# Patient Record
Sex: MALE | Race: WHITE | NOT HISPANIC OR LATINO | Employment: OTHER | URBAN - NONMETROPOLITAN AREA
[De-identification: names, ages, dates, MRNs, and addresses within clinical notes are randomized per-mention and may not be internally consistent; named-entity substitution may affect disease eponyms.]

---

## 2017-04-04 ENCOUNTER — APPOINTMENT (OUTPATIENT)
Dept: GENERAL RADIOLOGY | Facility: HOSPITAL | Age: 57
End: 2017-04-04

## 2017-04-04 ENCOUNTER — HOSPITAL ENCOUNTER (EMERGENCY)
Facility: HOSPITAL | Age: 57
Discharge: HOME | End: 2017-04-04
Admitting: FAMILY MEDICINE

## 2017-04-04 VITALS
HEART RATE: 95 BPM | OXYGEN SATURATION: 96 % | RESPIRATION RATE: 14 BRPM | WEIGHT: 181.4 LBS | SYSTOLIC BLOOD PRESSURE: 123 MMHG | TEMPERATURE: 98.2 F | HEIGHT: 70 IN | DIASTOLIC BLOOD PRESSURE: 70 MMHG | BODY MASS INDEX: 25.97 KG/M2

## 2017-04-04 DIAGNOSIS — M25.431 WRIST SWELLING, RIGHT: Primary | ICD-10-CM

## 2017-04-04 DIAGNOSIS — M25.532 LEFT WRIST PAIN: ICD-10-CM

## 2017-04-04 PROCEDURE — 96372 THER/PROPH/DIAG INJ SC/IM: CPT

## 2017-04-04 PROCEDURE — 25010000002 KETOROLAC TROMETHAMINE PER 15 MG: Performed by: PHYSICIAN ASSISTANT

## 2017-04-04 PROCEDURE — 99283 EMERGENCY DEPT VISIT LOW MDM: CPT

## 2017-04-04 PROCEDURE — 73110 X-RAY EXAM OF WRIST: CPT

## 2017-04-04 RX ORDER — MELOXICAM 7.5 MG/1
7.5 TABLET ORAL DAILY
Qty: 30 TABLET | Refills: 0 | Status: ON HOLD | OUTPATIENT
Start: 2017-04-04 | End: 2021-05-06

## 2017-04-04 RX ORDER — POTASSIUM CHLORIDE 750 MG/1
10 TABLET, EXTENDED RELEASE ORAL 2 TIMES DAILY
COMMUNITY
End: 2019-09-06

## 2017-04-04 RX ORDER — KETOROLAC TROMETHAMINE 30 MG/ML
60 INJECTION, SOLUTION INTRAMUSCULAR; INTRAVENOUS ONCE
Status: COMPLETED | OUTPATIENT
Start: 2017-04-04 | End: 2017-04-04

## 2017-04-04 RX ADMIN — KETOROLAC TROMETHAMINE 60 MG: 30 INJECTION, SOLUTION INTRAMUSCULAR at 14:44

## 2017-04-04 NOTE — DISCHARGE INSTRUCTIONS
Rest, ice, elevate, compress. followup with PCP and/or orthopedic surgeon. Avoid painful movement.

## 2017-04-04 NOTE — ED PROVIDER NOTES
"Subjective   Patient is a 56 y.o. male presenting with upper extremity pain.   Upper Extremity Issue     Patient is a pleasant 56-year-old male with chief complaint of left wrist swelling for the past 5 days.  He denies any trauma.  He reports a fall 1 month ago without any immediate pain.  For the past 5 days, he noticed tenderness to the distal aspect of his left wrist and some swelling.  He reports pain with any type of movement of his wrist.  He denied taking medication to alleviate symptoms.  He has not iced it.  He denies any fever or injections.  He has not sought medical attention until today.  He is right-hand dominant. He denies any other areas of swelling. He has chronic numbness to his extremities that is unchanged.     Review of Systems   Constitutional: Negative.    HENT: Negative.    Respiratory: Negative.    Cardiovascular: Negative.    Genitourinary: Negative.    Musculoskeletal:        Patient has chronic left knee pain.    Neurological: Negative.        History reviewed. No pertinent past medical history.    Allergies   Allergen Reactions   • Penicillins      Pt reports he does not know what kind of reaction he has, states \"I'm highly allergic.\"       Past Surgical History:   Procedure Laterality Date   • CYST REMOVAL     • KNEE SURGERY         History reviewed. No pertinent family history.    Social History     Social History   • Marital status:      Spouse name: N/A   • Number of children: N/A   • Years of education: N/A     Social History Main Topics   • Smoking status: Current Every Day Smoker     Packs/day: 1.00     Types: Cigarettes   • Smokeless tobacco: None   • Alcohol use Yes      Comment: Occasional   • Drug use: Yes     Special: Marijuana   • Sexual activity: Defer     Other Topics Concern   • None     Social History Narrative   • None       Prior to Admission medications    Medication Sig Start Date End Date Taking? Authorizing Provider   potassium chloride (K-DUR,KLOR-CON) 10 " "MEQ CR tablet Take 10 mEq by mouth 2 (Two) Times a Day.   Yes Historical Provider, MD       Medications   ketorolac (TORADOL) injection 60 mg (60 mg Intramuscular Given 4/4/17 1444)       /70 (Patient Position: Sitting)  Pulse 95  Temp 98.2 °F (36.8 °C) (Temporal Artery )   Resp 14  Ht 70\" (177.8 cm)  Wt 181 lb 6.4 oz (82.3 kg)  SpO2 96%  BMI 26.03 kg/m2      Objective   Physical Exam   Constitutional: He is oriented to person, place, and time. He appears well-developed and well-nourished.  Non-toxic appearance. No distress.   HENT:   Head: Normocephalic and atraumatic.   Nose: Nose normal.   Mouth/Throat: Uvula is midline, oropharynx is clear and moist and mucous membranes are normal.   Eyes: Conjunctivae, EOM and lids are normal. Pupils are equal, round, and reactive to light. Lids are everted and swept, no foreign bodies found.   Neck: Trachea normal, normal range of motion, full passive range of motion without pain and phonation normal. Neck supple. Normal carotid pulses and no JVD present. Carotid bruit is not present. No rigidity.   Cardiovascular: Normal rate, regular rhythm, normal heart sounds, intact distal pulses and normal pulses.    Pulmonary/Chest: Effort normal and breath sounds normal. No stridor. No apnea and no tachypnea. No respiratory distress.   Abdominal: Normal appearance and normal aorta. There is no hepatosplenomegaly.   Musculoskeletal: He exhibits edema and tenderness.   Patient has mild-moderate localized not well demarcated swelling (about the size of a lime) to his left distal wrist moreso along the radial region. It's fleshy, nonerythematous, mildly tender to touch.  Pain with ulnar and radial deviation. No pain to touch to his hand, left elbow, or left shoulder. He has full range of motion.        Vascular Status -  His exam exhibits right foot vasculature normal. His exam exhibits no right foot edema. His exam exhibits left foot vasculature normal. His exam exhibits no " left foot edema.  Neurological: He is alert and oriented to person, place, and time. He has normal strength. He displays normal reflexes. No cranial nerve deficit or sensory deficit. Gait normal. GCS eye subscore is 4. GCS verbal subscore is 5. GCS motor subscore is 6.   Skin: Skin is warm, dry and intact. He is not diaphoretic. No pallor.   Psychiatric: He has a normal mood and affect. His speech is normal and behavior is normal.   Nursing note and vitals reviewed.      Procedures         Lab Results (last 24 hours)     ** No results found for the last 24 hours. **          Xr Wrist 3+ View Left    Result Date: 4/4/2017  Narrative: EXAMINATION:  XR WRIST 3+ VW LEFT-  4/4/2017 3:22 PM EDT  HISTORY: Left wrist pain.  COMPARISON: No comparison study.  TECHNIQUE: 3 views were obtained.  FINDINGS:  There is no displaced fracture. There may be slight anterior subluxation of the distal ulna on the lateral view. Correlate with location of pain.      Impression: 1. No fracture is identified. 2. There may be slight anterior subluxation of the distal ulna on the lateral view. Correlate clinically.  This report was finalized on 04/04/2017 14:32 by Dr. Barrington Fajardo MD.      ED Course  ED Course        I reviewed this case with Dr. Luna who has reviewed the xray. The patient does NOT have any point tenderness or pain to his ulnar region.    MDM    Final diagnoses:   Wrist swelling, right   Left wrist pain          Thu-MAYA Gotti  04/04/17 0071

## 2017-07-17 ENCOUNTER — HOSPITAL ENCOUNTER (OUTPATIENT)
Dept: MRI IMAGING | Age: 57
Discharge: HOME | End: 2017-07-17
Payer: MEDICARE

## 2017-07-17 DIAGNOSIS — M25.532 ACUTE PAIN OF LEFT WRIST: ICD-10-CM

## 2017-07-17 PROCEDURE — 73221 MRI JOINT UPR EXTREM W/O DYE: CPT

## 2017-08-09 ENCOUNTER — OFFICE VISIT (OUTPATIENT)
Dept: GASTROENTEROLOGY | Age: 57
End: 2017-08-09
Payer: MEDICARE

## 2017-08-09 VITALS
RESPIRATION RATE: 18 BRPM | HEIGHT: 70 IN | HEART RATE: 63 BPM | WEIGHT: 171 LBS | DIASTOLIC BLOOD PRESSURE: 80 MMHG | SYSTOLIC BLOOD PRESSURE: 132 MMHG | OXYGEN SATURATION: 97 % | BODY MASS INDEX: 24.48 KG/M2

## 2017-08-09 DIAGNOSIS — K64.9 HEMORRHOIDS, UNSPECIFIED HEMORRHOID TYPE: ICD-10-CM

## 2017-08-09 DIAGNOSIS — Z86.010 HISTORY OF ADENOMATOUS POLYP OF COLON: Primary | ICD-10-CM

## 2017-08-09 PROCEDURE — 3017F COLORECTAL CA SCREEN DOC REV: CPT | Performed by: NURSE PRACTITIONER

## 2017-08-09 PROCEDURE — 99203 OFFICE O/P NEW LOW 30 MIN: CPT | Performed by: NURSE PRACTITIONER

## 2017-08-09 PROCEDURE — 4004F PT TOBACCO SCREEN RCVD TLK: CPT | Performed by: NURSE PRACTITIONER

## 2017-08-09 PROCEDURE — G8427 DOCREV CUR MEDS BY ELIG CLIN: HCPCS | Performed by: NURSE PRACTITIONER

## 2017-08-09 PROCEDURE — G8420 CALC BMI NORM PARAMETERS: HCPCS | Performed by: NURSE PRACTITIONER

## 2017-08-09 ASSESSMENT — ENCOUNTER SYMPTOMS
CHEST TIGHTNESS: 0
RECTAL PAIN: 0
NAUSEA: 0
BACK PAIN: 1
VOICE CHANGE: 0
SHORTNESS OF BREATH: 1
COUGH: 1
VOMITING: 0
ABDOMINAL DISTENTION: 0
SORE THROAT: 0
CONSTIPATION: 1
ABDOMINAL PAIN: 0
DIARRHEA: 0
BLOOD IN STOOL: 0

## 2017-08-22 ENCOUNTER — ANESTHESIA EVENT (OUTPATIENT)
Dept: OPERATING ROOM | Age: 57
End: 2017-08-22

## 2017-08-30 ENCOUNTER — HOSPITAL ENCOUNTER (OUTPATIENT)
Age: 57
Setting detail: OUTPATIENT SURGERY
Discharge: HOME | End: 2017-08-30
Attending: INTERNAL MEDICINE | Admitting: INTERNAL MEDICINE
Payer: MEDICARE

## 2017-08-30 ENCOUNTER — ANESTHESIA (OUTPATIENT)
Dept: OPERATING ROOM | Age: 57
End: 2017-08-30
Payer: MEDICARE

## 2017-08-30 ENCOUNTER — HOSPITAL ENCOUNTER (OUTPATIENT)
Age: 57
Setting detail: SPECIMEN
Discharge: HOME | End: 2017-08-30
Payer: MEDICARE

## 2017-08-30 VITALS — SYSTOLIC BLOOD PRESSURE: 97 MMHG | OXYGEN SATURATION: 98 % | DIASTOLIC BLOOD PRESSURE: 54 MMHG

## 2017-08-30 VITALS
OXYGEN SATURATION: 95 % | HEIGHT: 69 IN | SYSTOLIC BLOOD PRESSURE: 93 MMHG | BODY MASS INDEX: 25.18 KG/M2 | WEIGHT: 170 LBS | DIASTOLIC BLOOD PRESSURE: 45 MMHG | TEMPERATURE: 97 F | RESPIRATION RATE: 18 BRPM | HEART RATE: 65 BPM

## 2017-08-30 PROCEDURE — 45385 COLONOSCOPY W/LESION REMOVAL: CPT

## 2017-08-30 PROCEDURE — 88305 TISSUE EXAM BY PATHOLOGIST: CPT

## 2017-08-30 PROCEDURE — G8907 PT DOC NO EVENTS ON DISCHARG: HCPCS

## 2017-08-30 PROCEDURE — 00810 PR ANESTH,INTESTINE,SCOPE,LOW: CPT | Performed by: NURSE ANESTHETIST, CERTIFIED REGISTERED

## 2017-08-30 PROCEDURE — 45385 COLONOSCOPY W/LESION REMOVAL: CPT | Performed by: INTERNAL MEDICINE

## 2017-08-30 PROCEDURE — G8918 PT W/O PREOP ORDER IV AB PRO: HCPCS

## 2017-08-30 RX ORDER — SODIUM CHLORIDE, SODIUM LACTATE, POTASSIUM CHLORIDE, CALCIUM CHLORIDE 600; 310; 30; 20 MG/100ML; MG/100ML; MG/100ML; MG/100ML
INJECTION, SOLUTION INTRAVENOUS CONTINUOUS
Status: DISCONTINUED | OUTPATIENT
Start: 2017-08-30 | End: 2017-08-30 | Stop reason: HOSPADM

## 2017-08-30 RX ORDER — PROPOFOL 10 MG/ML
INJECTION, EMULSION INTRAVENOUS PRN
Status: DISCONTINUED | OUTPATIENT
Start: 2017-08-30 | End: 2017-08-30 | Stop reason: SDUPTHER

## 2017-08-30 RX ORDER — SODIUM CHLORIDE 9 MG/ML
INJECTION, SOLUTION INTRAVENOUS CONTINUOUS
Status: DISCONTINUED | OUTPATIENT
Start: 2017-08-30 | End: 2017-08-30 | Stop reason: HOSPADM

## 2017-08-30 RX ORDER — LIDOCAINE HYDROCHLORIDE 10 MG/ML
1 INJECTION, SOLUTION EPIDURAL; INFILTRATION; INTRACAUDAL; PERINEURAL
Status: DISCONTINUED | OUTPATIENT
Start: 2017-08-30 | End: 2017-08-30 | Stop reason: HOSPADM

## 2017-08-30 RX ADMIN — PROPOFOL 200 MG: 10 INJECTION, EMULSION INTRAVENOUS at 12:50

## 2017-08-30 RX ADMIN — SODIUM CHLORIDE: 9 INJECTION, SOLUTION INTRAVENOUS at 11:54

## 2017-10-26 ENCOUNTER — HOSPITAL ENCOUNTER (OUTPATIENT)
Dept: MRI IMAGING | Age: 57
Discharge: HOME | End: 2017-10-26
Payer: MEDICARE

## 2017-10-26 DIAGNOSIS — M54.2 CERVICALGIA: ICD-10-CM

## 2017-10-26 PROCEDURE — 72141 MRI NECK SPINE W/O DYE: CPT

## 2017-12-07 ENCOUNTER — HOSPITAL ENCOUNTER (OUTPATIENT)
Dept: GENERAL RADIOLOGY | Age: 57
Discharge: HOME | End: 2017-12-07
Payer: MEDICARE

## 2017-12-07 ENCOUNTER — HOSPITAL ENCOUNTER (OUTPATIENT)
Dept: PREADMISSION TESTING | Age: 57
Discharge: HOME | End: 2017-12-07
Payer: MEDICARE

## 2017-12-07 VITALS — BODY MASS INDEX: 25.62 KG/M2 | HEIGHT: 70 IN | WEIGHT: 179 LBS

## 2017-12-07 LAB
ALBUMIN SERPL-MCNC: 4.8 G/DL (ref 3.5–5.2)
ALP BLD-CCNC: 84 U/L (ref 40–130)
ALT SERPL-CCNC: 19 U/L (ref 5–41)
ANION GAP SERPL CALCULATED.3IONS-SCNC: 15 MMOL/L (ref 7–19)
APTT: 33 SEC (ref 26–36.2)
AST SERPL-CCNC: 17 U/L (ref 5–40)
BASOPHILS ABSOLUTE: 0 K/UL (ref 0–0.2)
BASOPHILS RELATIVE PERCENT: 0.4 % (ref 0–1)
BILIRUB SERPL-MCNC: 0.6 MG/DL (ref 0.2–1.2)
BILIRUBIN URINE: NEGATIVE
BLOOD, URINE: NEGATIVE
BUN BLDV-MCNC: 10 MG/DL (ref 6–20)
CALCIUM SERPL-MCNC: 9.5 MG/DL (ref 8.6–10)
CHLORIDE BLD-SCNC: 104 MMOL/L (ref 98–111)
CLARITY: CLEAR
CO2: 26 MMOL/L (ref 22–29)
COLOR: YELLOW
CREAT SERPL-MCNC: 0.6 MG/DL (ref 0.5–1.2)
EOSINOPHILS ABSOLUTE: 0.2 K/UL (ref 0–0.6)
EOSINOPHILS RELATIVE PERCENT: 2.7 % (ref 0–5)
GFR NON-AFRICAN AMERICAN: >60
GLUCOSE BLD-MCNC: 75 MG/DL (ref 74–109)
GLUCOSE URINE: NEGATIVE MG/DL
HCT VFR BLD CALC: 49.9 % (ref 42–52)
HEMOGLOBIN: 16.1 G/DL (ref 14–18)
INR BLD: 0.99 (ref 0.88–1.18)
KETONES, URINE: NEGATIVE MG/DL
LEUKOCYTE ESTERASE, URINE: NEGATIVE
LYMPHOCYTES ABSOLUTE: 3.5 K/UL (ref 1.1–4.5)
LYMPHOCYTES RELATIVE PERCENT: 38.7 % (ref 20–40)
MCH RBC QN AUTO: 30 PG (ref 27–31)
MCHC RBC AUTO-ENTMCNC: 32.3 G/DL (ref 33–37)
MCV RBC AUTO: 93.1 FL (ref 80–94)
MONOCYTES ABSOLUTE: 0.8 K/UL (ref 0–0.9)
MONOCYTES RELATIVE PERCENT: 8.5 % (ref 0–10)
NEUTROPHILS ABSOLUTE: 4.5 K/UL (ref 1.5–7.5)
NEUTROPHILS RELATIVE PERCENT: 49.5 % (ref 50–65)
NITRITE, URINE: NEGATIVE
PDW BLD-RTO: 13.3 % (ref 11.5–14.5)
PH UA: 7.5
PLATELET # BLD: 274 K/UL (ref 130–400)
PMV BLD AUTO: 11.9 FL (ref 9.4–12.4)
POTASSIUM SERPL-SCNC: 3.8 MMOL/L (ref 3.5–5)
PROTEIN UA: NEGATIVE MG/DL
PROTHROMBIN TIME: 13 SEC (ref 12–14.6)
RBC # BLD: 5.36 M/UL (ref 4.7–6.1)
SODIUM BLD-SCNC: 145 MMOL/L (ref 136–145)
SPECIFIC GRAVITY UA: 1.01
TOTAL PROTEIN: 7.3 G/DL (ref 6.6–8.7)
UROBILINOGEN, URINE: 0.2 E.U./DL
WBC # BLD: 9 K/UL (ref 4.8–10.8)

## 2017-12-07 PROCEDURE — 93005 ELECTROCARDIOGRAM TRACING: CPT

## 2017-12-07 PROCEDURE — 85730 THROMBOPLASTIN TIME PARTIAL: CPT

## 2017-12-07 PROCEDURE — 71020 XR CHEST STANDARD TWO VW: CPT

## 2017-12-07 PROCEDURE — 81003 URINALYSIS AUTO W/O SCOPE: CPT

## 2017-12-07 PROCEDURE — 85610 PROTHROMBIN TIME: CPT

## 2017-12-07 PROCEDURE — 87070 CULTURE OTHR SPECIMN AEROBIC: CPT

## 2017-12-07 PROCEDURE — 85025 COMPLETE CBC W/AUTO DIFF WBC: CPT

## 2017-12-07 PROCEDURE — 80053 COMPREHEN METABOLIC PANEL: CPT

## 2017-12-08 LAB — MRSA CULTURE ONLY: NORMAL

## 2017-12-11 LAB
EKG P AXIS: 75 DEGREES
EKG P-R INTERVAL: 178 MS
EKG Q-T INTERVAL: 444 MS
EKG QRS DURATION: 110 MS
EKG QTC CALCULATION (BAZETT): 430 MS
EKG T AXIS: 45 DEGREES

## 2017-12-12 ENCOUNTER — APPOINTMENT (OUTPATIENT)
Dept: GENERAL RADIOLOGY | Age: 57
DRG: 473 | End: 2017-12-12
Payer: MEDICARE

## 2017-12-12 ENCOUNTER — ANESTHESIA (OUTPATIENT)
Dept: OPERATING ROOM | Age: 57
DRG: 473 | End: 2017-12-12
Payer: MEDICARE

## 2017-12-12 ENCOUNTER — ANESTHESIA EVENT (OUTPATIENT)
Dept: OPERATING ROOM | Age: 57
DRG: 473 | End: 2017-12-12
Payer: MEDICARE

## 2017-12-12 ENCOUNTER — HOSPITAL ENCOUNTER (INPATIENT)
Age: 57
LOS: 1 days | Discharge: HOME | DRG: 473 | End: 2017-12-13
Payer: MEDICARE

## 2017-12-12 VITALS
SYSTOLIC BLOOD PRESSURE: 78 MMHG | TEMPERATURE: 98.6 F | RESPIRATION RATE: 6 BRPM | OXYGEN SATURATION: 100 % | DIASTOLIC BLOOD PRESSURE: 37 MMHG

## 2017-12-12 PROBLEM — G95.9 MYELOPATHY OF CERVICAL SPINAL CORD WITH CERVICAL RADICULOPATHY (HCC): Status: ACTIVE | Noted: 2017-12-12

## 2017-12-12 PROBLEM — G95.9 CERVICAL MYELOPATHY (HCC): Status: ACTIVE | Noted: 2017-12-12

## 2017-12-12 PROBLEM — M54.2 CHRONIC NECK PAIN: Status: ACTIVE | Noted: 2017-12-12

## 2017-12-12 PROBLEM — M54.12 MYELOPATHY OF CERVICAL SPINAL CORD WITH CERVICAL RADICULOPATHY (HCC): Status: ACTIVE | Noted: 2017-12-12

## 2017-12-12 PROBLEM — G89.29 CHRONIC NECK PAIN: Status: ACTIVE | Noted: 2017-12-12

## 2017-12-12 PROBLEM — M50.321 DEGENERATION OF INTERVERTEBRAL DISC AT C4-C5 LEVEL: Status: ACTIVE | Noted: 2017-12-12

## 2017-12-12 LAB
ABO/RH: NORMAL
ANTIBODY SCREEN: NORMAL

## 2017-12-12 PROCEDURE — 6360000002 HC RX W HCPCS: Performed by: ANESTHESIOLOGY

## 2017-12-12 PROCEDURE — C1713 ANCHOR/SCREW BN/BN,TIS/BN: HCPCS

## 2017-12-12 PROCEDURE — 2720000001 HC MISC SURG SUPPLY STERILE $51-500

## 2017-12-12 PROCEDURE — 7100000000 HC PACU RECOVERY - FIRST 15 MIN

## 2017-12-12 PROCEDURE — 6360000002 HC RX W HCPCS: Performed by: NURSE ANESTHETIST, CERTIFIED REGISTERED

## 2017-12-12 PROCEDURE — XRG20F3 FUSION OF 2 OR MORE CERVICAL VERTEBRAL JOINTS USING RADIOLUCENT POROUS INTERBODY FUSION DEVICE, OPEN APPROACH, NEW TECHNOLOGY GROUP 3: ICD-10-PCS

## 2017-12-12 PROCEDURE — 6360000002 HC RX W HCPCS: Performed by: PHYSICIAN ASSISTANT

## 2017-12-12 PROCEDURE — 3600000005 HC SURGERY LEVEL 5 BASE

## 2017-12-12 PROCEDURE — 2500000003 HC RX 250 WO HCPCS: Performed by: NURSE ANESTHETIST, CERTIFIED REGISTERED

## 2017-12-12 PROCEDURE — 86900 BLOOD TYPING SEROLOGIC ABO: CPT

## 2017-12-12 PROCEDURE — 86901 BLOOD TYPING SEROLOGIC RH(D): CPT

## 2017-12-12 PROCEDURE — 3209999900 FLUORO FOR SURGICAL PROCEDURES

## 2017-12-12 PROCEDURE — G8978 MOBILITY CURRENT STATUS: HCPCS

## 2017-12-12 PROCEDURE — 6370000000 HC RX 637 (ALT 250 FOR IP): Performed by: ANESTHESIOLOGY

## 2017-12-12 PROCEDURE — 97161 PT EVAL LOW COMPLEX 20 MIN: CPT

## 2017-12-12 PROCEDURE — 1210000000 HC MED SURG R&B

## 2017-12-12 PROCEDURE — 94640 AIRWAY INHALATION TREATMENT: CPT

## 2017-12-12 PROCEDURE — 86850 RBC ANTIBODY SCREEN: CPT

## 2017-12-12 PROCEDURE — 2720000010 HC SURG SUPPLY STERILE

## 2017-12-12 PROCEDURE — 36415 COLL VENOUS BLD VENIPUNCTURE: CPT

## 2017-12-12 PROCEDURE — 7100000001 HC PACU RECOVERY - ADDTL 15 MIN

## 2017-12-12 PROCEDURE — G8979 MOBILITY GOAL STATUS: HCPCS

## 2017-12-12 PROCEDURE — 3700000001 HC ADD 15 MINUTES (ANESTHESIA)

## 2017-12-12 PROCEDURE — 3700000000 HC ANESTHESIA ATTENDED CARE

## 2017-12-12 PROCEDURE — 3600000015 HC SURGERY LEVEL 5 ADDTL 15MIN

## 2017-12-12 PROCEDURE — 2580000003 HC RX 258: Performed by: PHYSICIAN ASSISTANT

## 2017-12-12 PROCEDURE — 2580000003 HC RX 258: Performed by: ANESTHESIOLOGY

## 2017-12-12 PROCEDURE — 94762 N-INVAS EAR/PLS OXIMTRY CONT: CPT

## 2017-12-12 PROCEDURE — 6360000002 HC RX W HCPCS

## 2017-12-12 PROCEDURE — 94664 DEMO&/EVAL PT USE INHALER: CPT

## 2017-12-12 PROCEDURE — L8699 PROSTHETIC IMPLANT NOS: HCPCS

## 2017-12-12 PROCEDURE — 2500000003 HC RX 250 WO HCPCS

## 2017-12-12 PROCEDURE — 0RT30ZZ RESECTION OF CERVICAL VERTEBRAL DISC, OPEN APPROACH: ICD-10-PCS

## 2017-12-12 PROCEDURE — 6370000000 HC RX 637 (ALT 250 FOR IP): Performed by: PHYSICIAN ASSISTANT

## 2017-12-12 DEVICE — CAGE SPNL W14XH8XL16MM FOOTPRINT 7DEG LORDOSIS CERV COHERE: Type: IMPLANTABLE DEVICE | Site: SPINE CERVICAL | Status: FUNCTIONAL

## 2017-12-12 DEVICE — PRIMAGEN 1CC: Type: IMPLANTABLE DEVICE | Site: SPINE CERVICAL | Status: FUNCTIONAL

## 2017-12-12 DEVICE — IMPLANTABLE DEVICE: Type: IMPLANTABLE DEVICE | Site: SPINE CERVICAL | Status: FUNCTIONAL

## 2017-12-12 DEVICE — AGENT HEMSTAT 8ML FLX TIP MTRX + DISP SURGIFLO: Type: IMPLANTABLE DEVICE | Site: SPINE CERVICAL | Status: FUNCTIONAL

## 2017-12-12 RX ORDER — IPRATROPIUM BROMIDE AND ALBUTEROL SULFATE 2.5; .5 MG/3ML; MG/3ML
1 SOLUTION RESPIRATORY (INHALATION) ONCE
Status: COMPLETED | OUTPATIENT
Start: 2017-12-12 | End: 2017-12-12

## 2017-12-12 RX ORDER — METOCLOPRAMIDE HYDROCHLORIDE 5 MG/ML
10 INJECTION INTRAMUSCULAR; INTRAVENOUS
Status: DISCONTINUED | OUTPATIENT
Start: 2017-12-12 | End: 2017-12-12 | Stop reason: HOSPADM

## 2017-12-12 RX ORDER — NICOTINE 21 MG/24HR
1 PATCH, TRANSDERMAL 24 HOURS TRANSDERMAL DAILY
Status: DISCONTINUED | OUTPATIENT
Start: 2017-12-12 | End: 2017-12-13 | Stop reason: HOSPADM

## 2017-12-12 RX ORDER — ONDANSETRON 2 MG/ML
INJECTION INTRAMUSCULAR; INTRAVENOUS PRN
Status: DISCONTINUED | OUTPATIENT
Start: 2017-12-12 | End: 2017-12-12 | Stop reason: SDUPTHER

## 2017-12-12 RX ORDER — SUFENTANIL CITRATE 50 UG/ML
INJECTION EPIDURAL; INTRAVENOUS PRN
Status: DISCONTINUED | OUTPATIENT
Start: 2017-12-12 | End: 2017-12-12 | Stop reason: SDUPTHER

## 2017-12-12 RX ORDER — MEPERIDINE HYDROCHLORIDE 50 MG/ML
12.5 INJECTION INTRAMUSCULAR; INTRAVENOUS; SUBCUTANEOUS EVERY 5 MIN PRN
Status: DISCONTINUED | OUTPATIENT
Start: 2017-12-12 | End: 2017-12-12 | Stop reason: HOSPADM

## 2017-12-12 RX ORDER — OXYCODONE HYDROCHLORIDE AND ACETAMINOPHEN 5; 325 MG/1; MG/1
1 TABLET ORAL EVERY 4 HOURS PRN
Status: DISCONTINUED | OUTPATIENT
Start: 2017-12-12 | End: 2017-12-13 | Stop reason: HOSPADM

## 2017-12-12 RX ORDER — FENTANYL CITRATE 50 UG/ML
50 INJECTION, SOLUTION INTRAMUSCULAR; INTRAVENOUS
Status: DISCONTINUED | OUTPATIENT
Start: 2017-12-12 | End: 2017-12-12 | Stop reason: HOSPADM

## 2017-12-12 RX ORDER — LABETALOL HYDROCHLORIDE 5 MG/ML
5 INJECTION, SOLUTION INTRAVENOUS EVERY 10 MIN PRN
Status: DISCONTINUED | OUTPATIENT
Start: 2017-12-12 | End: 2017-12-12 | Stop reason: HOSPADM

## 2017-12-12 RX ORDER — DEXAMETHASONE SODIUM PHOSPHATE 10 MG/ML
INJECTION INTRAMUSCULAR; INTRAVENOUS PRN
Status: DISCONTINUED | OUTPATIENT
Start: 2017-12-12 | End: 2017-12-12 | Stop reason: SDUPTHER

## 2017-12-12 RX ORDER — HYDRALAZINE HYDROCHLORIDE 20 MG/ML
5 INJECTION INTRAMUSCULAR; INTRAVENOUS EVERY 10 MIN PRN
Status: DISCONTINUED | OUTPATIENT
Start: 2017-12-12 | End: 2017-12-12 | Stop reason: HOSPADM

## 2017-12-12 RX ORDER — POLYETHYLENE GLYCOL 3350 17 G/17G
17 POWDER, FOR SOLUTION ORAL DAILY PRN
Status: DISCONTINUED | OUTPATIENT
Start: 2017-12-12 | End: 2017-12-13 | Stop reason: HOSPADM

## 2017-12-12 RX ORDER — FAMOTIDINE 20 MG/1
20 TABLET, FILM COATED ORAL 2 TIMES DAILY
Status: DISCONTINUED | OUTPATIENT
Start: 2017-12-12 | End: 2017-12-13 | Stop reason: HOSPADM

## 2017-12-12 RX ORDER — PROPOFOL 10 MG/ML
INJECTION, EMULSION INTRAVENOUS PRN
Status: DISCONTINUED | OUTPATIENT
Start: 2017-12-12 | End: 2017-12-12 | Stop reason: SDUPTHER

## 2017-12-12 RX ORDER — ACETAMINOPHEN 325 MG/1
650 TABLET ORAL EVERY 4 HOURS PRN
Status: DISCONTINUED | OUTPATIENT
Start: 2017-12-12 | End: 2017-12-13 | Stop reason: HOSPADM

## 2017-12-12 RX ORDER — SODIUM CHLORIDE 0.9 % (FLUSH) 0.9 %
10 SYRINGE (ML) INJECTION PRN
Status: DISCONTINUED | OUTPATIENT
Start: 2017-12-12 | End: 2017-12-12 | Stop reason: HOSPADM

## 2017-12-12 RX ORDER — SUCCINYLCHOLINE CHLORIDE 20 MG/ML
INJECTION INTRAMUSCULAR; INTRAVENOUS PRN
Status: DISCONTINUED | OUTPATIENT
Start: 2017-12-12 | End: 2017-12-12 | Stop reason: SDUPTHER

## 2017-12-12 RX ORDER — LIDOCAINE HYDROCHLORIDE 10 MG/ML
INJECTION, SOLUTION INFILTRATION; PERINEURAL PRN
Status: DISCONTINUED | OUTPATIENT
Start: 2017-12-12 | End: 2017-12-12 | Stop reason: SDUPTHER

## 2017-12-12 RX ORDER — MORPHINE SULFATE 1 MG/ML
4 INJECTION, SOLUTION EPIDURAL; INTRATHECAL; INTRAVENOUS EVERY 5 MIN PRN
Status: DISCONTINUED | OUTPATIENT
Start: 2017-12-12 | End: 2017-12-12 | Stop reason: HOSPADM

## 2017-12-12 RX ORDER — MIDAZOLAM HYDROCHLORIDE 1 MG/ML
2 INJECTION INTRAMUSCULAR; INTRAVENOUS
Status: COMPLETED | OUTPATIENT
Start: 2017-12-12 | End: 2017-12-12

## 2017-12-12 RX ORDER — OXYCODONE HYDROCHLORIDE AND ACETAMINOPHEN 5; 325 MG/1; MG/1
2 TABLET ORAL EVERY 4 HOURS PRN
Status: DISCONTINUED | OUTPATIENT
Start: 2017-12-12 | End: 2017-12-13 | Stop reason: HOSPADM

## 2017-12-12 RX ORDER — SODIUM CHLORIDE, SODIUM LACTATE, POTASSIUM CHLORIDE, CALCIUM CHLORIDE 600; 310; 30; 20 MG/100ML; MG/100ML; MG/100ML; MG/100ML
INJECTION, SOLUTION INTRAVENOUS CONTINUOUS
Status: DISCONTINUED | OUTPATIENT
Start: 2017-12-12 | End: 2017-12-12

## 2017-12-12 RX ORDER — SODIUM CHLORIDE 0.9 % (FLUSH) 0.9 %
10 SYRINGE (ML) INJECTION EVERY 12 HOURS SCHEDULED
Status: DISCONTINUED | OUTPATIENT
Start: 2017-12-12 | End: 2017-12-12 | Stop reason: HOSPADM

## 2017-12-12 RX ORDER — LIDOCAINE HYDROCHLORIDE 10 MG/ML
1 INJECTION, SOLUTION EPIDURAL; INFILTRATION; INTRACAUDAL; PERINEURAL
Status: DISCONTINUED | OUTPATIENT
Start: 2017-12-12 | End: 2017-12-12 | Stop reason: HOSPADM

## 2017-12-12 RX ORDER — PROPOFOL 10 MG/ML
INJECTION, EMULSION INTRAVENOUS CONTINUOUS PRN
Status: DISCONTINUED | OUTPATIENT
Start: 2017-12-12 | End: 2017-12-12 | Stop reason: SDUPTHER

## 2017-12-12 RX ORDER — MORPHINE SULFATE 1 MG/ML
2 INJECTION, SOLUTION EPIDURAL; INTRATHECAL; INTRAVENOUS EVERY 5 MIN PRN
Status: DISCONTINUED | OUTPATIENT
Start: 2017-12-12 | End: 2017-12-12 | Stop reason: HOSPADM

## 2017-12-12 RX ORDER — DIPHENHYDRAMINE HYDROCHLORIDE 50 MG/ML
12.5 INJECTION INTRAMUSCULAR; INTRAVENOUS
Status: DISCONTINUED | OUTPATIENT
Start: 2017-12-12 | End: 2017-12-12 | Stop reason: HOSPADM

## 2017-12-12 RX ORDER — ONDANSETRON 2 MG/ML
4 INJECTION INTRAMUSCULAR; INTRAVENOUS EVERY 6 HOURS PRN
Status: DISCONTINUED | OUTPATIENT
Start: 2017-12-12 | End: 2017-12-13 | Stop reason: HOSPADM

## 2017-12-12 RX ORDER — PROMETHAZINE HYDROCHLORIDE 25 MG/ML
6.25 INJECTION, SOLUTION INTRAMUSCULAR; INTRAVENOUS
Status: DISCONTINUED | OUTPATIENT
Start: 2017-12-12 | End: 2017-12-12 | Stop reason: HOSPADM

## 2017-12-12 RX ORDER — DOCUSATE SODIUM 100 MG/1
100 CAPSULE, LIQUID FILLED ORAL DAILY
Status: DISCONTINUED | OUTPATIENT
Start: 2017-12-12 | End: 2017-12-13 | Stop reason: HOSPADM

## 2017-12-12 RX ORDER — DIAZEPAM 5 MG/1
5 TABLET ORAL EVERY 6 HOURS PRN
Status: DISCONTINUED | OUTPATIENT
Start: 2017-12-12 | End: 2017-12-13 | Stop reason: HOSPADM

## 2017-12-12 RX ORDER — ENALAPRILAT 2.5 MG/2ML
1.25 INJECTION INTRAVENOUS
Status: DISCONTINUED | OUTPATIENT
Start: 2017-12-12 | End: 2017-12-12 | Stop reason: HOSPADM

## 2017-12-12 RX ORDER — MORPHINE SULFATE 4 MG/ML
2 INJECTION, SOLUTION INTRAMUSCULAR; INTRAVENOUS
Status: DISCONTINUED | OUTPATIENT
Start: 2017-12-12 | End: 2017-12-13 | Stop reason: HOSPADM

## 2017-12-12 RX ORDER — SODIUM CHLORIDE 9 MG/ML
INJECTION, SOLUTION INTRAVENOUS CONTINUOUS
Status: DISCONTINUED | OUTPATIENT
Start: 2017-12-12 | End: 2017-12-13 | Stop reason: HOSPADM

## 2017-12-12 RX ORDER — SCOLOPAMINE TRANSDERMAL SYSTEM 1 MG/1
1 PATCH, EXTENDED RELEASE TRANSDERMAL ONCE
Status: DISCONTINUED | OUTPATIENT
Start: 2017-12-12 | End: 2017-12-12 | Stop reason: HOSPADM

## 2017-12-12 RX ORDER — SODIUM CHLORIDE 0.9 % (FLUSH) 0.9 %
10 SYRINGE (ML) INJECTION PRN
Status: DISCONTINUED | OUTPATIENT
Start: 2017-12-12 | End: 2017-12-13 | Stop reason: HOSPADM

## 2017-12-12 RX ORDER — SODIUM CHLORIDE 0.9 % (FLUSH) 0.9 %
10 SYRINGE (ML) INJECTION EVERY 12 HOURS SCHEDULED
Status: DISCONTINUED | OUTPATIENT
Start: 2017-12-12 | End: 2017-12-13 | Stop reason: HOSPADM

## 2017-12-12 RX ORDER — MORPHINE SULFATE 4 MG/ML
4 INJECTION, SOLUTION INTRAMUSCULAR; INTRAVENOUS
Status: DISCONTINUED | OUTPATIENT
Start: 2017-12-12 | End: 2017-12-13 | Stop reason: HOSPADM

## 2017-12-12 RX ADMIN — MIDAZOLAM 2 MG: 1 INJECTION INTRAMUSCULAR; INTRAVENOUS at 07:34

## 2017-12-12 RX ADMIN — PHENYLEPHRINE HYDROCHLORIDE 100 MCG: 10 INJECTION INTRAVENOUS at 09:14

## 2017-12-12 RX ADMIN — OXYCODONE HYDROCHLORIDE AND ACETAMINOPHEN 2 TABLET: 5; 325 TABLET ORAL at 17:58

## 2017-12-12 RX ADMIN — IPRATROPIUM BROMIDE AND ALBUTEROL SULFATE 1 AMPULE: .5; 3 SOLUTION RESPIRATORY (INHALATION) at 07:19

## 2017-12-12 RX ADMIN — DOCUSATE SODIUM 100 MG: 100 CAPSULE, LIQUID FILLED ORAL at 12:45

## 2017-12-12 RX ADMIN — PHENYLEPHRINE HYDROCHLORIDE 200 MCG: 10 INJECTION INTRAVENOUS at 10:04

## 2017-12-12 RX ADMIN — SUCCINYLCHOLINE CHLORIDE 140 MG: 20 INJECTION, SOLUTION INTRAMUSCULAR; INTRAVENOUS; PARENTERAL at 08:39

## 2017-12-12 RX ADMIN — PHENYLEPHRINE HYDROCHLORIDE 200 MCG: 10 INJECTION INTRAVENOUS at 10:15

## 2017-12-12 RX ADMIN — LIDOCAINE HYDROCHLORIDE 50 MG: 10 INJECTION, SOLUTION INFILTRATION; PERINEURAL at 08:39

## 2017-12-12 RX ADMIN — PHENYLEPHRINE HYDROCHLORIDE 200 MCG: 10 INJECTION INTRAVENOUS at 09:28

## 2017-12-12 RX ADMIN — PHENYLEPHRINE HYDROCHLORIDE 200 MCG: 10 INJECTION INTRAVENOUS at 10:35

## 2017-12-12 RX ADMIN — PHENYLEPHRINE HYDROCHLORIDE 100 MCG: 10 INJECTION INTRAVENOUS at 08:58

## 2017-12-12 RX ADMIN — DIAZEPAM 5 MG: 5 TABLET ORAL at 18:42

## 2017-12-12 RX ADMIN — SODIUM CHLORIDE, SODIUM LACTATE, POTASSIUM CHLORIDE, AND CALCIUM CHLORIDE: 600; 310; 30; 20 INJECTION, SOLUTION INTRAVENOUS at 08:36

## 2017-12-12 RX ADMIN — ONDANSETRON HYDROCHLORIDE 4 MG: 2 SOLUTION INTRAMUSCULAR; INTRAVENOUS at 08:58

## 2017-12-12 RX ADMIN — PHENYLEPHRINE HYDROCHLORIDE 200 MCG: 10 INJECTION INTRAVENOUS at 09:54

## 2017-12-12 RX ADMIN — SODIUM CHLORIDE, SODIUM LACTATE, POTASSIUM CHLORIDE, AND CALCIUM CHLORIDE: 600; 310; 30; 20 INJECTION, SOLUTION INTRAVENOUS at 09:36

## 2017-12-12 RX ADMIN — CEFAZOLIN SODIUM 1 G: 1 INJECTION, SOLUTION INTRAVENOUS at 17:58

## 2017-12-12 RX ADMIN — PROPOFOL 160 MCG/KG/MIN: 10 INJECTION, EMULSION INTRAVENOUS at 08:39

## 2017-12-12 RX ADMIN — SODIUM CHLORIDE, SODIUM LACTATE, POTASSIUM CHLORIDE, AND CALCIUM CHLORIDE: 600; 310; 30; 20 INJECTION, SOLUTION INTRAVENOUS at 10:25

## 2017-12-12 RX ADMIN — ACETAMINOPHEN 650 MG: 325 TABLET ORAL at 12:45

## 2017-12-12 RX ADMIN — MORPHINE SULFATE 4 MG: 4 INJECTION INTRAVENOUS at 18:42

## 2017-12-12 RX ADMIN — PROPOFOL 120 MG: 10 INJECTION, EMULSION INTRAVENOUS at 08:39

## 2017-12-12 RX ADMIN — PHENYLEPHRINE HYDROCHLORIDE 100 MCG: 10 INJECTION INTRAVENOUS at 09:06

## 2017-12-12 RX ADMIN — DEXAMETHASONE SODIUM PHOSPHATE 10 MG: 10 INJECTION INTRAMUSCULAR; INTRAVENOUS at 08:58

## 2017-12-12 RX ADMIN — Medication 10 ML: at 20:13

## 2017-12-12 RX ADMIN — CEFAZOLIN SODIUM 2 G: 2 SOLUTION INTRAVENOUS at 08:53

## 2017-12-12 RX ADMIN — FAMOTIDINE 20 MG: 20 TABLET ORAL at 20:12

## 2017-12-12 RX ADMIN — FAMOTIDINE 20 MG: 20 TABLET ORAL at 12:45

## 2017-12-12 RX ADMIN — OXYCODONE HYDROCHLORIDE AND ACETAMINOPHEN 2 TABLET: 5; 325 TABLET ORAL at 22:49

## 2017-12-12 RX ADMIN — SUFENTANIL CITRATE 60 MCG: 50 INJECTION EPIDURAL; INTRAVENOUS at 08:39

## 2017-12-12 RX ADMIN — SODIUM CHLORIDE: 9 INJECTION, SOLUTION INTRAVENOUS at 12:45

## 2017-12-12 ASSESSMENT — PAIN SCALES - WONG BAKER: WONGBAKER_NUMERICALRESPONSE: 6

## 2017-12-12 ASSESSMENT — PAIN SCALES - GENERAL
PAINLEVEL_OUTOF10: 7
PAINLEVEL_OUTOF10: 3
PAINLEVEL_OUTOF10: 8
PAINLEVEL_OUTOF10: 4
PAINLEVEL_OUTOF10: 3

## 2017-12-12 NOTE — ANESTHESIA POSTPROCEDURE EVALUATION
Department of Anesthesiology  Postprocedure Note    Patient: Stephon Ferguson  MRN: [id number]  [place]: 1960  Date of evaluation: 12/12/2017  Time:  11:05 AM     Procedure Summary     Date:  12/12/17 Room / Location:  [facility] OR  / [facility] OR    Anesthesia Start:  0836 Anesthesia Stop:  3312    Procedure:  C3-4 POSSIBLE C4-5 ACDF (N/A ) Diagnosis:  (G95.9)    Surgeon:  Darylene Compton, MD Responsible Provider:  Juan Madden CRNA    Anesthesia Type:  general ASA Status:  3          Anesthesia Type: general    Kendall Phase I: Kendall Score: 10    Kendall Phase II:      Last vitals: Reviewed and per EMR flowsheets.        Anesthesia Post Evaluation    Patient location during evaluation: PACU  Patient participation: complete - patient participated  Level of consciousness: awake and alert  Pain score: 0  Airway patency: patent  Nausea & Vomiting: no nausea and no vomiting  Complications: no  Cardiovascular status: hemodynamically stable  Respiratory status: acceptable  Hydration status: euvolemic

## 2017-12-12 NOTE — BRIEF OP NOTE
Brief Postoperative Note  ______________________________________________________________    Patient: Jamilah Cuevas  YOB: 1960  MRN: [id number]  Date of Procedure: 12/12/2017    Pre-Op Diagnosis: G95.9    Post-Op Diagnosis: Same       Procedure(s):  C3-4 POSSIBLE C4-5 ACDF    Anesthesia: [unfilled]    Surgeon(s):  Jose Ramon Gil MD    Staff:  First Assistant: Dorian Braxton PA-C  Scrub Person First: Mai Mir; Bhavna Gutierrez     Estimated Blood Loss: 77FQ    Complications: None    Specimens:   * No specimens in log *    Implants:    Implant Name Type Inv.  Item Serial No.  Lot No. LRB No. Used   IMPL SEALANT SURGIFLO HEMOSTAT MATRIC Bone/Graft/Tissue IMPL SEALANT SURGIFLO HEMOSTAT MATRIC  [employer]: [employer] [id number] N/A 1   CELLENTRA 1CC Bone/Graft/Tissue CELLENTRA 1CC  [employer] [id number] N/A 1   CELLENTRA 1CC Bone/Graft/Tissue CELLENTRA 1CC  [employer] [id number] N/A 1   IMPL SPACER SPINE COHERE [id number] Spine IMPL SPACER SPINE COHERE [id number]  [employer] [id number] N/A 1   IMPL SPACER SPINE COHERE [id number] Spine IMPL SPACER SPINE COHERE [id number]  [employer] [id number] N/A 1   IMPL SPINE PLATE MAXAN 2 LVL 29EC Spine IMPL SPINE PLATE MAXAN 2 LVL 83DF  [employer]  N/A 1   SCREW SPINE 4.0X18MM Spine SCREW SPINE 4.0X18MM   [employer]   N/A 6         Drains:      Findings: SEE OPERATIVE REPORT    Dorian Braxton PA-C  Date: 12/12/2017  Time: 11:10 AM

## 2017-12-12 NOTE — ANESTHESIA PRE PROCEDURE
Department of Anesthesiology  Preprocedure Note       Name:  Shilpi Shane   Age:  65 y.o.  :  1960                                          MRN:  [id number]         Date:  2017      Surgeon: Fernande Boas):  Zina John MD    Procedure: Procedure(s):  C3-4 POSSIBLE C4-5 ACDF    Medications prior to admission:   Prior to Admission medications    Medication Sig Start Date End Date Taking? Authorizing Provider   Potassium 99 MG TABS Take by mouth Twice a Week    Yes Historical Provider, MD   Naproxen Sodium (ALEVE PO) Take by mouth as needed   Yes Historical Provider, MD       Current medications:    Current Facility-Administered Medications   Medication Dose Route Frequency Provider Last Rate Last Dose    ceFAZolin (ANCEF) 2 g in dextrose 3 % 50 mL IVPB (duplex)  2 g Intravenous Once Zina John MD           Allergies:     Allergies   Allergen Reactions    Codeine     Pcn [Penicillins]        Problem List:    Patient Active Problem List   Diagnosis Code    History of adenomatous polyp of colon Z86.010    Hemorrhoids K64.9       Past Medical History:        Diagnosis Date    Anxiety     Arthritis     Chronic back pain     [facility]     Depression     Hyperlipidemia     Hypertension     Insomnia     MDRO (multiple drug resistant organisms) resistance     MRSA (methicillin resistant staph aureus) culture positive     left knee    Neuropathy ([facility] [address] [facility]        Past Surgical History:        Procedure Laterality Date    COLONOSCOPY  2012    HP x2, TA (5 yr)    PATELLA SURGERY      CO COLSC FLX W/REMOVAL LESION BY HOT BX FORCEPS N/A 2017    Dr Hopkins Poster yr recall    TESTICLE SURGERY      cyst    TUMOR REMOVAL      x 5 on back, fatty tumor       Social History:    Social History   Substance Use Topics    Smoking status: Current Every Day Smoker     Packs/day: 1.50     Years: 30.00     Types: Cigarettes    Smokeless tobacco: Never Used   Vista Loffler Alcohol use No      Comment: quit x 8 years                                Ready to quit: Not Answered  Counseling given: Not Answered      Vital Signs (Current): There were no vitals filed for this visit. BP Readings from Last 3 Encounters:   08/30/17 (!) 97/54   08/30/17 (!) 93/45   08/09/17 132/80       NPO Status: Time of last liquid consumption: 2300                        Time of last solid consumption: 2300                        Date of last liquid consumption: 12/11/17                        Date of last solid food consumption: 12/11/17    BMI:   Wt Readings from Last 3 Encounters:   12/07/17 179 lb (81.2 kg)   08/30/17 170 lb (77.1 kg)   08/09/17 171 lb (77.6 kg)     There is no height or weight on file to calculate BMI.    CBC:   Lab Results   Component Value Date    WBC 9.0 12/07/2017    RBC 5.36 12/07/2017    HGB 16.1 12/07/2017    HCT 49.9 12/07/2017    MCV 93.1 12/07/2017    RDW 13.3 12/07/2017     12/07/2017       CMP:   Lab Results   Component Value Date     12/07/2017    K 3.8 12/07/2017     12/07/2017    CO2 26 12/07/2017    BUN 10 12/07/2017    CREATININE 0.6 12/07/2017    LABGLOM >60 12/07/2017    GLUCOSE 75 12/07/2017    PROT 7.3 12/07/2017    CALCIUM 9.5 12/07/2017    BILITOT 0.6 12/07/2017    ALKPHOS 84 12/07/2017    AST 17 12/07/2017    ALT 19 12/07/2017       POC Tests: No results for input(s): POCGLU, POCNA, POCK, POCCL, POCBUN, POCHEMO, POCHCT in the last 72 hours.     Coags:   Lab Results   Component Value Date    PROTIME 13.0 12/07/2017    INR 0.99 12/07/2017    APTT 33.0 12/07/2017       HCG (If Applicable): No results found for: PREGTESTUR, PREGSERUM, HCG, HCGQUANT     ABGs: No results found for: PHART, PO2ART, WPI0TLQ, PUF3WRC, BEART, J2AKOTMD     Type & Screen (If Applicable):  No results found for: LABABO, [address]    Anesthesia Evaluation  Patient summary reviewed and Nursing notes reviewed no history of anesthetic complications:   Airway: Mallampati: I  TM distance: >3 FB   Neck ROM: full  Mouth opening: > = 3 FB Dental:      Comment: No upper dentition    Pulmonary:normal exam    (+) COPD:                             Cardiovascular:    (+) hypertension:,       ECG reviewed               Beta Blocker:  Not on Beta Blocker         Neuro/Psych:   (+) depression/anxiety             GI/Hepatic/Renal:   (+) GERD:,           Endo/Other:    (+) : arthritis:., .                 Abdominal:           Vascular:                                        Anesthesia Plan      general     ASA 3       Induction: intravenous. MIPS: Postoperative opioids intended and Prophylactic antiemetics administered. Anesthetic plan and risks discussed with patient.                       Juan Neal MD   12/12/2017

## 2017-12-12 NOTE — OP NOTE
Pre Op Diagnosis: Cervical DDD, spondylosis, stenosis with radiculopathy, Cervical Myelopathy with spinal cord myelomalacia     Post Op Diagnosis: Same     Procedure:    1. Anterior Cervical Discectomy and Fusion C34 & C45  2. PEEK Spacer placement x 2 (8 mm, large footprint, Vertera, Cohere spacers)  3. Anterior plate fixation U24 & C45 ([employer] MaxAn plate & Screws)  4. Use of Allograft bone (Primagen, ZB)  5. Use of the surgical microscope  6. Use of intraoperative flouroscopy  7. Use of SSEP, MEP and continuous EMG monitoring     Surgeon: Humaira Brock     Assistant: Gordo Schmid PA-c assisted throughout all key components of this case by retracting soft  tissues and helping to enable adequate visualization to safely complete all  aspects of this procedure. He was scrubbed throughout the entirety of the  procedure.       Anaesthesia: GETA     EBL: 99WR     Complications:none     Implants: See brief op note              PROCEDURE IN DETAIL  The patient was seen in the preoperative holding area.  Prior to the  procedure, the operative site was confirmed and marked, and then the patient  was taken to the operating room.  Once in the operating room, the patient was  positioned in a supine position on a standard operating room table.  All bony  prominences are padded.  The arms are padded and tucked by the patient's  side.  Traction was placed across the shoulder using 3 inch wide silk tape.    The C-arm was used to localize a site for the incision.  The skin overlying  the operative level was marked.  The C-arm was taken out.  The anterior  cervical spine was prepped and draped in a sterile fashion.  A time-out was  called and all in the room agreed to proceed.          A right sided Zuñiga-Parker approach was used to expose the operative levels(C3-C5).    Through a transverse incision, the dissection was carried down through the  subcutaneous skin and through the platysma, and then developing the usual fascial intervals.  The esophagus was mobilized toward the left.  The operative levels were exposed and confirmed to be the appropriate level(s) with C-arm fluoroscopy. A self retaining retractor was then placed in the wound to maintain visualization. [place] pins were placed into the vertebral bodies cephalad and caudad to the first operative level (C34).  A 15 blade scalpel was used to incise the anterior longitudinal  ligament and disk material.  Distraction was placed across the disk space  using a [place] distractor.  Curettes and pituitaries were used to remove the  majority of the disk material.  A microscope was brought in. The anterior  osteophytes were taken off with a bone-bitting pituitary.  The endplates were  decorticated with a high speed bur.  The posterior osteophytes were then  thinned with a high speed bur, and then the small up-angled curette was used  to develop the interval between the anterior aspect of the dura and  the posterior longitudinal ligament.  The posterior longitudinal ligament was  then removed with a 2 mm Kerrison along with the remainder of the posterior  osteophytes.      Once all of neural elements had been adequately decompressed, the endplates  were decorticated using a high speed bur.  The disk space was irrigated and  suctioned.  A trial spacer was placed into the disk space.  It was found that  An 8 mm spacer was the appropriate size spacer.  An 8 mm PEEK spacer  was then obtained and packed with Cellentra allograft bone and then tapped  into the disk space without any complication.  Once the spacer was in  satisfactory position, the [place] pins were removed.  The holes left from the  [employer] were filled with Surgiflo to help maintain hemostasis.  The  anterior osteophytes were then contoured further with high speed bur to  accommodate plate placement. The caspar pins were placed above and below the next    Disc space (C45).  The discectomy was performed, the neural

## 2017-12-13 VITALS
DIASTOLIC BLOOD PRESSURE: 77 MMHG | TEMPERATURE: 97.6 F | HEART RATE: 64 BPM | RESPIRATION RATE: 17 BRPM | OXYGEN SATURATION: 97 % | BODY MASS INDEX: 25.62 KG/M2 | SYSTOLIC BLOOD PRESSURE: 142 MMHG | HEIGHT: 70 IN | WEIGHT: 179 LBS

## 2017-12-13 LAB
ANION GAP SERPL CALCULATED.3IONS-SCNC: 13 MMOL/L (ref 7–19)
BUN BLDV-MCNC: 11 MG/DL (ref 6–20)
CALCIUM SERPL-MCNC: 8.8 MG/DL (ref 8.6–10)
CHLORIDE BLD-SCNC: 104 MMOL/L (ref 98–111)
CO2: 25 MMOL/L (ref 22–29)
CREAT SERPL-MCNC: 0.7 MG/DL (ref 0.5–1.2)
GFR NON-AFRICAN AMERICAN: >60
GLUCOSE BLD-MCNC: 136 MG/DL (ref 74–109)
HCT VFR BLD CALC: 44 % (ref 42–52)
HEMOGLOBIN: 14.4 G/DL (ref 14–18)
MCH RBC QN AUTO: 30.3 PG (ref 27–31)
MCHC RBC AUTO-ENTMCNC: 32.7 G/DL (ref 33–37)
MCV RBC AUTO: 92.6 FL (ref 80–94)
PDW BLD-RTO: 13.2 % (ref 11.5–14.5)
PLATELET # BLD: 260 K/UL (ref 130–400)
PMV BLD AUTO: 12 FL (ref 9.4–12.4)
POTASSIUM SERPL-SCNC: 4.2 MMOL/L (ref 3.5–5)
RBC # BLD: 4.75 M/UL (ref 4.7–6.1)
SODIUM BLD-SCNC: 142 MMOL/L (ref 136–145)
WBC # BLD: 19.9 K/UL (ref 4.8–10.8)

## 2017-12-13 PROCEDURE — 80048 BASIC METABOLIC PNL TOTAL CA: CPT

## 2017-12-13 PROCEDURE — 2580000003 HC RX 258: Performed by: PHYSICIAN ASSISTANT

## 2017-12-13 PROCEDURE — 6370000000 HC RX 637 (ALT 250 FOR IP): Performed by: PHYSICIAN ASSISTANT

## 2017-12-13 PROCEDURE — 36415 COLL VENOUS BLD VENIPUNCTURE: CPT

## 2017-12-13 PROCEDURE — 97535 SELF CARE MNGMENT TRAINING: CPT

## 2017-12-13 PROCEDURE — 6360000002 HC RX W HCPCS: Performed by: PHYSICIAN ASSISTANT

## 2017-12-13 PROCEDURE — G8987 SELF CARE CURRENT STATUS: HCPCS

## 2017-12-13 PROCEDURE — 85027 COMPLETE CBC AUTOMATED: CPT

## 2017-12-13 PROCEDURE — 97165 OT EVAL LOW COMPLEX 30 MIN: CPT

## 2017-12-13 PROCEDURE — G8988 SELF CARE GOAL STATUS: HCPCS

## 2017-12-13 PROCEDURE — G8989 SELF CARE D/C STATUS: HCPCS

## 2017-12-13 RX ADMIN — DOCUSATE SODIUM 100 MG: 100 CAPSULE, LIQUID FILLED ORAL at 07:59

## 2017-12-13 RX ADMIN — OXYCODONE HYDROCHLORIDE AND ACETAMINOPHEN 2 TABLET: 5; 325 TABLET ORAL at 05:00

## 2017-12-13 RX ADMIN — Medication 10 ML: at 07:59

## 2017-12-13 RX ADMIN — CEFAZOLIN SODIUM 1 G: 1 INJECTION, SOLUTION INTRAVENOUS at 01:30

## 2017-12-13 RX ADMIN — FAMOTIDINE 20 MG: 20 TABLET ORAL at 07:59

## 2017-12-13 ASSESSMENT — PAIN SCALES - GENERAL: PAINLEVEL_OUTOF10: 7

## 2017-12-13 NOTE — DISCHARGE SUMMARY
[facility]  DISCHARGE SUMMARY  AUTHOR: Florence Christine MD    Patient ID:  Peggy Yap  [id number]  82 y.o.  1960    Admit date: 12/12/2017    Discharge date and time: 12/13/17    Hospital LOS: 1    Admitting Physician: Florence Christine MD     Indication for Admission: Cervical myelopathy     Admission Diagnoses: Cervical myelopathy ([address] [G95.9]    Discharge Diagnoses: Cervical myelopathy ([address] [G95.9]    Procedures: ACDF C3-C5    Problem List:   Patient Active Problem List    Diagnosis Date Noted    Myelopathy of cervical spinal cord with cervical radiculopathy 12/12/2017    Degeneration of intervertebral disc at C4-C5 level 12/12/2017    Chronic neck pain 12/12/2017    Cervical myelopathy ([address] 12/12/2017    History of adenomatous polyp of colon 08/09/2017    Hemorrhoids 08/09/2017       Consults: none    Admission Condition: good    Discharged Condition: good    Hospital Course: Stable     Disposition: home    Patient Instructions:    Clarise Soulier   Home Medication Instructions AIM:[id number]    Printed on:12/13/17 9845   Medication Information                      Potassium 99 MG TABS  Take by mouth Twice a Week                Activity: Avoid bending lifting or twisting, no driving while taking narcotic pain medication, walk 10-15 minutes 2-3 times daily, remove collar food/shower   Diet: regular diet  Wound Care: keep wound clean and dry and ok to shower, keep dressing CDI  Other: [address] [place] [address] office with questions or concerns    Follow-up with Dr Tiffany Mccoy or PA's in 2 weeks.     Electronically signed by Florence Christine MD on 12/13/17 at 7:39 AM

## 2017-12-13 NOTE — DISCHARGE INSTR - ACTIVITY
Avoid bending lifting or twisting, no driving while taking narcotic pain medication, walk 10-15 minutes 2-3 times daily, remove collar food/shower

## 2017-12-13 NOTE — PROGRESS NOTES
Occupational Therapy   Occupational Therapy Initial Assessment  Date: 2017   Patient Name: Tracy Marino  MRN: [id number]     : 1960    Patient Diagnosis(es): There were no encounter diagnoses. has a past medical history of Anxiety; Arthritis; Chronic back pain; Chronic neck pain; COPD (chronic obstructive pulmonary disease) ([address] Degeneration of intervertebral disc at C4-C5 level; Depression; Hyperlipidemia; Hypertension; Insomnia; MDRO (multiple drug resistant organisms) resistance; MRSA (methicillin resistant staph aureus) culture positive; Myelopathy of cervical spinal cord with cervical radiculopathy; and Neuropathy ([address] has a past surgical history that includes tumor removal; Patella surgery; Colonoscopy (2012); Testicle surgery; colsc flx w/removal lesion by hot bx forceps (N/A, 2017); and cervical fusion (N/A, 2017).     Treatment Diagnosis: ACF      Restrictions  Restrictions/Precautions  Restrictions/Precautions: Fall Risk  Required Braces or Orthoses?: Yes  Required Braces or Orthoses  Cervical: c-collar (HARD)  Position Activity Restriction  Spinal Precautions: No Lifting    Subjective   Pain Assessment  Patient Currently in Pain: Denies  Pain Assessment: Faces  Matos-Baker Pain Rating: Hurts even more  Pain Level: 7  Pain Location:  (HEADACHE)  Oxygen Therapy  SpO2: 97 %  O2 Device: None (Room air)  Social/Functional History  Social/Functional History  Lives With: Family (spouse, 2 daughters, and son)  Type of Home: House  Home Layout: One level  Home Access:  (1 STEP)  Bathroom Shower/Tub: Tub/Shower unit  Bathroom Equipment: Grab bars in shower, Commode  Home Equipment: Electric scooter, [address] Help From: Family  ADL Assistance: Independent  Ambulation Assistance: Independent  Transfer Assistance: Independent  Active : Yes  Mode of Transportation: Car  Occupation: On disability  Leisure & Hobbies: [employer]        Objective   Vision: Within
Physical Therapy    Facility/Department: [facility] SUR[facility]  Initial Assessment    NAME: Chely Trimble  : 1960  MRN: [id number]    Date of Service: 2017    Patient Diagnosis(es): There were no encounter diagnoses. has a past medical history of Anxiety; Arthritis; Chronic back pain; Chronic neck pain; COPD (chronic obstructive pulmonary disease) ([facility] [address] Degeneration of intervertebral disc at C4-C5 level; Depression; Hyperlipidemia; Hypertension; Insomnia; MDRO (multiple drug resistant organisms) resistance; MRSA (methicillin resistant staph aureus) culture positive; Myelopathy of cervical spinal cord with cervical radiculopathy; and Neuropathy ([facility] [address] [facility] has a past surgical history that includes tumor removal; Patella surgery; Colonoscopy (2012); Testicle surgery; colsc flx w/removal lesion by hot bx forceps (N/A, 2017); and cervical fusion (N/A, 2017).     Restrictions  Restrictions/Precautions  Restrictions/Precautions: Fall Risk  Required Braces or Orthoses?: Yes  Required Braces or Orthoses  Cervical: c-collar (HARD)  Position Activity Restriction  Spinal Precautions: No Lifting  Vision/Hearing  Vision: Within Functional Limits  Hearing: Within functional limits     Subjective  General  Chart Reviewed: Yes  Family / Caregiver Present: Yes  Diagnosis: ACF  Subjective  Subjective: Pt WILLING TO SIT EOB  Pain Screening  Patient Currently in Pain: Denies  Pain Assessment  Pain Assessment: Faces  Matos-Baker Pain Rating: Hurts even more  Pain Location:  (HEADACHE)       Orientation  Orientation  Overall Orientation Status: Within Normal Limits    Social/Functional History  Social/Functional History  Lives With: Spouse  Type of Home: House  Home Layout: One level  Home Access:  (1 STEP)  ADL Assistance: Independent  Ambulation Assistance: Independent  Transfer Assistance: Independent  Objective          AROM RLE (degrees)  RLE AROM: WFL  AROM LLE (degrees)  LLE AROM : WFL  Strength
Comments: pt hs paresthesia in LLE  Toilet Transfers  Equipment Used: Raised toilet seat with rails  Toilet Transfer: Modified independent  Bed mobility  Supine to Sit: Modified independent  Sit to Supine: Modified independent  Transfers  Sit to stand: Modified independent  Stand to sit: Modified independent  Transfer Comments: increased time due to decreased sensation in LLE         Cognition  Overall Cognitive Status: WFL      LUE AROM (degrees)  LUE AROM : WFL  Left Hand AROM (degrees)  Left Hand AROM: WFL  RUE AROM (degrees)  RUE AROM : WFL  Right Hand AROM (degrees)  Right Hand AROM: WFL        Assessment   Performance deficits / Impairments: Decreased ADL status  Assessment: Pt Modified I to don/doff C-collar, Modified I to dress entire body; pt Modified I for mobility to/from bathroom due to increased time and safety without device, Pt at this time demonstrating no need for skilled OT; please re-consult OT if needed   Treatment Diagnosis: ACF  Prognosis: Good  Decision Making: Low Complexity  Discharge Recommendations: Home independently (with family assist )  REQUIRES OT FOLLOW UP: Yes  Activity Tolerance  Activity Tolerance: Patient Tolerated treatment well  Safety Devices  Safety Devices in place: Yes  Type of devices: Call light within reach; Left in chair; All fall risk precautions in place       Discharge Recommendations:  Home independently (with family assist )     Plan   Plan  Times per week: 1-2x  Current Treatment Recommendations: Functional Mobility Training, Safety Education & Training, Equipment Evaluation, Education, & procurement, Patient/Caregiver Education & Training, Self-Care / ADL, Home Management Training, Endurance Training, Strengthening, Balance Training  G-Code  OT G-codes  Functional Assessment Tool Used: ADL and self-care tasks  Functional Limitation: Self care  Self Care Current Status (): 0 percent impaired, limited or restricted  Self Care Goal Status (): 0 percent

## 2019-04-15 ENCOUNTER — TRANSCRIBE ORDERS (OUTPATIENT)
Dept: ADMINISTRATIVE | Facility: HOSPITAL | Age: 59
End: 2019-04-15

## 2019-04-15 DIAGNOSIS — M54.16 LUMBAR RADICULOPATHY: Primary | ICD-10-CM

## 2019-05-07 ENCOUNTER — APPOINTMENT (OUTPATIENT)
Dept: MRI IMAGING | Facility: HOSPITAL | Age: 59
End: 2019-05-07

## 2019-05-21 ENCOUNTER — APPOINTMENT (OUTPATIENT)
Dept: MRI IMAGING | Facility: HOSPITAL | Age: 59
End: 2019-05-21

## 2019-05-30 ENCOUNTER — TRANSCRIBE ORDERS (OUTPATIENT)
Dept: ADMINISTRATIVE | Facility: HOSPITAL | Age: 59
End: 2019-05-30

## 2019-05-30 DIAGNOSIS — Z87.891 PERSONAL HISTORY OF TOBACCO USE, PRESENTING HAZARDS TO HEALTH: ICD-10-CM

## 2019-05-30 DIAGNOSIS — R52 PAIN: ICD-10-CM

## 2019-05-30 DIAGNOSIS — Z12.2 ENCOUNTER FOR SCREENING FOR LUNG CANCER: Primary | ICD-10-CM

## 2019-05-30 DIAGNOSIS — I70.213 ATHEROSCLEROSIS OF NATIVE ARTERY OF BOTH LOWER EXTREMITIES WITH INTERMITTENT CLAUDICATION (HCC): ICD-10-CM

## 2019-06-10 ENCOUNTER — HOSPITAL ENCOUNTER (OUTPATIENT)
Dept: CT IMAGING | Facility: HOSPITAL | Age: 59
Discharge: HOME | End: 2019-06-10

## 2019-06-10 ENCOUNTER — HOSPITAL ENCOUNTER (OUTPATIENT)
Dept: ULTRASOUND IMAGING | Facility: HOSPITAL | Age: 59
Discharge: HOME | End: 2019-06-10
Admitting: NURSE PRACTITIONER

## 2019-06-10 DIAGNOSIS — Z87.891 PERSONAL HISTORY OF TOBACCO USE, PRESENTING HAZARDS TO HEALTH: ICD-10-CM

## 2019-06-10 DIAGNOSIS — I70.213 ATHEROSCLEROSIS OF NATIVE ARTERY OF BOTH LOWER EXTREMITIES WITH INTERMITTENT CLAUDICATION (HCC): ICD-10-CM

## 2019-06-10 DIAGNOSIS — R52 PAIN: ICD-10-CM

## 2019-06-10 PROCEDURE — 93924 LWR XTR VASC STDY BILAT: CPT | Performed by: SURGERY

## 2019-06-10 PROCEDURE — G0297 LDCT FOR LUNG CA SCREEN: HCPCS

## 2019-06-10 PROCEDURE — 93923 UPR/LXTR ART STDY 3+ LVLS: CPT

## 2019-06-14 ENCOUNTER — TELEPHONE (OUTPATIENT)
Dept: VASCULAR SURGERY | Facility: CLINIC | Age: 59
End: 2019-06-14

## 2019-06-14 NOTE — TELEPHONE ENCOUNTER
I called the patient and reminded him of his appt on Monday with Dr. Biggs.  The patient confirmed.

## 2019-06-17 ENCOUNTER — OFFICE VISIT (OUTPATIENT)
Dept: VASCULAR SURGERY | Facility: CLINIC | Age: 59
End: 2019-06-17

## 2019-06-17 VITALS
DIASTOLIC BLOOD PRESSURE: 82 MMHG | SYSTOLIC BLOOD PRESSURE: 118 MMHG | HEART RATE: 72 BPM | WEIGHT: 181 LBS | HEIGHT: 70 IN | OXYGEN SATURATION: 98 % | BODY MASS INDEX: 25.91 KG/M2

## 2019-06-17 DIAGNOSIS — I70.211 ATHEROSCLEROSIS OF NATIVE ARTERY OF RIGHT LOWER EXTREMITY WITH INTERMITTENT CLAUDICATION (HCC): Primary | ICD-10-CM

## 2019-06-17 PROCEDURE — 99407 BEHAV CHNG SMOKING > 10 MIN: CPT | Performed by: SURGERY

## 2019-06-17 PROCEDURE — 99214 OFFICE O/P EST MOD 30 MIN: CPT | Performed by: SURGERY

## 2019-06-17 RX ORDER — BUDESONIDE AND FORMOTEROL FUMARATE DIHYDRATE 160; 4.5 UG/1; UG/1
2 AEROSOL RESPIRATORY (INHALATION)
Refills: 2 | COMMUNITY
Start: 2019-06-08

## 2019-06-17 RX ORDER — MELOXICAM 15 MG/1
TABLET ORAL
COMMUNITY
Start: 2019-06-12 | End: 2019-09-06

## 2019-06-17 RX ORDER — SIMVASTATIN 40 MG
40 TABLET ORAL NIGHTLY
Status: ON HOLD | COMMUNITY
Start: 2019-06-12 | End: 2021-05-06

## 2019-06-17 RX ORDER — OXYCODONE AND ACETAMINOPHEN 7.5; 325 MG/1; MG/1
TABLET ORAL
Refills: 0 | COMMUNITY
Start: 2019-05-25 | End: 2019-09-06

## 2019-06-17 NOTE — PATIENT INSTRUCTIONS
BMI for Adults  Body mass index (BMI) is a number that is calculated from a person's weight and height. In most adults, the number is used to find how much of an adult's weight is made up of fat. BMI is not as accurate as a direct measure of body fat.  How is BMI calculated?  BMI is calculated by dividing weight in kilograms by height in meters squared. It can also be calculated by dividing weight in pounds by height in inches squared, then multiplying the resulting number by 703. Charts are available to help you find your BMI quickly and easily without doing this calculation.  How is BMI interpreted?  Health care professionals use BMI charts to identify whether an adult is underweight, at a normal weight, or overweight based on the following guidelines:  · Underweight: BMI less than 18.5.  · Normal weight: BMI between 18.5 and 24.9.  · Overweight: BMI between 25 and 29.9.  · Obese: BMI of 30 and above.    BMI is usually interpreted the same for males and females.  Weight includes both fat and muscle, so someone with a muscular build, such as an athlete, may have a BMI that is higher than 24.9. In cases like these, BMI may not accurately depict body fat. To determine if excess body fat is the cause of a BMI of 25 or higher, further assessments may need to be done by a health care provider.  Why is BMI a useful tool?  BMI is used to identify a possible weight problem that may be related to a medical problem or may increase the risk for medical problems. BMI can also be used to promote changes to reach a healthy weight.  This information is not intended to replace advice given to you by your health care provider. Make sure you discuss any questions you have with your health care provider.  Document Released: 08/29/2005 Document Revised: 04/27/2017 Document Reviewed: 05/15/2015  [employer] Interactive Patient Education © 2018 [employer]        For more information:    Quit Now  [place]  1-800-QUIT-NOW  https://[place].quitlogix.org/en-[place]/  Steps to Quit Smoking  Smoking tobacco can be harmful to your health and can affect almost every organ in your body. Smoking puts you, and those around you, at risk for developing many serious chronic diseases. Quitting smoking is difficult, but it is one of the best things that you can do for your health. It is never too late to quit.  What are the benefits of quitting smoking?  When you quit smoking, you lower your risk of developing serious diseases and conditions, such as:  · Lung cancer or lung disease, such as COPD.  · Heart disease.  · Stroke.  · Heart attack.  · Infertility.  · Osteoporosis and bone fractures.  Additionally, symptoms such as coughing, wheezing, and shortness of breath may get better when you quit. You may also find that you get sick less often because your body is stronger at fighting off colds and infections. If you are pregnant, quitting smoking can help to reduce your chances of having a baby of low birth weight.  How do I get ready to quit?  When you decide to quit smoking, create a plan to make sure that you are successful. Before you quit:  · Pick a date to quit. Set a date within the next two weeks to give you time to prepare.  · Write down the reasons why you are quitting. Keep this list in places where you will see it often, such as on your bathroom mirror or in your car or wallet.  · Identify the people, places, things, and activities that make you want to smoke (triggers) and avoid them. Make sure to take these actions:  ¨ Throw away all cigarettes at home, at work, and in your car.  ¨ Throw away smoking accessories, such as ashtrays and lighters.  ¨ Clean your car and make sure to empty the ashtray.  ¨ Clean your home, including curtains and carpets.  · Tell your family, friends, and coworkers that you are quitting. Support from your loved ones can make quitting easier.  · Talk with your health care provider about  your options for quitting smoking.  · Find out what treatment options are covered by your health insurance.  What strategies can I use to quit smoking?  Talk with your healthcare provider about different strategies to quit smoking. Some strategies include:  · Quitting smoking altogether instead of gradually lessening how much you smoke over a period of time. Research shows that quitting “cold turkey” is more successful than gradually quitting.  · Attending in-person counseling to help you build problem-solving skills. You are more likely to have success in quitting if you attend several counseling sessions. Even short sessions of 10 minutes can be effective.  · Finding resources and support systems that can help you to quit smoking and remain smoke-free after you quit. These resources are most helpful when you use them often. They can include:  ¨ Online chats with a counselor.  ¨ Telephone quitlines.  ¨ Printed self-help materials.  ¨ Support groups or group counseling.  ¨ Text messaging programs.  ¨ Mobile phone applications.  · Taking medicines to help you quit smoking. (If you are pregnant or breastfeeding, talk with your health care provider first.) Some medicines contain nicotine and some do not. Both types of medicines help with cravings, but the medicines that include nicotine help to relieve withdrawal symptoms. Your health care provider may recommend:  ¨ Nicotine patches, gum, or lozenges.  ¨ Nicotine inhalers or sprays.  ¨ Non-nicotine medicine that is taken by mouth.  Talk with your health care provider about combining strategies, such as taking medicines while you are also receiving in-person counseling. Using these two strategies together makes you more likely to succeed in quitting than if you used either strategy on its own.  If you are pregnant or breastfeeding, talk with your health care provider about finding counseling or other support strategies to quit smoking. Do not take medicine to help you  quit smoking unless told to do so by your health care provider.  What things can I do to make it easier to quit?  Quitting smoking might feel overwhelming at first, but there is a lot that you can do to make it easier. Take these important actions:  · Reach out to your family and friends and ask that they support and encourage you during this time. Call telephone quitlines, reach out to support groups, or work with a counselor for support.  · Ask people who smoke to avoid smoking around you.  · Avoid places that trigger you to smoke, such as bars, parties, or smoke-break areas at work.  · Spend time around people who do not smoke.  · Lessen stress in your life, because stress can be a smoking trigger for some people. To lessen stress, try:  ¨ Exercising regularly.  ¨ Deep-breathing exercises.  ¨ Yoga.  ¨ Meditating.  ¨ Performing a body scan. This involves closing your eyes, scanning your body from head to toe, and noticing which parts of your body are particularly tense. Purposefully relax the muscles in those areas.  · Download or purchase mobile phone or tablet apps (applications) that can help you stick to your quit plan by providing reminders, tips, and encouragement. There are many free apps, such as QuitGuide from the CDC (Centers for Disease Control and Prevention). You can find other support for quitting smoking (smoking cessation) through smokefree.gov and other websites.  How will I feel when I quit smoking?  Within the first 24 hours of quitting smoking, you may start to feel some withdrawal symptoms. These symptoms are usually most noticeable 2-3 days after quitting, but they usually do not last beyond 2-3 weeks. Changes or symptoms that you might experience include:  · Mood swings.  · Restlessness, anxiety, or irritation.  · Difficulty concentrating.  · Dizziness.  · Strong cravings for sugary foods in addition to nicotine.  · Mild weight gain.  · Constipation.  · Nausea.  · Coughing or a sore  throat.  · Changes in how your medicines work in your body.  · A depressed mood.  · Difficulty sleeping (insomnia).  After the first 2-3 weeks of quitting, you may start to notice more positive results, such as:  · Improved sense of smell and taste.  · Decreased coughing and sore throat.  · Slower heart rate.  · Lower blood pressure.  · Clearer skin.  · The ability to breathe more easily.  · Fewer sick days.  Quitting smoking is very challenging for most people. Do not get discouraged if you are not successful the first time. Some people need to make many attempts to quit before they achieve long-term success. Do your best to stick to your quit plan, and talk with your health care provider if you have any questions or concerns.  This information is not intended to replace advice given to you by your health care provider. Make sure you discuss any questions you have with your health care provider.  Document Released: 12/12/2002 Document Revised: 08/15/2017 Document Reviewed: 05/03/2016  [employer]vier Interactive Patient Education © 2017 [employer].

## 2019-06-17 NOTE — PROGRESS NOTES
06/17/2019      Mai Tran, APRN  [address]  [place] [address]    Tree Shaffer  1960    Chief Complaint   Patient presents with   • Establish Care     Bilateral leg claudication.  Pt states that it is starting to effect his thighs.  Pt referred by Dr. Tran       Dear Mai Tran, ELIO:      HPI  I had the pleasure of seeing your patient Tree Shaffer in the office today.  Thank you kindly for this consultation.  As you recall, Tree Shaffer is a 58 y.o.  male who you are currently following for general medical care.  He presents to the office today with complaints of lower extremity claudication, worse on the right than the left.  He is a past history of COPD, chronic neck and back pain and is status post prior cervical spine procedure, chronic arthritis, as well as hypercholesterolemia.  He is also a heavy smoker.  He reports over the last few months he has been noticing discomfort/cramping to the right calf with walking.  He reports after about 500 feet he develops what sounds like claudication in the right lower extremity in the calf.  If he continues to walk through this he also reports some claudication in the left lower extremity but symptoms are worse on the right.  He reports at times the claudication can make it difficult for him to perform activities if they require more than minimal effort.  He overall also complains of generalized fatigue as well as a time shortness of breath which also limit his activity.  He was sent for noninvasive arterial imaging with GEOFF/PVR and I did review those images in the office today.  It does show an GEOFF of 0.8 on the right, and 0.98 on the left.  He otherwise is without acute complaint at the office today.    Past Medical History:   Diagnosis Date   • Arthritis    • Back pain    • COPD (chronic obstructive pulmonary disease) (CMS/[facility])    • Hypercholesteremia        Past Surgical History:   Procedure Laterality Date   • CYST REMOVAL     •  "KNEE SURGERY     • NECK SURGERY         Family History   Problem Relation Age of Onset   • COPD Mother    • Cancer Mother         femal type   • Neuromuscular disorder Father         Lizzy Gehrig's Disease       Social History     Socioeconomic History   • Marital status:      Spouse name: Not on file   • Number of children: Not on file   • Years of education: Not on file   • Highest education level: Not on file   Tobacco Use   • Smoking status: Current Every Day Smoker     Packs/day: 1.50     Types: Cigarettes   • Smokeless tobacco: Never Used   Substance and Sexual Activity   • Alcohol use: Yes     Comment: Occasional   • Drug use: Yes     Types: Marijuana   • Sexual activity: Defer       Allergies   Allergen Reactions   • Codeine Other (See Comments)   • Penicillins      Pt reports he does not know what kind of reaction he has, states \"I'm highly allergic.\"       Prior to Admission medications    Medication Sig Start Date End Date Taking? Authorizing Provider   LYRICA 100 MG capsule TK 1 C PO Q 8 HOURS PRN 5/25/19  Yes Clarence Kaur MD   meloxicam (MOBIC) 15 MG tablet  6/12/19  Yes Clarence Kaur MD   meloxicam (MOBIC) 7.5 MG tablet Take 1 tablet by mouth Daily. 4/4/17  Yes Serenity Fernandes PA   oxyCODONE-acetaminophen (PERCOCET) 7.5-325 MG per tablet TK 1 T PO Q 8 HOURS PRN P 5/25/19  Yes Clarence Kaur MD   potassium chloride (K-DUR,KLOR-CON) 10 MEQ CR tablet Take 10 mEq by mouth 2 (Two) Times a Day.   Yes Clarence Kaur MD   PROAIR  (90 Base) MCG/ACT inhaler INHALE 2 PUFFS (180 MCG) BY INHALATION ROUTE EVERY 4-6 HOURS AS NEEDED 6/8/19  Yes Clarence Kaur MD   simvastatin (ZOCOR) 40 MG tablet  6/12/19  Yes Clarence Kaur MD   SYMBICORT 160-4.5 MCG/ACT inhaler INHALE 2 PUFFS BY INHALATION ROUTE 2 TIMES PER DAY 6/8/19  Yes Clarence Kaur MD       Review of Systems   Constitutional: Positive for fatigue. Negative for activity change, appetite " "change, chills, diaphoresis and fever.   HENT: Negative.  Negative for congestion, sneezing, sore throat and trouble swallowing.    Eyes: Negative.  Negative for visual disturbance.   Respiratory: Negative.  Negative for chest tightness and shortness of breath.    Cardiovascular: Negative.  Negative for chest pain, palpitations and leg swelling.   Gastrointestinal: Negative.  Negative for abdominal distention, abdominal pain, nausea and vomiting.   Endocrine: Negative.    Genitourinary: Negative.    Musculoskeletal:        He reports bilateral lower extremity claudication, worse on the right than the left   Skin: Negative.    Allergic/Immunologic: Negative.    Neurological: Negative.    Hematological: Negative.    Psychiatric/Behavioral: Negative.        /82   Pulse 72   Ht 177.8 cm (70\")   Wt 82.1 kg (181 lb)   SpO2 98%   BMI 25.97 kg/m²   Physical Exam   Constitutional: He is oriented to person, place, and time. He appears well-developed and well-nourished.   HENT:   Head: Normocephalic and atraumatic.   Eyes: EOM are normal. Pupils are equal, round, and reactive to light.   Neck: Normal range of motion. Neck supple. No JVD present.   Cardiovascular: Normal rate and regular rhythm.   Pulses:       Carotid pulses are 2+ on the right side, and 2+ on the left side.       Radial pulses are 2+ on the right side, and 2+ on the left side.        Femoral pulses are 2+ on the right side, and 2+ on the left side.       Popliteal pulses are 0 on the right side, and 0 on the left side.        Dorsalis pedis pulses are 0 on the right side, and 0 on the left side.        Posterior tibial pulses are 0 on the right side, and 0 on the left side.   He does have Doppler signals of the bilateral DP and PT which are mainly monophasic.   Pulmonary/Chest: Effort normal. No respiratory distress.   Abdominal: Soft. He exhibits no distension and no mass. There is no tenderness.   Musculoskeletal: Normal range of motion. He " exhibits no edema, tenderness or deformity.   Neurological: He is alert and oriented to person, place, and time. No sensory deficit. He exhibits normal muscle tone.   Skin: Skin is warm and dry. Capillary refill takes 2 to 3 seconds.   Psychiatric: He has a normal mood and affect. His behavior is normal. Judgment and thought content normal.   Vitals reviewed.      Us Ankle / Brachial Indices Extremity Complete    Result Date: 6/11/2019  Narrative:  History: PAD  Comments: Bilateral lower extremity arterial with multi-level pulse volume recordings and segmental pressures were performed at rest and stress.  The right ankle/brachial index is 0.80. Doppler waveforms are monophasic and PVR waveform at the ankle is mildly dampened.These findings are consistent with moderate arterial insufficiency of the right lower extremity at rest.  Postexercise GEOFF 0.68.  The left ankle/brachial index is 0.98. Doppler waveforms are biphasic and PVR waveform at the ankle is normal appearing with no significant emptying. These findings are consistent with no significant arterial insufficiency of the left lower extremity at rest.    Postexercise ABIs 0.86.      Impression: Impression: 1. Moderate arterial insufficiency of the right lower extremity at rest. 2. No significant arterial insufficiency of the left lower extremity at rest.   This report was finalized on 06/11/2019 09:14 by Dr. Leo Biggs MD.    Ct Chest Low Dose Wo    Result Date: 6/10/2019  Narrative: CT CHEST LOW DOSE WO- 6/10/2019 1:43 PM CDT  HISTORY: Lung cancer screening  COMPARISON: 10/01/2012  DOSE LENGTH PRODUCT: 63 mGy cm. Automated exposure control was also utilized to decrease patient radiation dose.  TECHNIQUE: Noncontrast CT of the chest was performed using low-dose protocol.  FINDINGS:  Nodules: No suspicious pulmonary nodule. Scattered granulomata.  Other lung findings: Emphysema. Minimal dependent atelectasis at the lung bases.  Airway: Main airways patent.   Pleura: No pleural effusion.  Aorta and great vessels: Normal course and caliber of the thoracic aorta with atherosclerosis.  Heart and pericardium: Normal heart size. No pericardial effusion. Coronary artery calcifications present.  Lymph nodes: No adenopathy.  Bones and soft tissues: No acute bony findings. Soft tissues within normal limits.  Upper abdomen: Calcified gallstone in the gallbladder. Evaluation of the upper abdomen limited due to technique with grossly normal appearance of the liver, pancreas, spleen, adrenal glands.      Impression: 1. No evidence of pulmonary malignancy. Emphysema. Recommend continued annual screening. 3. Coronary artery calcifications. 4. Calcified gallstone.  Lung-RADS 1: Negative No nodules and definitely benign nodules. Continue annual screening with low-dose CT in 12 months.   ACT Lung-RADS version 1.1 Categories and Recommendations: 1: Negative -- Recommend continued annual screening with low-dose CT in 12 months. 2: Benign appearance or behavior. -- Recommend continued annual screening with low-dose CT in 12 months. 3: Probably benign -- Recommend low-dose CT in 6 months. 4A: Probably suspicious (5-15% of malignancy) --recommend low-dose CT in 3 months. PET/CT may be used when there is a a solid component of at least 8 mm. 4B: Suspicious (greater than 15% chance of malignancy) --options for additional evaluation include chest CT, PET/CT and/or tissue sampling depending on clinical morbidities. For new large nodules developing on an annual repeat screening CT, a one-month low-dose CT may be used to address potentially infectious or inflammatory conditions. 4X: Suspicious --category 3 or 4 nodules with additional features are imaging that increases the suspicion of malignancy. Recommendation same as 4B. S: Other clinically significant or potentially clinically significant findings (nonlung cancer) -- recommendation depends on finding.  This report was finalized on 06/10/2019  15:16 by Dr Sudha Jason MD.      There is no problem list on file for this patient.        ICD-10-CM ICD-9-CM   1. Atherosclerosis of native artery of right lower extremity with intermittent claudication (CMS/[facility])  I70.211 440.21       Lab Frequency Next Occurrence   US Ankle / Brachial Indices Extremity Complete Once 09/14/2019       Plan: After thoroughly evaluating Tree Shaffer, I believe the best course of action is to initially remain conservative from a vascular standpoint.  He has symptoms of claudication mainly in the right lower extremity although at this point it is not lifestyle limiting.  All it does at times affect his activity he also complains of generalized fatigue and overall malaise and as such this limits his activity and he spends most of his time at home on the couch.  I have recommended that he start a monitored exercise program where he tries to walk at least 30 minutes 3-4 times a week which has been shown to be beneficial in patients with mild to moderate claudication.  I have also recommended that he start an 81 mg aspirin daily in conjunction with the statin medication that he was just recently started on.  I have also strongly recommended that he attempt to cut back and ultimately quit smoking as it is the #1 risk factor in peripheral vascular disease.  I will plan to see him back in the office in 3 months with a repeat GEOFF PVR and we will see how his symptoms are doing.  If he continues to have similar symptoms or his current symptoms worsen despite the above then we can discuss the possibility of an angiogram at that time.  The patient can continue taking their current medication regimen as previously planned. I did discuss vascular risk factors as they pertain to the progression of vascular disease including controlling hyperlipidemia, and BMI. Patient's Body mass index is 25.97 kg/m². BMI is above normal parameters. Recommendations include: educational material. I did   extensively on smoking cessation, and the patient was advised of the continued risks of smoking.  I provided over 10 minutes counseling on this matter. This was all discussed in full with complete understanding.    Thank you for allowing me to participate in the care of your patient.  Please do not hesitate with any questions or concerns.  I will keep you aware of any further encounters with Tree Shaffer.        Sincerely yours,         Leo Biggs MD

## 2019-06-21 ENCOUNTER — TRANSCRIBE ORDERS (OUTPATIENT)
Dept: ADMINISTRATIVE | Facility: HOSPITAL | Age: 59
End: 2019-06-21

## 2019-06-21 DIAGNOSIS — R10.9 ABDOMINAL PAIN, UNSPECIFIED ABDOMINAL LOCATION: Primary | ICD-10-CM

## 2019-06-21 DIAGNOSIS — R10.12 ABDOMINAL PAIN, LEFT UPPER QUADRANT: ICD-10-CM

## 2019-06-27 ENCOUNTER — HOSPITAL ENCOUNTER (OUTPATIENT)
Dept: ULTRASOUND IMAGING | Facility: HOSPITAL | Age: 59
Discharge: HOME | End: 2019-06-27
Admitting: NURSE PRACTITIONER

## 2019-06-27 DIAGNOSIS — R10.9 ABDOMINAL PAIN, UNSPECIFIED ABDOMINAL LOCATION: ICD-10-CM

## 2019-06-27 DIAGNOSIS — R10.12 ABDOMINAL PAIN, LEFT UPPER QUADRANT: ICD-10-CM

## 2019-06-27 PROCEDURE — 76700 US EXAM ABDOM COMPLETE: CPT

## 2019-09-06 ENCOUNTER — HOSPITAL ENCOUNTER (OUTPATIENT)
Dept: GENERAL RADIOLOGY | Facility: HOSPITAL | Age: 59
Discharge: HOME | End: 2019-09-06
Admitting: SPECIALIST

## 2019-09-06 ENCOUNTER — APPOINTMENT (OUTPATIENT)
Dept: PREADMISSION TESTING | Facility: HOSPITAL | Age: 59
End: 2019-09-06

## 2019-09-06 VITALS
WEIGHT: 180.78 LBS | HEART RATE: 64 BPM | OXYGEN SATURATION: 97 % | HEIGHT: 68 IN | SYSTOLIC BLOOD PRESSURE: 131 MMHG | BODY MASS INDEX: 27.4 KG/M2 | RESPIRATION RATE: 16 BRPM | DIASTOLIC BLOOD PRESSURE: 86 MMHG

## 2019-09-06 LAB
ALBUMIN SERPL-MCNC: 4.5 G/DL (ref 3.5–5)
ALBUMIN/GLOB SERPL: 1.6 G/DL (ref 1.1–2.5)
ALP SERPL-CCNC: 60 U/L (ref 24–120)
ALT SERPL W P-5'-P-CCNC: 25 U/L (ref 0–54)
ANION GAP SERPL CALCULATED.3IONS-SCNC: 8 MMOL/L (ref 4–13)
AST SERPL-CCNC: 21 U/L (ref 7–45)
BASOPHILS # BLD AUTO: 0.05 10*3/MM3 (ref 0–0.2)
BASOPHILS NFR BLD AUTO: 0.5 % (ref 0–1.5)
BILIRUB SERPL-MCNC: 0.8 MG/DL (ref 0.1–1)
BUN BLD-MCNC: 17 MG/DL (ref 5–21)
BUN/CREAT SERPL: 22.1 (ref 7–25)
CALCIUM SPEC-SCNC: 9.3 MG/DL (ref 8.4–10.4)
CHLORIDE SERPL-SCNC: 107 MMOL/L (ref 98–110)
CO2 SERPL-SCNC: 27 MMOL/L (ref 24–31)
CREAT BLD-MCNC: 0.77 MG/DL (ref 0.5–1.4)
DEPRECATED RDW RBC AUTO: 44.5 FL (ref 37–54)
EOSINOPHIL # BLD AUTO: 0.28 10*3/MM3 (ref 0–0.4)
EOSINOPHIL NFR BLD AUTO: 2.7 % (ref 0.3–6.2)
ERYTHROCYTE [DISTWIDTH] IN BLOOD BY AUTOMATED COUNT: 13.3 % (ref 12.3–15.4)
GFR SERPL CREATININE-BSD FRML MDRD: 104 ML/MIN/1.73
GLOBULIN UR ELPH-MCNC: 2.8 GM/DL
GLUCOSE BLD-MCNC: 91 MG/DL (ref 70–100)
HCT VFR BLD AUTO: 50.4 % (ref 37.5–51)
HGB BLD-MCNC: 16.7 G/DL (ref 13–17.7)
IMM GRANULOCYTES # BLD AUTO: 0.03 10*3/MM3 (ref 0–0.05)
IMM GRANULOCYTES NFR BLD AUTO: 0.3 % (ref 0–0.5)
LYMPHOCYTES # BLD AUTO: 3.46 10*3/MM3 (ref 0.7–3.1)
LYMPHOCYTES NFR BLD AUTO: 33.5 % (ref 19.6–45.3)
MCH RBC QN AUTO: 30.1 PG (ref 26.6–33)
MCHC RBC AUTO-ENTMCNC: 33.1 G/DL (ref 31.5–35.7)
MCV RBC AUTO: 90.8 FL (ref 79–97)
MONOCYTES # BLD AUTO: 0.78 10*3/MM3 (ref 0.1–0.9)
MONOCYTES NFR BLD AUTO: 7.5 % (ref 5–12)
NEUTROPHILS # BLD AUTO: 5.74 10*3/MM3 (ref 1.7–7)
NEUTROPHILS NFR BLD AUTO: 55.5 % (ref 42.7–76)
NRBC BLD AUTO-RTO: 0 /100 WBC (ref 0–0.2)
PLATELET # BLD AUTO: 244 10*3/MM3 (ref 140–450)
PMV BLD AUTO: 12.2 FL (ref 6–12)
POTASSIUM BLD-SCNC: 4.4 MMOL/L (ref 3.5–5.3)
PROT SERPL-MCNC: 7.3 G/DL (ref 6.3–8.7)
RBC # BLD AUTO: 5.55 10*6/MM3 (ref 4.14–5.8)
SODIUM BLD-SCNC: 142 MMOL/L (ref 135–145)
WBC NRBC COR # BLD: 10.34 10*3/MM3 (ref 3.4–10.8)

## 2019-09-06 PROCEDURE — 93005 ELECTROCARDIOGRAM TRACING: CPT

## 2019-09-06 PROCEDURE — 80053 COMPREHEN METABOLIC PANEL: CPT | Performed by: SPECIALIST

## 2019-09-06 PROCEDURE — 93010 ELECTROCARDIOGRAM REPORT: CPT | Performed by: INTERNAL MEDICINE

## 2019-09-06 PROCEDURE — 71046 X-RAY EXAM CHEST 2 VIEWS: CPT

## 2019-09-06 PROCEDURE — 85025 COMPLETE CBC W/AUTO DIFF WBC: CPT | Performed by: SPECIALIST

## 2019-09-06 PROCEDURE — 36415 COLL VENOUS BLD VENIPUNCTURE: CPT

## 2019-09-06 RX ORDER — HYDROCODONE BITARTRATE AND ACETAMINOPHEN 10; 325 MG/1; MG/1
1 TABLET ORAL EVERY 6 HOURS PRN
COMMUNITY
End: 2019-09-27

## 2019-09-06 NOTE — DISCHARGE INSTRUCTIONS
DAY OF SURGERY INSTRUCTIONS        YOUR SURGEON: ***SHAR SCHMITT    PROCEDURE: ***LAPAROSCOPIC CHOLECYSTECTOMY     DATE OF SURGERY: ***September 10, 2019     ARRIVAL TIME: AS DIRECTED BY OFFICE    YOU MAY TAKE THE FOLLOWING MEDICATION(S) THE MORNING OF SURGERY WITH A SIP OF WATER: ***NORCO IF NEEDED       ALL OTHER HOME MEDICATION CHECK WITH YOUR PHYSICIAN                MANAGING PAIN AFTER SURGERY    We know you are probably wondering what your pain will be like after surgery.  Following surgery it is unrealistic to expect you will not have pain.   Pain is how our bodies let us know that something is wrong or cautions us to be careful.  That said, our goal is to make your pain tolerable.    Methods we may use to treat your pain include (oral or IV medications, PCAs, epidurals, nerve blocks, etc.)   While some procedures require IV pain medications for a short time after surgery, transitioning to pain medications by mouth allows for better management of pain.   Your nurse will encourage you to take oral pain medications whenever possible.  IV medications work almost immediately, but only last a short while.  Taking medications by mouth allows for a more constant level of medication in your blood stream for a longer period of time.      Once your pain is out of control it is harder to get back under control.  It is important you are aware when your next dose of pain medication is due.  If you are admitted, your nurse may write the time of your next dose on the white board in your room to help you remember.      We are interested in your pain and encourage you to inform us about aggravating factors during your visit.   Many times a simple repositioning every few hours can make a big difference.    If your physician says it is okay, do not let your pain prevent you from getting out of bed. Be sure to call your nurse for assistance prior to getting up so you do not fall.      Before surgery, please decide your  tolerable pain goal.  These faces help describe the pain ratings we use on a 0-10 scale.   Be prepared to tell us your goal and whether or not you take pain or anxiety medications at home.          BEFORE YOU COME TO THE HOSPITAL  (Pre-op instructions)  • Do not eat, drink, smoke or chew gum after midnight the night before surgery.  This also includes no mints.  • Morning of surgery take only the medicines you have been instructed with a sip of water unless otherwise instructed  by your physician.  • Do not shave, wear makeup or dark nail polish.  • Remove all jewelry including rings.  • Leave anything you consider valuable at home.  • Leave your suitcase in the car until after your surgery.  • Bring the following with you if applicable:  o Picture ID and insurance, Medicare or Medicaid cards  o Co-pay/deductible required by insurance (cash, check, credit card)  o Copy of advance directive, living will or power-of- documents if not brought to PAT  o CPAP or BIPAP mask and tubing  o Relaxation aids (MP3 player, book, magazine)  • On the day of surgery check in at registration located at the main entrance of the hospital.       Outpatient Surgery Guidelines, Adult  Outpatient procedures are those for which the person having the procedure is allowed to go home the same day as the procedure. Various procedures are done on an outpatient basis. You should follow some general guidelines if you will be having an outpatient procedure.  LET YOUR HEALTH CARE PROVIDER KNOW ABOUT:  · Any allergies you have.  · All medicines you are taking, including vitamins, herbs, eye drops, creams, and over-the-counter medicines.  · Previous problems you or members of your family have had with the use of anesthetics.  · Any blood disorders you have.  · Previous surgeries you have had.  · Medical conditions you have.  RISKS AND COMPLICATIONS  Your health care provider will discuss possible risks and complications with you before  surgery. Common risks and complications include:    · Problems due to the use of anesthetics.  · Blood loss and replacement (does not apply to minor surgical procedures).  · Temporary increase in pain due to surgery.  · Uncorrected pain or problems that the surgery was meant to correct.  · Infection.  · New damage.  BEFORE THE PROCEDURE  · Ask your health care provider about changing or stopping your regular medicines. You may need to stop taking certain medicines in the days or weeks before the procedure.  · Stop smoking at least 2 weeks before surgery. This lowers your risk for complications during and after surgery. Ask your health care provider for help with this if needed.  · Eat your usual meals and a light supper the day before surgery. Continue fluid intake. Do not drink alcohol.  · Do not eat or drink after midnight the night before your surgery.   · Arrange for someone to take you home and to stay with you for 24 hours after the procedure. Medicine given for your procedure may affect your ability to drive or to care for yourself.  · Call your health care provider's office if you develop an illness or problem that may prevent you from safely having your procedure.  AFTER THE PROCEDURE  After surgery, you will be taken to a recovery area, where your progress will be monitored. If there are no complications, you will be allowed to go home when you are awake, stable, and taking fluids well. You may have numbness around the surgical site. Healing will take some time. You will have tenderness at the surgical site and may have some swelling and bruising. You may also have some nausea.  HOME CARE INSTRUCTIONS  · Do not drive for 24 hours, or as directed by your health care provider. Do not drive while taking prescription pain medicines.  · Do not drink alcohol for 24 hours.  · Do not make important decisions or sign legal documents for 24 hours.  · You may resume a normal diet and activities as directed.  · Do not  lift anything heavier than 10 pounds (4.5 kg) or play contact sports until your health care provider says it is okay.  · Change your bandages (dressings) as directed.  · Only take over-the-counter or prescription medicines as directed by your health care provider.  · Follow up with your health care provider as directed.  SEEK MEDICAL CARE IF:  · You have increased bleeding (more than a small spot) from the surgical site.  · You have redness, swelling, or increasing pain in the wound.  · You see pus coming from the wound.  · You have a fever.  · You notice a bad smell coming from the wound or dressing.  · You feel lightheaded or faint.  · You develop a rash.  · You have trouble breathing.  · You develop allergies.  MAKE SURE YOU:  · Understand these instructions.  · Will watch your condition.  · Will get help right away if you are not doing well or get worse.     This information is not intended to replace advice given to you by your health care provider. Make sure you discuss any questions you have with your health care provider.     Document Released: 09/12/2002 Document Revised: 05/03/2016 Document Reviewed: 05/22/2014  [employer] Interactive Patient Education ©2016 [employer]       Fall Prevention in Hospitals, Adult  As a hospital patient, your condition and the treatments you receive can increase your risk for falls. Some additional risk factors for falls in a hospital include:  · Being in an unfamiliar environment.  · Being on bed rest.  · Your surgery.  · Taking certain medicines.  · Your tubing requirements, such as intravenous (IV) therapy or catheters.  It is important that you learn how to decrease fall risks while at the hospital. Below are important tips that can help prevent falls.  SAFETY TIPS FOR PREVENTING FALLS  Talk about your risk of falling.  · Ask your health care provider why you are at risk for falling. Is it your medicine, illness, tubing placement, or something else?  · Make a plan with  your health care provider to keep you safe from falls.  · Ask your health care provider or pharmacist about side effects of your medicines. Some medicines can make you dizzy or affect your coordination.  Ask for help.  · Ask for help before getting out of bed. You may need to press your call button.  · Ask for assistance in getting safely to the toilet.  · Ask for a walker or cane to be put at your bedside. Ask that most of the side rails on your bed be placed up before your health care provider leaves the room.  · Ask family or friends to sit with you.  · Ask for things that are out of your reach, such as your glasses, hearing aids, telephone, bedside table, or call button.  Follow these tips to avoid falling:  · Stay lying or seated, rather than standing, while waiting for help.  · Wear rubber-soled slippers or shoes whenever you walk in the hospital.  · Avoid quick, sudden movements.  ¨ Change positions slowly.  ¨ Sit on the side of your bed before standing.  ¨ Stand up slowly and wait before you start to walk.  · Let your health care provider know if there is a spill on the floor.  · Pay careful attention to the medical equipment, electrical cords, and tubes around you.  · When you need help, use your call button by your bed or in the bathroom. Wait for one of your health care providers to help you.  · If you feel dizzy or unsure of your footing, return to bed and wait for assistance.  · Avoid being distracted by the TV, telephone, or another person in your room.  · Do not lean or support yourself on rolling objects, such as IV poles or bedside tables.     This information is not intended to replace advice given to you by your health care provider. Make sure you discuss any questions you have with your health care provider.     Document Released: 12/15/2001 Document Revised: 01/08/2016 Document Reviewed: 08/25/2013  [employer] Interactive Patient Education ©2016 [employer]       Surgical Site Infections  FAQs  What is a Surgical Site Infection (SSI)?  A surgical site infection is an infection that occurs after surgery in the part of the body where the surgery took place. Most patients who have surgery do not develop an infection. However, infections develop in about 1 to 3 out of every 100 patients who have surgery.  Some of the common symptoms of a surgical site infection are:  · Redness and pain around the area where you had surgery  · Drainage of cloudy fluid from your surgical wound  · Fever  Can SSIs be treated?  Yes. Most surgical site infections can be treated with antibiotics. The antibiotic given to you depends on the bacteria (germs) causing the infection. Sometimes patients with SSIs also need another surgery to treat the infection.  What are some of the things that hospitals are doing to prevent SSIs?  To prevent SSIs, doctors, nurses, and other healthcare providers:  · Clean their hands and arms up to their elbows with an antiseptic agent just before the surgery.  · Clean their hands with soap and water or an alcohol-based hand rub before and after caring for each patient.  · May remove some of your hair immediately before your surgery using electric clippers if the hair is in the same area where the procedure will occur. They should not shave you with a razor.  · Wear special hair covers, masks, gowns, and gloves during surgery to keep the surgery area clean.  · Give you antibiotics before your surgery starts. In most cases, you should get antibiotics within 60 minutes before the surgery starts and the antibiotics should be stopped within 24 hours after surgery.  · Clean the skin at the site of your surgery with a special soap that kills germs.  What can I do to help prevent SSIs?  Before your surgery:  · Tell your doctor about other medical problems you may have. Health problems such as allergies, diabetes, and obesity could affect your surgery and your treatment.  · Quit smoking. Patients who smoke  get more infections. Talk to your doctor about how you can quit before your surgery.  · Do not shave near where you will have surgery. Shaving with a razor can irritate your skin and make it easier to develop an infection.  At the time of your surgery:  · Speak up if someone tries to shave you with a razor before surgery. Ask why you need to be shaved and talk with your surgeon if you have any concerns.  · Ask if you will get antibiotics before surgery.  After your surgery:  · Make sure that your healthcare providers clean their hands before examining you, either with soap and water or an alcohol-based hand rub.  · If you do not see your providers clean their hands, please ask them to do so.  · Family and friends who visit you should not touch the surgical wound or dressings.  · Family and friends should clean their hands with soap and water or an alcohol-based hand rub before and after visiting you. If you do not see them clean their hands, ask them to clean their hands.  What do I need to do when I go home from the hospital?  · Before you go home, your doctor or nurse should explain everything you need to know about taking care of your wound. Make sure you understand how to care for your wound before you leave the hospital.  · Always clean your hands before and after caring for your wound.  · Before you go home, make sure you know who to contact if you have questions or problems after you get home.  · If you have any symptoms of an infection, such as redness and pain at the surgery site, drainage, or fever, call your doctor immediately.  If you have additional questions, please ask your doctor or nurse.  Developed and co-sponsored by The Society for Healthcare Epidemiology of [place] (SHEA); Infectious Diseases Society of [place] (IDSA); American Hospital Association; Association for Professionals in Infection Control and Epidemiology (APIC); Centers for Disease Control and Prevention (CDC); and The Joint  Commission.     This information is not intended to replace advice given to you by your health care provider. Make sure you discuss any questions you have with your health care provider.     Document Released: 12/23/2014 Document Revised: 01/08/2016 Document Reviewed: 03/02/2016  [employer] Interactive Patient Education ©2016 [employer]       [facility]  CHG 4% Patient Instruction Sheet    Preparing the Skin Before Surgery  Preparing or “prepping” skin before surgery can reduce the risk of infection at the surgical site. To make the process easier,[facility] has chosen 4% Chlorhexidine Gluconate (CHG) antiseptic solution.   The steps below outline the prepping process and should be carefully followed.                                                                                                                                                      Prep the skin at the following time(s):                                                      We recommend you shower the night before surgery, and again the morning of surgery with the 4% CHG antiseptic solution using half of the bottle and a cloth each time.  Dress in clean clothes/sleepwear after showering.  See instructions below for application.          Do not apply any lotions or moisturizers.       Do not shave the area to be prepped for at least 2 days prior to surgery.    Clipping the hair may be done immediately prior to your surgery at the hospital    if needed.    Directions:  Thoroughly rinse your body with water.  Apply 4% CHG to a cloth and wash skin gently, paying special attention to the operative site.  Rinse again thoroughly.  Once you have begun using this product do not apply anything else to your skin. If itching or redness persists, rinse affected areas and discontinue use.    When using this product:  • Keep out of eyes, ears, and mouth.  • If solution should contact these areas, rinse out promptly and thoroughly with water.  • For external  use only.  • Do not use in genital area, on your face or head.      PATIENT/FAMILY/RESPONSIBLE PARTY VERBALIZES UNDERSTANDING OF ABOVE EDUCATION.  COPY OF PAIN SCALE GIVEN AND REVIEWED WITH VERBALIZED UNDERSTANDING.

## 2019-09-09 RX ORDER — LEVOFLOXACIN 5 MG/ML
500 INJECTION, SOLUTION INTRAVENOUS EVERY 24 HOURS
Status: COMPLETED | OUTPATIENT
Start: 2019-09-10 | End: 2019-09-10

## 2019-09-10 ENCOUNTER — APPOINTMENT (OUTPATIENT)
Dept: GENERAL RADIOLOGY | Facility: HOSPITAL | Age: 59
End: 2019-09-10

## 2019-09-10 ENCOUNTER — ANESTHESIA EVENT (OUTPATIENT)
Dept: PERIOP | Facility: HOSPITAL | Age: 59
End: 2019-09-10

## 2019-09-10 ENCOUNTER — HOSPITAL ENCOUNTER (OUTPATIENT)
Facility: HOSPITAL | Age: 59
Setting detail: HOSPITAL OUTPATIENT SURGERY
Discharge: HOME | End: 2019-09-10
Attending: SPECIALIST | Admitting: SPECIALIST

## 2019-09-10 ENCOUNTER — ANESTHESIA (OUTPATIENT)
Dept: PERIOP | Facility: HOSPITAL | Age: 59
End: 2019-09-10

## 2019-09-10 VITALS
DIASTOLIC BLOOD PRESSURE: 61 MMHG | TEMPERATURE: 98.2 F | RESPIRATION RATE: 18 BRPM | SYSTOLIC BLOOD PRESSURE: 103 MMHG | HEART RATE: 68 BPM | OXYGEN SATURATION: 93 %

## 2019-09-10 DIAGNOSIS — K80.20 CHOLELITHIASIS: ICD-10-CM

## 2019-09-10 PROCEDURE — 25010000002 FENTANYL CITRATE (PF) 100 MCG/2ML SOLUTION: Performed by: NURSE ANESTHETIST, CERTIFIED REGISTERED

## 2019-09-10 PROCEDURE — 25010000002 DEXAMETHASONE PER 1 MG: Performed by: NURSE ANESTHETIST, CERTIFIED REGISTERED

## 2019-09-10 PROCEDURE — 25010000002 DEXAMETHASONE PER 1 MG: Performed by: ANESTHESIOLOGY

## 2019-09-10 PROCEDURE — 76000 FLUOROSCOPY <1 HR PHYS/QHP: CPT

## 2019-09-10 PROCEDURE — 25010000002 LEVOFLOXACIN PER 250 MG: Performed by: NURSE ANESTHETIST, CERTIFIED REGISTERED

## 2019-09-10 PROCEDURE — 25010000002 LEVOFLOXACIN PER 250 MG: Performed by: SPECIALIST

## 2019-09-10 PROCEDURE — 25010000002 FENTANYL CITRATE (PF) 250 MCG/5ML SOLUTION: Performed by: NURSE ANESTHETIST, CERTIFIED REGISTERED

## 2019-09-10 PROCEDURE — C1726 CATH, BAL DIL, NON-VASCULAR: HCPCS | Performed by: SPECIALIST

## 2019-09-10 PROCEDURE — 25010000002 CEFOXITIN PER 1 G: Performed by: SPECIALIST

## 2019-09-10 PROCEDURE — 25010000002 ONDANSETRON PER 1 MG: Performed by: NURSE ANESTHETIST, CERTIFIED REGISTERED

## 2019-09-10 PROCEDURE — 88304 TISSUE EXAM BY PATHOLOGIST: CPT | Performed by: SPECIALIST

## 2019-09-10 PROCEDURE — 25010000002 IOPAMIDOL 61 % SOLUTION: Performed by: SPECIALIST

## 2019-09-10 PROCEDURE — 74300 X-RAY BILE DUCTS/PANCREAS: CPT

## 2019-09-10 PROCEDURE — 25010000002 MIDAZOLAM PER 1 MG: Performed by: ANESTHESIOLOGY

## 2019-09-10 PROCEDURE — 25010000002 PROPOFOL 10 MG/ML EMULSION: Performed by: NURSE ANESTHETIST, CERTIFIED REGISTERED

## 2019-09-10 PROCEDURE — 25010000002 NEOSTIGMINE PER 0.5 MG: Performed by: NURSE ANESTHETIST, CERTIFIED REGISTERED

## 2019-09-10 RX ORDER — ONDANSETRON HCL 8 MG
8 TABLET ORAL EVERY 8 HOURS PRN
Qty: 10 TABLET | Refills: 1 | Status: SHIPPED | OUTPATIENT
Start: 2019-09-10 | End: 2019-09-27

## 2019-09-10 RX ORDER — ONDANSETRON 2 MG/ML
4 INJECTION INTRAMUSCULAR; INTRAVENOUS ONCE AS NEEDED
Status: DISCONTINUED | OUTPATIENT
Start: 2019-09-10 | End: 2019-09-10 | Stop reason: HOSPADM

## 2019-09-10 RX ORDER — LIDOCAINE HYDROCHLORIDE 20 MG/ML
INJECTION, SOLUTION INFILTRATION; PERINEURAL AS NEEDED
Status: DISCONTINUED | OUTPATIENT
Start: 2019-09-10 | End: 2019-09-10 | Stop reason: SURG

## 2019-09-10 RX ORDER — SODIUM CHLORIDE, SODIUM LACTATE, POTASSIUM CHLORIDE, CALCIUM CHLORIDE 600; 310; 30; 20 MG/100ML; MG/100ML; MG/100ML; MG/100ML
1000 INJECTION, SOLUTION INTRAVENOUS CONTINUOUS
Status: DISCONTINUED | OUTPATIENT
Start: 2019-09-10 | End: 2019-09-10 | Stop reason: HOSPADM

## 2019-09-10 RX ORDER — ONDANSETRON 2 MG/ML
INJECTION INTRAMUSCULAR; INTRAVENOUS AS NEEDED
Status: DISCONTINUED | OUTPATIENT
Start: 2019-09-10 | End: 2019-09-10 | Stop reason: SURG

## 2019-09-10 RX ORDER — SODIUM CHLORIDE 0.9 % (FLUSH) 0.9 %
3 SYRINGE (ML) INJECTION EVERY 12 HOURS SCHEDULED
Status: DISCONTINUED | OUTPATIENT
Start: 2019-09-10 | End: 2019-09-10 | Stop reason: HOSPADM

## 2019-09-10 RX ORDER — OXYCODONE AND ACETAMINOPHEN 10; 325 MG/1; MG/1
1 TABLET ORAL ONCE AS NEEDED
Status: DISCONTINUED | OUTPATIENT
Start: 2019-09-10 | End: 2019-09-10 | Stop reason: HOSPADM

## 2019-09-10 RX ORDER — SODIUM CHLORIDE 9 MG/ML
INJECTION, SOLUTION INTRAVENOUS AS NEEDED
Status: DISCONTINUED | OUTPATIENT
Start: 2019-09-10 | End: 2019-09-10 | Stop reason: HOSPADM

## 2019-09-10 RX ORDER — METOCLOPRAMIDE HYDROCHLORIDE 5 MG/ML
5 INJECTION INTRAMUSCULAR; INTRAVENOUS
Status: DISCONTINUED | OUTPATIENT
Start: 2019-09-10 | End: 2019-09-10 | Stop reason: HOSPADM

## 2019-09-10 RX ORDER — IPRATROPIUM BROMIDE AND ALBUTEROL SULFATE 2.5; .5 MG/3ML; MG/3ML
3 SOLUTION RESPIRATORY (INHALATION) ONCE AS NEEDED
Status: DISCONTINUED | OUTPATIENT
Start: 2019-09-10 | End: 2019-09-10 | Stop reason: HOSPADM

## 2019-09-10 RX ORDER — PROPOFOL 10 MG/ML
VIAL (ML) INTRAVENOUS AS NEEDED
Status: DISCONTINUED | OUTPATIENT
Start: 2019-09-10 | End: 2019-09-10 | Stop reason: SURG

## 2019-09-10 RX ORDER — SODIUM CHLORIDE, SODIUM LACTATE, POTASSIUM CHLORIDE, CALCIUM CHLORIDE 600; 310; 30; 20 MG/100ML; MG/100ML; MG/100ML; MG/100ML
100 INJECTION, SOLUTION INTRAVENOUS CONTINUOUS
Status: DISCONTINUED | OUTPATIENT
Start: 2019-09-10 | End: 2019-09-10 | Stop reason: HOSPADM

## 2019-09-10 RX ORDER — FENTANYL CITRATE 50 UG/ML
25 INJECTION, SOLUTION INTRAMUSCULAR; INTRAVENOUS AS NEEDED
Status: DISCONTINUED | OUTPATIENT
Start: 2019-09-10 | End: 2019-09-10 | Stop reason: HOSPADM

## 2019-09-10 RX ORDER — DEXAMETHASONE SODIUM PHOSPHATE 4 MG/ML
4 INJECTION, SOLUTION INTRA-ARTICULAR; INTRALESIONAL; INTRAMUSCULAR; INTRAVENOUS; SOFT TISSUE ONCE AS NEEDED
Status: COMPLETED | OUTPATIENT
Start: 2019-09-10 | End: 2019-09-10

## 2019-09-10 RX ORDER — SODIUM CHLORIDE 0.9 % (FLUSH) 0.9 %
3 SYRINGE (ML) INJECTION AS NEEDED
Status: DISCONTINUED | OUTPATIENT
Start: 2019-09-10 | End: 2019-09-10 | Stop reason: HOSPADM

## 2019-09-10 RX ORDER — IBUPROFEN 600 MG/1
600 TABLET ORAL EVERY 6 HOURS PRN
COMMUNITY
End: 2019-09-27

## 2019-09-10 RX ORDER — MEPERIDINE HYDROCHLORIDE 50 MG/1
50 TABLET ORAL EVERY 4 HOURS PRN
Qty: 30 TABLET | Refills: 0 | Status: SHIPPED | OUTPATIENT
Start: 2019-09-10 | End: 2019-09-27

## 2019-09-10 RX ORDER — FENTANYL CITRATE 50 UG/ML
INJECTION, SOLUTION INTRAMUSCULAR; INTRAVENOUS AS NEEDED
Status: DISCONTINUED | OUTPATIENT
Start: 2019-09-10 | End: 2019-09-10 | Stop reason: SURG

## 2019-09-10 RX ORDER — IBUPROFEN 600 MG/1
600 TABLET ORAL ONCE AS NEEDED
Status: DISCONTINUED | OUTPATIENT
Start: 2019-09-10 | End: 2019-09-10 | Stop reason: HOSPADM

## 2019-09-10 RX ORDER — DEXAMETHASONE SODIUM PHOSPHATE 4 MG/ML
INJECTION, SOLUTION INTRA-ARTICULAR; INTRALESIONAL; INTRAMUSCULAR; INTRAVENOUS; SOFT TISSUE AS NEEDED
Status: DISCONTINUED | OUTPATIENT
Start: 2019-09-10 | End: 2019-09-10 | Stop reason: SURG

## 2019-09-10 RX ORDER — LEVOFLOXACIN 5 MG/ML
INJECTION, SOLUTION INTRAVENOUS AS NEEDED
Status: DISCONTINUED | OUTPATIENT
Start: 2019-09-10 | End: 2019-09-10 | Stop reason: SURG

## 2019-09-10 RX ORDER — NALOXONE HCL 0.4 MG/ML
0.4 VIAL (ML) INJECTION AS NEEDED
Status: DISCONTINUED | OUTPATIENT
Start: 2019-09-10 | End: 2019-09-10 | Stop reason: HOSPADM

## 2019-09-10 RX ORDER — MAGNESIUM HYDROXIDE 1200 MG/15ML
LIQUID ORAL AS NEEDED
Status: DISCONTINUED | OUTPATIENT
Start: 2019-09-10 | End: 2019-09-10 | Stop reason: HOSPADM

## 2019-09-10 RX ORDER — ROCURONIUM BROMIDE 10 MG/ML
INJECTION, SOLUTION INTRAVENOUS AS NEEDED
Status: DISCONTINUED | OUTPATIENT
Start: 2019-09-10 | End: 2019-09-10 | Stop reason: SURG

## 2019-09-10 RX ORDER — SODIUM CHLORIDE 0.9 % (FLUSH) 0.9 %
3-10 SYRINGE (ML) INJECTION AS NEEDED
Status: DISCONTINUED | OUTPATIENT
Start: 2019-09-10 | End: 2019-09-10 | Stop reason: HOSPADM

## 2019-09-10 RX ORDER — OXYCODONE AND ACETAMINOPHEN 7.5; 325 MG/1; MG/1
2 TABLET ORAL EVERY 4 HOURS PRN
Status: DISCONTINUED | OUTPATIENT
Start: 2019-09-10 | End: 2019-09-10 | Stop reason: HOSPADM

## 2019-09-10 RX ORDER — GLYCOPYRROLATE 0.2 MG/ML
INJECTION INTRAMUSCULAR; INTRAVENOUS AS NEEDED
Status: DISCONTINUED | OUTPATIENT
Start: 2019-09-10 | End: 2019-09-10 | Stop reason: SURG

## 2019-09-10 RX ORDER — MIDAZOLAM HYDROCHLORIDE 1 MG/ML
2 INJECTION INTRAMUSCULAR; INTRAVENOUS
Status: DISCONTINUED | OUTPATIENT
Start: 2019-09-10 | End: 2019-09-10 | Stop reason: HOSPADM

## 2019-09-10 RX ORDER — MIDAZOLAM HYDROCHLORIDE 1 MG/ML
1 INJECTION INTRAMUSCULAR; INTRAVENOUS
Status: DISCONTINUED | OUTPATIENT
Start: 2019-09-10 | End: 2019-09-10 | Stop reason: HOSPADM

## 2019-09-10 RX ORDER — LABETALOL HYDROCHLORIDE 5 MG/ML
5 INJECTION, SOLUTION INTRAVENOUS
Status: DISCONTINUED | OUTPATIENT
Start: 2019-09-10 | End: 2019-09-10 | Stop reason: HOSPADM

## 2019-09-10 RX ORDER — BUPIVACAINE HYDROCHLORIDE AND EPINEPHRINE 5; 5 MG/ML; UG/ML
INJECTION, SOLUTION PERINEURAL AS NEEDED
Status: DISCONTINUED | OUTPATIENT
Start: 2019-09-10 | End: 2019-09-10 | Stop reason: HOSPADM

## 2019-09-10 RX ORDER — IPRATROPIUM BROMIDE AND ALBUTEROL SULFATE 2.5; .5 MG/3ML; MG/3ML
3 SOLUTION RESPIRATORY (INHALATION) ONCE
Status: COMPLETED | OUTPATIENT
Start: 2019-09-10 | End: 2019-09-10

## 2019-09-10 RX ORDER — ACETAMINOPHEN 500 MG
1000 TABLET ORAL ONCE
Status: COMPLETED | OUTPATIENT
Start: 2019-09-10 | End: 2019-09-10

## 2019-09-10 RX ORDER — METHYLCELLULOSE 2 G/19G
2 POWDER, FOR SOLUTION ORAL DAILY
Qty: 60 G | Refills: 6 | Status: SHIPPED | OUTPATIENT
Start: 2019-09-10 | End: 2019-10-10

## 2019-09-10 RX ADMIN — SODIUM CHLORIDE, POTASSIUM CHLORIDE, SODIUM LACTATE AND CALCIUM CHLORIDE: 600; 310; 30; 20 INJECTION, SOLUTION INTRAVENOUS at 11:51

## 2019-09-10 RX ADMIN — LIDOCAINE HYDROCHLORIDE 100 MG: 20 INJECTION, SOLUTION INFILTRATION; PERINEURAL at 10:35

## 2019-09-10 RX ADMIN — DEXAMETHASONE SODIUM PHOSPHATE 4 MG: 4 INJECTION, SOLUTION INTRAMUSCULAR; INTRAVENOUS at 11:51

## 2019-09-10 RX ADMIN — PROPOFOL 150 MG: 10 INJECTION, EMULSION INTRAVENOUS at 10:35

## 2019-09-10 RX ADMIN — MIDAZOLAM HYDROCHLORIDE 2 MG: 1 INJECTION, SOLUTION INTRAMUSCULAR; INTRAVENOUS at 09:31

## 2019-09-10 RX ADMIN — IPRATROPIUM BROMIDE AND ALBUTEROL SULFATE 3 ML: 2.5; .5 SOLUTION RESPIRATORY (INHALATION) at 09:29

## 2019-09-10 RX ADMIN — FENTANYL CITRATE 100 MCG: 50 INJECTION, SOLUTION INTRAMUSCULAR; INTRAVENOUS at 10:35

## 2019-09-10 RX ADMIN — ACETAMINOPHEN 1000 MG: 500 TABLET, FILM COATED ORAL at 09:31

## 2019-09-10 RX ADMIN — LEVOFLOXACIN 500 MG: 5 INJECTION, SOLUTION INTRAVENOUS at 10:14

## 2019-09-10 RX ADMIN — Medication 4 MG: at 11:51

## 2019-09-10 RX ADMIN — ROCURONIUM BROMIDE 40 MG: 10 INJECTION INTRAVENOUS at 10:35

## 2019-09-10 RX ADMIN — GLYCOPYRROLATE 0.6 MG: 0.2 INJECTION, SOLUTION INTRAMUSCULAR; INTRAVENOUS at 11:51

## 2019-09-10 RX ADMIN — SODIUM CHLORIDE, POTASSIUM CHLORIDE, SODIUM LACTATE AND CALCIUM CHLORIDE 1000 ML: 600; 310; 30; 20 INJECTION, SOLUTION INTRAVENOUS at 07:56

## 2019-09-10 RX ADMIN — LEVOFLOXACIN 500 MG: 500 INJECTION, SOLUTION INTRAVENOUS at 10:14

## 2019-09-10 RX ADMIN — ONDANSETRON HYDROCHLORIDE 4 MG: 2 SOLUTION INTRAMUSCULAR; INTRAVENOUS at 11:51

## 2019-09-10 RX ADMIN — FENTANYL CITRATE 250 MCG: 50 INJECTION INTRAMUSCULAR; INTRAVENOUS at 10:35

## 2019-09-10 RX ADMIN — DEXAMETHASONE SODIUM PHOSPHATE 4 MG: 4 INJECTION, SOLUTION INTRAMUSCULAR; INTRAVENOUS at 09:31

## 2019-09-10 NOTE — ANESTHESIA PROCEDURE NOTES
Airway  Urgency: elective    Date/Time: 9/10/2019 10:35 AM  Airway not difficult    General Information and Staff    Patient location during procedure: OR  CRNA: Zeb Harp CRNA    Indications and Patient Condition  Indications for airway management: airway protection    Preoxygenated: yes  MILS maintained throughout  Mask difficulty assessment: 1 - vent by mask    Final Airway Details  Final airway type: endotracheal airway      Successful airway: ETT  Cuffed: yes   Successful intubation technique: direct laryngoscopy  Facilitating devices/methods: intubating stylet  Endotracheal tube insertion site: oral  Blade: Jayy  Blade size: 4  ETT size (mm): 8.0  Cormack-Lehane Classification: grade I - full view of glottis  Placement verified by: chest auscultation and capnometry   Cuff volume (mL): 8  Measured from: lips  ETT/EBT  to lips (cm): 21  Number of attempts at approach: 1

## 2019-09-10 NOTE — DISCHARGE INSTRUCTIONS

## 2019-09-10 NOTE — ANESTHESIA POSTPROCEDURE EVALUATION
Patient: Tree Shaffer    Procedure Summary     Date:  09/10/19 Room / Location:   [facility] OR  /  [facility]    Anesthesia Start:  1028 Anesthesia Stop:  1206    Procedure:  LAPAROSCOPIC CHOLECYSTECTOMY WITH INTRAOPERATIVE, CHOLANGIOGRAM (N/A Abdomen) Diagnosis:  (CHOLELITHIASIS)    Surgeon:  Leon Pulido MD Provider:  Zeb Harp CRNA    Anesthesia Type:  general ASA Status:  3          Anesthesia Type: general  Last vitals  BP   107/64 (09/10/19 1257)   Temp   98.2 °F (36.8 °C) (09/10/19 1257)   Pulse   74 (09/10/19 1257)   Resp   16 (09/10/19 1257)     SpO2   95 % (09/10/19 1257)     Post Anesthesia Care and Evaluation    Patient location during evaluation: PACU  Patient participation: complete - patient participated  Level of consciousness: awake and alert  Pain management: adequate  Airway patency: patent  Anesthetic complications: No anesthetic complications    Cardiovascular status: acceptable  Respiratory status: acceptable  Hydration status: acceptable    Comments: Blood pressure 107/64, pulse 74, temperature 98.2 °F (36.8 °C), temperature source Temporal, resp. rate 16, SpO2 95 %.    Pt discharged from PACU based on kimberlee score >8

## 2019-09-10 NOTE — ANESTHESIA PREPROCEDURE EVALUATION
Anesthesia Evaluation     Patient summary reviewed   no history of anesthetic complications:  NPO Solid Status: > 8 hours  NPO Liquid Status: > 2 hours           Airway   Mallampati: I  TM distance: >3 FB  Neck ROM: full  Dental          Pulmonary - normal exam    breath sounds clear to auscultation  (+) a smoker (1 ppd) Current Smoked day of surgery, COPD,   (-) asthma, recent URI, sleep apnea  Cardiovascular - normal exam  Exercise tolerance: good (4-7 METS)    ECG reviewed  Rhythm: regular  Rate: normal    (+) hypertension, PVD, hyperlipidemia,   (-) pacemaker, past MI, angina, cardiac stents, CABG      Neuro/Psych  (+) psychiatric history Anxiety,     (-) seizures, TIA, CVA  GI/Hepatic/Renal/Endo    (-) GERD, liver disease, no renal disease, diabetes, hypothyroidism, hyperthyroidism    Musculoskeletal     (+) back pain, neck pain,   Abdominal    Substance History      OB/GYN          Other   (+) arthritis                     Anesthesia Plan    ASA 3     general     intravenous induction   Anesthetic plan, all risks, benefits, and alternatives have been provided, discussed and informed consent has been obtained with: patient.

## 2019-09-10 NOTE — OP NOTE
CHOLECYSTECTOMY LAPAROSCOPIC INTRAOPERATIVE CHOLANGIOGRAM  Procedure Note    Tree Shaffer  9/10/2019    Pre-op Diagnosis:   CHOLELITHIASIS    Post-op Diagnosis:     Symptomatic cholelithiasis chronic cholecystitis    Procedure/CPT® Codes:      Procedure(s):  LAPAROSCOPIC CHOLECYSTECTOMY WITH INTRAOPERATIVE, CHOLANGIOGRAM    Surgeon(s):  Leon Pulido MD    Anesthesia: General    Staff:   Specimens     ID Source Type Tests Collected By Collected At Frozen?      A Gallbladder Tissue · TISSUE PATHOLOGY EXAM   Leon Pulido MD 9/10/19 1115 No     Description: GALLBLADDER AND CONTENTS          Estimated Blood Loss: 25 cc    Specimens:                ID Type Source Tests Collected by Time   A : GALLBLADDER AND CONTENTS Tissue Gallbladder TISSUE PATHOLOGY EXAM Leon Pulido MD 9/10/2019 1115         Drains: There were no drains    Indications: Mr. Tree Shaffer is a 59-year-old gentleman who for the past 2 months has had very vague symptoms in the upper abdomen with fullness, bloating, central abdominal pain occasional right sided discomfort as well as bloating and fullness with fried or greasy foods.  With this noted an ultrasound showing stones no inflammation he is tried to reduce his fats also anti-inflammatories and antiulcer medications all without improvement.  With the above problem he is for laparoscopic cholecystectomy and treatment of presumed symptomatic cholelithiasis and chronic cholecystitis.    Findings: Laparoscopic exploration, cholecystectomy and cholangiogram accomplished blood loss less than 25 cc cholangiogram was normal.      Complications: There are no complications blood loss less than 25 cc      Procedure: Patient was placed in a supine position in the surgical suite and after a timeout had been completed  the area was scrubbed prepped and draped with alcohol and Betadine a Ioban was applied.  Following this a transverse skin incision was completed in the upper abdomen incising  through the skin and subcutaneous taste tissue to the fascia.  Once of the fascia 2-0 Vicryl stay sutures were placed superior and inferior to the planned transverse incision.  An incision was completed with a 15 blade incising through the fascia and out muscle-splitting technique was completed using Darian clamps and dissecting in a muscle-splitting technique down to the peritoneum.  The peritoneum was opened and entered a blunt trocar 10 mm port was placed in this area and insufflation with CO2 was completed to maintain the abdominal pressure between 10 and 14 mm of pressure.  This accomplished under direct visualization 3  5 mm trochars were placed one in the right mid abdomen and 2 in the upper abdomen.  With the 3, 5 mm ports and one 10 the gallbladders and grasped with the right lateral port and elevated toward the right shoulder.  The adhesions at the mary carmen hepatis was taken down sharply and as well as with Bovie electrocautery and dissection was completed at the mary carmen hepatis.  With dissection completed the critical angle was visualized with the cystic artery and cystic duct with this visualized the cystic artery was then clipped ×3 proximally and incised distally further dissection around the infundibulum was completed and a clip was placed across the infundibulum incision into the infundibulum was completed through which a cholangiogram was brought into the field and cholangiography obtained.  There is free flow into the left and right hepatic radical, hepatic duct, bile duct and free flow into the duodenum.  With this completed no other abnormalities noted the Cholangiocath was removed the cystic duct was then transected the infundibulum was controlled using 1-0 PDS loop and the cystic duct was controlled using 2-0 PDS loops and one clip distal to this.  With this completed the gallbladder was slowly and tediously removed from the infrahepatic substance prominent venous and arterial tributaries were  noted and a single clip was applied across these.  Ultimately the gallbladder was able to be fully removed.  With this completed the bed was evaluated there is no bleeding from the bed and full control of the cystic duct and cystic artery the gallbladder was then grasped and placed in the Endo Catch  and removed from the right midepigastric port.  There is no spillage of bile or stones and this removal and having completed this the excess gas was relieved the right midepigastric port was closed using 4 figure-of-eight sutures of 0 Vicryl the deep dermis was reapproximated using simple inverted interrupted sutures of 3-0 Vicryl and skin was enclosed using running subcuticular suture of 4-0 Vicryl.  The skin was injected with half percent Marcaine with epinephrine a total of 30 mL used the 5 mm trochars sites were then closed using simple inverted interrupted sutures of 4-0 Vicryl and once completed the area was cleaned with saline Mastisol Steri-Strips 2 x 2 and Tegaderm applied.  Patient was then extubated and transferred to the recovery room in good condition and we supplied back pressure over the right midepigastric port until the patient was extubated to reduce any increased tension on the larger trocar site.  Leon Pulido MD     Date: 9/10/2019  Time: 12:16 PM    EMR Dragon/Transcription disclaimer: Much of this encounter note is an electronic transcription/translation of spoken language to printed text. The electronic translation of spoken language may permit erroneous, or at times, nonsensical words or phrases to be inadvertently transcribed; although I have reviewed the note for such errors, some may still exist.

## 2019-09-12 LAB
CYTO UR: NORMAL
LAB AP CASE REPORT: NORMAL
PATH REPORT.FINAL DX SPEC: NORMAL
PATH REPORT.GROSS SPEC: NORMAL

## 2019-09-16 ENCOUNTER — TELEPHONE (OUTPATIENT)
Dept: VASCULAR SURGERY | Facility: CLINIC | Age: 59
End: 2019-09-16

## 2019-09-16 NOTE — TELEPHONE ENCOUNTER
Patient returned a phone call from the office. Patient confirmed testing and appointment with Dr. Biggs on 9/18/2019

## 2019-09-16 NOTE — TELEPHONE ENCOUNTER
Left a message for the patient reminding him of his testing and appt with Dr. Biggs on Wednesday.

## 2019-09-18 ENCOUNTER — APPOINTMENT (OUTPATIENT)
Dept: ULTRASOUND IMAGING | Facility: HOSPITAL | Age: 59
End: 2019-09-18

## 2019-09-25 ENCOUNTER — TELEPHONE (OUTPATIENT)
Dept: VASCULAR SURGERY | Facility: CLINIC | Age: 59
End: 2019-09-25

## 2019-09-25 NOTE — TELEPHONE ENCOUNTER
I called the patient to remind him of his appt for testing and to see Dr. Biggs on Friday.  He was aware and confirmed.

## 2019-09-27 ENCOUNTER — HOSPITAL ENCOUNTER (OUTPATIENT)
Dept: ULTRASOUND IMAGING | Facility: HOSPITAL | Age: 59
Discharge: HOME | End: 2019-09-27
Admitting: SURGERY

## 2019-09-27 ENCOUNTER — OFFICE VISIT (OUTPATIENT)
Dept: VASCULAR SURGERY | Facility: CLINIC | Age: 59
End: 2019-09-27

## 2019-09-27 VITALS
SYSTOLIC BLOOD PRESSURE: 138 MMHG | HEART RATE: 58 BPM | WEIGHT: 192 LBS | OXYGEN SATURATION: 98 % | HEIGHT: 68 IN | BODY MASS INDEX: 29.1 KG/M2 | DIASTOLIC BLOOD PRESSURE: 82 MMHG

## 2019-09-27 DIAGNOSIS — I70.211 ATHEROSCLEROSIS OF NATIVE ARTERY OF RIGHT LOWER EXTREMITY WITH INTERMITTENT CLAUDICATION (HCC): ICD-10-CM

## 2019-09-27 DIAGNOSIS — I70.213 ATHEROSCLEROSIS OF NATIVE ARTERY OF BOTH LOWER EXTREMITIES WITH INTERMITTENT CLAUDICATION (HCC): Primary | ICD-10-CM

## 2019-09-27 PROCEDURE — 93923 UPR/LXTR ART STDY 3+ LVLS: CPT

## 2019-09-27 PROCEDURE — 93923 UPR/LXTR ART STDY 3+ LVLS: CPT | Performed by: SURGERY

## 2019-09-27 PROCEDURE — 99407 BEHAV CHNG SMOKING > 10 MIN: CPT | Performed by: SURGERY

## 2019-09-27 PROCEDURE — 99214 OFFICE O/P EST MOD 30 MIN: CPT | Performed by: SURGERY

## 2019-09-27 RX ORDER — OXYCODONE AND ACETAMINOPHEN 7.5; 325 MG/1; MG/1
TABLET ORAL
Refills: 0 | Status: ON HOLD | COMMUNITY
Start: 2019-09-19 | End: 2021-05-06

## 2019-09-27 RX ORDER — ASPIRIN 81 MG/1
81 TABLET ORAL DAILY
Qty: 30 TABLET | Refills: 3 | Status: SHIPPED | OUTPATIENT
Start: 2019-09-27 | End: 2020-01-25

## 2019-09-27 NOTE — PROGRESS NOTES
09/27/2019      Provider, No Known  [facility] [place] [address]        Tree Shaffer  1960    Chief Complaint   Patient presents with   • Follow-up     3 month f/u with ABIs.  Pt states that he hasn't been walking much lately because he had his gallbladder removed.        Dear Provider, No Known:    HPI     I had the pleasure of seeing your patient in the office today for follow up.  As you recall, the patient is a 59 y.o. male who we are currently following for peripheral vascular disease and lower extremity claudication.  He returns today after having undergone repeat GEOFF/PVR for continued surveillance.  I did review this study in the office and it does show basically no change from his previous study.  His GEOFF on the right is 0.81 which represents mild arterial insufficiency, and on the left is 0.94 which represents no significant arterial insufficiency.  He continues to have symptoms of mild claudication mainly in the right lower extremity but still he reports these are not really affecting his day-to-day function.  At our last visit I recommended that he start a walking program of at least 3-4 times a week however initially he reported in the first 2 weeks after our visit he did this but then had to have a cholecystectomy and has been fairly sedentary since that time.  He otherwise is without acute complaints today.      Review of Systems   Constitutional: Positive for fatigue. Negative for activity change, appetite change, chills, diaphoresis and fever.   HENT: Negative.  Negative for congestion, sneezing, sore throat and trouble swallowing.    Eyes: Negative.  Negative for visual disturbance.   Respiratory: Negative.  Negative for chest tightness and shortness of breath.    Cardiovascular: Negative.  Negative for chest pain, palpitations and leg swelling.   Gastrointestinal: Negative.  Negative for abdominal distention, abdominal pain, nausea and vomiting.   Endocrine: Negative.   "  Genitourinary: Negative.    Musculoskeletal:        He reports bilateral lower extremity claudication, worse on the right than the left   Skin: Negative.    Allergic/Immunologic: Negative.    Neurological: Negative.    Hematological: Negative.    Psychiatric/Behavioral: Negative.        /82   Pulse 58   Ht 172.7 cm (68\")   Wt 87.1 kg (192 lb)   SpO2 98%   BMI 29.19 kg/m²   Physical Exam   Constitutional: He is oriented to person, place, and time. He appears well-developed and well-nourished.   HENT:   Head: Normocephalic and atraumatic.   Eyes: EOM are normal. Pupils are equal, round, and reactive to light.   Neck: Normal range of motion. Neck supple. No JVD present.   Cardiovascular: Normal rate and regular rhythm.   Pulses:       Carotid pulses are 2+ on the right side, and 2+ on the left side.       Radial pulses are 2+ on the right side, and 2+ on the left side.        Femoral pulses are 2+ on the right side, and 2+ on the left side.       Popliteal pulses are 0 on the right side, and 0 on the left side.        Dorsalis pedis pulses are 0 on the right side, and 0 on the left side.        Posterior tibial pulses are 0 on the right side, and 0 on the left side.   He does have Doppler signals of the bilateral DP and PT which are mainly monophasic.   Pulmonary/Chest: Effort normal. No respiratory distress.   Abdominal: Soft. He exhibits no distension and no mass. There is no tenderness.   Musculoskeletal: Normal range of motion. He exhibits no edema, tenderness or deformity.   Neurological: He is alert and oriented to person, place, and time. No sensory deficit. He exhibits normal muscle tone.   Skin: Skin is warm and dry. Capillary refill takes 2 to 3 seconds.   Psychiatric: He has a normal mood and affect. His behavior is normal. Judgment and thought content normal.   Vitals reviewed.      DIAGNOSTIC DATA:    Xr Chest 2 View    Result Date: 9/6/2019  Narrative: EXAMINATION: XR CHEST 2 VW-  9/6/2019 " 10:32 AM CDT  TWO-VIEW CHEST:  HISTORY: COPD  Frontal and lateral projection chest radiograph obtained.  COMPARISON:  10/02/2012  FINDINGS:  There are COPD changes.  There are scattered calcified granuloma.  The lungs are clear without consolidating infiltrates.  The heart is normal in size, pulmonary circulation appropriate, without heart failure.  The bony structures are intact.  Changes from anterior cervical fusion plating noted. No acute osseous abnormalities observed.                                                                                                                  Impression: 1. COPD. 2. Remote granulomatous disease. 3. No acute cardiopulmonary process.   This report was finalized on 09/06/2019 10:34 by Dr. Kolby Page MD.    Fl C Arm During Surgery    Result Date: 9/10/2019  Narrative: FL CHOLANGIOGRAM OPERATIVE- 9/10/2019 11:00 AM CDT  HISTORY: stones; K80.20-Calculus of gallbladder without cholecystitis without obstruction  COMPARISON: None  FLUOROSCOPY TIME: 9 seconds with a dose of 3.2 mGy  NUMBER OF IMAGES: 7      Impression: Contrast was injected through the cystic duct stump, and multiple fluoroscopic images of the RIGHT upper quadrant were obtained intraoperatively. The opacified intrahepatic and extrahepatic ducts demonstrate no filling defects or obstruction. Contrast is noted in the small bowel. Surgical instruments and clips are noted in the RIGHT upper quadrant.  Please refer to the operative note for more details. This report was finalized on 09/10/2019 14:29 by Dr. Rome Looney MD.    Fl Cholangiogram Operative    Result Date: 9/10/2019  Narrative: FL CHOLANGIOGRAM OPERATIVE- 9/10/2019 11:00 AM CDT  HISTORY: stones; K80.20-Calculus of gallbladder without cholecystitis without obstruction  COMPARISON: None  FLUOROSCOPY TIME: 9 seconds with a dose of 3.2 mGy  NUMBER OF IMAGES: 7      Impression: Contrast was injected through the cystic duct stump, and multiple fluoroscopic  images of the RIGHT upper quadrant were obtained intraoperatively. The opacified intrahepatic and extrahepatic ducts demonstrate no filling defects or obstruction. Contrast is noted in the small bowel. Surgical instruments and clips are noted in the RIGHT upper quadrant.  Please refer to the operative note for more details. This report was finalized on 09/10/2019 14:29 by Dr. Rome Looney MD.      There is no problem list on file for this patient.        ICD-10-CM ICD-9-CM   1. Atherosclerosis of native artery of both lower extremities with intermittent claudication (CMS/[facility]) I70.213 440.21       Lab Frequency Next Occurrence   US Ankle / Brachial Indices Extremity Complete Once 09/14/2019       PLAN: After thoroughly evaluating Tree Shaffer, I believe the best course of action is to remain conservative from a vascular standpoint.  He does continue to have mild claudication mainly in the right lower extremity however at this point it is not lifestyle limiting.  He did have a repeat GEOFF/PVR done today which I did review.  GEOFF on the right is 0.81 which represents mild arterial insufficiency and on the left is 0.94 which represents no significant arterial insufficiency.  He continues to smoke at least a pack a day.  At this point we again discussed him attempting an exercise program with walking at least 3-4 times a week to the point where he has claudication and to try and push past this slightly to help with the formation of collaterals.  He reports that he again will try to do this now that he is feeling much better after his cholecystectomy.  I will plan to see him back here in 6 months with a repeat GEOFF/PVR for continued surveillance.  The patient is to continue taking their medications as previously discussed.  I also recommended that he start an 81 mg aspirin daily as he has not previously done this.  He is currently on a statin.   I did discuss vascular risk factors as they pertain to the progression of  vascular disease including controlling hyperlipidemia. These factors remain stable. Patient's Body mass index is 29.19 kg/m². BMI is above normal parameters. Recommendations include: educational material. I did  extensively on smoking cessation, and the patient was advised of the continued risks of smoking.  I provided over 10 minutes counseling on this matter. This was all discussed in full with complete understanding.  Thank you for allowing me to participate in the care of your patient.  Please do not hesitate to call with any questions or concerns.  We will keep you aware of any further encounters with Tree Shaffer.      Sincerely Yours,      Leo Biggs MD

## 2019-09-27 NOTE — PATIENT INSTRUCTIONS
For more information:    Quit Now [place]  1-800-QUIT-NOW  https://[place].quitlogix.org/en-[place]/  Steps to Quit Smoking  Smoking tobacco can be harmful to your health and can affect almost every organ in your body. Smoking puts you, and those around you, at risk for developing many serious chronic diseases. Quitting smoking is difficult, but it is one of the best things that you can do for your health. It is never too late to quit.  What are the benefits of quitting smoking?  When you quit smoking, you lower your risk of developing serious diseases and conditions, such as:  · Lung cancer or lung disease, such as COPD.  · Heart disease.  · Stroke.  · Heart attack.  · Infertility.  · Osteoporosis and bone fractures.  Additionally, symptoms such as coughing, wheezing, and shortness of breath may get better when you quit. You may also find that you get sick less often because your body is stronger at fighting off colds and infections. If you are pregnant, quitting smoking can help to reduce your chances of having a baby of low birth weight.  How do I get ready to quit?  When you decide to quit smoking, create a plan to make sure that you are successful. Before you quit:  · Pick a date to quit. Set a date within the next two weeks to give you time to prepare.  · Write down the reasons why you are quitting. Keep this list in places where you will see it often, such as on your bathroom mirror or in your car or wallet.  · Identify the people, places, things, and activities that make you want to smoke (triggers) and avoid them. Make sure to take these actions:  ¨ Throw away all cigarettes at home, at work, and in your car.  ¨ Throw away smoking accessories, such as ashtrays and lighters.  ¨ Clean your car and make sure to empty the ashtray.  ¨ Clean your home, including curtains and carpets.  · Tell your family, friends, and coworkers that you are quitting. Support from your loved ones can make quitting easier.  · Talk with  your health care provider about your options for quitting smoking.  · Find out what treatment options are covered by your health insurance.  What strategies can I use to quit smoking?  Talk with your healthcare provider about different strategies to quit smoking. Some strategies include:  · Quitting smoking altogether instead of gradually lessening how much you smoke over a period of time. Research shows that quitting “cold turkey” is more successful than gradually quitting.  · Attending in-person counseling to help you build problem-solving skills. You are more likely to have success in quitting if you attend several counseling sessions. Even short sessions of 10 minutes can be effective.  · Finding resources and support systems that can help you to quit smoking and remain smoke-free after you quit. These resources are most helpful when you use them often. They can include:  ¨ Online chats with a counselor.  ¨ Telephone quitlines.  ¨ Printed self-help materials.  ¨ Support groups or group counseling.  ¨ Text messaging programs.  ¨ Mobile phone applications.  · Taking medicines to help you quit smoking. (If you are pregnant or breastfeeding, talk with your health care provider first.) Some medicines contain nicotine and some do not. Both types of medicines help with cravings, but the medicines that include nicotine help to relieve withdrawal symptoms. Your health care provider may recommend:  ¨ Nicotine patches, gum, or lozenges.  ¨ Nicotine inhalers or sprays.  ¨ Non-nicotine medicine that is taken by mouth.  Talk with your health care provider about combining strategies, such as taking medicines while you are also receiving in-person counseling. Using these two strategies together makes you more likely to succeed in quitting than if you used either strategy on its own.  If you are pregnant or breastfeeding, talk with your health care provider about finding counseling or other support strategies to quit smoking. Do  not take medicine to help you quit smoking unless told to do so by your health care provider.  What things can I do to make it easier to quit?  Quitting smoking might feel overwhelming at first, but there is a lot that you can do to make it easier. Take these important actions:  · Reach out to your family and friends and ask that they support and encourage you during this time. Call telephone quitlines, reach out to support groups, or work with a counselor for support.  · Ask people who smoke to avoid smoking around you.  · Avoid places that trigger you to smoke, such as bars, parties, or smoke-break areas at work.  · Spend time around people who do not smoke.  · Lessen stress in your life, because stress can be a smoking trigger for some people. To lessen stress, try:  ¨ Exercising regularly.  ¨ Deep-breathing exercises.  ¨ Yoga.  ¨ Meditating.  ¨ Performing a body scan. This involves closing your eyes, scanning your body from head to toe, and noticing which parts of your body are particularly tense. Purposefully relax the muscles in those areas.  · Download or purchase mobile phone or tablet apps (applications) that can help you stick to your quit plan by providing reminders, tips, and encouragement. There are many free apps, such as QuitGuide from the CDC (Centers for Disease Control and Prevention). You can find other support for quitting smoking (smoking cessation) through smokefree.gov and other websites.  How will I feel when I quit smoking?  Within the first 24 hours of quitting smoking, you may start to feel some withdrawal symptoms. These symptoms are usually most noticeable 2-3 days after quitting, but they usually do not last beyond 2-3 weeks. Changes or symptoms that you might experience include:  · Mood swings.  · Restlessness, anxiety, or irritation.  · Difficulty concentrating.  · Dizziness.  · Strong cravings for sugary foods in addition to nicotine.  · Mild weight  gain.  · Constipation.  · Nausea.  · Coughing or a sore throat.  · Changes in how your medicines work in your body.  · A depressed mood.  · Difficulty sleeping (insomnia).  After the first 2-3 weeks of quitting, you may start to notice more positive results, such as:  · Improved sense of smell and taste.  · Decreased coughing and sore throat.  · Slower heart rate.  · Lower blood pressure.  · Clearer skin.  · The ability to breathe more easily.  · Fewer sick days.  Quitting smoking is very challenging for most people. Do not get discouraged if you are not successful the first time. Some people need to make many attempts to quit before they achieve long-term success. Do your best to stick to your quit plan, and talk with your health care provider if you have any questions or concerns.  This information is not intended to replace advice given to you by your health care provider. Make sure you discuss any questions you have with your health care provider.  Document Released: 12/12/2002 Document Revised: 08/15/2017 Document Reviewed: 05/03/2016  [employer] Interactive Patient Education © 2017 [employer]        BMI for Adults    Body mass index (BMI) is a number that is calculated from a person's weight and height. BMI may help to estimate how much of a person's weight is composed of fat. BMI can help identify those who may be at higher risk for certain medical problems.  How is BMI used with adults?  BMI is used as a screening tool to identify possible weight problems. It is used to check whether a person is obese, overweight, healthy weight, or underweight.  How is BMI calculated?  BMI measures your weight and compares it to your height. This can be done either in English (U.S[place] or metric measurements. Note that charts are available to help you find your BMI quickly and easily without having to do these calculations yourself.  To calculate your BMI in English (U.S[place] measurements, your health care provider  "will:  1. Measure your weight in pounds (lb).  2. Multiply the number of pounds by 703.  ? For example, for a person who weighs 180 lb, multiply that number by 703, which equals 126,540.  3. Measure your height in inches (in). Then multiply that number by itself to get a measurement called \"inches squared.\"  ? For example, for a person who is 70 in tall, the \"inches squared\" measurement is 70 in x 70 in, which equals 4900 inches squared.  4. Divide the total from Step 2 (number of lb x 703) by the total from Step 3 (inches squared): 126,540 ÷ 4900 = 25.8. This is your BMI.  To calculate your BMI in metric measurements, your health care provider will:  1. Measure your weight in kilograms (kg).  2. Measure your height in meters (m). Then multiply that number by itself to get a measurement called \"meters squared.\"  ? For example, for a person who is 1.75 m tall, the \"meters squared\" measurement is 1.75 m x 1.75 m, which is equal to 3.1 meters squared.  3. Divide the number of kilograms (your weight) by the meters squared number. In this example: 70 ÷ 3.1 = 22.6. This is your BMI.  How is BMI interpreted?  To interpret your results, your health care provider will use BMI charts to identify whether you are underweight, normal weight, overweight, or obese. The following guidelines will be used:  · Underweight: BMI less than 18.5.  · Normal weight: BMI between 18.5 and 24.9.  · Overweight: BMI between 25 and 29.9.  · Obese: BMI of 30 and above.  Please note:  · Weight includes both fat and muscle, so someone with a muscular build, such as an athlete, may have a BMI that is higher than 24.9. In cases like these, BMI is not an accurate measure of body fat.  · To determine if excess body fat is the cause of a BMI of 25 or higher, further assessments may need to be done by a health care provider.  · BMI is usually interpreted in the same way for men and women.  Why is BMI a useful tool?  BMI is useful in two " ways:  · Identifying a weight problem that may be related to a medical condition, or that may increase the risk for medical problems.  · Promoting lifestyle and diet changes in order to reach a healthy weight.  Summary  · Body mass index (BMI) is a number that is calculated from a person's weight and height.  · BMI may help to estimate how much of a person's weight is composed of fat. BMI can help identify those who may be at higher risk for certain medical problems.  · BMI can be measured using English measurements or metric measurements.  · To interpret your results, your health care provider will use BMI charts to identify whether you are underweight, normal weight, overweight, or obese.  This information is not intended to replace advice given to you by your health care provider. Make sure you discuss any questions you have with your health care provider.  Document Released: 08/29/2005 Document Revised: 10/31/2018 Document Reviewed: 10/31/2018  [employer] Interactive Patient Education © 2019 [employer]

## 2020-03-27 ENCOUNTER — APPOINTMENT (OUTPATIENT)
Dept: ULTRASOUND IMAGING | Facility: HOSPITAL | Age: 60
End: 2020-03-27

## 2020-12-11 NOTE — ANESTHESIA POSTPROCEDURE EVALUATION
Department of Anesthesiology  Postprocedure Note    Patient: Shilpi Shane  MRN: [id number]  [place]: 1960  Date of evaluation: 12/12/2017  Time:  10:25 AM     Procedure Summary     Date:  12/12/17 Room / Location:  [facility] OR  / [facility] OR    Anesthesia Start:  0836 Anesthesia Stop:      Procedure:  C3-4 POSSIBLE C4-5 ACDF (N/A ) Diagnosis:  (G95.9)    Surgeon:  Zina John MD Responsible Provider:  Rochelle Turner CRNA    Anesthesia Type:  general ASA Status:  3          Anesthesia Type: general    Kendall Phase I: Kendall Score: 10    Kendall Phase II:      Last vitals: Reviewed and per EMR flowsheets. Anesthesia Post Evaluation    Patient location during evaluation: PACU  Patient participation: waiting for patient participation  Level of consciousness: awake and lethargic  Pain score: 0  Airway patency: patent  Nausea & Vomiting: no nausea and no vomiting  Complications: no  Cardiovascular status: blood pressure returned to baseline  Respiratory status: acceptable  Hydration status: euvolemic  Comments: Pt transported with oxygen.   Report to RN psychiatric evaluation

## 2021-03-04 ENCOUNTER — TELEPHONE (OUTPATIENT)
Dept: GASTROENTEROLOGY | Facility: CLINIC | Age: 61
End: 2021-03-04

## 2021-03-04 NOTE — TELEPHONE ENCOUNTER
We have a referral from [facility] office-but pt is a [facility] pt-sending back to pcp and having them refer to [facility]

## 2021-03-08 ENCOUNTER — TRANSCRIBE ORDERS (OUTPATIENT)
Dept: ADMINISTRATIVE | Facility: HOSPITAL | Age: 61
End: 2021-03-08

## 2021-03-08 DIAGNOSIS — R09.89 LEFT CAROTID BRUIT: Primary | ICD-10-CM

## 2021-03-11 ENCOUNTER — HOSPITAL ENCOUNTER (OUTPATIENT)
Dept: ULTRASOUND IMAGING | Facility: HOSPITAL | Age: 61
Discharge: HOME | End: 2021-03-11
Admitting: NURSE PRACTITIONER

## 2021-03-11 DIAGNOSIS — R09.89 LEFT CAROTID BRUIT: ICD-10-CM

## 2021-03-11 PROCEDURE — 93880 EXTRACRANIAL BILAT STUDY: CPT

## 2021-05-05 ENCOUNTER — HOSPITAL ENCOUNTER (INPATIENT)
Facility: HOSPITAL | Age: 61
LOS: 10 days | Discharge: HOME | End: 2021-05-15
Attending: EMERGENCY MEDICINE | Admitting: THORACIC SURGERY (CARDIOTHORACIC VASCULAR SURGERY)

## 2021-05-05 ENCOUNTER — APPOINTMENT (OUTPATIENT)
Dept: GENERAL RADIOLOGY | Facility: HOSPITAL | Age: 61
End: 2021-05-05

## 2021-05-05 DIAGNOSIS — I70.213 ATHEROSCLEROSIS OF NATIVE ARTERY OF BOTH LOWER EXTREMITIES WITH INTERMITTENT CLAUDICATION (HCC): ICD-10-CM

## 2021-05-05 DIAGNOSIS — Z74.09 IMPAIRED MOBILITY: ICD-10-CM

## 2021-05-05 DIAGNOSIS — I20.8 STABLE ANGINA PECTORIS (HCC): ICD-10-CM

## 2021-05-05 DIAGNOSIS — I25.110 CORONARY ARTERY DISEASE INVOLVING NATIVE CORONARY ARTERY OF NATIVE HEART WITH UNSTABLE ANGINA PECTORIS (HCC): ICD-10-CM

## 2021-05-05 DIAGNOSIS — G95.9 CERVICAL MYELOPATHY (HCC): ICD-10-CM

## 2021-05-05 DIAGNOSIS — R94.31 T WAVE INVERSION IN EKG: ICD-10-CM

## 2021-05-05 DIAGNOSIS — R94.39 ABNORMAL STRESS TEST: ICD-10-CM

## 2021-05-05 DIAGNOSIS — I20.0 UNSTABLE ANGINA PECTORIS (HCC): Primary | ICD-10-CM

## 2021-05-05 PROBLEM — I10 ESSENTIAL HYPERTENSION: Status: ACTIVE | Noted: 2021-05-05

## 2021-05-05 PROBLEM — Z72.0 TOBACCO ABUSE: Status: ACTIVE | Noted: 2021-05-05

## 2021-05-05 PROBLEM — F12.90 MARIJUANA USE: Status: ACTIVE | Noted: 2021-05-05

## 2021-05-05 LAB
ALBUMIN SERPL-MCNC: 4.3 G/DL (ref 3.5–5.2)
ALBUMIN/GLOB SERPL: 1.7 G/DL
ALP SERPL-CCNC: 96 U/L (ref 39–117)
ALT SERPL W P-5'-P-CCNC: 13 U/L (ref 1–41)
ANION GAP SERPL CALCULATED.3IONS-SCNC: 9 MMOL/L (ref 5–15)
AST SERPL-CCNC: 14 U/L (ref 1–40)
BASOPHILS # BLD AUTO: 0.05 10*3/MM3 (ref 0–0.2)
BASOPHILS NFR BLD AUTO: 0.4 % (ref 0–1.5)
BILIRUB SERPL-MCNC: 0.5 MG/DL (ref 0–1.2)
BUN SERPL-MCNC: 11 MG/DL (ref 6–20)
BUN/CREAT SERPL: 16.7 (ref 7–25)
CALCIUM SPEC-SCNC: 9.3 MG/DL (ref 8.6–10.5)
CHLORIDE SERPL-SCNC: 106 MMOL/L (ref 98–107)
CO2 SERPL-SCNC: 24 MMOL/L (ref 22–29)
CREAT SERPL-MCNC: 0.66 MG/DL (ref 0.76–1.27)
DEPRECATED RDW RBC AUTO: 42.6 FL (ref 37–54)
EOSINOPHIL # BLD AUTO: 0.24 10*3/MM3 (ref 0–0.4)
EOSINOPHIL NFR BLD AUTO: 2 % (ref 0.3–6.2)
ERYTHROCYTE [DISTWIDTH] IN BLOOD BY AUTOMATED COUNT: 12.7 % (ref 12.3–15.4)
GFR SERPL CREATININE-BSD FRML MDRD: 124 ML/MIN/1.73
GLOBULIN UR ELPH-MCNC: 2.6 GM/DL
GLUCOSE SERPL-MCNC: 108 MG/DL (ref 65–99)
HCT VFR BLD AUTO: 50.6 % (ref 37.5–51)
HGB BLD-MCNC: 16.7 G/DL (ref 13–17.7)
HOLD SPECIMEN: NORMAL
HOLD SPECIMEN: NORMAL
IMM GRANULOCYTES # BLD AUTO: 0.04 10*3/MM3 (ref 0–0.05)
IMM GRANULOCYTES NFR BLD AUTO: 0.3 % (ref 0–0.5)
LYMPHOCYTES # BLD AUTO: 3.28 10*3/MM3 (ref 0.7–3.1)
LYMPHOCYTES NFR BLD AUTO: 27.7 % (ref 19.6–45.3)
MCH RBC QN AUTO: 30.1 PG (ref 26.6–33)
MCHC RBC AUTO-ENTMCNC: 33 G/DL (ref 31.5–35.7)
MCV RBC AUTO: 91.2 FL (ref 79–97)
MONOCYTES # BLD AUTO: 0.97 10*3/MM3 (ref 0.1–0.9)
MONOCYTES NFR BLD AUTO: 8.2 % (ref 5–12)
NEUTROPHILS NFR BLD AUTO: 61.4 % (ref 42.7–76)
NEUTROPHILS NFR BLD AUTO: 7.27 10*3/MM3 (ref 1.7–7)
NRBC BLD AUTO-RTO: 0 /100 WBC (ref 0–0.2)
PLATELET # BLD AUTO: 261 10*3/MM3 (ref 140–450)
PMV BLD AUTO: 11.6 FL (ref 6–12)
POTASSIUM SERPL-SCNC: 4.2 MMOL/L (ref 3.5–5.2)
PROT SERPL-MCNC: 6.9 G/DL (ref 6–8.5)
RBC # BLD AUTO: 5.55 10*6/MM3 (ref 4.14–5.8)
SARS-COV-2 RNA RESP QL NAA+PROBE: NOT DETECTED
SODIUM SERPL-SCNC: 139 MMOL/L (ref 136–145)
TROPONIN T SERPL-MCNC: 0.01 NG/ML (ref 0–0.03)
TROPONIN T SERPL-MCNC: <0.01 NG/ML (ref 0–0.03)
WBC # BLD AUTO: 11.85 10*3/MM3 (ref 3.4–10.8)
WHOLE BLOOD HOLD SPECIMEN: NORMAL
WHOLE BLOOD HOLD SPECIMEN: NORMAL

## 2021-05-05 PROCEDURE — 71045 X-RAY EXAM CHEST 1 VIEW: CPT

## 2021-05-05 PROCEDURE — 93010 ELECTROCARDIOGRAM REPORT: CPT | Performed by: EMERGENCY MEDICINE

## 2021-05-05 PROCEDURE — 84484 ASSAY OF TROPONIN QUANT: CPT | Performed by: EMERGENCY MEDICINE

## 2021-05-05 PROCEDURE — 80053 COMPREHEN METABOLIC PANEL: CPT | Performed by: EMERGENCY MEDICINE

## 2021-05-05 PROCEDURE — G0378 HOSPITAL OBSERVATION PER HR: HCPCS

## 2021-05-05 PROCEDURE — 25010000002 ENOXAPARIN PER 10 MG: Performed by: EMERGENCY MEDICINE

## 2021-05-05 PROCEDURE — 84484 ASSAY OF TROPONIN QUANT: CPT | Performed by: INTERNAL MEDICINE

## 2021-05-05 PROCEDURE — 99284 EMERGENCY DEPT VISIT MOD MDM: CPT

## 2021-05-05 PROCEDURE — 93005 ELECTROCARDIOGRAM TRACING: CPT | Performed by: EMERGENCY MEDICINE

## 2021-05-05 PROCEDURE — 85025 COMPLETE CBC W/AUTO DIFF WBC: CPT | Performed by: EMERGENCY MEDICINE

## 2021-05-05 PROCEDURE — 93005 ELECTROCARDIOGRAM TRACING: CPT

## 2021-05-05 PROCEDURE — U0003 INFECTIOUS AGENT DETECTION BY NUCLEIC ACID (DNA OR RNA); SEVERE ACUTE RESPIRATORY SYNDROME CORONAVIRUS 2 (SARS-COV-2) (CORONAVIRUS DISEASE [COVID-19]), AMPLIFIED PROBE TECHNIQUE, MAKING USE OF HIGH THROUGHPUT TECHNOLOGIES AS DESCRIBED BY CMS-2020-01-R: HCPCS | Performed by: EMERGENCY MEDICINE

## 2021-05-05 PROCEDURE — 94640 AIRWAY INHALATION TREATMENT: CPT

## 2021-05-05 PROCEDURE — 94799 UNLISTED PULMONARY SVC/PX: CPT

## 2021-05-05 PROCEDURE — 93005 ELECTROCARDIOGRAM TRACING: CPT | Performed by: INTERNAL MEDICINE

## 2021-05-05 RX ORDER — ASPIRIN 81 MG/1
324 TABLET, CHEWABLE ORAL ONCE
Status: COMPLETED | OUTPATIENT
Start: 2021-05-05 | End: 2021-05-05

## 2021-05-05 RX ORDER — ACETAMINOPHEN 325 MG/1
650 TABLET ORAL EVERY 4 HOURS PRN
Status: DISCONTINUED | OUTPATIENT
Start: 2021-05-05 | End: 2021-05-10 | Stop reason: HOSPADM

## 2021-05-05 RX ORDER — BUDESONIDE AND FORMOTEROL FUMARATE DIHYDRATE 160; 4.5 UG/1; UG/1
2 AEROSOL RESPIRATORY (INHALATION)
Status: DISCONTINUED | OUTPATIENT
Start: 2021-05-05 | End: 2021-05-10 | Stop reason: HOSPADM

## 2021-05-05 RX ORDER — ONDANSETRON 4 MG/1
4 TABLET, FILM COATED ORAL EVERY 6 HOURS PRN
Status: DISCONTINUED | OUTPATIENT
Start: 2021-05-05 | End: 2021-05-10 | Stop reason: HOSPADM

## 2021-05-05 RX ORDER — NITROGLYCERIN 20 MG/100ML
5-200 INJECTION INTRAVENOUS
Status: DISCONTINUED | OUTPATIENT
Start: 2021-05-05 | End: 2021-05-05

## 2021-05-05 RX ORDER — PREGABALIN 50 MG/1
100 CAPSULE ORAL EVERY 8 HOURS PRN
Status: DISCONTINUED | OUTPATIENT
Start: 2021-05-05 | End: 2021-05-10 | Stop reason: HOSPADM

## 2021-05-05 RX ORDER — SODIUM CHLORIDE 0.9 % (FLUSH) 0.9 %
10 SYRINGE (ML) INJECTION EVERY 12 HOURS SCHEDULED
Status: DISCONTINUED | OUTPATIENT
Start: 2021-05-05 | End: 2021-05-10 | Stop reason: HOSPADM

## 2021-05-05 RX ORDER — SODIUM CHLORIDE 0.9 % (FLUSH) 0.9 %
10 SYRINGE (ML) INJECTION AS NEEDED
Status: DISCONTINUED | OUTPATIENT
Start: 2021-05-05 | End: 2021-05-05

## 2021-05-05 RX ORDER — ATORVASTATIN CALCIUM 10 MG/1
10 TABLET, FILM COATED ORAL DAILY
Status: DISCONTINUED | OUTPATIENT
Start: 2021-05-05 | End: 2021-05-05

## 2021-05-05 RX ORDER — ONDANSETRON 2 MG/ML
4 INJECTION INTRAMUSCULAR; INTRAVENOUS EVERY 6 HOURS PRN
Status: DISCONTINUED | OUTPATIENT
Start: 2021-05-05 | End: 2021-05-10 | Stop reason: HOSPADM

## 2021-05-05 RX ORDER — ATORVASTATIN CALCIUM 40 MG/1
40 TABLET, FILM COATED ORAL NIGHTLY
Status: DISCONTINUED | OUTPATIENT
Start: 2021-05-05 | End: 2021-05-10 | Stop reason: HOSPADM

## 2021-05-05 RX ORDER — SODIUM CHLORIDE 0.9 % (FLUSH) 0.9 %
10 SYRINGE (ML) INJECTION AS NEEDED
Status: DISCONTINUED | OUTPATIENT
Start: 2021-05-05 | End: 2021-05-10 | Stop reason: HOSPADM

## 2021-05-05 RX ORDER — ASPIRIN 81 MG/1
81 TABLET ORAL DAILY
Status: DISCONTINUED | OUTPATIENT
Start: 2021-05-05 | End: 2021-05-07

## 2021-05-05 RX ADMIN — ATORVASTATIN CALCIUM 40 MG: 40 TABLET, FILM COATED ORAL at 21:09

## 2021-05-05 RX ADMIN — NITROGLYCERIN 10 MCG/MIN: 20 INJECTION INTRAVENOUS at 11:08

## 2021-05-05 RX ADMIN — ASPIRIN 243 MG: 81 TABLET, CHEWABLE ORAL at 11:08

## 2021-05-05 RX ADMIN — SODIUM CHLORIDE, PRESERVATIVE FREE 10 ML: 5 INJECTION INTRAVENOUS at 21:09

## 2021-05-05 RX ADMIN — ENOXAPARIN SODIUM 90 MG: 100 INJECTION SUBCUTANEOUS at 11:08

## 2021-05-05 RX ADMIN — BUDESONIDE AND FORMOTEROL FUMARATE DIHYDRATE 2 PUFF: 160; 4.5 AEROSOL RESPIRATORY (INHALATION) at 20:41

## 2021-05-06 ENCOUNTER — APPOINTMENT (OUTPATIENT)
Dept: CARDIOLOGY | Facility: HOSPITAL | Age: 61
End: 2021-05-06

## 2021-05-06 PROBLEM — I20.8 STABLE ANGINA PECTORIS (HCC): Status: ACTIVE | Noted: 2021-05-05

## 2021-05-06 PROBLEM — I20.0 UNSTABLE ANGINA PECTORIS (HCC): Status: ACTIVE | Noted: 2021-05-05

## 2021-05-06 PROBLEM — I20.89 STABLE ANGINA PECTORIS: Status: ACTIVE | Noted: 2021-05-05

## 2021-05-06 PROBLEM — R94.39 ABNORMAL STRESS TEST: Status: ACTIVE | Noted: 2021-05-05

## 2021-05-06 LAB
ANION GAP SERPL CALCULATED.3IONS-SCNC: 8 MMOL/L (ref 5–15)
BH CV ECHO MEAS - AO MAX PG (FULL): 3.3 MMHG
BH CV ECHO MEAS - AO MAX PG: 7.5 MMHG
BH CV ECHO MEAS - AO MEAN PG (FULL): 2 MMHG
BH CV ECHO MEAS - AO MEAN PG: 4 MMHG
BH CV ECHO MEAS - AO ROOT AREA (BSA CORRECTED): 1.7
BH CV ECHO MEAS - AO ROOT AREA: 9.1 CM^2
BH CV ECHO MEAS - AO ROOT DIAM: 3.4 CM
BH CV ECHO MEAS - AO V2 MAX: 137 CM/SEC
BH CV ECHO MEAS - AO V2 MEAN: 95.9 CM/SEC
BH CV ECHO MEAS - AO V2 VTI: 32 CM
BH CV ECHO MEAS - AVA(I,A): 2.8 CM^2
BH CV ECHO MEAS - AVA(I,D): 2.8 CM^2
BH CV ECHO MEAS - AVA(V,A): 2.6 CM^2
BH CV ECHO MEAS - AVA(V,D): 2.6 CM^2
BH CV ECHO MEAS - BSA(HAYCOCK): 2 M^2
BH CV ECHO MEAS - BSA: 1.9 M^2
BH CV ECHO MEAS - BZI_BMI: 24.4 KILOGRAMS/M^2
BH CV ECHO MEAS - BZI_METRIC_HEIGHT: 177.8 CM
BH CV ECHO MEAS - BZI_METRIC_WEIGHT: 77.1 KG
BH CV ECHO MEAS - EDV(CUBED): 132.7 ML
BH CV ECHO MEAS - EDV(MOD-SP4): 68 ML
BH CV ECHO MEAS - EDV(TEICH): 123.8 ML
BH CV ECHO MEAS - EF(CUBED): 67.7 %
BH CV ECHO MEAS - EF(MOD-SP4): 54.4 %
BH CV ECHO MEAS - EF(TEICH): 58.9 %
BH CV ECHO MEAS - ESV(CUBED): 42.9 ML
BH CV ECHO MEAS - ESV(MOD-SP4): 31 ML
BH CV ECHO MEAS - ESV(TEICH): 50.9 ML
BH CV ECHO MEAS - FS: 31.4 %
BH CV ECHO MEAS - IVS/LVPW: 1.1
BH CV ECHO MEAS - IVSD: 1.5 CM
BH CV ECHO MEAS - LA DIMENSION: 3.4 CM
BH CV ECHO MEAS - LA/AO: 1
BH CV ECHO MEAS - LAT PEAK E' VEL: 10.3 CM/SEC
BH CV ECHO MEAS - LV DIASTOLIC VOL/BSA (35-75): 34.9 ML/M^2
BH CV ECHO MEAS - LV MASS(C)D: 316.2 GRAMS
BH CV ECHO MEAS - LV MASS(C)DI: 162.3 GRAMS/M^2
BH CV ECHO MEAS - LV MAX PG: 4.2 MMHG
BH CV ECHO MEAS - LV MEAN PG: 2 MMHG
BH CV ECHO MEAS - LV SYSTOLIC VOL/BSA (12-30): 15.9 ML/M^2
BH CV ECHO MEAS - LV V1 MAX: 102 CM/SEC
BH CV ECHO MEAS - LV V1 MEAN: 68.1 CM/SEC
BH CV ECHO MEAS - LV V1 VTI: 26.3 CM
BH CV ECHO MEAS - LVIDD: 5.1 CM
BH CV ECHO MEAS - LVIDS: 3.5 CM
BH CV ECHO MEAS - LVLD AP4: 8.1 CM
BH CV ECHO MEAS - LVLS AP4: 6.2 CM
BH CV ECHO MEAS - LVOT AREA (M): 3.5 CM^2
BH CV ECHO MEAS - LVOT AREA: 3.5 CM^2
BH CV ECHO MEAS - LVOT DIAM: 2.1 CM
BH CV ECHO MEAS - LVPWD: 1.4 CM
BH CV ECHO MEAS - MED PEAK E' VEL: 5.9 CM/SEC
BH CV ECHO MEAS - MR MAX PG: 31.6 MMHG
BH CV ECHO MEAS - MR MAX VEL: 281 CM/SEC
BH CV ECHO MEAS - MV A MAX VEL: 74 CM/SEC
BH CV ECHO MEAS - MV DEC TIME: 0.27 SEC
BH CV ECHO MEAS - MV E MAX VEL: 79.5 CM/SEC
BH CV ECHO MEAS - MV E/A: 1.1
BH CV ECHO MEAS - SI(AO): 149.1 ML/M^2
BH CV ECHO MEAS - SI(CUBED): 46.1 ML/M^2
BH CV ECHO MEAS - SI(LVOT): 46.8 ML/M^2
BH CV ECHO MEAS - SI(MOD-SP4): 19 ML/M^2
BH CV ECHO MEAS - SI(TEICH): 37.4 ML/M^2
BH CV ECHO MEAS - SV(AO): 290.5 ML
BH CV ECHO MEAS - SV(CUBED): 89.8 ML
BH CV ECHO MEAS - SV(LVOT): 91.1 ML
BH CV ECHO MEAS - SV(MOD-SP4): 37 ML
BH CV ECHO MEAS - SV(TEICH): 72.9 ML
BH CV ECHO MEASUREMENTS AVERAGE E/E' RATIO: 9.81
BH CV STRESS BP STAGE 1: NORMAL
BH CV STRESS BP STAGE 2: NORMAL
BH CV STRESS BP STAGE 3: NORMAL
BH CV STRESS DOB - ATROPINE STAGE 3: 0.6
BH CV STRESS DOSE DOBUTAMINE STAGE 1: 10
BH CV STRESS DOSE DOBUTAMINE STAGE 2: 20
BH CV STRESS DOSE DOBUTAMINE STAGE 3: 30
BH CV STRESS DURATION MIN STAGE 1: 3
BH CV STRESS DURATION MIN STAGE 2: 3
BH CV STRESS DURATION MIN STAGE 3: 4
BH CV STRESS DURATION SEC STAGE 1: 0
BH CV STRESS DURATION SEC STAGE 2: 0
BH CV STRESS DURATION SEC STAGE 3: 13
BH CV STRESS HR STAGE 1: 67
BH CV STRESS HR STAGE 2: 90
BH CV STRESS HR STAGE 3: 153
BH CV STRESS PROTOCOL 1: NORMAL
BH CV STRESS RECOVERY BP: NORMAL MMHG
BH CV STRESS RECOVERY HR: 99 BPM
BH CV STRESS STAGE 1: 1
BH CV STRESS STAGE 2: 2
BH CV STRESS STAGE 3: 3
BUN SERPL-MCNC: 10 MG/DL (ref 6–20)
BUN/CREAT SERPL: 15.2 (ref 7–25)
CALCIUM SPEC-SCNC: 9.3 MG/DL (ref 8.6–10.5)
CHLORIDE SERPL-SCNC: 105 MMOL/L (ref 98–107)
CHOLEST SERPL-MCNC: 153 MG/DL (ref 0–200)
CO2 SERPL-SCNC: 27 MMOL/L (ref 22–29)
CREAT SERPL-MCNC: 0.66 MG/DL (ref 0.76–1.27)
DEPRECATED RDW RBC AUTO: 42.5 FL (ref 37–54)
ERYTHROCYTE [DISTWIDTH] IN BLOOD BY AUTOMATED COUNT: 12.7 % (ref 12.3–15.4)
GFR SERPL CREATININE-BSD FRML MDRD: 124 ML/MIN/1.73
GLUCOSE SERPL-MCNC: 108 MG/DL (ref 65–99)
HBA1C MFR BLD: 5.8 % (ref 4.8–5.6)
HCT VFR BLD AUTO: 50.9 % (ref 37.5–51)
HDLC SERPL-MCNC: 34 MG/DL (ref 40–60)
HGB BLD-MCNC: 16.7 G/DL (ref 13–17.7)
LDLC SERPL CALC-MCNC: 99 MG/DL (ref 0–100)
LDLC/HDLC SERPL: 2.85 {RATIO}
LEFT ATRIUM VOLUME INDEX: 17.9 ML/M2
LEFT ATRIUM VOLUME: 35 CM3
MAXIMAL PREDICTED HEART RATE: 161 BPM
MAXIMAL PREDICTED HEART RATE: 161 BPM
MCH RBC QN AUTO: 29.8 PG (ref 26.6–33)
MCHC RBC AUTO-ENTMCNC: 32.8 G/DL (ref 31.5–35.7)
MCV RBC AUTO: 90.7 FL (ref 79–97)
PERCENT MAX PREDICTED HR: 95.03 %
PLATELET # BLD AUTO: 253 10*3/MM3 (ref 140–450)
PMV BLD AUTO: 12 FL (ref 6–12)
POTASSIUM SERPL-SCNC: 4.2 MMOL/L (ref 3.5–5.2)
QT INTERVAL: 388 MS
QT INTERVAL: 418 MS
QT INTERVAL: 422 MS
QT INTERVAL: 430 MS
QT INTERVAL: 438 MS
QTC INTERVAL: 418 MS
QTC INTERVAL: 419 MS
QTC INTERVAL: 425 MS
QTC INTERVAL: 428 MS
QTC INTERVAL: 448 MS
RBC # BLD AUTO: 5.61 10*6/MM3 (ref 4.14–5.8)
SODIUM SERPL-SCNC: 140 MMOL/L (ref 136–145)
STRESS BASELINE BP: NORMAL MMHG
STRESS BASELINE HR: 72 BPM
STRESS PERCENT HR: 112 %
STRESS POST PEAK BP: NORMAL MMHG
STRESS POST PEAK HR: 153 BPM
STRESS TARGET HR: 137 BPM
STRESS TARGET HR: 137 BPM
TRIGL SERPL-MCNC: 110 MG/DL (ref 0–150)
TROPONIN T SERPL-MCNC: <0.01 NG/ML (ref 0–0.03)
VLDLC SERPL-MCNC: 20 MG/DL (ref 5–40)
WBC # BLD AUTO: 8.86 10*3/MM3 (ref 3.4–10.8)

## 2021-05-06 PROCEDURE — G0378 HOSPITAL OBSERVATION PER HR: HCPCS

## 2021-05-06 PROCEDURE — 80061 LIPID PANEL: CPT | Performed by: INTERNAL MEDICINE

## 2021-05-06 PROCEDURE — 36415 COLL VENOUS BLD VENIPUNCTURE: CPT | Performed by: INTERNAL MEDICINE

## 2021-05-06 PROCEDURE — 93352 ADMIN ECG CONTRAST AGENT: CPT | Performed by: EMERGENCY MEDICINE

## 2021-05-06 PROCEDURE — 93350 STRESS TTE ONLY: CPT | Performed by: EMERGENCY MEDICINE

## 2021-05-06 PROCEDURE — 94799 UNLISTED PULMONARY SVC/PX: CPT

## 2021-05-06 PROCEDURE — 80048 BASIC METABOLIC PNL TOTAL CA: CPT | Performed by: INTERNAL MEDICINE

## 2021-05-06 PROCEDURE — 93018 CV STRESS TEST I&R ONLY: CPT | Performed by: EMERGENCY MEDICINE

## 2021-05-06 PROCEDURE — 83036 HEMOGLOBIN GLYCOSYLATED A1C: CPT | Performed by: INTERNAL MEDICINE

## 2021-05-06 PROCEDURE — 85027 COMPLETE CBC AUTOMATED: CPT | Performed by: INTERNAL MEDICINE

## 2021-05-06 PROCEDURE — 25010000003 ATROPINE SULFATE: Performed by: EMERGENCY MEDICINE

## 2021-05-06 PROCEDURE — 93010 ELECTROCARDIOGRAM REPORT: CPT | Performed by: EMERGENCY MEDICINE

## 2021-05-06 PROCEDURE — 25010000002 PERFLUTREN 6.52 MG/ML SUSPENSION: Performed by: EMERGENCY MEDICINE

## 2021-05-06 PROCEDURE — 93350 STRESS TTE ONLY: CPT

## 2021-05-06 PROCEDURE — 93017 CV STRESS TEST TRACING ONLY: CPT

## 2021-05-06 PROCEDURE — 93306 TTE W/DOPPLER COMPLETE: CPT

## 2021-05-06 PROCEDURE — 99223 1ST HOSP IP/OBS HIGH 75: CPT | Performed by: NURSE PRACTITIONER

## 2021-05-06 PROCEDURE — 25010000003 DOBUTAMINE PER 250 MG: Performed by: EMERGENCY MEDICINE

## 2021-05-06 PROCEDURE — 84484 ASSAY OF TROPONIN QUANT: CPT | Performed by: INTERNAL MEDICINE

## 2021-05-06 RX ORDER — HYDROXYZINE HYDROCHLORIDE 25 MG/1
25 TABLET, FILM COATED ORAL ONCE
Status: COMPLETED | OUTPATIENT
Start: 2021-05-06 | End: 2021-05-06

## 2021-05-06 RX ORDER — HYDROXYZINE HYDROCHLORIDE 25 MG/1
25 TABLET, FILM COATED ORAL 3 TIMES DAILY PRN
Status: DISCONTINUED | OUTPATIENT
Start: 2021-05-06 | End: 2021-05-07

## 2021-05-06 RX ORDER — DOBUTAMINE HYDROCHLORIDE 100 MG/100ML
10-50 INJECTION INTRAVENOUS CONTINUOUS
Status: DISCONTINUED | OUTPATIENT
Start: 2021-05-06 | End: 2021-05-06

## 2021-05-06 RX ORDER — ATORVASTATIN CALCIUM 40 MG/1
40 TABLET, FILM COATED ORAL DAILY
COMMUNITY
End: 2021-05-18 | Stop reason: SDUPTHER

## 2021-05-06 RX ORDER — NITROGLYCERIN 0.4 MG/1
0.4 TABLET SUBLINGUAL ONCE
Status: COMPLETED | OUTPATIENT
Start: 2021-05-06 | End: 2021-05-06

## 2021-05-06 RX ORDER — CYCLOBENZAPRINE HCL 10 MG
10 TABLET ORAL 3 TIMES DAILY PRN
COMMUNITY
End: 2021-06-23

## 2021-05-06 RX ORDER — MELOXICAM 7.5 MG/1
15 TABLET ORAL DAILY
COMMUNITY
End: 2021-05-15 | Stop reason: HOSPADM

## 2021-05-06 RX ADMIN — BUDESONIDE AND FORMOTEROL FUMARATE DIHYDRATE 2 PUFF: 160; 4.5 AEROSOL RESPIRATORY (INHALATION) at 07:21

## 2021-05-06 RX ADMIN — ATORVASTATIN CALCIUM 40 MG: 40 TABLET, FILM COATED ORAL at 20:08

## 2021-05-06 RX ADMIN — ATROPINE SULFATE 0.6 MG: 0.1 INJECTION PARENTERAL at 08:58

## 2021-05-06 RX ADMIN — SODIUM CHLORIDE, PRESERVATIVE FREE 10 ML: 5 INJECTION INTRAVENOUS at 10:18

## 2021-05-06 RX ADMIN — SODIUM CHLORIDE, PRESERVATIVE FREE 10 ML: 5 INJECTION INTRAVENOUS at 20:08

## 2021-05-06 RX ADMIN — NITROGLYCERIN 0.4 MG: 0.4 TABLET SUBLINGUAL at 08:58

## 2021-05-06 RX ADMIN — BUDESONIDE AND FORMOTEROL FUMARATE DIHYDRATE 2 PUFF: 160; 4.5 AEROSOL RESPIRATORY (INHALATION) at 18:42

## 2021-05-06 RX ADMIN — PERFLUTREN 8.48 MG: 6.52 INJECTION, SUSPENSION INTRAVENOUS at 08:07

## 2021-05-06 RX ADMIN — ASPIRIN 81 MG: 81 TABLET, FILM COATED ORAL at 10:18

## 2021-05-06 RX ADMIN — HYDROXYZINE HYDROCHLORIDE 25 MG: 25 TABLET ORAL at 12:01

## 2021-05-06 RX ADMIN — Medication 10 MCG/KG/MIN: at 08:08

## 2021-05-07 ENCOUNTER — APPOINTMENT (OUTPATIENT)
Dept: PULMONOLOGY | Facility: HOSPITAL | Age: 61
End: 2021-05-07

## 2021-05-07 ENCOUNTER — APPOINTMENT (OUTPATIENT)
Dept: ULTRASOUND IMAGING | Facility: HOSPITAL | Age: 61
End: 2021-05-07

## 2021-05-07 ENCOUNTER — APPOINTMENT (OUTPATIENT)
Dept: CT IMAGING | Facility: HOSPITAL | Age: 61
End: 2021-05-07

## 2021-05-07 PROBLEM — I25.110 CORONARY ARTERY DISEASE INVOLVING NATIVE CORONARY ARTERY WITH UNSTABLE ANGINA PECTORIS (HCC): Status: ACTIVE | Noted: 2021-05-07

## 2021-05-07 LAB
ANION GAP SERPL CALCULATED.3IONS-SCNC: 9 MMOL/L (ref 5–15)
ARTERIAL PATENCY WRIST A: ABNORMAL
ATMOSPHERIC PRESS: 752 MMHG
BASE EXCESS BLDA CALC-SCNC: 0.4 MMOL/L (ref 0–2)
BDY SITE: ABNORMAL
BODY TEMPERATURE: 37 C
BUN SERPL-MCNC: 12 MG/DL (ref 6–20)
BUN/CREAT SERPL: 17.4 (ref 7–25)
CALCIUM SPEC-SCNC: 9.1 MG/DL (ref 8.6–10.5)
CHLORIDE SERPL-SCNC: 105 MMOL/L (ref 98–107)
CO2 SERPL-SCNC: 26 MMOL/L (ref 22–29)
CREAT SERPL-MCNC: 0.69 MG/DL (ref 0.76–1.27)
GFR SERPL CREATININE-BSD FRML MDRD: 117 ML/MIN/1.73
GLUCOSE SERPL-MCNC: 109 MG/DL (ref 65–99)
HCO3 BLDA-SCNC: 25.2 MMOL/L (ref 20–26)
Lab: ABNORMAL
MODALITY: ABNORMAL
PCO2 BLDA: 40.7 MM HG (ref 35–45)
PCO2 TEMP ADJ BLD: 40.7 MM HG (ref 35–45)
PH BLDA: 7.4 PH UNITS (ref 7.35–7.45)
PH, TEMP CORRECTED: 7.4 PH UNITS (ref 7.35–7.45)
PO2 BLDA: 70.7 MM HG (ref 83–108)
PO2 TEMP ADJ BLD: 70.7 MM HG (ref 83–108)
POTASSIUM SERPL-SCNC: 3.7 MMOL/L (ref 3.5–5.2)
SAO2 % BLDCOA: 94.3 % (ref 94–99)
SODIUM SERPL-SCNC: 140 MMOL/L (ref 136–145)
VENTILATOR MODE: ABNORMAL

## 2021-05-07 PROCEDURE — C1769 GUIDE WIRE: HCPCS | Performed by: EMERGENCY MEDICINE

## 2021-05-07 PROCEDURE — 94726 PLETHYSMOGRAPHY LUNG VOLUMES: CPT | Performed by: INTERNAL MEDICINE

## 2021-05-07 PROCEDURE — 94729 DIFFUSING CAPACITY: CPT

## 2021-05-07 PROCEDURE — 94799 UNLISTED PULMONARY SVC/PX: CPT

## 2021-05-07 PROCEDURE — 0 IOPAMIDOL PER 1 ML: Performed by: INTERNAL MEDICINE

## 2021-05-07 PROCEDURE — 93970 EXTREMITY STUDY: CPT

## 2021-05-07 PROCEDURE — 25010000002 HEPARIN (PORCINE) 1000-0.9 UT/500ML-% SOLUTION: Performed by: EMERGENCY MEDICINE

## 2021-05-07 PROCEDURE — 99153 MOD SED SAME PHYS/QHP EA: CPT | Performed by: EMERGENCY MEDICINE

## 2021-05-07 PROCEDURE — 0 IOPAMIDOL PER 1 ML: Performed by: EMERGENCY MEDICINE

## 2021-05-07 PROCEDURE — 82803 BLOOD GASES ANY COMBINATION: CPT

## 2021-05-07 PROCEDURE — C1894 INTRO/SHEATH, NON-LASER: HCPCS | Performed by: EMERGENCY MEDICINE

## 2021-05-07 PROCEDURE — 94060 EVALUATION OF WHEEZING: CPT

## 2021-05-07 PROCEDURE — 80048 BASIC METABOLIC PNL TOTAL CA: CPT | Performed by: EMERGENCY MEDICINE

## 2021-05-07 PROCEDURE — 25010000002 ENOXAPARIN PER 10 MG: Performed by: EMERGENCY MEDICINE

## 2021-05-07 PROCEDURE — 25010000002 FENTANYL CITRATE (PF) 100 MCG/2ML SOLUTION: Performed by: EMERGENCY MEDICINE

## 2021-05-07 PROCEDURE — 36600 WITHDRAWAL OF ARTERIAL BLOOD: CPT

## 2021-05-07 PROCEDURE — 94726 PLETHYSMOGRAPHY LUNG VOLUMES: CPT

## 2021-05-07 PROCEDURE — 93454 CORONARY ARTERY ANGIO S&I: CPT | Performed by: EMERGENCY MEDICINE

## 2021-05-07 PROCEDURE — 99152 MOD SED SAME PHYS/QHP 5/>YRS: CPT | Performed by: EMERGENCY MEDICINE

## 2021-05-07 PROCEDURE — 94060 EVALUATION OF WHEEZING: CPT | Performed by: INTERNAL MEDICINE

## 2021-05-07 PROCEDURE — 25010000002 DIPHENHYDRAMINE PER 50 MG: Performed by: EMERGENCY MEDICINE

## 2021-05-07 PROCEDURE — B2111ZZ FLUOROSCOPY OF MULTIPLE CORONARY ARTERIES USING LOW OSMOLAR CONTRAST: ICD-10-PCS | Performed by: EMERGENCY MEDICINE

## 2021-05-07 PROCEDURE — 25010000002 HEPARIN (PORCINE) 2000-0.9 UNIT/L-% SOLUTION: Performed by: EMERGENCY MEDICINE

## 2021-05-07 PROCEDURE — 93923 UPR/LXTR ART STDY 3+ LVLS: CPT

## 2021-05-07 PROCEDURE — 4A023N7 MEASUREMENT OF CARDIAC SAMPLING AND PRESSURE, LEFT HEART, PERCUTANEOUS APPROACH: ICD-10-PCS | Performed by: EMERGENCY MEDICINE

## 2021-05-07 PROCEDURE — 99222 1ST HOSP IP/OBS MODERATE 55: CPT | Performed by: NURSE PRACTITIONER

## 2021-05-07 PROCEDURE — 25010000002 MIDAZOLAM HCL (PF) 5 MG/5ML SOLUTION: Performed by: EMERGENCY MEDICINE

## 2021-05-07 PROCEDURE — 94729 DIFFUSING CAPACITY: CPT | Performed by: INTERNAL MEDICINE

## 2021-05-07 PROCEDURE — 71275 CT ANGIOGRAPHY CHEST: CPT

## 2021-05-07 PROCEDURE — 25010000002 HEPARIN (PORCINE) PER 1000 UNITS: Performed by: EMERGENCY MEDICINE

## 2021-05-07 RX ORDER — HEPARIN SODIUM 200 [USP'U]/100ML
INJECTION, SOLUTION INTRAVENOUS AS NEEDED
Status: DISCONTINUED | OUTPATIENT
Start: 2021-05-07 | End: 2021-05-07 | Stop reason: HOSPADM

## 2021-05-07 RX ORDER — DIPHENHYDRAMINE HYDROCHLORIDE 50 MG/ML
INJECTION INTRAMUSCULAR; INTRAVENOUS AS NEEDED
Status: DISCONTINUED | OUTPATIENT
Start: 2021-05-07 | End: 2021-05-07 | Stop reason: HOSPADM

## 2021-05-07 RX ORDER — ALBUTEROL SULFATE 2.5 MG/3ML
2.5 SOLUTION RESPIRATORY (INHALATION) ONCE
Status: COMPLETED | OUTPATIENT
Start: 2021-05-07 | End: 2021-05-07

## 2021-05-07 RX ORDER — IPRATROPIUM BROMIDE AND ALBUTEROL SULFATE 2.5; .5 MG/3ML; MG/3ML
1.5 SOLUTION RESPIRATORY (INHALATION)
Status: DISCONTINUED | OUTPATIENT
Start: 2021-05-07 | End: 2021-05-10 | Stop reason: HOSPADM

## 2021-05-07 RX ORDER — ACETAMINOPHEN 325 MG/1
650 TABLET ORAL EVERY 4 HOURS PRN
Status: DISCONTINUED | OUTPATIENT
Start: 2021-05-07 | End: 2021-05-10 | Stop reason: HOSPADM

## 2021-05-07 RX ORDER — ONDANSETRON 2 MG/ML
4 INJECTION INTRAMUSCULAR; INTRAVENOUS EVERY 6 HOURS PRN
Status: DISCONTINUED | OUTPATIENT
Start: 2021-05-07 | End: 2021-05-10 | Stop reason: HOSPADM

## 2021-05-07 RX ORDER — NICOTINE 21 MG/24HR
1 PATCH, TRANSDERMAL 24 HOURS TRANSDERMAL
Status: DISCONTINUED | OUTPATIENT
Start: 2021-05-07 | End: 2021-05-10 | Stop reason: HOSPADM

## 2021-05-07 RX ORDER — LIDOCAINE HYDROCHLORIDE 20 MG/ML
INJECTION, SOLUTION INFILTRATION; PERINEURAL AS NEEDED
Status: DISCONTINUED | OUTPATIENT
Start: 2021-05-07 | End: 2021-05-07 | Stop reason: HOSPADM

## 2021-05-07 RX ORDER — ASPIRIN 81 MG/1
81 TABLET, CHEWABLE ORAL DAILY
Status: DISCONTINUED | OUTPATIENT
Start: 2021-05-07 | End: 2021-05-10 | Stop reason: HOSPADM

## 2021-05-07 RX ORDER — FENTANYL CITRATE 50 UG/ML
INJECTION, SOLUTION INTRAMUSCULAR; INTRAVENOUS AS NEEDED
Status: DISCONTINUED | OUTPATIENT
Start: 2021-05-07 | End: 2021-05-07 | Stop reason: HOSPADM

## 2021-05-07 RX ORDER — ONDANSETRON 4 MG/1
4 TABLET, FILM COATED ORAL EVERY 6 HOURS PRN
Status: DISCONTINUED | OUTPATIENT
Start: 2021-05-07 | End: 2021-05-10 | Stop reason: HOSPADM

## 2021-05-07 RX ORDER — MIDAZOLAM HYDROCHLORIDE 1 MG/ML
INJECTION, SOLUTION INTRAMUSCULAR; INTRAVENOUS AS NEEDED
Status: DISCONTINUED | OUTPATIENT
Start: 2021-05-07 | End: 2021-05-07 | Stop reason: HOSPADM

## 2021-05-07 RX ORDER — SODIUM CHLORIDE 9 MG/ML
100 INJECTION, SOLUTION INTRAVENOUS CONTINUOUS
Status: DISCONTINUED | OUTPATIENT
Start: 2021-05-07 | End: 2021-05-08

## 2021-05-07 RX ORDER — HYDROXYZINE HYDROCHLORIDE 25 MG/1
25 TABLET, FILM COATED ORAL 4 TIMES DAILY PRN
Status: DISCONTINUED | OUTPATIENT
Start: 2021-05-07 | End: 2021-05-10 | Stop reason: HOSPADM

## 2021-05-07 RX ADMIN — ATORVASTATIN CALCIUM 40 MG: 40 TABLET, FILM COATED ORAL at 21:39

## 2021-05-07 RX ADMIN — BUDESONIDE AND FORMOTEROL FUMARATE DIHYDRATE 2 PUFF: 160; 4.5 AEROSOL RESPIRATORY (INHALATION) at 18:58

## 2021-05-07 RX ADMIN — SODIUM CHLORIDE 100 ML/HR: 9 INJECTION, SOLUTION INTRAVENOUS at 10:17

## 2021-05-07 RX ADMIN — NICOTINE 1 PATCH: 14 PATCH, EXTENDED RELEASE TRANSDERMAL at 17:15

## 2021-05-07 RX ADMIN — IPRATROPIUM BROMIDE AND ALBUTEROL SULFATE 1.5 ML: 2.5; .5 SOLUTION RESPIRATORY (INHALATION) at 18:58

## 2021-05-07 RX ADMIN — ENOXAPARIN SODIUM 40 MG: 40 INJECTION SUBCUTANEOUS at 12:08

## 2021-05-07 RX ADMIN — ALBUTEROL SULFATE 2.5 MG: 2.5 SOLUTION RESPIRATORY (INHALATION) at 15:41

## 2021-05-07 RX ADMIN — HYDROXYZINE HYDROCHLORIDE 25 MG: 25 TABLET, FILM COATED ORAL at 13:54

## 2021-05-07 RX ADMIN — IPRATROPIUM BROMIDE AND ALBUTEROL SULFATE 1.5 ML: 2.5; .5 SOLUTION RESPIRATORY (INHALATION) at 23:51

## 2021-05-07 RX ADMIN — ASPIRIN 81 MG: 81 TABLET, FILM COATED ORAL at 07:05

## 2021-05-07 RX ADMIN — IOPAMIDOL 100 ML: 755 INJECTION, SOLUTION INTRAVENOUS at 18:09

## 2021-05-07 RX ADMIN — BUDESONIDE AND FORMOTEROL FUMARATE DIHYDRATE 2 PUFF: 160; 4.5 AEROSOL RESPIRATORY (INHALATION) at 06:43

## 2021-05-07 RX ADMIN — SODIUM CHLORIDE, PRESERVATIVE FREE 10 ML: 5 INJECTION INTRAVENOUS at 21:40

## 2021-05-08 LAB
ANION GAP SERPL CALCULATED.3IONS-SCNC: 10 MMOL/L (ref 5–15)
BUN SERPL-MCNC: 12 MG/DL (ref 6–20)
BUN/CREAT SERPL: 16.4 (ref 7–25)
CALCIUM SPEC-SCNC: 9 MG/DL (ref 8.6–10.5)
CHLORIDE SERPL-SCNC: 105 MMOL/L (ref 98–107)
CO2 SERPL-SCNC: 25 MMOL/L (ref 22–29)
CREAT SERPL-MCNC: 0.73 MG/DL (ref 0.76–1.27)
DEPRECATED RDW RBC AUTO: 41.1 FL (ref 37–54)
ERYTHROCYTE [DISTWIDTH] IN BLOOD BY AUTOMATED COUNT: 12.5 % (ref 12.3–15.4)
GFR SERPL CREATININE-BSD FRML MDRD: 110 ML/MIN/1.73
GLUCOSE SERPL-MCNC: 125 MG/DL (ref 65–99)
HCT VFR BLD AUTO: 48.2 % (ref 37.5–51)
HGB BLD-MCNC: 16.1 G/DL (ref 13–17.7)
MCH RBC QN AUTO: 30 PG (ref 26.6–33)
MCHC RBC AUTO-ENTMCNC: 33.4 G/DL (ref 31.5–35.7)
MCV RBC AUTO: 89.8 FL (ref 79–97)
PLATELET # BLD AUTO: 247 10*3/MM3 (ref 140–450)
PMV BLD AUTO: 11.7 FL (ref 6–12)
POTASSIUM SERPL-SCNC: 3.5 MMOL/L (ref 3.5–5.2)
RBC # BLD AUTO: 5.37 10*6/MM3 (ref 4.14–5.8)
SODIUM SERPL-SCNC: 140 MMOL/L (ref 136–145)
WBC # BLD AUTO: 10.58 10*3/MM3 (ref 3.4–10.8)

## 2021-05-08 PROCEDURE — 80048 BASIC METABOLIC PNL TOTAL CA: CPT | Performed by: EMERGENCY MEDICINE

## 2021-05-08 PROCEDURE — 85027 COMPLETE CBC AUTOMATED: CPT | Performed by: EMERGENCY MEDICINE

## 2021-05-08 PROCEDURE — 94799 UNLISTED PULMONARY SVC/PX: CPT

## 2021-05-08 PROCEDURE — 99232 SBSQ HOSP IP/OBS MODERATE 35: CPT | Performed by: EMERGENCY MEDICINE

## 2021-05-08 PROCEDURE — 25010000002 ENOXAPARIN PER 10 MG: Performed by: EMERGENCY MEDICINE

## 2021-05-08 RX ORDER — CARVEDILOL 3.12 MG/1
3.12 TABLET ORAL 2 TIMES DAILY WITH MEALS
Status: DISCONTINUED | OUTPATIENT
Start: 2021-05-08 | End: 2021-05-09

## 2021-05-08 RX ADMIN — ENOXAPARIN SODIUM 40 MG: 40 INJECTION SUBCUTANEOUS at 14:58

## 2021-05-08 RX ADMIN — ATORVASTATIN CALCIUM 40 MG: 40 TABLET, FILM COATED ORAL at 21:10

## 2021-05-08 RX ADMIN — IPRATROPIUM BROMIDE AND ALBUTEROL SULFATE 1.5 ML: 2.5; .5 SOLUTION RESPIRATORY (INHALATION) at 10:30

## 2021-05-08 RX ADMIN — IPRATROPIUM BROMIDE AND ALBUTEROL SULFATE 1.5 ML: 2.5; .5 SOLUTION RESPIRATORY (INHALATION) at 14:44

## 2021-05-08 RX ADMIN — BUDESONIDE AND FORMOTEROL FUMARATE DIHYDRATE 2 PUFF: 160; 4.5 AEROSOL RESPIRATORY (INHALATION) at 07:29

## 2021-05-08 RX ADMIN — IPRATROPIUM BROMIDE AND ALBUTEROL SULFATE 1.5 ML: 2.5; .5 SOLUTION RESPIRATORY (INHALATION) at 19:09

## 2021-05-08 RX ADMIN — HYDROXYZINE HYDROCHLORIDE 25 MG: 25 TABLET, FILM COATED ORAL at 16:32

## 2021-05-08 RX ADMIN — IPRATROPIUM BROMIDE AND ALBUTEROL SULFATE 1.5 ML: 2.5; .5 SOLUTION RESPIRATORY (INHALATION) at 07:29

## 2021-05-08 RX ADMIN — IPRATROPIUM BROMIDE AND ALBUTEROL SULFATE 1.5 ML: 2.5; .5 SOLUTION RESPIRATORY (INHALATION) at 23:31

## 2021-05-08 RX ADMIN — BUDESONIDE AND FORMOTEROL FUMARATE DIHYDRATE 2 PUFF: 160; 4.5 AEROSOL RESPIRATORY (INHALATION) at 19:09

## 2021-05-08 RX ADMIN — HYDROXYZINE HYDROCHLORIDE 25 MG: 25 TABLET, FILM COATED ORAL at 08:33

## 2021-05-08 RX ADMIN — CARVEDILOL 3.12 MG: 3.12 TABLET, FILM COATED ORAL at 18:34

## 2021-05-08 RX ADMIN — SODIUM CHLORIDE, PRESERVATIVE FREE 10 ML: 5 INJECTION INTRAVENOUS at 21:10

## 2021-05-08 RX ADMIN — ASPIRIN 81 MG: 81 TABLET, CHEWABLE ORAL at 08:33

## 2021-05-08 RX ADMIN — ACETAMINOPHEN 650 MG: 325 TABLET, FILM COATED ORAL at 19:53

## 2021-05-08 RX ADMIN — IPRATROPIUM BROMIDE AND ALBUTEROL SULFATE 1.5 ML: 2.5; .5 SOLUTION RESPIRATORY (INHALATION) at 03:21

## 2021-05-08 RX ADMIN — SODIUM CHLORIDE, PRESERVATIVE FREE 10 ML: 5 INJECTION INTRAVENOUS at 08:33

## 2021-05-08 RX ADMIN — HYDROXYZINE HYDROCHLORIDE 25 MG: 25 TABLET, FILM COATED ORAL at 21:10

## 2021-05-08 RX ADMIN — NICOTINE 1 PATCH: 14 PATCH, EXTENDED RELEASE TRANSDERMAL at 08:33

## 2021-05-09 PROBLEM — I25.110 CORONARY ARTERY DISEASE INVOLVING NATIVE CORONARY ARTERY OF NATIVE HEART WITH UNSTABLE ANGINA PECTORIS (HCC): Status: ACTIVE | Noted: 2021-05-05

## 2021-05-09 LAB
ABO GROUP BLD: NORMAL
ALBUMIN SERPL-MCNC: 4.1 G/DL (ref 3.5–5.2)
ALBUMIN/GLOB SERPL: 1.6 G/DL
ALP SERPL-CCNC: 84 U/L (ref 39–117)
ALT SERPL W P-5'-P-CCNC: 18 U/L (ref 1–41)
ANION GAP SERPL CALCULATED.3IONS-SCNC: 9 MMOL/L (ref 5–15)
APTT PPP: 32.7 SECONDS (ref 24.1–35)
ARTERIAL PATENCY WRIST A: POSITIVE
AST SERPL-CCNC: 20 U/L (ref 1–40)
ATMOSPHERIC PRESS: 749 MMHG
BASE EXCESS BLDA CALC-SCNC: 2.1 MMOL/L (ref 0–2)
BDY SITE: ABNORMAL
BILIRUB SERPL-MCNC: 0.5 MG/DL (ref 0–1.2)
BLD GP AB SCN SERPL QL: NEGATIVE
BODY TEMPERATURE: 37 C
BUN SERPL-MCNC: 13 MG/DL (ref 6–20)
BUN/CREAT SERPL: 16.7 (ref 7–25)
CALCIUM SPEC-SCNC: 9.5 MG/DL (ref 8.6–10.5)
CHLORIDE SERPL-SCNC: 105 MMOL/L (ref 98–107)
CO2 SERPL-SCNC: 26 MMOL/L (ref 22–29)
CREAT SERPL-MCNC: 0.78 MG/DL (ref 0.76–1.27)
DEPRECATED RDW RBC AUTO: 40.8 FL (ref 37–54)
ERYTHROCYTE [DISTWIDTH] IN BLOOD BY AUTOMATED COUNT: 12.4 % (ref 12.3–15.4)
GFR SERPL CREATININE-BSD FRML MDRD: 102 ML/MIN/1.73
GLOBULIN UR ELPH-MCNC: 2.5 GM/DL
GLUCOSE SERPL-MCNC: 102 MG/DL (ref 65–99)
HCO3 BLDA-SCNC: 27 MMOL/L (ref 20–26)
HCT VFR BLD AUTO: 47.8 % (ref 37.5–51)
HGB BLD-MCNC: 16.2 G/DL (ref 13–17.7)
INR PPP: 1.07 (ref 0.91–1.09)
Lab: ABNORMAL
MCH RBC QN AUTO: 30.2 PG (ref 26.6–33)
MCHC RBC AUTO-ENTMCNC: 33.9 G/DL (ref 31.5–35.7)
MCV RBC AUTO: 89.2 FL (ref 79–97)
MODALITY: ABNORMAL
PCO2 BLDA: 41.9 MM HG (ref 35–45)
PCO2 TEMP ADJ BLD: 41.9 MM HG (ref 35–45)
PH BLDA: 7.42 PH UNITS (ref 7.35–7.45)
PH, TEMP CORRECTED: 7.42 PH UNITS (ref 7.35–7.45)
PLATELET # BLD AUTO: 246 10*3/MM3 (ref 140–450)
PMV BLD AUTO: 11.9 FL (ref 6–12)
PO2 BLDA: 72.2 MM HG (ref 83–108)
PO2 TEMP ADJ BLD: 72.2 MM HG (ref 83–108)
POTASSIUM SERPL-SCNC: 4.3 MMOL/L (ref 3.5–5.2)
PROT SERPL-MCNC: 6.6 G/DL (ref 6–8.5)
PROTHROMBIN TIME: 13.1 SECONDS (ref 11.5–13.4)
RBC # BLD AUTO: 5.36 10*6/MM3 (ref 4.14–5.8)
RH BLD: POSITIVE
SAO2 % BLDCOA: 94.9 % (ref 94–99)
SODIUM SERPL-SCNC: 140 MMOL/L (ref 136–145)
T&S EXPIRATION DATE: NORMAL
VENTILATOR MODE: ABNORMAL
WBC # BLD AUTO: 9.63 10*3/MM3 (ref 3.4–10.8)

## 2021-05-09 PROCEDURE — 85730 THROMBOPLASTIN TIME PARTIAL: CPT | Performed by: THORACIC SURGERY (CARDIOTHORACIC VASCULAR SURGERY)

## 2021-05-09 PROCEDURE — 82803 BLOOD GASES ANY COMBINATION: CPT

## 2021-05-09 PROCEDURE — 86900 BLOOD TYPING SEROLOGIC ABO: CPT | Performed by: THORACIC SURGERY (CARDIOTHORACIC VASCULAR SURGERY)

## 2021-05-09 PROCEDURE — 99024 POSTOP FOLLOW-UP VISIT: CPT | Performed by: THORACIC SURGERY (CARDIOTHORACIC VASCULAR SURGERY)

## 2021-05-09 PROCEDURE — 94799 UNLISTED PULMONARY SVC/PX: CPT

## 2021-05-09 PROCEDURE — 86901 BLOOD TYPING SEROLOGIC RH(D): CPT | Performed by: THORACIC SURGERY (CARDIOTHORACIC VASCULAR SURGERY)

## 2021-05-09 PROCEDURE — 25010000002 ENOXAPARIN PER 10 MG: Performed by: EMERGENCY MEDICINE

## 2021-05-09 PROCEDURE — 36600 WITHDRAWAL OF ARTERIAL BLOOD: CPT

## 2021-05-09 PROCEDURE — 86920 COMPATIBILITY TEST SPIN: CPT

## 2021-05-09 PROCEDURE — 85610 PROTHROMBIN TIME: CPT | Performed by: THORACIC SURGERY (CARDIOTHORACIC VASCULAR SURGERY)

## 2021-05-09 PROCEDURE — 80053 COMPREHEN METABOLIC PANEL: CPT | Performed by: THORACIC SURGERY (CARDIOTHORACIC VASCULAR SURGERY)

## 2021-05-09 PROCEDURE — 85027 COMPLETE CBC AUTOMATED: CPT | Performed by: THORACIC SURGERY (CARDIOTHORACIC VASCULAR SURGERY)

## 2021-05-09 PROCEDURE — 87635 SARS-COV-2 COVID-19 AMP PRB: CPT | Performed by: INTERNAL MEDICINE

## 2021-05-09 PROCEDURE — 86850 RBC ANTIBODY SCREEN: CPT | Performed by: THORACIC SURGERY (CARDIOTHORACIC VASCULAR SURGERY)

## 2021-05-09 PROCEDURE — 99233 SBSQ HOSP IP/OBS HIGH 50: CPT | Performed by: EMERGENCY MEDICINE

## 2021-05-09 RX ORDER — NITROGLYCERIN 0.4 MG/1
0.4 TABLET SUBLINGUAL
Status: CANCELLED | OUTPATIENT
Start: 2021-05-09

## 2021-05-09 RX ORDER — LORAZEPAM 0.5 MG/1
0.5 TABLET ORAL EVERY 6 HOURS PRN
Status: DISCONTINUED | OUTPATIENT
Start: 2021-05-09 | End: 2021-05-10 | Stop reason: HOSPADM

## 2021-05-09 RX ORDER — CARVEDILOL 3.12 MG/1
3.12 TABLET ORAL ONCE
Status: COMPLETED | OUTPATIENT
Start: 2021-05-09 | End: 2021-05-09

## 2021-05-09 RX ORDER — CARVEDILOL 6.25 MG/1
6.25 TABLET ORAL 2 TIMES DAILY WITH MEALS
Status: DISCONTINUED | OUTPATIENT
Start: 2021-05-09 | End: 2021-05-10 | Stop reason: HOSPADM

## 2021-05-09 RX ORDER — ACETAMINOPHEN 325 MG/1
650 TABLET ORAL EVERY 4 HOURS PRN
Status: CANCELLED | OUTPATIENT
Start: 2021-05-09

## 2021-05-09 RX ADMIN — ENOXAPARIN SODIUM 40 MG: 40 INJECTION SUBCUTANEOUS at 11:34

## 2021-05-09 RX ADMIN — PREGABALIN 100 MG: 50 CAPSULE ORAL at 08:23

## 2021-05-09 RX ADMIN — CARVEDILOL 3.12 MG: 3.12 TABLET, FILM COATED ORAL at 11:34

## 2021-05-09 RX ADMIN — IPRATROPIUM BROMIDE AND ALBUTEROL SULFATE 1.5 ML: 2.5; .5 SOLUTION RESPIRATORY (INHALATION) at 18:43

## 2021-05-09 RX ADMIN — ASPIRIN 81 MG: 81 TABLET, CHEWABLE ORAL at 08:23

## 2021-05-09 RX ADMIN — IPRATROPIUM BROMIDE AND ALBUTEROL SULFATE 1.5 ML: 2.5; .5 SOLUTION RESPIRATORY (INHALATION) at 06:31

## 2021-05-09 RX ADMIN — BUDESONIDE AND FORMOTEROL FUMARATE DIHYDRATE 2 PUFF: 160; 4.5 AEROSOL RESPIRATORY (INHALATION) at 06:31

## 2021-05-09 RX ADMIN — IPRATROPIUM BROMIDE AND ALBUTEROL SULFATE 1.5 ML: 2.5; .5 SOLUTION RESPIRATORY (INHALATION) at 23:00

## 2021-05-09 RX ADMIN — CARVEDILOL 6.25 MG: 6.25 TABLET, FILM COATED ORAL at 17:16

## 2021-05-09 RX ADMIN — NICOTINE 1 PATCH: 14 PATCH, EXTENDED RELEASE TRANSDERMAL at 08:24

## 2021-05-09 RX ADMIN — ATORVASTATIN CALCIUM 40 MG: 40 TABLET, FILM COATED ORAL at 20:26

## 2021-05-09 RX ADMIN — BUDESONIDE AND FORMOTEROL FUMARATE DIHYDRATE 2 PUFF: 160; 4.5 AEROSOL RESPIRATORY (INHALATION) at 18:43

## 2021-05-09 RX ADMIN — LORAZEPAM 0.5 MG: 0.5 TABLET ORAL at 15:23

## 2021-05-09 RX ADMIN — CARVEDILOL 3.12 MG: 3.12 TABLET, FILM COATED ORAL at 08:23

## 2021-05-09 RX ADMIN — HYDROXYZINE HYDROCHLORIDE 25 MG: 25 TABLET, FILM COATED ORAL at 08:23

## 2021-05-09 RX ADMIN — LORAZEPAM 0.5 MG: 0.5 TABLET ORAL at 23:23

## 2021-05-09 RX ADMIN — IPRATROPIUM BROMIDE AND ALBUTEROL SULFATE 1.5 ML: 2.5; .5 SOLUTION RESPIRATORY (INHALATION) at 03:25

## 2021-05-09 RX ADMIN — IPRATROPIUM BROMIDE AND ALBUTEROL SULFATE 1.5 ML: 2.5; .5 SOLUTION RESPIRATORY (INHALATION) at 14:12

## 2021-05-09 RX ADMIN — SODIUM CHLORIDE, PRESERVATIVE FREE 10 ML: 5 INJECTION INTRAVENOUS at 20:26

## 2021-05-09 RX ADMIN — IPRATROPIUM BROMIDE AND ALBUTEROL SULFATE 1.5 ML: 2.5; .5 SOLUTION RESPIRATORY (INHALATION) at 10:30

## 2021-05-09 RX ADMIN — SODIUM CHLORIDE, PRESERVATIVE FREE 10 ML: 5 INJECTION INTRAVENOUS at 08:23

## 2021-05-10 ENCOUNTER — APPOINTMENT (OUTPATIENT)
Dept: CARDIOLOGY | Facility: HOSPITAL | Age: 61
End: 2021-05-10

## 2021-05-10 ENCOUNTER — ANESTHESIA EVENT (OUTPATIENT)
Dept: PERIOP | Facility: HOSPITAL | Age: 61
End: 2021-05-10

## 2021-05-10 ENCOUNTER — ANESTHESIA (OUTPATIENT)
Dept: PERIOP | Facility: HOSPITAL | Age: 61
End: 2021-05-10

## 2021-05-10 ENCOUNTER — APPOINTMENT (OUTPATIENT)
Dept: GENERAL RADIOLOGY | Facility: HOSPITAL | Age: 61
End: 2021-05-10

## 2021-05-10 PROBLEM — I20.8 STABLE ANGINA PECTORIS: Status: RESOLVED | Noted: 2021-05-05 | Resolved: 2021-05-10

## 2021-05-10 PROBLEM — I70.213 ATHEROSCLEROSIS OF NATIVE ARTERY OF BOTH LOWER EXTREMITIES WITH INTERMITTENT CLAUDICATION (HCC): Status: ACTIVE | Noted: 2021-05-10

## 2021-05-10 PROBLEM — G95.9 CERVICAL MYELOPATHY (HCC): Status: ACTIVE | Noted: 2021-05-10

## 2021-05-10 PROBLEM — I20.89 STABLE ANGINA PECTORIS: Status: RESOLVED | Noted: 2021-05-05 | Resolved: 2021-05-10

## 2021-05-10 LAB
A-A DO2: 137.7 MMHG
A-A DO2: 150.9 MMHG
A-A DO2: 176.9 MMHG
A-A DO2: 184.3 MMHG
A-A DO2: 215.2 MMHG
A-A DO2: 28.6 MMHG
A-A DO2: 66.7 MMHG
A-A DO2: 97.5 MMHG
A-A DO2: ABNORMAL
ALBUMIN SERPL-MCNC: 3 G/DL (ref 3.5–5.2)
ALBUMIN SERPL-MCNC: 3.1 G/DL (ref 3.5–5.2)
ANION GAP SERPL CALCULATED.3IONS-SCNC: 4 MMOL/L (ref 5–15)
ANION GAP SERPL CALCULATED.3IONS-SCNC: 5 MMOL/L (ref 5–15)
APTT PPP: 40 SECONDS (ref 24.1–35)
ARTERIAL PATENCY WRIST A: ABNORMAL
ATMOSPHERIC PRESS: 753 MMHG
ATMOSPHERIC PRESS: 754 MMHG
BASE EXCESS BLDA CALC-SCNC: -0.5 MMOL/L (ref 0–2)
BASE EXCESS BLDA CALC-SCNC: -0.8 MMOL/L (ref 0–2)
BASE EXCESS BLDA CALC-SCNC: -0.9 MMOL/L (ref 0–2)
BASE EXCESS BLDA CALC-SCNC: -1.2 MMOL/L (ref 0–2)
BASE EXCESS BLDA CALC-SCNC: -1.5 MMOL/L (ref 0–2)
BASE EXCESS BLDA CALC-SCNC: -1.8 MMOL/L (ref 0–2)
BASE EXCESS BLDA CALC-SCNC: 0.3 MMOL/L (ref 0–2)
BASE EXCESS BLDA CALC-SCNC: 0.4 MMOL/L (ref 0–2)
BASE EXCESS BLDA CALC-SCNC: 0.4 MMOL/L (ref 0–2)
BASE EXCESS BLDA CALC-SCNC: 0.8 MMOL/L (ref 0–2)
BASE EXCESS BLDA CALC-SCNC: 2.7 MMOL/L (ref 0–2)
BASE EXCESS BLDA CALC-SCNC: 3 MMOL/L (ref 0–2)
BDY SITE: ABNORMAL
BODY TEMPERATURE: 37 C
BUN SERPL-MCNC: 13 MG/DL (ref 6–20)
BUN SERPL-MCNC: 13 MG/DL (ref 6–20)
BUN/CREAT SERPL: 16 (ref 7–25)
BUN/CREAT SERPL: 16.5 (ref 7–25)
CA-I BLD-MCNC: 4.09 MG/DL (ref 4.6–5.4)
CA-I BLD-MCNC: 4.18 MG/DL (ref 4.6–5.4)
CA-I BLD-MCNC: 4.24 MG/DL (ref 4.6–5.4)
CA-I BLD-MCNC: 4.27 MG/DL (ref 4.6–5.4)
CA-I BLD-MCNC: 4.53 MG/DL (ref 4.6–5.4)
CA-I BLD-MCNC: 4.62 MG/DL (ref 4.6–5.4)
CA-I BLD-MCNC: 4.95 MG/DL (ref 4.6–5.4)
CA-I BLD-MCNC: 4.96 MG/DL (ref 4.6–5.4)
CA-I BLD-MCNC: 5.06 MG/DL (ref 4.6–5.4)
CALCIUM SPEC-SCNC: 8 MG/DL (ref 8.6–10.5)
CALCIUM SPEC-SCNC: 8.3 MG/DL (ref 8.6–10.5)
CHLORIDE SERPL-SCNC: 114 MMOL/L (ref 98–107)
CHLORIDE SERPL-SCNC: 115 MMOL/L (ref 98–107)
CO2 SERPL-SCNC: 25 MMOL/L (ref 22–29)
CO2 SERPL-SCNC: 26 MMOL/L (ref 22–29)
COHGB MFR BLD: 0.6 % (ref 0–5)
COHGB MFR BLD: 0.8 % (ref 0–5)
COHGB MFR BLD: 1 % (ref 0–5)
COHGB MFR BLD: 1.1 % (ref 0–5)
COHGB MFR BLD: 1.2 % (ref 0–5)
CREAT SERPL-MCNC: 0.79 MG/DL (ref 0.76–1.27)
CREAT SERPL-MCNC: 0.81 MG/DL (ref 0.76–1.27)
DEPRECATED RDW RBC AUTO: 41.9 FL (ref 37–54)
DEPRECATED RDW RBC AUTO: 43 FL (ref 37–54)
ERYTHROCYTE [DISTWIDTH] IN BLOOD BY AUTOMATED COUNT: 12.6 % (ref 12.3–15.4)
ERYTHROCYTE [DISTWIDTH] IN BLOOD BY AUTOMATED COUNT: 12.7 % (ref 12.3–15.4)
GFR SERPL CREATININE-BSD FRML MDRD: 100 ML/MIN/1.73
GFR SERPL CREATININE-BSD FRML MDRD: 98 ML/MIN/1.73
GLUCOSE BLDC GLUCOMTR-MCNC: 125 MG/DL (ref 70–130)
GLUCOSE BLDC GLUCOMTR-MCNC: 127 MG/DL (ref 70–130)
GLUCOSE BLDC GLUCOMTR-MCNC: 129 MG/DL (ref 70–130)
GLUCOSE BLDC GLUCOMTR-MCNC: 83 MG/DL (ref 70–130)
GLUCOSE SERPL-MCNC: 118 MG/DL (ref 65–99)
GLUCOSE SERPL-MCNC: 68 MG/DL (ref 65–99)
HCO3 BLDA-SCNC: 23.7 MMOL/L (ref 20–26)
HCO3 BLDA-SCNC: 24.3 MMOL/L (ref 20–26)
HCO3 BLDA-SCNC: 24.4 MMOL/L (ref 20–26)
HCO3 BLDA-SCNC: 24.6 MMOL/L (ref 20–26)
HCO3 BLDA-SCNC: 24.6 MMOL/L (ref 20–26)
HCO3 BLDA-SCNC: 24.8 MMOL/L (ref 20–26)
HCO3 BLDA-SCNC: 25 MMOL/L (ref 20–26)
HCO3 BLDA-SCNC: 25.1 MMOL/L (ref 20–26)
HCO3 BLDA-SCNC: 25.4 MMOL/L (ref 20–26)
HCO3 BLDA-SCNC: 26.3 MMOL/L (ref 20–26)
HCO3 BLDA-SCNC: 27.1 MMOL/L (ref 20–26)
HCO3 BLDA-SCNC: 27.7 MMOL/L (ref 20–26)
HCT VFR BLD AUTO: 35.8 % (ref 37.5–51)
HCT VFR BLD AUTO: 37.5 % (ref 37.5–51)
HCT VFR BLD CALC: 24.5 % (ref 38–51)
HCT VFR BLD CALC: 28.9 % (ref 38–51)
HCT VFR BLD CALC: 32.2 % (ref 38–51)
HCT VFR BLD CALC: 33.6 % (ref 38–51)
HCT VFR BLD CALC: 33.6 % (ref 38–51)
HCT VFR BLD CALC: 34.9 % (ref 38–51)
HCT VFR BLD CALC: 39 % (ref 38–51)
HCT VFR BLD CALC: 43.4 % (ref 38–51)
HCT VFR BLD CALC: 46.9 % (ref 38–51)
HGB BLD-MCNC: 12 G/DL (ref 13–17.7)
HGB BLD-MCNC: 12.3 G/DL (ref 13–17.7)
HGB BLDA-MCNC: 10.5 G/DL (ref 14–18)
HGB BLDA-MCNC: 10.9 G/DL (ref 14–18)
HGB BLDA-MCNC: 11 G/DL (ref 14–18)
HGB BLDA-MCNC: 11.4 G/DL (ref 14–18)
HGB BLDA-MCNC: 12.7 G/DL (ref 14–18)
HGB BLDA-MCNC: 14.2 G/DL (ref 14–18)
HGB BLDA-MCNC: 15.3 G/DL (ref 14–18)
HGB BLDA-MCNC: 8 G/DL (ref 14–18)
HGB BLDA-MCNC: 9.4 G/DL (ref 14–18)
INHALED O2 CONCENTRATION: 100 %
INHALED O2 CONCENTRATION: 30 %
INHALED O2 CONCENTRATION: 60 %
INHALED O2 CONCENTRATION: 80 %
INR PPP: 1.37 (ref 0.91–1.09)
Lab: ABNORMAL
Lab: NORMAL
MCH RBC QN AUTO: 30.4 PG (ref 26.6–33)
MCH RBC QN AUTO: 30.6 PG (ref 26.6–33)
MCHC RBC AUTO-ENTMCNC: 32.8 G/DL (ref 31.5–35.7)
MCHC RBC AUTO-ENTMCNC: 33.5 G/DL (ref 31.5–35.7)
MCV RBC AUTO: 91.3 FL (ref 79–97)
MCV RBC AUTO: 92.6 FL (ref 79–97)
METHGB BLD QL: 0.7 % (ref 0–3)
METHGB BLD QL: 0.9 % (ref 0–3)
METHGB BLD QL: 0.9 % (ref 0–3)
METHGB BLD QL: 1 % (ref 0–3)
METHGB BLD QL: 1 % (ref 0–3)
METHGB BLD QL: 1.2 % (ref 0–3)
METHGB BLD QL: 1.2 % (ref 0–3)
MODALITY: ABNORMAL
NOTE: ABNORMAL
NOTIFIED BY: ABNORMAL
NOTIFIED BY: ABNORMAL
NOTIFIED WHO: ABNORMAL
NOTIFIED WHO: ABNORMAL
OXYHGB MFR BLDV: 98.2 % (ref 94–99)
OXYHGB MFR BLDV: 98.2 % (ref 94–99)
OXYHGB MFR BLDV: 98.3 % (ref 94–99)
OXYHGB MFR BLDV: 98.4 % (ref 94–99)
OXYHGB MFR BLDV: 98.5 % (ref 94–99)
OXYHGB MFR BLDV: 98.5 % (ref 94–99)
OXYHGB MFR BLDV: >98.5 % (ref 94–99)
PCO2 BLDA: 28.3 MM HG (ref 35–45)
PCO2 BLDA: 36.5 MM HG (ref 35–45)
PCO2 BLDA: 36.6 MM HG (ref 35–45)
PCO2 BLDA: 37.8 MM HG (ref 35–45)
PCO2 BLDA: 38.2 MM HG (ref 35–45)
PCO2 BLDA: 43.6 MM HG (ref 35–45)
PCO2 BLDA: 44.8 MM HG (ref 35–45)
PCO2 BLDA: 46.1 MM HG (ref 35–45)
PCO2 BLDA: 47.7 MM HG (ref 35–45)
PCO2 BLDA: 48.4 MM HG (ref 35–45)
PCO2 BLDA: 49.7 MM HG (ref 35–45)
PCO2 BLDA: 54.5 MM HG (ref 35–45)
PCO2 TEMP ADJ BLD: 28.3 MM HG (ref 35–45)
PCO2 TEMP ADJ BLD: 36.5 MM HG (ref 35–45)
PCO2 TEMP ADJ BLD: 36.6 MM HG (ref 35–45)
PCO2 TEMP ADJ BLD: 37.8 MM HG (ref 35–45)
PCO2 TEMP ADJ BLD: 38.2 MM HG (ref 35–45)
PCO2 TEMP ADJ BLD: 43.6 MM HG (ref 35–45)
PCO2 TEMP ADJ BLD: 44.8 MM HG (ref 35–45)
PCO2 TEMP ADJ BLD: 46.1 MM HG (ref 35–45)
PCO2 TEMP ADJ BLD: 47.7 MM HG (ref 35–45)
PCO2 TEMP ADJ BLD: 48.4 MM HG (ref 35–45)
PCO2 TEMP ADJ BLD: 49.7 MM HG (ref 35–45)
PCO2 TEMP ADJ BLD: 54.5 MM HG (ref 35–45)
PEEP RESPIRATORY: 8 CM[H2O]
PH BLDA: 7.29 PH UNITS (ref 7.35–7.45)
PH BLDA: 7.32 PH UNITS (ref 7.35–7.45)
PH BLDA: 7.33 PH UNITS (ref 7.35–7.45)
PH BLDA: 7.33 PH UNITS (ref 7.35–7.45)
PH BLDA: 7.35 PH UNITS (ref 7.35–7.45)
PH BLDA: 7.35 PH UNITS (ref 7.35–7.45)
PH BLDA: 7.41 PH UNITS (ref 7.35–7.45)
PH BLDA: 7.42 PH UNITS (ref 7.35–7.45)
PH BLDA: 7.43 PH UNITS (ref 7.35–7.45)
PH BLDA: 7.55 PH UNITS (ref 7.35–7.45)
PH, TEMP CORRECTED: 7.29 PH UNITS (ref 7.35–7.45)
PH, TEMP CORRECTED: 7.32 PH UNITS (ref 7.35–7.45)
PH, TEMP CORRECTED: 7.33 PH UNITS (ref 7.35–7.45)
PH, TEMP CORRECTED: 7.33 PH UNITS (ref 7.35–7.45)
PH, TEMP CORRECTED: 7.35 PH UNITS (ref 7.35–7.45)
PH, TEMP CORRECTED: 7.35 PH UNITS (ref 7.35–7.45)
PH, TEMP CORRECTED: 7.41 PH UNITS (ref 7.35–7.45)
PH, TEMP CORRECTED: 7.42 PH UNITS (ref 7.35–7.45)
PH, TEMP CORRECTED: 7.43 PH UNITS (ref 7.35–7.45)
PH, TEMP CORRECTED: 7.55 PH UNITS (ref 7.35–7.45)
PHOSPHATE SERPL-MCNC: 3.2 MG/DL (ref 2.5–4.5)
PHOSPHATE SERPL-MCNC: 4.6 MG/DL (ref 2.5–4.5)
PLATELET # BLD AUTO: 147 10*3/MM3 (ref 140–450)
PLATELET # BLD AUTO: 148 10*3/MM3 (ref 140–450)
PMV BLD AUTO: 11.9 FL (ref 6–12)
PMV BLD AUTO: 11.9 FL (ref 6–12)
PO2 BLDA: 104 MM HG (ref 83–108)
PO2 BLDA: 150 MM HG (ref 83–108)
PO2 BLDA: 186 MM HG (ref 83–108)
PO2 BLDA: 376 MM HG (ref 83–108)
PO2 BLDA: 453 MM HG (ref 83–108)
PO2 BLDA: 453 MM HG (ref 83–108)
PO2 BLDA: 487 MM HG (ref 83–108)
PO2 BLDA: 492 MM HG (ref 83–108)
PO2 BLDA: 530 MM HG (ref 83–108)
PO2 BLDA: 80.6 MM HG (ref 83–108)
PO2 BLDA: >547 MM HG (ref 83–108)
PO2 BLDA: >547 MM HG (ref 83–108)
PO2 TEMP ADJ BLD: 104 MM HG (ref 83–108)
PO2 TEMP ADJ BLD: 150 MM HG (ref 83–108)
PO2 TEMP ADJ BLD: 186 MM HG (ref 83–108)
PO2 TEMP ADJ BLD: 376 MM HG (ref 83–108)
PO2 TEMP ADJ BLD: 453 MM HG (ref 83–108)
PO2 TEMP ADJ BLD: 453 MM HG (ref 83–108)
PO2 TEMP ADJ BLD: 487 MM HG (ref 83–108)
PO2 TEMP ADJ BLD: 492 MM HG (ref 83–108)
PO2 TEMP ADJ BLD: 530 MM HG (ref 83–108)
PO2 TEMP ADJ BLD: 80.6 MM HG (ref 83–108)
PO2 TEMP ADJ BLD: >547 MM HG (ref 83–108)
PO2 TEMP ADJ BLD: >547 MM HG (ref 83–108)
POTASSIUM BLDA-SCNC: 3.6 MMOL/L (ref 3.5–5.2)
POTASSIUM BLDA-SCNC: 3.7 MMOL/L (ref 3.5–5.2)
POTASSIUM BLDA-SCNC: 3.8 MMOL/L (ref 3.5–5.2)
POTASSIUM BLDA-SCNC: 4.1 MMOL/L (ref 3.5–5.2)
POTASSIUM BLDA-SCNC: 4.1 MMOL/L (ref 3.5–5.2)
POTASSIUM BLDA-SCNC: 4.8 MMOL/L (ref 3.5–5.2)
POTASSIUM BLDA-SCNC: 5 MMOL/L (ref 3.5–5.2)
POTASSIUM BLDA-SCNC: 5.1 MMOL/L (ref 3.5–5.2)
POTASSIUM BLDA-SCNC: 5.8 MMOL/L (ref 3.5–5.2)
POTASSIUM SERPL-SCNC: 3.7 MMOL/L (ref 3.5–5.2)
POTASSIUM SERPL-SCNC: 5.1 MMOL/L (ref 3.5–5.2)
PROTHROMBIN TIME: 15.8 SECONDS (ref 11.5–13.4)
PSV: 10 CMH2O
RBC # BLD AUTO: 3.92 10*6/MM3 (ref 4.14–5.8)
RBC # BLD AUTO: 4.05 10*6/MM3 (ref 4.14–5.8)
SAO2 % BLDCOA: 100 % (ref 94–99)
SAO2 % BLDCOA: 100 % (ref 94–99)
SAO2 % BLDCOA: 95.8 % (ref 94–99)
SAO2 % BLDCOA: 97.9 % (ref 94–99)
SAO2 % BLDCOA: 98.9 % (ref 94–99)
SAO2 % BLDCOA: 99.4 % (ref 94–99)
SAO2 % BLDCOA: >100.1 % (ref 94–99)
SARS-COV-2 RNA PNL SPEC NAA+PROBE: NOT DETECTED
SET MECH RESP RATE: 18
SET MECH RESP RATE: 20
SET MECH RESP RATE: 24
SET MECH RESP RATE: 24
SODIUM BLDA-SCNC: 138 MMOL/L (ref 136–145)
SODIUM BLDA-SCNC: 138 MMOL/L (ref 136–145)
SODIUM BLDA-SCNC: 139 MMOL/L (ref 136–145)
SODIUM BLDA-SCNC: 140 MMOL/L (ref 136–145)
SODIUM BLDA-SCNC: 140 MMOL/L (ref 136–145)
SODIUM BLDA-SCNC: 141 MMOL/L (ref 136–145)
SODIUM BLDA-SCNC: 141 MMOL/L (ref 136–145)
SODIUM BLDA-SCNC: 142 MMOL/L (ref 136–145)
SODIUM BLDA-SCNC: 144 MMOL/L (ref 136–145)
SODIUM SERPL-SCNC: 144 MMOL/L (ref 136–145)
SODIUM SERPL-SCNC: 145 MMOL/L (ref 136–145)
VENTILATOR MODE: ABNORMAL
VENTILATOR MODE: AC
VT ON VENT VENT: 550 ML
WBC # BLD AUTO: 10.96 10*3/MM3 (ref 3.4–10.8)
WBC # BLD AUTO: 11.29 10*3/MM3 (ref 3.4–10.8)

## 2021-05-10 PROCEDURE — 71045 X-RAY EXAM CHEST 1 VIEW: CPT

## 2021-05-10 PROCEDURE — 25010000002 MANNITOL PER 50 ML

## 2021-05-10 PROCEDURE — 94799 UNLISTED PULMONARY SVC/PX: CPT

## 2021-05-10 PROCEDURE — 25010000002 HEPARIN (PORCINE) PER 1000 UNITS: Performed by: NURSE ANESTHETIST, CERTIFIED REGISTERED

## 2021-05-10 PROCEDURE — 82375 ASSAY CARBOXYHB QUANT: CPT

## 2021-05-10 PROCEDURE — 25010000002 MORPHINE SULFATE (PF) 2 MG/ML SOLUTION: Performed by: THORACIC SURGERY (CARDIOTHORACIC VASCULAR SURGERY)

## 2021-05-10 PROCEDURE — 5A1221Z PERFORMANCE OF CARDIAC OUTPUT, CONTINUOUS: ICD-10-PCS | Performed by: THORACIC SURGERY (CARDIOTHORACIC VASCULAR SURGERY)

## 2021-05-10 PROCEDURE — 85610 PROTHROMBIN TIME: CPT | Performed by: THORACIC SURGERY (CARDIOTHORACIC VASCULAR SURGERY)

## 2021-05-10 PROCEDURE — 25010000002 PROTAMINE SULFATE PER 10 MG

## 2021-05-10 PROCEDURE — C1751 CATH, INF, PER/CENT/MIDLINE: HCPCS | Performed by: NURSE ANESTHETIST, CERTIFIED REGISTERED

## 2021-05-10 PROCEDURE — C1889 IMPLANT/INSERT DEVICE, NOC: HCPCS | Performed by: THORACIC SURGERY (CARDIOTHORACIC VASCULAR SURGERY)

## 2021-05-10 PROCEDURE — 94002 VENT MGMT INPAT INIT DAY: CPT

## 2021-05-10 PROCEDURE — 82962 GLUCOSE BLOOD TEST: CPT

## 2021-05-10 PROCEDURE — 06BP4ZZ EXCISION OF RIGHT SAPHENOUS VEIN, PERCUTANEOUS ENDOSCOPIC APPROACH: ICD-10-PCS | Performed by: THORACIC SURGERY (CARDIOTHORACIC VASCULAR SURGERY)

## 2021-05-10 PROCEDURE — 93325 DOPPLER ECHO COLOR FLOW MAPG: CPT | Performed by: EMERGENCY MEDICINE

## 2021-05-10 PROCEDURE — 25010000002 VANCOMYCIN 1 G RECONSTITUTED SOLUTION: Performed by: NURSE ANESTHETIST, CERTIFIED REGISTERED

## 2021-05-10 PROCEDURE — 021209W BYPASS CORONARY ARTERY, THREE ARTERIES FROM AORTA WITH AUTOLOGOUS VENOUS TISSUE, OPEN APPROACH: ICD-10-PCS | Performed by: THORACIC SURGERY (CARDIOTHORACIC VASCULAR SURGERY)

## 2021-05-10 PROCEDURE — 25010000003 CEFAZOLIN PER 500 MG: Performed by: NURSE ANESTHETIST, CERTIFIED REGISTERED

## 2021-05-10 PROCEDURE — 25010000002 ALBUMIN HUMAN 5% PER 50 ML: Performed by: THORACIC SURGERY (CARDIOTHORACIC VASCULAR SURGERY)

## 2021-05-10 PROCEDURE — 82803 BLOOD GASES ANY COMBINATION: CPT

## 2021-05-10 PROCEDURE — 25010000003 POTASSIUM CHLORIDE PER 2 MEQ: Performed by: THORACIC SURGERY (CARDIOTHORACIC VASCULAR SURGERY)

## 2021-05-10 PROCEDURE — 93010 ELECTROCARDIOGRAM REPORT: CPT | Performed by: EMERGENCY MEDICINE

## 2021-05-10 PROCEDURE — P9041 ALBUMIN (HUMAN),5%, 50ML: HCPCS | Performed by: THORACIC SURGERY (CARDIOTHORACIC VASCULAR SURGERY)

## 2021-05-10 PROCEDURE — A4648 IMPLANTABLE TISSUE MARKER: HCPCS | Performed by: THORACIC SURGERY (CARDIOTHORACIC VASCULAR SURGERY)

## 2021-05-10 PROCEDURE — 33533 CABG ARTERIAL SINGLE: CPT | Performed by: THORACIC SURGERY (CARDIOTHORACIC VASCULAR SURGERY)

## 2021-05-10 PROCEDURE — 33519 CABG ARTERY-VEIN THREE: CPT | Performed by: THORACIC SURGERY (CARDIOTHORACIC VASCULAR SURGERY)

## 2021-05-10 PROCEDURE — 25010000002 AMIODARONE IN DEXTROSE 5% 360-4.14 MG/200ML-% SOLUTION: Performed by: THORACIC SURGERY (CARDIOTHORACIC VASCULAR SURGERY)

## 2021-05-10 PROCEDURE — 25010000002 VANCOMYCIN 1 G RECONSTITUTED SOLUTION 1 EACH VIAL: Performed by: THORACIC SURGERY (CARDIOTHORACIC VASCULAR SURGERY)

## 2021-05-10 PROCEDURE — 83050 HGB METHEMOGLOBIN QUAN: CPT

## 2021-05-10 PROCEDURE — 93312 ECHO TRANSESOPHAGEAL: CPT | Performed by: EMERGENCY MEDICINE

## 2021-05-10 PROCEDURE — C1713 ANCHOR/SCREW BN/BN,TIS/BN: HCPCS | Performed by: THORACIC SURGERY (CARDIOTHORACIC VASCULAR SURGERY)

## 2021-05-10 PROCEDURE — 93005 ELECTROCARDIOGRAM TRACING: CPT | Performed by: THORACIC SURGERY (CARDIOTHORACIC VASCULAR SURGERY)

## 2021-05-10 PROCEDURE — 25010000002 POTASSIUM CHLORIDE PER 2 MEQ OF POTASSIUM

## 2021-05-10 PROCEDURE — 85027 COMPLETE CBC AUTOMATED: CPT | Performed by: THORACIC SURGERY (CARDIOTHORACIC VASCULAR SURGERY)

## 2021-05-10 PROCEDURE — 25010000002 PROPOFOL 10 MG/ML EMULSION: Performed by: THORACIC SURGERY (CARDIOTHORACIC VASCULAR SURGERY)

## 2021-05-10 PROCEDURE — 85730 THROMBOPLASTIN TIME PARTIAL: CPT | Performed by: THORACIC SURGERY (CARDIOTHORACIC VASCULAR SURGERY)

## 2021-05-10 PROCEDURE — 25010000002 FUROSEMIDE PER 20 MG

## 2021-05-10 PROCEDURE — 25010000002 PHENYLEPHRINE 10 MG/ML SOLUTION 5 ML VIAL: Performed by: THORACIC SURGERY (CARDIOTHORACIC VASCULAR SURGERY)

## 2021-05-10 PROCEDURE — 82805 BLOOD GASES W/O2 SATURATION: CPT

## 2021-05-10 PROCEDURE — 93318 ECHO TRANSESOPHAGEAL INTRAOP: CPT | Performed by: NURSE ANESTHETIST, CERTIFIED REGISTERED

## 2021-05-10 PROCEDURE — 25010000003 MEPERIDINE PER 100 MG: Performed by: THORACIC SURGERY (CARDIOTHORACIC VASCULAR SURGERY)

## 2021-05-10 PROCEDURE — 25010000002 MIDAZOLAM PER 1 MG: Performed by: NURSE ANESTHETIST, CERTIFIED REGISTERED

## 2021-05-10 PROCEDURE — P9047 ALBUMIN (HUMAN), 25%, 50ML: HCPCS

## 2021-05-10 PROCEDURE — 63710000001 INSULIN REGULAR HUMAN PER 5 UNITS: Performed by: NURSE ANESTHETIST, CERTIFIED REGISTERED

## 2021-05-10 PROCEDURE — 25010000002 PROTAMINE SULFATE PER 10 MG: Performed by: NURSE ANESTHETIST, CERTIFIED REGISTERED

## 2021-05-10 PROCEDURE — 25010000002 PAPAVERINE PER 60 MG: Performed by: THORACIC SURGERY (CARDIOTHORACIC VASCULAR SURGERY)

## 2021-05-10 PROCEDURE — 80069 RENAL FUNCTION PANEL: CPT | Performed by: THORACIC SURGERY (CARDIOTHORACIC VASCULAR SURGERY)

## 2021-05-10 PROCEDURE — 02100AW BYPASS CORONARY ARTERY, ONE ARTERY FROM AORTA WITH AUTOLOGOUS ARTERIAL TISSUE, OPEN APPROACH: ICD-10-PCS | Performed by: THORACIC SURGERY (CARDIOTHORACIC VASCULAR SURGERY)

## 2021-05-10 PROCEDURE — 25010000002 PHENYLEPHRINE 10 MG/ML SOLUTION

## 2021-05-10 PROCEDURE — 25010000002 ALBUMIN HUMAN 25% PER 50 ML

## 2021-05-10 PROCEDURE — 25010000003 METHYLENE BLUE 50 MG/10ML SOLUTION: Performed by: THORACIC SURGERY (CARDIOTHORACIC VASCULAR SURGERY)

## 2021-05-10 PROCEDURE — 33508 ENDOSCOPIC VEIN HARVEST: CPT | Performed by: THORACIC SURGERY (CARDIOTHORACIC VASCULAR SURGERY)

## 2021-05-10 PROCEDURE — 25810000003 DEXTROSE 5 % WITH KCL 20 MEQ 20-5 MEQ/L-% SOLUTION: Performed by: THORACIC SURGERY (CARDIOTHORACIC VASCULAR SURGERY)

## 2021-05-10 PROCEDURE — 25010000002 HEPARIN (PORCINE) PER 1000 UNITS

## 2021-05-10 PROCEDURE — 25010000002 VANCOMYCIN 10 G RECONSTITUTED SOLUTION: Performed by: THORACIC SURGERY (CARDIOTHORACIC VASCULAR SURGERY)

## 2021-05-10 PROCEDURE — 82330 ASSAY OF CALCIUM: CPT

## 2021-05-10 PROCEDURE — 25010000002 HEPARIN (PORCINE) PER 1000 UNITS: Performed by: THORACIC SURGERY (CARDIOTHORACIC VASCULAR SURGERY)

## 2021-05-10 DEVICE — CLIP LIGAT VASC HORIZON TI LG ORNG 6CT: Type: IMPLANTABLE DEVICE | Site: CHEST | Status: FUNCTIONAL

## 2021-05-10 DEVICE — KT HEMOST ABS SURGIFOAM PORCN 1GRAM: Type: IMPLANTABLE DEVICE | Site: CHEST | Status: FUNCTIONAL

## 2021-05-10 DEVICE — DISK-SHAPED STYLE, SILICONE (1 PER STERILE PKG)
Type: IMPLANTABLE DEVICE | Site: CHEST | Status: FUNCTIONAL
Brand: SCANLAN® RADIOMARK® GRAFT MARKERS

## 2021-05-10 DEVICE — WR SUT NONABS MF SS CCS 1/2CIR CUT/CONV 5/0 18IN M657G: Type: IMPLANTABLE DEVICE | Site: CHEST | Status: FUNCTIONAL

## 2021-05-10 DEVICE — WR SUT NONABS MF SS V40 1/2CIR TC 6/0 18IN M649G: Type: IMPLANTABLE DEVICE | Site: CHEST | Status: FUNCTIONAL

## 2021-05-10 DEVICE — PLEDGET INCISIONLINE REINF TFE SFT PTFE 1.5X3X7MM WHT: Type: IMPLANTABLE DEVICE | Site: CHEST | Status: FUNCTIONAL

## 2021-05-10 RX ORDER — ONDANSETRON 2 MG/ML
4 INJECTION INTRAMUSCULAR; INTRAVENOUS EVERY 6 HOURS PRN
Status: DISCONTINUED | OUTPATIENT
Start: 2021-05-10 | End: 2021-05-15 | Stop reason: HOSPADM

## 2021-05-10 RX ORDER — SUFENTANIL CITRATE 50 UG/ML
INJECTION EPIDURAL; INTRAVENOUS AS NEEDED
Status: DISCONTINUED | OUTPATIENT
Start: 2021-05-10 | End: 2021-05-10 | Stop reason: SURG

## 2021-05-10 RX ORDER — MAGNESIUM HYDROXIDE 1200 MG/15ML
LIQUID ORAL AS NEEDED
Status: DISCONTINUED | OUTPATIENT
Start: 2021-05-10 | End: 2021-05-10 | Stop reason: HOSPADM

## 2021-05-10 RX ORDER — MORPHINE SULFATE 2 MG/ML
2 INJECTION, SOLUTION INTRAMUSCULAR; INTRAVENOUS
Status: DISCONTINUED | OUTPATIENT
Start: 2021-05-10 | End: 2021-05-11

## 2021-05-10 RX ORDER — AMINOCAPROIC ACID 250 MG/ML
INJECTION, SOLUTION INTRAVENOUS AS NEEDED
Status: DISCONTINUED | OUTPATIENT
Start: 2021-05-10 | End: 2021-05-10 | Stop reason: SURG

## 2021-05-10 RX ORDER — POTASSIUM CHLORIDE 29.8 MG/ML
20 INJECTION INTRAVENOUS
Status: COMPLETED | OUTPATIENT
Start: 2021-05-10 | End: 2021-05-10

## 2021-05-10 RX ORDER — DEXTROSE MONOHYDRATE 25 G/50ML
25-50 INJECTION, SOLUTION INTRAVENOUS
Status: DISCONTINUED | OUTPATIENT
Start: 2021-05-10 | End: 2021-05-11

## 2021-05-10 RX ORDER — NEOSTIGMINE METHYLSULFATE 5 MG/5 ML
SYRINGE (ML) INTRAVENOUS AS NEEDED
Status: DISCONTINUED | OUTPATIENT
Start: 2021-05-10 | End: 2021-05-10 | Stop reason: SURG

## 2021-05-10 RX ORDER — ATORVASTATIN CALCIUM 10 MG/1
20 TABLET, FILM COATED ORAL NIGHTLY
Status: DISCONTINUED | OUTPATIENT
Start: 2021-05-11 | End: 2021-05-15 | Stop reason: HOSPADM

## 2021-05-10 RX ORDER — IPRATROPIUM BROMIDE AND ALBUTEROL SULFATE 2.5; .5 MG/3ML; MG/3ML
3 SOLUTION RESPIRATORY (INHALATION)
Status: DISCONTINUED | OUTPATIENT
Start: 2021-05-10 | End: 2021-05-12

## 2021-05-10 RX ORDER — ALBUMIN, HUMAN INJ 5% 5 %
500 SOLUTION INTRAVENOUS ONCE
Status: COMPLETED | OUTPATIENT
Start: 2021-05-10 | End: 2021-05-10

## 2021-05-10 RX ORDER — METOPROLOL TARTRATE 5 MG/5ML
INJECTION INTRAVENOUS AS NEEDED
Status: DISCONTINUED | OUTPATIENT
Start: 2021-05-10 | End: 2021-05-10 | Stop reason: SURG

## 2021-05-10 RX ORDER — ROCURONIUM BROMIDE 10 MG/ML
INJECTION, SOLUTION INTRAVENOUS AS NEEDED
Status: DISCONTINUED | OUTPATIENT
Start: 2021-05-10 | End: 2021-05-10 | Stop reason: SURG

## 2021-05-10 RX ORDER — SODIUM CHLORIDE, SODIUM LACTATE, POTASSIUM CHLORIDE, CALCIUM CHLORIDE 600; 310; 30; 20 MG/100ML; MG/100ML; MG/100ML; MG/100ML
INJECTION, SOLUTION INTRAVENOUS CONTINUOUS PRN
Status: DISCONTINUED | OUTPATIENT
Start: 2021-05-10 | End: 2021-05-10 | Stop reason: SURG

## 2021-05-10 RX ORDER — OXYCODONE HYDROCHLORIDE AND ACETAMINOPHEN 5; 325 MG/1; MG/1
1 TABLET ORAL
Status: DISCONTINUED | OUTPATIENT
Start: 2021-05-10 | End: 2021-05-15 | Stop reason: HOSPADM

## 2021-05-10 RX ORDER — ASPIRIN 81 MG/1
81 TABLET ORAL DAILY
Status: DISCONTINUED | OUTPATIENT
Start: 2021-05-12 | End: 2021-05-15 | Stop reason: HOSPADM

## 2021-05-10 RX ORDER — MEPERIDINE HYDROCHLORIDE 50 MG/ML
25 INJECTION INTRAMUSCULAR; INTRAVENOUS; SUBCUTANEOUS
Status: DISCONTINUED | OUTPATIENT
Start: 2021-05-10 | End: 2021-05-11

## 2021-05-10 RX ORDER — LIDOCAINE HYDROCHLORIDE 20 MG/ML
INJECTION, SOLUTION EPIDURAL; INFILTRATION; INTRACAUDAL; PERINEURAL AS NEEDED
Status: DISCONTINUED | OUTPATIENT
Start: 2021-05-10 | End: 2021-05-10 | Stop reason: SURG

## 2021-05-10 RX ORDER — DEXMEDETOMIDINE HYDROCHLORIDE 4 UG/ML
.2-1.5 INJECTION, SOLUTION INTRAVENOUS
Status: DISCONTINUED | OUTPATIENT
Start: 2021-05-10 | End: 2021-05-11

## 2021-05-10 RX ORDER — VECURONIUM BROMIDE 1 MG/ML
INJECTION, POWDER, LYOPHILIZED, FOR SOLUTION INTRAVENOUS AS NEEDED
Status: DISCONTINUED | OUTPATIENT
Start: 2021-05-10 | End: 2021-05-10 | Stop reason: SURG

## 2021-05-10 RX ORDER — DEXTROSE MONOHYDRATE 25 G/50ML
50 INJECTION, SOLUTION INTRAVENOUS
Status: DISCONTINUED | OUTPATIENT
Start: 2021-05-10 | End: 2021-05-10 | Stop reason: HOSPADM

## 2021-05-10 RX ORDER — CLOPIDOGREL BISULFATE 75 MG/1
75 TABLET ORAL DAILY
Status: DISCONTINUED | OUTPATIENT
Start: 2021-05-11 | End: 2021-05-15 | Stop reason: HOSPADM

## 2021-05-10 RX ORDER — MIDAZOLAM HYDROCHLORIDE 1 MG/ML
INJECTION INTRAMUSCULAR; INTRAVENOUS AS NEEDED
Status: DISCONTINUED | OUTPATIENT
Start: 2021-05-10 | End: 2021-05-10 | Stop reason: SURG

## 2021-05-10 RX ORDER — VANCOMYCIN HYDROCHLORIDE 1 G/20ML
INJECTION, POWDER, LYOPHILIZED, FOR SOLUTION INTRAVENOUS AS NEEDED
Status: DISCONTINUED | OUTPATIENT
Start: 2021-05-10 | End: 2021-05-10 | Stop reason: SURG

## 2021-05-10 RX ORDER — POTASSIUM CHLORIDE, DEXTROSE MONOHYDRATE 150; 5 MG/100ML; G/100ML
30 INJECTION, SOLUTION INTRAVENOUS CONTINUOUS
Status: DISCONTINUED | OUTPATIENT
Start: 2021-05-10 | End: 2021-05-11

## 2021-05-10 RX ORDER — AMIODARONE HYDROCHLORIDE 200 MG/1
400 TABLET ORAL EVERY 6 HOURS
Status: DISCONTINUED | OUTPATIENT
Start: 2021-05-11 | End: 2021-05-12

## 2021-05-10 RX ORDER — NITROGLYCERIN 20 MG/100ML
5 INJECTION INTRAVENOUS CONTINUOUS
Status: DISCONTINUED | OUTPATIENT
Start: 2021-05-10 | End: 2021-05-11

## 2021-05-10 RX ORDER — SODIUM CHLORIDE 9 MG/ML
INJECTION, SOLUTION INTRAVENOUS AS NEEDED
Status: DISCONTINUED | OUTPATIENT
Start: 2021-05-10 | End: 2021-05-10 | Stop reason: HOSPADM

## 2021-05-10 RX ORDER — CHLORHEXIDINE GLUCONATE 0.12 MG/ML
15 RINSE ORAL EVERY 12 HOURS SCHEDULED
Status: DISCONTINUED | OUTPATIENT
Start: 2021-05-10 | End: 2021-05-10 | Stop reason: SDUPTHER

## 2021-05-10 RX ORDER — PROTAMINE SULFATE 10 MG/ML
INJECTION, SOLUTION INTRAVENOUS AS NEEDED
Status: DISCONTINUED | OUTPATIENT
Start: 2021-05-10 | End: 2021-05-10 | Stop reason: SURG

## 2021-05-10 RX ORDER — CEFAZOLIN SODIUM 1 G/3ML
INJECTION, POWDER, FOR SOLUTION INTRAMUSCULAR; INTRAVENOUS AS NEEDED
Status: DISCONTINUED | OUTPATIENT
Start: 2021-05-10 | End: 2021-05-10 | Stop reason: SURG

## 2021-05-10 RX ORDER — CHLORHEXIDINE GLUCONATE 0.12 MG/ML
15 RINSE ORAL EVERY 12 HOURS
Status: DISCONTINUED | OUTPATIENT
Start: 2021-05-10 | End: 2021-05-12

## 2021-05-10 RX ORDER — ALBUTEROL SULFATE 2.5 MG/3ML
2.5 SOLUTION RESPIRATORY (INHALATION) EVERY 4 HOURS PRN
Status: ACTIVE | OUTPATIENT
Start: 2021-05-10 | End: 2021-05-11

## 2021-05-10 RX ORDER — HEPARIN SODIUM 1000 [USP'U]/ML
INJECTION, SOLUTION INTRAVENOUS; SUBCUTANEOUS AS NEEDED
Status: DISCONTINUED | OUTPATIENT
Start: 2021-05-10 | End: 2021-05-10 | Stop reason: SURG

## 2021-05-10 RX ORDER — NITROGLYCERIN 20 MG/100ML
INJECTION INTRAVENOUS CONTINUOUS PRN
Status: DISCONTINUED | OUTPATIENT
Start: 2021-05-10 | End: 2021-05-10 | Stop reason: SURG

## 2021-05-10 RX ORDER — OXYCODONE AND ACETAMINOPHEN 10; 325 MG/1; MG/1
1 TABLET ORAL
Status: DISCONTINUED | OUTPATIENT
Start: 2021-05-10 | End: 2021-05-14

## 2021-05-10 RX ORDER — SODIUM CHLORIDE 9 MG/ML
INJECTION, SOLUTION INTRAVENOUS CONTINUOUS PRN
Status: DISCONTINUED | OUTPATIENT
Start: 2021-05-10 | End: 2021-05-10 | Stop reason: SURG

## 2021-05-10 RX ORDER — POTASSIUM CHLORIDE 29.8 MG/ML
20 INJECTION INTRAVENOUS
Status: DISCONTINUED | OUTPATIENT
Start: 2021-05-10 | End: 2021-05-11

## 2021-05-10 RX ORDER — ASPIRIN 81 MG/1
162 TABLET, CHEWABLE ORAL ONCE
Status: COMPLETED | OUTPATIENT
Start: 2021-05-11 | End: 2021-05-11

## 2021-05-10 RX ORDER — ETOMIDATE 2 MG/ML
INJECTION INTRAVENOUS AS NEEDED
Status: DISCONTINUED | OUTPATIENT
Start: 2021-05-10 | End: 2021-05-10 | Stop reason: SURG

## 2021-05-10 RX ADMIN — ALBUMIN HUMAN 500 ML: 0.05 INJECTION, SOLUTION INTRAVENOUS at 16:31

## 2021-05-10 RX ADMIN — SUFENTANIL CITRATE 50 MCG: 50 INJECTION EPIDURAL; INTRAVENOUS at 11:47

## 2021-05-10 RX ADMIN — POTASSIUM CHLORIDE AND DEXTROSE MONOHYDRATE 30 ML/HR: 150; 5 INJECTION, SOLUTION INTRAVENOUS at 13:14

## 2021-05-10 RX ADMIN — ETOMIDATE 10 MG: 2 INJECTION, SOLUTION INTRAVENOUS at 07:25

## 2021-05-10 RX ADMIN — MORPHINE SULFATE 2 MG: 2 INJECTION, SOLUTION INTRAMUSCULAR; INTRAVENOUS at 13:14

## 2021-05-10 RX ADMIN — SODIUM CHLORIDE, POTASSIUM CHLORIDE, SODIUM LACTATE AND CALCIUM CHLORIDE: 600; 310; 30; 20 INJECTION, SOLUTION INTRAVENOUS at 07:55

## 2021-05-10 RX ADMIN — DEXTROSE MONOHYDRATE 25 ML: 25 INJECTION, SOLUTION INTRAVENOUS at 11:13

## 2021-05-10 RX ADMIN — SUFENTANIL CITRATE 100 MCG: 50 INJECTION EPIDURAL; INTRAVENOUS at 07:28

## 2021-05-10 RX ADMIN — AMINOCAPROIC ACID 1 G/HR: 250 INJECTION, SOLUTION INTRAVENOUS at 08:08

## 2021-05-10 RX ADMIN — PROPOFOL 50 MCG/KG/MIN: 10 INJECTION, EMULSION INTRAVENOUS at 13:15

## 2021-05-10 RX ADMIN — Medication 1 APPLICATION: at 18:06

## 2021-05-10 RX ADMIN — POTASSIUM CHLORIDE AND DEXTROSE MONOHYDRATE 30 ML/HR: 150; 5 INJECTION, SOLUTION INTRAVENOUS at 13:15

## 2021-05-10 RX ADMIN — IPRATROPIUM BROMIDE AND ALBUTEROL SULFATE 1.5 ML: 2.5; .5 SOLUTION RESPIRATORY (INHALATION) at 03:33

## 2021-05-10 RX ADMIN — VASOPRESSIN 0.5 ML: 20 INJECTION INTRAVENOUS at 08:57

## 2021-05-10 RX ADMIN — AMIODARONE HYDROCHLORIDE 1 MG/MIN: 1.8 INJECTION, SOLUTION INTRAVENOUS at 14:56

## 2021-05-10 RX ADMIN — POTASSIUM CHLORIDE 20 MEQ: 29.8 INJECTION, SOLUTION INTRAVENOUS at 14:53

## 2021-05-10 RX ADMIN — AMINOCAPROIC ACID 5 G: 250 INJECTION, SOLUTION INTRAVENOUS at 08:00

## 2021-05-10 RX ADMIN — HEPARIN SODIUM 3000 UNITS: 1000 INJECTION, SOLUTION INTRAVENOUS; SUBCUTANEOUS at 08:17

## 2021-05-10 RX ADMIN — GLYCOPYRROLATE 0.4 MCG: 0.2 INJECTION, SOLUTION INTRAMUSCULAR; INTRAVENOUS at 12:28

## 2021-05-10 RX ADMIN — SUFENTANIL CITRATE 50 MCG: 50 INJECTION EPIDURAL; INTRAVENOUS at 08:31

## 2021-05-10 RX ADMIN — LIDOCAINE HYDROCHLORIDE 50 MG: 20 INJECTION, SOLUTION EPIDURAL; INFILTRATION; INTRACAUDAL; PERINEURAL at 07:25

## 2021-05-10 RX ADMIN — SUFENTANIL CITRATE 100 MCG: 50 INJECTION EPIDURAL; INTRAVENOUS at 07:47

## 2021-05-10 RX ADMIN — INSULIN HUMAN 20 UNITS: 100 INJECTION, SOLUTION PARENTERAL at 11:13

## 2021-05-10 RX ADMIN — Medication 3 MG: at 12:28

## 2021-05-10 RX ADMIN — METOPROLOL TARTRATE 1 MG: 5 INJECTION INTRAVENOUS at 07:48

## 2021-05-10 RX ADMIN — VANCOMYCIN HYDROCHLORIDE 1250 MG: 1 INJECTION, POWDER, LYOPHILIZED, FOR SOLUTION INTRAVENOUS at 07:42

## 2021-05-10 RX ADMIN — VECURONIUM BROMIDE 5 MG: 1 INJECTION, POWDER, LYOPHILIZED, FOR SOLUTION INTRAVENOUS at 08:24

## 2021-05-10 RX ADMIN — CEFAZOLIN 2 G: 330 INJECTION, POWDER, FOR SOLUTION INTRAMUSCULAR; INTRAVENOUS at 08:05

## 2021-05-10 RX ADMIN — MEPERIDINE HYDROCHLORIDE 25 MG: 50 INJECTION INTRAMUSCULAR; INTRAVENOUS; SUBCUTANEOUS at 14:51

## 2021-05-10 RX ADMIN — SODIUM CHLORIDE, POTASSIUM CHLORIDE, SODIUM LACTATE AND CALCIUM CHLORIDE: 600; 310; 30; 20 INJECTION, SOLUTION INTRAVENOUS at 07:50

## 2021-05-10 RX ADMIN — IPRATROPIUM BROMIDE AND ALBUTEROL SULFATE 3 ML: 2.5; .5 SOLUTION RESPIRATORY (INHALATION) at 14:44

## 2021-05-10 RX ADMIN — AMIODARONE HYDROCHLORIDE 1 MG/MIN: 1.8 INJECTION, SOLUTION INTRAVENOUS at 20:27

## 2021-05-10 RX ADMIN — MIDAZOLAM 5 MG: 1 INJECTION INTRAMUSCULAR; INTRAVENOUS at 07:19

## 2021-05-10 RX ADMIN — PROTAMINE SULFATE 150 MG: 10 INJECTION, SOLUTION INTRAVENOUS at 11:29

## 2021-05-10 RX ADMIN — CHLORHEXIDINE GLUCONATE 0.12% ORAL RINSE 15 ML: 1.2 LIQUID ORAL at 18:06

## 2021-05-10 RX ADMIN — SUFENTANIL CITRATE 50 MCG: 50 INJECTION EPIDURAL; INTRAVENOUS at 07:25

## 2021-05-10 RX ADMIN — MORPHINE SULFATE 2 MG: 2 INJECTION, SOLUTION INTRAMUSCULAR; INTRAVENOUS at 17:11

## 2021-05-10 RX ADMIN — MORPHINE SULFATE 2 MG: 2 INJECTION, SOLUTION INTRAMUSCULAR; INTRAVENOUS at 19:02

## 2021-05-10 RX ADMIN — IPRATROPIUM BROMIDE AND ALBUTEROL SULFATE 3 ML: 2.5; .5 SOLUTION RESPIRATORY (INHALATION) at 18:47

## 2021-05-10 RX ADMIN — SODIUM CHLORIDE, POTASSIUM CHLORIDE, SODIUM LACTATE AND CALCIUM CHLORIDE: 600; 310; 30; 20 INJECTION, SOLUTION INTRAVENOUS at 07:15

## 2021-05-10 RX ADMIN — OXYCODONE HYDROCHLORIDE AND ACETAMINOPHEN 1 TABLET: 10; 325 TABLET ORAL at 23:52

## 2021-05-10 RX ADMIN — PHENYLEPHRINE HYDROCHLORIDE 0.5 MCG/KG/MIN: 10 INJECTION INTRAVENOUS at 13:45

## 2021-05-10 RX ADMIN — VANCOMYCIN HYDROCHLORIDE 1250 MG: 10 INJECTION, POWDER, LYOPHILIZED, FOR SOLUTION INTRAVENOUS at 20:10

## 2021-05-10 RX ADMIN — VECURONIUM BROMIDE 5 MG: 1 INJECTION, POWDER, LYOPHILIZED, FOR SOLUTION INTRAVENOUS at 10:41

## 2021-05-10 RX ADMIN — ROCURONIUM BROMIDE 50 MG: 10 INJECTION INTRAVENOUS at 07:25

## 2021-05-10 RX ADMIN — CEFAZOLIN 2 G: 330 INJECTION, POWDER, FOR SOLUTION INTRAMUSCULAR; INTRAVENOUS at 12:05

## 2021-05-10 RX ADMIN — POTASSIUM CHLORIDE 20 MEQ: 29.8 INJECTION, SOLUTION INTRAVENOUS at 13:30

## 2021-05-10 RX ADMIN — SODIUM CHLORIDE, POTASSIUM CHLORIDE, SODIUM LACTATE AND CALCIUM CHLORIDE: 600; 310; 30; 20 INJECTION, SOLUTION INTRAVENOUS at 11:45

## 2021-05-10 RX ADMIN — SUFENTANIL CITRATE 100 MCG: 50 INJECTION EPIDURAL; INTRAVENOUS at 08:15

## 2021-05-10 RX ADMIN — SUFENTANIL CITRATE 100 MCG: 50 INJECTION EPIDURAL; INTRAVENOUS at 08:19

## 2021-05-10 RX ADMIN — DEXMEDETOMIDINE 0.5 MCG/KG/HR: 100 INJECTION, SOLUTION, CONCENTRATE INTRAVENOUS at 12:14

## 2021-05-10 RX ADMIN — MIDAZOLAM 5 MG: 1 INJECTION INTRAMUSCULAR; INTRAVENOUS at 10:41

## 2021-05-10 RX ADMIN — NITROGLYCERIN 10 MCG/MIN: 20 INJECTION INTRAVENOUS at 08:27

## 2021-05-10 RX ADMIN — OXYCODONE HYDROCHLORIDE AND ACETAMINOPHEN 1 TABLET: 10; 325 TABLET ORAL at 20:15

## 2021-05-10 RX ADMIN — SODIUM CHLORIDE: 0.9 INJECTION, SOLUTION INTRAVENOUS at 08:01

## 2021-05-10 RX ADMIN — SUFENTANIL CITRATE 100 MCG: 50 INJECTION EPIDURAL; INTRAVENOUS at 09:31

## 2021-05-10 RX ADMIN — HEPARIN SODIUM 36000 UNITS: 1000 INJECTION, SOLUTION INTRAVENOUS; SUBCUTANEOUS at 08:38

## 2021-05-10 RX ADMIN — SUFENTANIL CITRATE 100 MCG: 50 INJECTION EPIDURAL; INTRAVENOUS at 10:41

## 2021-05-10 NOTE — ANESTHESIA PROCEDURE NOTES
Peripheral IV    Patient location during procedure: OR  Line placed for Difficult Access, Fluids/Medication Admin and Sedation.  Performed By   CRNA: Kel Waldrop CRNA  Preanesthetic Checklist  Completed: patient identified, IV checked, site marked, risks and benefits discussed, surgical consent, monitors and equipment checked, pre-op evaluation and timeout performed  Peripheral IV Prep   Patient position: supine   Prep: ChloraPrep  Patient monitoring: cardiac monitor, heart rate and continuous pulse ox  Peripheral IV Procedure   Laterality:right  Location:  Hand  Catheter size: 18 G         Post Assessment   Dressing Type: tape and transparent.    IV Dressing/Site: clean, dry and intact

## 2021-05-10 NOTE — ANESTHESIA PROCEDURE NOTES
Arterial Line      Patient reassessed immediately prior to procedure    Patient location during procedure: OR   Line placed for respiratory failure, hemodynamic monitoring, ABGs/Labs/ISTAT and MD/Surgeon request.  Performed By   CRNA: Kel Waldrop CRNA  Preanesthetic Checklist  Completed: patient identified, IV checked, site marked, risks and benefits discussed, surgical consent, monitors and equipment checked, pre-op evaluation and timeout performed  Arterial Line Prep   Sterile Tech: gloves  Prep: alcohol swabs  Arterial Line Procedure   Laterality:left  Location:  radial artery  Catheter size: 20 G   Guidance: ultrasound guided  PROCEDURE NOTE/ULTRASOUND INTERPRETATION.  Using ultrasound guidance the potential vascular sites for insertion of the catheter were visualized to determine the patency of the vessel to be used for vascular access.  After selecting the appropriate site for insertion, the needle was visualized under ultrasound being inserted into the radial artery, followed by ultrasound confirmation of wire and catheter placement. There were no abnormalities seen on ultrasound; an image was taken; and the patient tolerated the procedure with no complications.   Number of attempts: 1  Successful placement: yes  Post Assessment   Dressing Type: secured with tape, wrist guard applied and occlusive dressing applied.   Complications no  Circ/Move/Sens Assessment: normal.   Patient Tolerance: patient tolerated the procedure well with no apparent complications

## 2021-05-10 NOTE — ANESTHESIA POSTPROCEDURE EVALUATION
Patient: Tree Shaffer    Procedure Summary     Date: 05/10/21 Room / Location: [facility] OR  /  [facility] OR    Anesthesia Start: 0707 Anesthesia Stop: 1242    Procedure: CORONARY ARTERY BYPASS GRAFTING x 4, lima, endoscopic vein harvest right leg, debra (N/A Chest) Diagnosis:       Coronary artery disease involving native coronary artery of native heart with unstable angina pectoris (CMS/HCC)      (Coronary artery disease involving native coronary artery of native heart with unstable angina pectoris (CMS/HCC) [I25.110])    Surgeons: Alex Menendez MD Provider: Kel Waldrop CRNA    Anesthesia Type: general ASA Status: 4          Anesthesia Type: general    Vitals  Vitals Value Taken Time   BP     Temp     Pulse 76 05/10/21 1242   Resp     SpO2 100 % 05/10/21 1242   Vitals shown include unvalidated device data.        Post Anesthesia Care and Evaluation    Patient location during evaluation: ICU  Pain management: adequate  Airway patency: patent  Anesthetic complications: No anesthetic complications  PONV Status: none  Cardiovascular status: acceptable  Respiratory status: ETT, intubated and ventilator  Hydration status: acceptable

## 2021-05-10 NOTE — ANESTHESIA PROCEDURE NOTES
Preanesthesia Checklist:  Patient identified, IV assessed, risks and benefits discussed, monitors and equipment assessed, procedure being performed at surgeon's request and anesthesia consent obtained.    General Procedure Information  TABBY Placed for monitoring purposes only -- This is not a diagnostic TABBY          Anesthesia Information  Performed Personally  Anesthesiologist:  Elia Acuna MD

## 2021-05-10 NOTE — ANESTHESIA PREPROCEDURE EVALUATION
Anesthesia Evaluation     NPO Solid Status: > 8 hours  NPO Liquid Status: > 8 hours           Airway   Mallampati: II  No difficulty expected  Dental    (+) poor dentition    Pulmonary    (+) a smoker, COPD,   Cardiovascular     (+) hypertension, CAD, angina, hyperlipidemia,   (-) CHF      Neuro/Psych  GI/Hepatic/Renal/Endo      Musculoskeletal     Abdominal    Substance History      OB/GYN          Other                      Anesthesia Plan    ASA 4     general

## 2021-05-10 NOTE — ANESTHESIA PROCEDURE NOTES
Central Line    Pre-sedation assessment completed: 5/10/2021 7:35 AM    Patient reassessed immediately prior to procedure    Patient location during procedure: OR  Start time: 5/10/2021 7:35 AM  Stop Time:5/10/2021 7:35 AM  Indications: central pressure monitoring and vascular access  Staff  Anesthesiologist: Elia Acuna MD  Preanesthetic Checklist  Completed: patient identified, IV checked, site marked, risks and benefits discussed, surgical consent, monitors and equipment checked, pre-op evaluation and timeout performed  Central Line Prep  Sterile Tech:cap, gloves, gown, mask and sterile barriers  Prep: chloraprep  Patient monitoring: blood pressure monitoring, continuous pulse oximetry and EKG  Central Line Procedure  Laterality:right  Location:internal jugular  Catheter Type:Montgomery-Coy and MAC  Catheter Size:9 Fr  Guidance:ultrasound guided  PROCEDURE NOTE/ULTRASOUND INTERPRETATION.  Using ultrasound guidance the potential vascular sites for insertion of the catheter were visualized to determine the patency of the vessel to be used for vascular access.  After selecting the appropriate site for insertion, the needle was visualized under ultrasound being inserted into the internal jugular vein, followed by ultrasound confirmation of wire and catheter placement. There were no abnormalities seen on ultrasound; an image was taken; and the patient tolerated the procedure with no complications. Images: still images not obtained  Assessment  Post procedure:biopatch applied, line sutured and occlusive dressing applied  Assessement:chest x-ray ordered, free fluid flow and blood return through all ports  Complications:no  Patient Tolerance:patient tolerated the procedure well with no apparent complications  Additional Notes  PCW at 53

## 2021-05-10 NOTE — ANESTHESIA PROCEDURE NOTES
Airway  Urgency: elective    Date/Time: 5/10/2021 7:26 AM  Airway not difficult    General Information and Staff    Patient location during procedure: OR  CRNA: Kel Waldrop CRNA    Indications and Patient Condition  Indications for airway management: airway protection    Preoxygenated: yes  MILS maintained throughout  Mask difficulty assessment: 1 - vent by mask    Final Airway Details  Final airway type: endotracheal airway      Successful airway: ETT  Cuffed: yes   Successful intubation technique: direct laryngoscopy  Endotracheal tube insertion site: oral  Blade: Bedoya  Blade size: 2  ETT size (mm): 7.5  Cormack-Lehane Classification: grade I - full view of glottis  Placement verified by: chest auscultation and capnometry   Cuff volume (mL): 8  Measured from: lips  ETT/EBT  to lips (cm): 22  Number of attempts at approach: 1

## 2021-05-11 ENCOUNTER — APPOINTMENT (OUTPATIENT)
Dept: GENERAL RADIOLOGY | Facility: HOSPITAL | Age: 61
End: 2021-05-11

## 2021-05-11 LAB
ALBUMIN SERPL-MCNC: 3.5 G/DL (ref 3.5–5.2)
ANION GAP SERPL CALCULATED.3IONS-SCNC: 6 MMOL/L (ref 5–15)
BASOPHILS # BLD AUTO: 0.03 10*3/MM3 (ref 0–0.2)
BASOPHILS NFR BLD AUTO: 0.2 % (ref 0–1.5)
BUN SERPL-MCNC: 13 MG/DL (ref 6–20)
BUN/CREAT SERPL: 17.3 (ref 7–25)
CALCIUM SPEC-SCNC: 8 MG/DL (ref 8.6–10.5)
CHLORIDE SERPL-SCNC: 109 MMOL/L (ref 98–107)
CO2 SERPL-SCNC: 25 MMOL/L (ref 22–29)
CREAT SERPL-MCNC: 0.75 MG/DL (ref 0.76–1.27)
DEPRECATED RDW RBC AUTO: 43.1 FL (ref 37–54)
EOSINOPHIL # BLD AUTO: 0.13 10*3/MM3 (ref 0–0.4)
EOSINOPHIL NFR BLD AUTO: 0.8 % (ref 0.3–6.2)
ERYTHROCYTE [DISTWIDTH] IN BLOOD BY AUTOMATED COUNT: 12.7 % (ref 12.3–15.4)
GFR SERPL CREATININE-BSD FRML MDRD: 107 ML/MIN/1.73
GLUCOSE BLDC GLUCOMTR-MCNC: 107 MG/DL (ref 70–130)
GLUCOSE BLDC GLUCOMTR-MCNC: 129 MG/DL (ref 70–130)
GLUCOSE BLDC GLUCOMTR-MCNC: 131 MG/DL (ref 70–130)
GLUCOSE BLDC GLUCOMTR-MCNC: 132 MG/DL (ref 70–130)
GLUCOSE BLDC GLUCOMTR-MCNC: 136 MG/DL (ref 70–130)
GLUCOSE BLDC GLUCOMTR-MCNC: 149 MG/DL (ref 70–130)
GLUCOSE SERPL-MCNC: 170 MG/DL (ref 65–99)
HCT VFR BLD AUTO: 36.1 % (ref 37.5–51)
HGB BLD-MCNC: 11.9 G/DL (ref 13–17.7)
IMM GRANULOCYTES # BLD AUTO: 0.06 10*3/MM3 (ref 0–0.05)
IMM GRANULOCYTES NFR BLD AUTO: 0.4 % (ref 0–0.5)
INR PPP: 1.34 (ref 0.91–1.09)
LYMPHOCYTES # BLD AUTO: 1.78 10*3/MM3 (ref 0.7–3.1)
LYMPHOCYTES NFR BLD AUTO: 11.4 % (ref 19.6–45.3)
MAGNESIUM SERPL-MCNC: 1.9 MG/DL (ref 1.6–2.6)
MCH RBC QN AUTO: 30.4 PG (ref 26.6–33)
MCHC RBC AUTO-ENTMCNC: 33 G/DL (ref 31.5–35.7)
MCV RBC AUTO: 92.3 FL (ref 79–97)
MONOCYTES # BLD AUTO: 1.5 10*3/MM3 (ref 0.1–0.9)
MONOCYTES NFR BLD AUTO: 9.6 % (ref 5–12)
NEUTROPHILS NFR BLD AUTO: 12.16 10*3/MM3 (ref 1.7–7)
NEUTROPHILS NFR BLD AUTO: 77.6 % (ref 42.7–76)
NRBC BLD AUTO-RTO: 0 /100 WBC (ref 0–0.2)
PHOSPHATE SERPL-MCNC: 4.1 MG/DL (ref 2.5–4.5)
PLATELET # BLD AUTO: 159 10*3/MM3 (ref 140–450)
PMV BLD AUTO: 12.1 FL (ref 6–12)
POTASSIUM SERPL-SCNC: 4.6 MMOL/L (ref 3.5–5.2)
PROTHROMBIN TIME: 15.6 SECONDS (ref 11.5–13.4)
QT INTERVAL: 434 MS
QT INTERVAL: 464 MS
QTC INTERVAL: 459 MS
QTC INTERVAL: 478 MS
RBC # BLD AUTO: 3.91 10*6/MM3 (ref 4.14–5.8)
SODIUM SERPL-SCNC: 140 MMOL/L (ref 136–145)
WBC # BLD AUTO: 15.66 10*3/MM3 (ref 3.4–10.8)

## 2021-05-11 PROCEDURE — 94799 UNLISTED PULMONARY SVC/PX: CPT

## 2021-05-11 PROCEDURE — 83735 ASSAY OF MAGNESIUM: CPT | Performed by: THORACIC SURGERY (CARDIOTHORACIC VASCULAR SURGERY)

## 2021-05-11 PROCEDURE — 82962 GLUCOSE BLOOD TEST: CPT

## 2021-05-11 PROCEDURE — 85610 PROTHROMBIN TIME: CPT | Performed by: THORACIC SURGERY (CARDIOTHORACIC VASCULAR SURGERY)

## 2021-05-11 PROCEDURE — 97162 PT EVAL MOD COMPLEX 30 MIN: CPT

## 2021-05-11 PROCEDURE — 25010000002 VANCOMYCIN 10 G RECONSTITUTED SOLUTION: Performed by: NURSE PRACTITIONER

## 2021-05-11 PROCEDURE — 93010 ELECTROCARDIOGRAM REPORT: CPT | Performed by: EMERGENCY MEDICINE

## 2021-05-11 PROCEDURE — 25010000002 ONDANSETRON PER 1 MG: Performed by: THORACIC SURGERY (CARDIOTHORACIC VASCULAR SURGERY)

## 2021-05-11 PROCEDURE — 25010000002 ENOXAPARIN PER 10 MG: Performed by: NURSE PRACTITIONER

## 2021-05-11 PROCEDURE — 99024 POSTOP FOLLOW-UP VISIT: CPT | Performed by: NURSE PRACTITIONER

## 2021-05-11 PROCEDURE — 25010000002 VANCOMYCIN 10 G RECONSTITUTED SOLUTION: Performed by: THORACIC SURGERY (CARDIOTHORACIC VASCULAR SURGERY)

## 2021-05-11 PROCEDURE — 80069 RENAL FUNCTION PANEL: CPT | Performed by: THORACIC SURGERY (CARDIOTHORACIC VASCULAR SURGERY)

## 2021-05-11 PROCEDURE — 97116 GAIT TRAINING THERAPY: CPT

## 2021-05-11 PROCEDURE — 25010000002 ENOXAPARIN PER 10 MG: Performed by: THORACIC SURGERY (CARDIOTHORACIC VASCULAR SURGERY)

## 2021-05-11 PROCEDURE — 85025 COMPLETE CBC W/AUTO DIFF WBC: CPT | Performed by: THORACIC SURGERY (CARDIOTHORACIC VASCULAR SURGERY)

## 2021-05-11 PROCEDURE — 71045 X-RAY EXAM CHEST 1 VIEW: CPT

## 2021-05-11 PROCEDURE — 25010000002 ONDANSETRON PER 1 MG: Performed by: NURSE PRACTITIONER

## 2021-05-11 PROCEDURE — 93005 ELECTROCARDIOGRAM TRACING: CPT | Performed by: THORACIC SURGERY (CARDIOTHORACIC VASCULAR SURGERY)

## 2021-05-11 RX ORDER — BISACODYL 5 MG/1
10 TABLET, DELAYED RELEASE ORAL 2 TIMES DAILY
Status: DISCONTINUED | OUTPATIENT
Start: 2021-05-11 | End: 2021-05-15 | Stop reason: HOSPADM

## 2021-05-11 RX ORDER — POLYETHYLENE GLYCOL 3350 17 G/17G
17 POWDER, FOR SOLUTION ORAL DAILY
Status: DISCONTINUED | OUTPATIENT
Start: 2021-05-11 | End: 2021-05-15 | Stop reason: HOSPADM

## 2021-05-11 RX ADMIN — IPRATROPIUM BROMIDE AND ALBUTEROL SULFATE 3 ML: 2.5; .5 SOLUTION RESPIRATORY (INHALATION) at 10:23

## 2021-05-11 RX ADMIN — POLYETHYLENE GLYCOL (3350) 17 G: 17 POWDER, FOR SOLUTION ORAL at 09:27

## 2021-05-11 RX ADMIN — ONDANSETRON HYDROCHLORIDE 4 MG: 2 SOLUTION INTRAMUSCULAR; INTRAVENOUS at 03:15

## 2021-05-11 RX ADMIN — OXYCODONE HYDROCHLORIDE AND ACETAMINOPHEN 1 TABLET: 10; 325 TABLET ORAL at 15:40

## 2021-05-11 RX ADMIN — AMIODARONE HYDROCHLORIDE 400 MG: 200 TABLET ORAL at 06:44

## 2021-05-11 RX ADMIN — OXYCODONE HYDROCHLORIDE AND ACETAMINOPHEN 1 TABLET: 10; 325 TABLET ORAL at 12:06

## 2021-05-11 RX ADMIN — AMIODARONE HYDROCHLORIDE 400 MG: 200 TABLET ORAL at 23:18

## 2021-05-11 RX ADMIN — Medication 1 APPLICATION: at 18:34

## 2021-05-11 RX ADMIN — VANCOMYCIN HYDROCHLORIDE 1250 MG: 10 INJECTION, POWDER, LYOPHILIZED, FOR SOLUTION INTRAVENOUS at 20:25

## 2021-05-11 RX ADMIN — IPRATROPIUM BROMIDE AND ALBUTEROL SULFATE 3 ML: 2.5; .5 SOLUTION RESPIRATORY (INHALATION) at 19:37

## 2021-05-11 RX ADMIN — ATORVASTATIN CALCIUM 20 MG: 10 TABLET, FILM COATED ORAL at 20:30

## 2021-05-11 RX ADMIN — Medication 1 APPLICATION: at 06:43

## 2021-05-11 RX ADMIN — ONDANSETRON HYDROCHLORIDE 4 MG: 2 SOLUTION INTRAMUSCULAR; INTRAVENOUS at 16:41

## 2021-05-11 RX ADMIN — IPRATROPIUM BROMIDE AND ALBUTEROL SULFATE 3 ML: 2.5; .5 SOLUTION RESPIRATORY (INHALATION) at 06:50

## 2021-05-11 RX ADMIN — AMIODARONE HYDROCHLORIDE 400 MG: 200 TABLET ORAL at 18:34

## 2021-05-11 RX ADMIN — OXYCODONE HYDROCHLORIDE AND ACETAMINOPHEN 1 TABLET: 10; 325 TABLET ORAL at 23:11

## 2021-05-11 RX ADMIN — VANCOMYCIN HYDROCHLORIDE 1250 MG: 10 INJECTION, POWDER, LYOPHILIZED, FOR SOLUTION INTRAVENOUS at 08:38

## 2021-05-11 RX ADMIN — BISACODYL 10 MG: 5 TABLET ORAL at 09:27

## 2021-05-11 RX ADMIN — AMIODARONE HYDROCHLORIDE 400 MG: 200 TABLET ORAL at 12:06

## 2021-05-11 RX ADMIN — METOPROLOL TARTRATE 12.5 MG: 25 TABLET, FILM COATED ORAL at 08:38

## 2021-05-11 RX ADMIN — ENOXAPARIN SODIUM 30 MG: 30 INJECTION SUBCUTANEOUS at 20:30

## 2021-05-11 RX ADMIN — METOPROLOL TARTRATE 12.5 MG: 25 TABLET, FILM COATED ORAL at 20:30

## 2021-05-11 RX ADMIN — OXYCODONE HYDROCHLORIDE AND ACETAMINOPHEN 1 TABLET: 10; 325 TABLET ORAL at 04:17

## 2021-05-11 RX ADMIN — CHLORHEXIDINE GLUCONATE 0.12% ORAL RINSE 15 ML: 1.2 LIQUID ORAL at 18:34

## 2021-05-11 RX ADMIN — OXYCODONE HYDROCHLORIDE AND ACETAMINOPHEN 1 TABLET: 10; 325 TABLET ORAL at 19:47

## 2021-05-11 RX ADMIN — CHLORHEXIDINE GLUCONATE 0.12% ORAL RINSE 15 ML: 1.2 LIQUID ORAL at 06:44

## 2021-05-11 RX ADMIN — OXYCODONE HYDROCHLORIDE AND ACETAMINOPHEN 1 TABLET: 10; 325 TABLET ORAL at 09:18

## 2021-05-11 RX ADMIN — BISACODYL 10 MG: 5 TABLET ORAL at 20:30

## 2021-05-11 RX ADMIN — IPRATROPIUM BROMIDE AND ALBUTEROL SULFATE 3 ML: 2.5; .5 SOLUTION RESPIRATORY (INHALATION) at 14:13

## 2021-05-11 RX ADMIN — ASPIRIN 162 MG: 81 TABLET, CHEWABLE ORAL at 08:38

## 2021-05-11 RX ADMIN — CLOPIDOGREL 75 MG: 75 TABLET, FILM COATED ORAL at 18:33

## 2021-05-11 RX ADMIN — ENOXAPARIN SODIUM 30 MG: 30 INJECTION SUBCUTANEOUS at 08:38

## 2021-05-12 ENCOUNTER — APPOINTMENT (OUTPATIENT)
Dept: GENERAL RADIOLOGY | Facility: HOSPITAL | Age: 61
End: 2021-05-12

## 2021-05-12 LAB
ANION GAP SERPL CALCULATED.3IONS-SCNC: 7 MMOL/L (ref 5–15)
BUN SERPL-MCNC: 9 MG/DL (ref 6–20)
BUN/CREAT SERPL: 13.4 (ref 7–25)
CALCIUM SPEC-SCNC: 8.6 MG/DL (ref 8.6–10.5)
CHLORIDE SERPL-SCNC: 102 MMOL/L (ref 98–107)
CO2 SERPL-SCNC: 29 MMOL/L (ref 22–29)
CREAT SERPL-MCNC: 0.67 MG/DL (ref 0.76–1.27)
DEPRECATED RDW RBC AUTO: 42.1 FL (ref 37–54)
ERYTHROCYTE [DISTWIDTH] IN BLOOD BY AUTOMATED COUNT: 12.4 % (ref 12.3–15.4)
GFR SERPL CREATININE-BSD FRML MDRD: 121 ML/MIN/1.73
GLUCOSE BLDC GLUCOMTR-MCNC: 120 MG/DL (ref 70–130)
GLUCOSE BLDC GLUCOMTR-MCNC: 132 MG/DL (ref 70–130)
GLUCOSE BLDC GLUCOMTR-MCNC: 139 MG/DL (ref 70–130)
GLUCOSE BLDC GLUCOMTR-MCNC: 145 MG/DL (ref 70–130)
GLUCOSE SERPL-MCNC: 124 MG/DL (ref 65–99)
HCT VFR BLD AUTO: 36.2 % (ref 37.5–51)
HGB BLD-MCNC: 12 G/DL (ref 13–17.7)
MCH RBC QN AUTO: 30.5 PG (ref 26.6–33)
MCHC RBC AUTO-ENTMCNC: 33.1 G/DL (ref 31.5–35.7)
MCV RBC AUTO: 91.9 FL (ref 79–97)
PLATELET # BLD AUTO: 146 10*3/MM3 (ref 140–450)
PMV BLD AUTO: 12.7 FL (ref 6–12)
POTASSIUM SERPL-SCNC: 3.8 MMOL/L (ref 3.5–5.2)
RBC # BLD AUTO: 3.94 10*6/MM3 (ref 4.14–5.8)
SODIUM SERPL-SCNC: 138 MMOL/L (ref 136–145)
WBC # BLD AUTO: 14.52 10*3/MM3 (ref 3.4–10.8)

## 2021-05-12 PROCEDURE — 94799 UNLISTED PULMONARY SVC/PX: CPT

## 2021-05-12 PROCEDURE — 97116 GAIT TRAINING THERAPY: CPT

## 2021-05-12 PROCEDURE — 25010000002 FUROSEMIDE PER 20 MG: Performed by: NURSE PRACTITIONER

## 2021-05-12 PROCEDURE — 85027 COMPLETE CBC AUTOMATED: CPT | Performed by: NURSE PRACTITIONER

## 2021-05-12 PROCEDURE — 25010000002 ENOXAPARIN PER 10 MG: Performed by: NURSE PRACTITIONER

## 2021-05-12 PROCEDURE — 82962 GLUCOSE BLOOD TEST: CPT

## 2021-05-12 PROCEDURE — 99024 POSTOP FOLLOW-UP VISIT: CPT | Performed by: NURSE PRACTITIONER

## 2021-05-12 PROCEDURE — 71045 X-RAY EXAM CHEST 1 VIEW: CPT

## 2021-05-12 PROCEDURE — 25010000002 ONDANSETRON PER 1 MG: Performed by: NURSE PRACTITIONER

## 2021-05-12 PROCEDURE — 80048 BASIC METABOLIC PNL TOTAL CA: CPT | Performed by: NURSE PRACTITIONER

## 2021-05-12 RX ORDER — AMIODARONE HYDROCHLORIDE 200 MG/1
400 TABLET ORAL EVERY 8 HOURS SCHEDULED
Status: DISCONTINUED | OUTPATIENT
Start: 2021-05-12 | End: 2021-05-14

## 2021-05-12 RX ORDER — IPRATROPIUM BROMIDE AND ALBUTEROL SULFATE 2.5; .5 MG/3ML; MG/3ML
1.5 SOLUTION RESPIRATORY (INHALATION)
Status: DISCONTINUED | OUTPATIENT
Start: 2021-05-13 | End: 2021-05-14

## 2021-05-12 RX ORDER — POTASSIUM CHLORIDE 750 MG/1
20 CAPSULE, EXTENDED RELEASE ORAL 2 TIMES DAILY WITH MEALS
Status: DISCONTINUED | OUTPATIENT
Start: 2021-05-12 | End: 2021-05-15 | Stop reason: HOSPADM

## 2021-05-12 RX ORDER — PANTOPRAZOLE SODIUM 40 MG/1
40 TABLET, DELAYED RELEASE ORAL
Status: DISCONTINUED | OUTPATIENT
Start: 2021-05-12 | End: 2021-05-15 | Stop reason: HOSPADM

## 2021-05-12 RX ORDER — IPRATROPIUM BROMIDE AND ALBUTEROL SULFATE 2.5; .5 MG/3ML; MG/3ML
3 SOLUTION RESPIRATORY (INHALATION)
Status: DISCONTINUED | OUTPATIENT
Start: 2021-05-12 | End: 2021-05-12

## 2021-05-12 RX ORDER — FUROSEMIDE 10 MG/ML
20 INJECTION INTRAMUSCULAR; INTRAVENOUS
Status: DISCONTINUED | OUTPATIENT
Start: 2021-05-12 | End: 2021-05-14

## 2021-05-12 RX ADMIN — OXYCODONE HYDROCHLORIDE AND ACETAMINOPHEN 1 TABLET: 10; 325 TABLET ORAL at 21:20

## 2021-05-12 RX ADMIN — POTASSIUM CHLORIDE 20 MEQ: 750 CAPSULE, EXTENDED RELEASE ORAL at 11:28

## 2021-05-12 RX ADMIN — CHLORHEXIDINE GLUCONATE 0.12% ORAL RINSE 15 ML: 1.2 LIQUID ORAL at 05:19

## 2021-05-12 RX ADMIN — OXYCODONE HYDROCHLORIDE AND ACETAMINOPHEN 1 TABLET: 5; 325 TABLET ORAL at 17:21

## 2021-05-12 RX ADMIN — IPRATROPIUM BROMIDE AND ALBUTEROL SULFATE 3 ML: 2.5; .5 SOLUTION RESPIRATORY (INHALATION) at 07:09

## 2021-05-12 RX ADMIN — METOPROLOL TARTRATE 12.5 MG: 25 TABLET, FILM COATED ORAL at 09:21

## 2021-05-12 RX ADMIN — POTASSIUM CHLORIDE 20 MEQ: 750 CAPSULE, EXTENDED RELEASE ORAL at 17:21

## 2021-05-12 RX ADMIN — FUROSEMIDE 20 MG: 10 INJECTION, SOLUTION INTRAVENOUS at 11:28

## 2021-05-12 RX ADMIN — PANTOPRAZOLE SODIUM 40 MG: 40 TABLET, DELAYED RELEASE ORAL at 11:28

## 2021-05-12 RX ADMIN — AMIODARONE HYDROCHLORIDE 400 MG: 200 TABLET ORAL at 21:19

## 2021-05-12 RX ADMIN — ENOXAPARIN SODIUM 30 MG: 30 INJECTION SUBCUTANEOUS at 09:23

## 2021-05-12 RX ADMIN — POLYETHYLENE GLYCOL (3350) 17 G: 17 POWDER, FOR SOLUTION ORAL at 09:21

## 2021-05-12 RX ADMIN — METOPROLOL TARTRATE 25 MG: 25 TABLET, FILM COATED ORAL at 21:20

## 2021-05-12 RX ADMIN — AMIODARONE HYDROCHLORIDE 400 MG: 200 TABLET ORAL at 05:19

## 2021-05-12 RX ADMIN — Medication 1 APPLICATION: at 05:19

## 2021-05-12 RX ADMIN — IPRATROPIUM BROMIDE AND ALBUTEROL SULFATE 3 ML: 2.5; .5 SOLUTION RESPIRATORY (INHALATION) at 15:04

## 2021-05-12 RX ADMIN — ONDANSETRON HYDROCHLORIDE 4 MG: 2 SOLUTION INTRAMUSCULAR; INTRAVENOUS at 17:21

## 2021-05-12 RX ADMIN — CLOPIDOGREL 75 MG: 75 TABLET, FILM COATED ORAL at 17:22

## 2021-05-12 RX ADMIN — IPRATROPIUM BROMIDE AND ALBUTEROL SULFATE 3 ML: 2.5; .5 SOLUTION RESPIRATORY (INHALATION) at 20:01

## 2021-05-12 RX ADMIN — BISACODYL 10 MG: 5 TABLET ORAL at 09:21

## 2021-05-12 RX ADMIN — FUROSEMIDE 20 MG: 10 INJECTION, SOLUTION INTRAVENOUS at 17:27

## 2021-05-12 RX ADMIN — ATORVASTATIN CALCIUM 20 MG: 10 TABLET, FILM COATED ORAL at 21:19

## 2021-05-12 RX ADMIN — OXYCODONE HYDROCHLORIDE AND ACETAMINOPHEN 1 TABLET: 10; 325 TABLET ORAL at 04:19

## 2021-05-12 RX ADMIN — ENOXAPARIN SODIUM 30 MG: 30 INJECTION SUBCUTANEOUS at 21:19

## 2021-05-12 RX ADMIN — OXYCODONE HYDROCHLORIDE AND ACETAMINOPHEN 1 TABLET: 5; 325 TABLET ORAL at 09:21

## 2021-05-12 RX ADMIN — BISACODYL 10 MG: 5 TABLET ORAL at 21:19

## 2021-05-12 RX ADMIN — OXYCODONE HYDROCHLORIDE AND ACETAMINOPHEN 1 TABLET: 5; 325 TABLET ORAL at 12:53

## 2021-05-12 RX ADMIN — ASPIRIN 81 MG: 81 TABLET, FILM COATED ORAL at 09:21

## 2021-05-12 RX ADMIN — AMIODARONE HYDROCHLORIDE 400 MG: 200 TABLET ORAL at 14:28

## 2021-05-13 ENCOUNTER — APPOINTMENT (OUTPATIENT)
Dept: GENERAL RADIOLOGY | Facility: HOSPITAL | Age: 61
End: 2021-05-13

## 2021-05-13 LAB
ANION GAP SERPL CALCULATED.3IONS-SCNC: 5 MMOL/L (ref 5–15)
BH BB BLOOD EXPIRATION DATE: NORMAL
BH BB BLOOD EXPIRATION DATE: NORMAL
BH BB BLOOD TYPE BARCODE: 5100
BH BB BLOOD TYPE BARCODE: 5100
BH BB DISPENSE STATUS: NORMAL
BH BB DISPENSE STATUS: NORMAL
BH BB PRODUCT CODE: NORMAL
BH BB PRODUCT CODE: NORMAL
BH BB UNIT NUMBER: NORMAL
BH BB UNIT NUMBER: NORMAL
BUN SERPL-MCNC: 14 MG/DL (ref 6–20)
BUN/CREAT SERPL: 19.4 (ref 7–25)
CALCIUM SPEC-SCNC: 8.7 MG/DL (ref 8.6–10.5)
CHLORIDE SERPL-SCNC: 102 MMOL/L (ref 98–107)
CO2 SERPL-SCNC: 31 MMOL/L (ref 22–29)
CREAT SERPL-MCNC: 0.72 MG/DL (ref 0.76–1.27)
CROSSMATCH INTERPRETATION: NORMAL
CROSSMATCH INTERPRETATION: NORMAL
DEPRECATED RDW RBC AUTO: 41.1 FL (ref 37–54)
ERYTHROCYTE [DISTWIDTH] IN BLOOD BY AUTOMATED COUNT: 12.2 % (ref 12.3–15.4)
GFR SERPL CREATININE-BSD FRML MDRD: 112 ML/MIN/1.73
GLUCOSE BLDC GLUCOMTR-MCNC: 117 MG/DL (ref 70–130)
GLUCOSE BLDC GLUCOMTR-MCNC: 123 MG/DL (ref 70–130)
GLUCOSE SERPL-MCNC: 119 MG/DL (ref 65–99)
HCT VFR BLD AUTO: 33.1 % (ref 37.5–51)
HGB BLD-MCNC: 10.9 G/DL (ref 13–17.7)
MAXIMAL PREDICTED HEART RATE: 161 BPM
MCH RBC QN AUTO: 30.2 PG (ref 26.6–33)
MCHC RBC AUTO-ENTMCNC: 32.9 G/DL (ref 31.5–35.7)
MCV RBC AUTO: 91.7 FL (ref 79–97)
PLATELET # BLD AUTO: 163 10*3/MM3 (ref 140–450)
PMV BLD AUTO: 12.4 FL (ref 6–12)
POTASSIUM SERPL-SCNC: 4.3 MMOL/L (ref 3.5–5.2)
RBC # BLD AUTO: 3.61 10*6/MM3 (ref 4.14–5.8)
SODIUM SERPL-SCNC: 138 MMOL/L (ref 136–145)
STRESS TARGET HR: 137 BPM
UNIT  ABO: NORMAL
UNIT  ABO: NORMAL
UNIT  RH: NORMAL
UNIT  RH: NORMAL
WBC # BLD AUTO: 10.62 10*3/MM3 (ref 3.4–10.8)

## 2021-05-13 PROCEDURE — 82962 GLUCOSE BLOOD TEST: CPT

## 2021-05-13 PROCEDURE — 25010000002 ENOXAPARIN PER 10 MG: Performed by: NURSE PRACTITIONER

## 2021-05-13 PROCEDURE — 94799 UNLISTED PULMONARY SVC/PX: CPT

## 2021-05-13 PROCEDURE — 97116 GAIT TRAINING THERAPY: CPT

## 2021-05-13 PROCEDURE — 80048 BASIC METABOLIC PNL TOTAL CA: CPT | Performed by: NURSE PRACTITIONER

## 2021-05-13 PROCEDURE — 25010000002 ONDANSETRON PER 1 MG: Performed by: NURSE PRACTITIONER

## 2021-05-13 PROCEDURE — 85027 COMPLETE CBC AUTOMATED: CPT | Performed by: NURSE PRACTITIONER

## 2021-05-13 PROCEDURE — 71046 X-RAY EXAM CHEST 2 VIEWS: CPT

## 2021-05-13 PROCEDURE — 25010000002 FUROSEMIDE PER 20 MG: Performed by: NURSE PRACTITIONER

## 2021-05-13 PROCEDURE — 99024 POSTOP FOLLOW-UP VISIT: CPT | Performed by: NURSE PRACTITIONER

## 2021-05-13 RX ADMIN — AMIODARONE HYDROCHLORIDE 400 MG: 200 TABLET ORAL at 21:58

## 2021-05-13 RX ADMIN — CLOPIDOGREL 75 MG: 75 TABLET, FILM COATED ORAL at 17:28

## 2021-05-13 RX ADMIN — BISACODYL 10 MG: 5 TABLET ORAL at 08:14

## 2021-05-13 RX ADMIN — ATORVASTATIN CALCIUM 20 MG: 10 TABLET, FILM COATED ORAL at 21:58

## 2021-05-13 RX ADMIN — AMIODARONE HYDROCHLORIDE 400 MG: 200 TABLET ORAL at 14:06

## 2021-05-13 RX ADMIN — OXYCODONE HYDROCHLORIDE AND ACETAMINOPHEN 1 TABLET: 5; 325 TABLET ORAL at 14:06

## 2021-05-13 RX ADMIN — IPRATROPIUM BROMIDE AND ALBUTEROL SULFATE 1.5 ML: 2.5; .5 SOLUTION RESPIRATORY (INHALATION) at 19:46

## 2021-05-13 RX ADMIN — OXYCODONE HYDROCHLORIDE AND ACETAMINOPHEN 1 TABLET: 10; 325 TABLET ORAL at 21:59

## 2021-05-13 RX ADMIN — FUROSEMIDE 20 MG: 10 INJECTION, SOLUTION INTRAVENOUS at 17:28

## 2021-05-13 RX ADMIN — ENOXAPARIN SODIUM 30 MG: 30 INJECTION SUBCUTANEOUS at 21:58

## 2021-05-13 RX ADMIN — POLYETHYLENE GLYCOL (3350) 17 G: 17 POWDER, FOR SOLUTION ORAL at 08:14

## 2021-05-13 RX ADMIN — PANTOPRAZOLE SODIUM 40 MG: 40 TABLET, DELAYED RELEASE ORAL at 05:21

## 2021-05-13 RX ADMIN — IPRATROPIUM BROMIDE AND ALBUTEROL SULFATE 1.5 ML: 2.5; .5 SOLUTION RESPIRATORY (INHALATION) at 06:21

## 2021-05-13 RX ADMIN — ENOXAPARIN SODIUM 30 MG: 30 INJECTION SUBCUTANEOUS at 08:14

## 2021-05-13 RX ADMIN — ASPIRIN 81 MG: 81 TABLET, FILM COATED ORAL at 08:14

## 2021-05-13 RX ADMIN — OXYCODONE HYDROCHLORIDE AND ACETAMINOPHEN 1 TABLET: 10; 325 TABLET ORAL at 08:14

## 2021-05-13 RX ADMIN — POTASSIUM CHLORIDE 20 MEQ: 750 CAPSULE, EXTENDED RELEASE ORAL at 08:14

## 2021-05-13 RX ADMIN — POTASSIUM CHLORIDE 20 MEQ: 750 CAPSULE, EXTENDED RELEASE ORAL at 17:28

## 2021-05-13 RX ADMIN — METOPROLOL TARTRATE 37.5 MG: 25 TABLET, FILM COATED ORAL at 08:14

## 2021-05-13 RX ADMIN — OXYCODONE HYDROCHLORIDE AND ACETAMINOPHEN 1 TABLET: 5; 325 TABLET ORAL at 18:35

## 2021-05-13 RX ADMIN — METOPROLOL TARTRATE 37.5 MG: 25 TABLET, FILM COATED ORAL at 21:58

## 2021-05-13 RX ADMIN — ONDANSETRON HYDROCHLORIDE 4 MG: 2 SOLUTION INTRAMUSCULAR; INTRAVENOUS at 13:18

## 2021-05-13 RX ADMIN — FUROSEMIDE 20 MG: 10 INJECTION, SOLUTION INTRAVENOUS at 08:14

## 2021-05-13 RX ADMIN — IPRATROPIUM BROMIDE AND ALBUTEROL SULFATE 1.5 ML: 2.5; .5 SOLUTION RESPIRATORY (INHALATION) at 15:04

## 2021-05-13 RX ADMIN — AMIODARONE HYDROCHLORIDE 400 MG: 200 TABLET ORAL at 05:21

## 2021-05-13 RX ADMIN — OXYCODONE HYDROCHLORIDE AND ACETAMINOPHEN 1 TABLET: 10; 325 TABLET ORAL at 02:26

## 2021-05-14 LAB
ALBUMIN SERPL-MCNC: 3 G/DL (ref 3.5–5.2)
ALBUMIN/GLOB SERPL: 1 G/DL
ALP SERPL-CCNC: 83 U/L (ref 39–117)
ALT SERPL W P-5'-P-CCNC: 17 U/L (ref 1–41)
ANION GAP SERPL CALCULATED.3IONS-SCNC: 5 MMOL/L (ref 5–15)
AST SERPL-CCNC: 25 U/L (ref 1–40)
BILIRUB SERPL-MCNC: 0.6 MG/DL (ref 0–1.2)
BUN SERPL-MCNC: 17 MG/DL (ref 6–20)
BUN/CREAT SERPL: 22.4 (ref 7–25)
CALCIUM SPEC-SCNC: 8.7 MG/DL (ref 8.6–10.5)
CHLORIDE SERPL-SCNC: 102 MMOL/L (ref 98–107)
CO2 SERPL-SCNC: 30 MMOL/L (ref 22–29)
CREAT SERPL-MCNC: 0.76 MG/DL (ref 0.76–1.27)
DEPRECATED RDW RBC AUTO: 41.3 FL (ref 37–54)
ERYTHROCYTE [DISTWIDTH] IN BLOOD BY AUTOMATED COUNT: 12.5 % (ref 12.3–15.4)
GFR SERPL CREATININE-BSD FRML MDRD: 105 ML/MIN/1.73
GLOBULIN UR ELPH-MCNC: 3.1 GM/DL
GLUCOSE SERPL-MCNC: 167 MG/DL (ref 65–99)
HCT VFR BLD AUTO: 31.5 % (ref 37.5–51)
HGB BLD-MCNC: 10.4 G/DL (ref 13–17.7)
MCH RBC QN AUTO: 30 PG (ref 26.6–33)
MCHC RBC AUTO-ENTMCNC: 33 G/DL (ref 31.5–35.7)
MCV RBC AUTO: 90.8 FL (ref 79–97)
PLATELET # BLD AUTO: 221 10*3/MM3 (ref 140–450)
PMV BLD AUTO: 11.9 FL (ref 6–12)
POTASSIUM SERPL-SCNC: 4.1 MMOL/L (ref 3.5–5.2)
PROT SERPL-MCNC: 6.1 G/DL (ref 6–8.5)
RBC # BLD AUTO: 3.47 10*6/MM3 (ref 4.14–5.8)
SODIUM SERPL-SCNC: 137 MMOL/L (ref 136–145)
WBC # BLD AUTO: 8.43 10*3/MM3 (ref 3.4–10.8)

## 2021-05-14 PROCEDURE — 85027 COMPLETE CBC AUTOMATED: CPT | Performed by: NURSE PRACTITIONER

## 2021-05-14 PROCEDURE — 94799 UNLISTED PULMONARY SVC/PX: CPT

## 2021-05-14 PROCEDURE — 99024 POSTOP FOLLOW-UP VISIT: CPT | Performed by: NURSE PRACTITIONER

## 2021-05-14 PROCEDURE — 25010000002 ENOXAPARIN PER 10 MG: Performed by: NURSE PRACTITIONER

## 2021-05-14 PROCEDURE — 80053 COMPREHEN METABOLIC PANEL: CPT | Performed by: NURSE PRACTITIONER

## 2021-05-14 PROCEDURE — 97116 GAIT TRAINING THERAPY: CPT

## 2021-05-14 PROCEDURE — 94640 AIRWAY INHALATION TREATMENT: CPT

## 2021-05-14 RX ORDER — BUMETANIDE 0.25 MG/ML
1 INJECTION INTRAMUSCULAR; INTRAVENOUS
Status: DISCONTINUED | OUTPATIENT
Start: 2021-05-14 | End: 2021-05-15 | Stop reason: HOSPADM

## 2021-05-14 RX ORDER — AMIODARONE HYDROCHLORIDE 200 MG/1
400 TABLET ORAL DAILY
Status: DISCONTINUED | OUTPATIENT
Start: 2021-05-16 | End: 2021-05-15 | Stop reason: HOSPADM

## 2021-05-14 RX ORDER — AMIODARONE HYDROCHLORIDE 200 MG/1
400 TABLET ORAL EVERY 12 HOURS SCHEDULED
Status: DISCONTINUED | OUTPATIENT
Start: 2021-05-14 | End: 2021-05-14

## 2021-05-14 RX ORDER — BUDESONIDE AND FORMOTEROL FUMARATE DIHYDRATE 160; 4.5 UG/1; UG/1
2 AEROSOL RESPIRATORY (INHALATION)
Status: DISCONTINUED | OUTPATIENT
Start: 2021-05-14 | End: 2021-05-15 | Stop reason: HOSPADM

## 2021-05-14 RX ORDER — BUMETANIDE 0.25 MG/ML
1 INJECTION INTRAMUSCULAR; INTRAVENOUS ONCE
Status: COMPLETED | OUTPATIENT
Start: 2021-05-14 | End: 2021-05-14

## 2021-05-14 RX ORDER — LISINOPRIL 2.5 MG/1
2.5 TABLET ORAL
Status: DISCONTINUED | OUTPATIENT
Start: 2021-05-15 | End: 2021-05-15 | Stop reason: HOSPADM

## 2021-05-14 RX ORDER — METOPROLOL TARTRATE 50 MG/1
50 TABLET, FILM COATED ORAL EVERY 12 HOURS SCHEDULED
Status: DISCONTINUED | OUTPATIENT
Start: 2021-05-14 | End: 2021-05-15 | Stop reason: HOSPADM

## 2021-05-14 RX ADMIN — BUMETANIDE 1 MG: 0.25 INJECTION, SOLUTION INTRAMUSCULAR; INTRAVENOUS at 17:18

## 2021-05-14 RX ADMIN — OXYCODONE HYDROCHLORIDE AND ACETAMINOPHEN 1 TABLET: 10; 325 TABLET ORAL at 03:09

## 2021-05-14 RX ADMIN — POTASSIUM CHLORIDE 20 MEQ: 750 CAPSULE, EXTENDED RELEASE ORAL at 17:18

## 2021-05-14 RX ADMIN — OXYCODONE HYDROCHLORIDE AND ACETAMINOPHEN 1 TABLET: 5; 325 TABLET ORAL at 09:19

## 2021-05-14 RX ADMIN — CLOPIDOGREL 75 MG: 75 TABLET, FILM COATED ORAL at 17:18

## 2021-05-14 RX ADMIN — BISACODYL 10 MG: 5 TABLET ORAL at 21:47

## 2021-05-14 RX ADMIN — BUDESONIDE AND FORMOTEROL FUMARATE DIHYDRATE 2 PUFF: 160; 4.5 AEROSOL RESPIRATORY (INHALATION) at 13:00

## 2021-05-14 RX ADMIN — POTASSIUM CHLORIDE 20 MEQ: 750 CAPSULE, EXTENDED RELEASE ORAL at 09:19

## 2021-05-14 RX ADMIN — ENOXAPARIN SODIUM 30 MG: 30 INJECTION SUBCUTANEOUS at 21:47

## 2021-05-14 RX ADMIN — POLYETHYLENE GLYCOL (3350) 17 G: 17 POWDER, FOR SOLUTION ORAL at 09:19

## 2021-05-14 RX ADMIN — BUMETANIDE 1 MG: 0.25 INJECTION, SOLUTION INTRAMUSCULAR; INTRAVENOUS at 09:19

## 2021-05-14 RX ADMIN — OXYCODONE HYDROCHLORIDE AND ACETAMINOPHEN 1 TABLET: 5; 325 TABLET ORAL at 21:47

## 2021-05-14 RX ADMIN — METOPROLOL TARTRATE 50 MG: 50 TABLET, FILM COATED ORAL at 21:46

## 2021-05-14 RX ADMIN — IPRATROPIUM BROMIDE AND ALBUTEROL SULFATE 1.5 ML: 2.5; .5 SOLUTION RESPIRATORY (INHALATION) at 06:02

## 2021-05-14 RX ADMIN — METOPROLOL TARTRATE 50 MG: 50 TABLET, FILM COATED ORAL at 09:19

## 2021-05-14 RX ADMIN — OXYCODONE HYDROCHLORIDE AND ACETAMINOPHEN 1 TABLET: 5; 325 TABLET ORAL at 12:30

## 2021-05-14 RX ADMIN — AMIODARONE HYDROCHLORIDE 400 MG: 200 TABLET ORAL at 05:23

## 2021-05-14 RX ADMIN — ATORVASTATIN CALCIUM 20 MG: 10 TABLET, FILM COATED ORAL at 21:47

## 2021-05-14 RX ADMIN — PANTOPRAZOLE SODIUM 40 MG: 40 TABLET, DELAYED RELEASE ORAL at 05:23

## 2021-05-14 RX ADMIN — ENOXAPARIN SODIUM 30 MG: 30 INJECTION SUBCUTANEOUS at 09:19

## 2021-05-14 RX ADMIN — OXYCODONE HYDROCHLORIDE AND ACETAMINOPHEN 1 TABLET: 5; 325 TABLET ORAL at 17:18

## 2021-05-14 RX ADMIN — ASPIRIN 81 MG: 81 TABLET, FILM COATED ORAL at 09:19

## 2021-05-14 RX ADMIN — BISACODYL 10 MG: 5 TABLET ORAL at 09:19

## 2021-05-14 RX ADMIN — AMIODARONE HYDROCHLORIDE 400 MG: 200 TABLET ORAL at 21:46

## 2021-05-14 RX ADMIN — BUDESONIDE AND FORMOTEROL FUMARATE DIHYDRATE 2 PUFF: 160; 4.5 AEROSOL RESPIRATORY (INHALATION) at 19:54

## 2021-05-15 ENCOUNTER — APPOINTMENT (OUTPATIENT)
Dept: GENERAL RADIOLOGY | Facility: HOSPITAL | Age: 61
End: 2021-05-15

## 2021-05-15 VITALS
BODY MASS INDEX: 25.71 KG/M2 | SYSTOLIC BLOOD PRESSURE: 109 MMHG | RESPIRATION RATE: 16 BRPM | HEART RATE: 68 BPM | HEIGHT: 70 IN | WEIGHT: 179.6 LBS | TEMPERATURE: 97.9 F | OXYGEN SATURATION: 96 % | DIASTOLIC BLOOD PRESSURE: 90 MMHG

## 2021-05-15 PROBLEM — R94.31 T WAVE INVERSION IN EKG: Status: RESOLVED | Noted: 2021-05-05 | Resolved: 2021-05-15

## 2021-05-15 PROCEDURE — 94799 UNLISTED PULMONARY SVC/PX: CPT

## 2021-05-15 PROCEDURE — 97116 GAIT TRAINING THERAPY: CPT

## 2021-05-15 PROCEDURE — 71045 X-RAY EXAM CHEST 1 VIEW: CPT

## 2021-05-15 PROCEDURE — 99024 POSTOP FOLLOW-UP VISIT: CPT | Performed by: SURGERY

## 2021-05-15 PROCEDURE — 25010000002 ENOXAPARIN PER 10 MG: Performed by: NURSE PRACTITIONER

## 2021-05-15 RX ORDER — CLOPIDOGREL BISULFATE 75 MG/1
75 TABLET ORAL DAILY
Qty: 30 TABLET | Refills: 2 | Status: SHIPPED | OUTPATIENT
Start: 2021-05-15 | End: 2022-04-11

## 2021-05-15 RX ORDER — LISINOPRIL 2.5 MG/1
2.5 TABLET ORAL
Qty: 30 TABLET | Refills: 3 | Status: SHIPPED | OUTPATIENT
Start: 2021-05-16 | End: 2022-07-11 | Stop reason: SDUPTHER

## 2021-05-15 RX ORDER — METOPROLOL TARTRATE 50 MG/1
50 TABLET, FILM COATED ORAL EVERY 12 HOURS SCHEDULED
Qty: 60 TABLET | Refills: 2 | Status: SHIPPED | OUTPATIENT
Start: 2021-05-15 | End: 2022-07-11 | Stop reason: SDUPTHER

## 2021-05-15 RX ORDER — PANTOPRAZOLE SODIUM 40 MG/1
40 TABLET, DELAYED RELEASE ORAL DAILY
Qty: 30 TABLET | Refills: 0 | Status: SHIPPED | OUTPATIENT
Start: 2021-05-15

## 2021-05-15 RX ORDER — FUROSEMIDE 20 MG/1
20 TABLET ORAL DAILY
Qty: 7 TABLET | Refills: 0 | Status: SHIPPED | OUTPATIENT
Start: 2021-05-15 | End: 2021-06-23

## 2021-05-15 RX ORDER — OXYCODONE HYDROCHLORIDE AND ACETAMINOPHEN 5; 325 MG/1; MG/1
1 TABLET ORAL
Qty: 25 TABLET | Refills: 0 | Status: SHIPPED | OUTPATIENT
Start: 2021-05-15 | End: 2021-05-20

## 2021-05-15 RX ORDER — ASPIRIN 81 MG/1
81 TABLET ORAL DAILY
Qty: 30 TABLET | Refills: 2 | Status: SHIPPED | OUTPATIENT
Start: 2021-05-15

## 2021-05-15 RX ADMIN — BISACODYL 10 MG: 5 TABLET ORAL at 08:09

## 2021-05-15 RX ADMIN — POTASSIUM CHLORIDE 20 MEQ: 750 CAPSULE, EXTENDED RELEASE ORAL at 08:09

## 2021-05-15 RX ADMIN — ASPIRIN 81 MG: 81 TABLET, FILM COATED ORAL at 08:09

## 2021-05-15 RX ADMIN — OXYCODONE HYDROCHLORIDE AND ACETAMINOPHEN 1 TABLET: 5; 325 TABLET ORAL at 05:53

## 2021-05-15 RX ADMIN — BUDESONIDE AND FORMOTEROL FUMARATE DIHYDRATE 2 PUFF: 160; 4.5 AEROSOL RESPIRATORY (INHALATION) at 10:22

## 2021-05-15 RX ADMIN — ENOXAPARIN SODIUM 30 MG: 30 INJECTION SUBCUTANEOUS at 08:09

## 2021-05-15 RX ADMIN — OXYCODONE HYDROCHLORIDE AND ACETAMINOPHEN 1 TABLET: 5; 325 TABLET ORAL at 02:12

## 2021-05-15 RX ADMIN — PANTOPRAZOLE SODIUM 40 MG: 40 TABLET, DELAYED RELEASE ORAL at 05:53

## 2021-05-15 RX ADMIN — BUMETANIDE 1 MG: 0.25 INJECTION, SOLUTION INTRAMUSCULAR; INTRAVENOUS at 08:09

## 2021-05-15 RX ADMIN — LISINOPRIL 2.5 MG: 2.5 TABLET ORAL at 08:09

## 2021-05-15 RX ADMIN — OXYCODONE HYDROCHLORIDE AND ACETAMINOPHEN 1 TABLET: 5; 325 TABLET ORAL at 09:45

## 2021-05-15 RX ADMIN — METOPROLOL TARTRATE 50 MG: 50 TABLET, FILM COATED ORAL at 08:09

## 2021-05-15 NOTE — THERAPY DISCHARGE NOTE
Acute Care - Physical Therapy Discharge Summary  [facility]       Patient Name: Tree Shaffer  : 1960  MRN: [id number]    Today's Date: 5/15/2021                 Admit Date: 2021      PT Recommendation and Plan    Visit Dx:    ICD-10-CM ICD-9-CM   1. Unstable angina pectoris (CMS/[facility])  I20.0 411.1   2. Abnormal stress test  R94.39 794.39   3. T wave inversion in EKG  R94.31 794.31   4. Coronary artery disease involving native coronary artery of native heart with unstable angina pectoris (CMS/HCC)  I25.110 414.01     411.1   5. Cervical myelopathy (CMS/[facility])  G95.9 721.1   6. Atherosclerosis of native artery of both lower extremities with intermittent claudication (CMS/[facility])  I70.213 440.21   7. Stable angina pectoris (CMS/[facility])  I20.8 413.9   8. Impaired mobility  Z74.09 799.89           PT Charges     Row Name 05/15/21 1003             Time Calculation    Start Time  0850  -AE      Stop Time  0913  -AE      Time Calculation (min)  23 min  -AE      PT Received On  05/15/21  -AE      PT Goal Re-Cert Due Date  21  -AE         Time Calculation- PT    Total Timed Code Minutes- PT  23 minute(s)  -AE         Timed Charges    83563 - Gait Training Minutes   23  -AE         Total Minutes    Timed Charges Total Minutes  23  -AE       Total Minutes  23  -AE        User Key  (r) = Recorded By, (t) = Taken By, (c) = Cosigned By    Initials Name Provider Type    AE Mara Pino, PTA Physical Therapy Assistant          PT Rehab Goals     Row Name 05/15/21 1606             Bed Mobility Goal 1 (PT)    Activity/Assistive Device (Bed Mobility Goal 1, PT)  sit to supine/supine to sit  -AE      [place] Level/Cues Needed (Bed Mobility Goal 1, PT)  supervision required  -AE      Time Frame (Bed Mobility Goal 1, PT)  long term goal (LTG);10 days  -AE      Progress/Outcomes (Bed Mobility Goal 1, PT)  goal met  -AE         Transfer Goal 1 (PT)    Activity/Assistive Device (Transfer Goal 1, PT)   sit-to-stand/stand-to-sit;bed-to-chair/chair-to-bed  -AE      [place] Level/Cues Needed (Transfer Goal 1, PT)  supervision required  -AE      Time Frame (Transfer Goal 1, PT)  long term goal (LTG);10 days  -AE      Progress/Outcome (Transfer Goal 1, PT)  goal met  -AE         Gait Training Goal 1 (PT)    Activity/Assistive Device (Gait Training Goal 1, PT)  gait (walking locomotion);decrease fall risk;improve balance and speed;increase endurance/gait distance  -AE      [place] Level (Gait Training Goal 1, PT)  supervision required  -AE      Distance (Gait Training Goal 1, PT)  300  -AE      Time Frame (Gait Training Goal 1, PT)  long term goal (LTG);10 days  -AE      Progress/Outcome (Gait Training Goal 1, PT)  goal met  -AE         Stairs Goal 1 (PT)    Activity/Assistive Device (Stairs Goal 1, PT)  ascending stairs;descending stairs  -AE      [place] Level/Cues Needed (Stairs Goal 1, PT)  supervision required  -AE      Number of Stairs (Stairs Goal 1, PT)  2-4 steps  -AE      Time Frame (Stairs Goal 1, PT)  long term goal (LTG);10 days  -AE      Progress/Outcome (Stairs Goal 1, PT)  goal met  -AE        User Key  (r) = Recorded By, (t) = Taken By, (c) = Cosigned By    Initials Name Provider Type Discipline    AE Mara Pino PTA Physical Therapy Assistant PT          Therapy Charges for Today     Code Description Service Date Service Provider Modifiers Qty    [id number] HC GAIT TRAINING EA 15 MIN 5/15/2021 Mara Pino PTA GP 2          PT Discharge Summary  Anticipated Discharge Disposition (PT): home  Reason for Discharge: All goals achieved  Outcomes Achieved: Able to achieve all goals within established timeline  Discharge Destination: Home      Mara Pino PTA   5/15/2021

## 2021-05-15 NOTE — PLAN OF CARE
Goal Outcome Evaluation:  Plan of Care Reviewed With: patient  Progress: improving  Outcome Summary: VSS. PRN pain meds given for c/o pain with relief noted. Removed O2 last night at beginning of shift, has remained on room air through night and while ambulating in [place]. Plans for probable discharge home today. Will continue to monitor.

## 2021-05-15 NOTE — PROGRESS NOTES
[facility] Cardiothoracic Surgery  PROGRESS NOTE     Subjective:  Doing well today postop day 5 from coronary bypass grafting x4.  He has been on room air and tolerated well, he walked in the halls and had saturations in the 90s on room air.  His pain is much better.  His weight is down 5 pounds from yesterday and he is near his baseline.  He is much more confident about discharge home today.    Objective:      Intake/Output Summary (Last 24 hours) at 5/15/2021 1449  Last data filed at 5/15/2021 0900  Gross per 24 hour   Intake 360 ml   Output 2325 ml   Net -1965 ml       CT Outpt: L Pleural-25 cc          05/15/21  0417   Weight: 81.5 kg (179 lb 9.6 oz)         PE:  Vitals:    05/15/21 1324   BP: 109/90   Pulse: 68   Resp: 16   Temp: 97.9 °F (36.6 °C)   SpO2: 96%     GENERAL: NAD, resting comfortably, normal color  CARDIOVASCULAR: regular, regular rate, sinus, incision clean dry and intact and midline and sternum is stable  PULMONARY: Normal bilateral breath sounds, no labored breathing  ABDOMEN: soft, nt/nd  EXTREMITIES: mild peripheral edema, normal pulses, normal ROM, right lower extremity incision clean dry and intact without erythema        Lab Results (last 72 hours)     Procedure Component Value Units Date/Time    Comprehensive Metabolic Panel [[id number]]  (Abnormal) Collected: 05/14/21 0441    Specimen: Blood Updated: 05/14/21 0527     Glucose 167 mg/dL      BUN 17 mg/dL      Creatinine 0.76 mg/dL      Sodium 137 mmol/L      Potassium 4.1 mmol/L      Chloride 102 mmol/L      CO2 30.0 mmol/L      Calcium 8.7 mg/dL      Total Protein 6.1 g/dL      Albumin 3.00 g/dL      ALT (SGPT) 17 U/L      AST (SGOT) 25 U/L      Alkaline Phosphatase 83 U/L      Total Bilirubin 0.6 mg/dL      eGFR Non African Amer 105 mL/min/1.73      Globulin 3.1 gm/dL      A/G Ratio 1.0 g/dL      BUN/Creatinine Ratio 22.4     Anion Gap 5.0 mmol/L     Narrative:      GFR Normal >60  Chronic Kidney Disease <60  Kidney  Failure <15      CBC (No Diff) [[id number]]  (Abnormal) Collected: 05/14/21 0441    Specimen: Blood Updated: 05/14/21 0504     WBC 8.43 10*3/mm3      RBC 3.47 10*6/mm3      Hemoglobin 10.4 g/dL      Hematocrit 31.5 %      MCV 90.8 fL      MCH 30.0 pg      MCHC 33.0 g/dL      RDW 12.5 %      RDW-SD 41.3 fl      MPV 11.9 fL      Platelets 221 10*3/mm3     POC Glucose Once [[id number]]  (Normal) Collected: 05/13/21 1054    Specimen: Blood Updated: 05/13/21 1119     Glucose 123 mg/dL      Comment: : [id number] Sproutling LadonnaMeter ID: [id number]       POC Glucose Once [[id number]]  (Normal) Collected: 05/13/21 0722    Specimen: Blood Updated: 05/13/21 0742     Glucose 117 mg/dL      Comment: : [id number] Sproutling LadonnaMeter ID: [id number]       Basic Metabolic Panel [[id number]]  (Abnormal) Collected: 05/13/21 0444    Specimen: Blood Updated: 05/13/21 0550     Glucose 119 mg/dL      BUN 14 mg/dL      Creatinine 0.72 mg/dL      Sodium 138 mmol/L      Potassium 4.3 mmol/L      Chloride 102 mmol/L      CO2 31.0 mmol/L      Calcium 8.7 mg/dL      eGFR Non African Amer 112 mL/min/1.73      BUN/Creatinine Ratio 19.4     Anion Gap 5.0 mmol/L     Narrative:      GFR Normal >60  Chronic Kidney Disease <60  Kidney Failure <15      CBC (No Diff) [[id number]]  (Abnormal) Collected: 05/13/21 0444    Specimen: Blood Updated: 05/13/21 0511     WBC 10.62 10*3/mm3      RBC 3.61 10*6/mm3      Hemoglobin 10.9 g/dL      Hematocrit 33.1 %      MCV 91.7 fL      MCH 30.2 pg      MCHC 32.9 g/dL      RDW 12.2 %      RDW-SD 41.1 fl      MPV 12.4 fL      Platelets 163 10*3/mm3     POC Glucose Once [[id number]]  (Abnormal) Collected: 05/12/21 2054    Specimen: Blood Updated: 05/12/21 2107     Glucose 132 mg/dL      Comment: : [id number] Leoncioangel TerryMeter ID: [id number]             Rhythm: Sinus 60s    CXR: Well-expanded bilateral lungs, stable cardiac silhouette after removal of last chest tube and cardiac pacing wires, expected  postsurgical changes    Assessment/Plan     Mr. Shaffer is a 60-year-old male who presented with severe coronary artery disease and unstable angina.  He is now postop day 5 from coronary bypass grafting x4.  His diuresis was increased yesterday is doing much better today on room air and tolerating well.    Chest tube and wires removed today without remark  He has been up and out of bed and ambulating well with adequate saturations  Okay for discharge home today  Standard follow-up    Stefan Sibley M.D.  Cardiothoracic Surgeon

## 2021-05-15 NOTE — THERAPY TREATMENT NOTE
Acute Care - Physical Therapy Treatment Note  [facility]     Patient Name: Tree Shaffer  : 1960  MRN: [id number]  Today's Date: 5/15/2021           PT Assessment (last 12 hours)      PT Evaluation and Treatment     Row Name 05/15/21 0850          Physical Therapy Time and Intention    Subjective Information  complains of;pain  -AE     Document Type  therapy note (daily note)  -AE     Mode of Treatment  physical therapy  -AE     Row Name 05/15/21 0850          General Information    Existing Precautions/Restrictions  oxygen therapy device and L/min;fall  -AE     Row Name 05/15/21 0850          Pain Scale: Numbers Pre/Post-Treatment    Pretreatment Pain Rating  7/10  -AE     Posttreatment Pain Rating  7/10  -AE     Pain Location  back spasms  -AE     Row Name 05/15/21 0850          Bed Mobility    Comment (Bed Mobility)  sitting EOB  -AE     [facility] Name 05/15/21 0850          Transfers    Sit-Stand [place] (Transfers)  independent  -AE     Stand-Sit [place] (Transfers)  independent  -AE     Row Name 05/15/21 0850          Gait/Stairs (Locomotion)    [place] Level (Gait)  supervision  -AE     Distance in Feet (Gait)  500'x2  -AE     Row Name 05/15/21 0850          Aerobic Exercise    Comment, Aerobic Exercise (Therapeutic Exercise)  -- cardiac protocol 20 reps  -AE     Row Name             Wound 05/10/21 0832 sternal    Wound - Properties Group Placement Date: 05/10/21  -CF Placement Time: 0832  -CF Present on Hospital Admission: N  -CF Location: sternal  -CF Additional Comments: closed incision. dressings applied  -CF    Retired Wound - Properties Group Date first assessed: 05/10/21  -CF Time first assessed: 0832  -CF Present on Hospital Admission: N  -CF Location: sternal  -CF Additional Comments: closed incision. dressings applied  -CF    Row Name             Wound 05/10/21 0832 Right leg    Wound - Properties Group Placement Date: 05/10/21  -CF Placement Time: 0832  -CF Present on Hospital  Admission: N  -CF Side: Right  -CF Location: leg  -CF Additional Comments: closed incision. dresssings applied. EVH site.  -CF    Retired Wound - Properties Group Date first assessed: 05/10/21  -CF Time first assessed: 0832  -CF Present on Hospital Admission: N  -CF Side: Right  -CF Location: leg  -CF Additional Comments: closed incision. dresssings applied. EVH site.  -CF    Row Name             Wound 05/10/21 0834 Right upper thigh    Wound - Properties Group Placement Date: 05/10/21  -CF Placement Time: 0834  -CF Present on Hospital Admission: N  -CF Side: Right  -CF Orientation: upper  -CF Location: thigh  -CF Additional Comments: scratch to right upper outer thigh that was present prior to coming to OR. stratch approxiately 4 inches in length.  -CF    Retired Wound - Properties Group Date first assessed: 05/10/21  -CF Time first assessed: 0834  -CF Present on Hospital Admission: N  -CF Side: Right  -CF Location: thigh  -CF Additional Comments: scratch to right upper outer thigh that was present prior to coming to OR. stratch approxiately 4 inches in length.  -CF    Row Name 05/15/21 0850          Vital Signs    Pre SpO2 (%)  93  -AE     O2 Delivery Pre Treatment  room air  -AE     O2 Delivery Intra Treatment  room air unable to obtain accurate reading on pule ox  -AE     Post SpO2 (%)  89 reading not consistent  -AE     O2 Delivery Post Treatment  room air  -AE     Row Name 05/15/21 0850          Positioning and Restraints    Pre-Treatment Position  in bed  -AE     Post Treatment Position  bed  -AE     In Bed  call light within reach;sitting EOB  -AE       User Key  (r) = Recorded By, (t) = Taken By, (c) = Cosigned By    Initials Name Provider Type    AE Mara Pino, PTA Physical Therapy Assistant    CF Renu Zepeda RN Registered Nurse        Physical Therapy Education                 Title: PT OT SLP Therapies (In Progress)     Topic: Physical Therapy (In Progress)     Point: Mobility training (Done)      Learning Progress Summary           Patient Acceptance, E, VU,DU by DALE at 5/11/2021 0851    Comment: Progression of PT POC, benefits of activity, sternal prec                   Point: Home exercise program (Done)     Learning Progress Summary           Patient Acceptance, E,TB, VU by  at 5/12/2021 0836    Comment: home walking progression                   Point: Body mechanics (Not Started)     Learner Progress:  Not documented in this visit.          Point: Precautions (Done)     Learning Progress Summary           Patient Acceptance, E,TB, VU by  at 5/13/2021 1051    Comment: deep breathing    Acceptance, E, VU,DU by DALE at 5/11/2021 0851    Comment: Progression of PT POC, benefits of activity, sternal prec                               User Key     Initials Effective Dates Name Provider Type Discipline     08/02/16 -  Mary Anglin, PTA Physical Therapy Assistant PT    DALE 08/02/16 -  Sincere Phan, PT DPT Physical Therapist PT              PT Recommendation and Plan     Plan of Care Reviewed With: patient  Progress: improving  Outcome Summary: Pt on room air and had no c/o chest pain but c/o muscle spasms in back 7/10. Pt states back pain is chronic. Pt was sitting EOB and was S to stand and to ambulate.Pt walked on room air and did not seem SOA. PTA was unable to obtain consistant pulse ox reading. Pt walked 500ft x 2 with 1 standing rest. Pt would benefit from continued PT while here at [facility]       Time Calculation:   PT Charges     Row Name 05/15/21 1003             Time Calculation    Start Time  0850  -AE      Stop Time  0913  -AE      Time Calculation (min)  23 min  -AE      PT Received On  05/15/21  -AE      PT Goal Re-Cert Due Date  05/21/21  -AE         Time Calculation- PT    Total Timed Code Minutes- PT  23 minute(s)  -AE         Timed Charges    71776 - Gait Training Minutes   23  -AE         Total Minutes    Timed Charges Total Minutes  23  -AE       Total Minutes  23  -AE         User Key  (r) = Recorded By, (t) = Taken By, (c) = Cosigned By    Initials Name Provider Type    AE Mara Pino PTA Physical Therapy Assistant        Therapy Charges for Today     Code Description Service Date Service Provider Modifiers Qty    25805949498 HC GAIT TRAINING EA 15 MIN 5/15/2021 Mara Pino PTA GP 2          PT G-Codes  Outcome Measure Options: AM-PAC 6 Clicks Basic Mobility (PT)  AM-PAC 6 Clicks Score (PT): 18    Mara Pino PTA  5/15/2021

## 2021-05-15 NOTE — PLAN OF CARE
Goal Outcome Evaluation:  Plan of Care Reviewed With: patient  Progress: improvingOutcome Summary: Pt on room air and had no c/o chest pain but c/o muscle spasms in back 7/10. Pt states back pain is chronic. Pt was sitting EOB and was S to stand and to ambulate.Pt walked on room air and did not seem SOA. PTA was unable to obtain consistant pulse ox reading. Pt walked 500ft x 2 with 1 standing rest. Pt would benefit from continued PT while here at hospital.

## 2021-05-16 ENCOUNTER — READMISSION MANAGEMENT (OUTPATIENT)
Dept: CALL CENTER | Facility: HOSPITAL | Age: 61
End: 2021-05-16

## 2021-05-16 NOTE — OUTREACH NOTE
Prep Survey      Responses   [facility] facility patient discharged from?  [place]   Is LACE score < 7 ?  No   Emergency Room discharge w/ pulse ox?  No   Eligibility  Readm Mgmt   Discharge diagnosis  Chest Pain CABG   Does the patient have one of the following disease processes/diagnoses(primary or secondary)?  Cardiothoracic surgery   Does the patient have Home health ordered?  No   Is there a DME ordered?  No   Prep survey completed?  Yes          Fernanda Coats RN

## 2021-05-16 NOTE — PAYOR COMM NOTE
"DC HOME 5-15-21  [id number]    Timbo Shafferzier JOAQUÍN (60 y.o. Male)     Date of Birth Social Security Number Address Home Phone MRN    1960  [id number] [phone] [id number]    Rastafari Marital Status          Other        Admission Date Admission Type Admitting Provider Attending Provider Department, Room/Bed    5/5/21 Emergency Alex Menendez MD  [facility] 4B, 432/1    Discharge Date Discharge Disposition Discharge Destination        5/15/2021 Home or Self Care Home             Attending Provider: (none)   Allergies: Codeine, Penicillins    Isolation: None   Infection: None   Code Status: Prior    Ht: 177.8 cm (70\")   Wt: 81.5 kg (179 lb 9.6 oz)    Admission Cmt: None   Principal Problem: Coronary artery disease involving native coronary artery of native heart with unstable angina pectoris (CMS/[facility]) [I25.110] More...                 Active Insurance as of 5/5/2021     Primary Coverage     Payor Plan Insurance Group Employer/Plan Group    [facility] MEDICARE REPLACEMENT [facility]      Payor Plan Address Payor Plan Phone Number Payor Plan Fax Number Effective Dates    [address] [address] [phone]  4/15/2019 - None Entered    [facility] [address]       Subscriber Name Subscriber Birth Date Member ID       Tree Shaffer 1960 [id number]                 Emergency Contacts      (Rel.) Home Phone Work Phone Mobile Phone    EdmundTory (Spouse) [phone] -- [phone]            Discharge Summary    No notes of this type exist for this encounter.         "

## 2021-05-17 ENCOUNTER — READMISSION MANAGEMENT (OUTPATIENT)
Dept: CALL CENTER | Facility: HOSPITAL | Age: 61
End: 2021-05-17

## 2021-05-17 PROBLEM — K08.9 POOR DENTITION: Status: ACTIVE | Noted: 2021-05-17

## 2021-05-17 PROBLEM — F41.9 ANXIETY: Status: ACTIVE | Noted: 2021-05-17

## 2021-05-17 PROBLEM — J44.9 COPD (CHRONIC OBSTRUCTIVE PULMONARY DISEASE) (HCC): Status: ACTIVE | Noted: 2021-05-17

## 2021-05-17 PROBLEM — K21.9 GERD (GASTROESOPHAGEAL REFLUX DISEASE): Status: ACTIVE | Noted: 2021-05-17

## 2021-05-17 PROBLEM — R09.02 HYPOXIA: Status: ACTIVE | Noted: 2021-05-17

## 2021-05-17 PROBLEM — G89.29 CHRONIC NECK AND BACK PAIN: Status: ACTIVE | Noted: 2021-05-17

## 2021-05-17 PROBLEM — I73.9 PERIPHERAL ARTERY DISEASE: Status: ACTIVE | Noted: 2021-05-17

## 2021-05-17 PROBLEM — Z79.899 ON STATIN THERAPY: Status: ACTIVE | Noted: 2021-05-17

## 2021-05-17 PROBLEM — M54.9 CHRONIC NECK AND BACK PAIN: Status: ACTIVE | Noted: 2021-05-17

## 2021-05-17 PROBLEM — M54.2 CHRONIC NECK AND BACK PAIN: Status: ACTIVE | Noted: 2021-05-17

## 2021-05-17 NOTE — OUTREACH NOTE
CT Surgery Week 1 Survey      Responses   [facility] patient discharged from?  [place]   Does the patient have one of the following disease processes/diagnoses(primary or secondary)?  Cardiothoracic surgery   Week 1 attempt successful?  Yes   Call start time  1329   Call end time  1334   Discharge diagnosis  Chest Pain CABG   Is patient permission given to speak with other caregiver?  Yes   List who call center can speak with  wife   Medication alerts for this patient  Pt is not currently on Lipitor   Meds reviewed with patient/caregiver?  Yes   Is the patient having any side effects they believe may be caused by any medication additions or changes?  No   Does the patient have all medications related to this admission filled (includes all antibiotics, pain medications, cardiac medications, etc.)  Yes   Is the patient taking all medications as directed (includes completed medication regime)?  Yes   Does the patient have a primary care provider?   Yes   Does the patient have an appointment scheduled with their C/T surgeon?  Yes   Comments regarding PCP  Cardiology f/u 5-18   Has the patient kept scheduled appointments due by today?  N/A   Psychosocial issues?  No   Comments  Pt reports that he has some edema LE. Denies CP, SOA. Incision is healing well. No s/sx of infection.    Did the patient receive a copy of their discharge instructions?  Yes   Nursing interventions  Reviewed instructions with patient   What is the patient's perception of their health status since discharge?  Improving   Nursing interventions  Reassured on normal signs of recovery   Is the patient /caregiver able to teach back basic post-op care?  Practice 'cough and deep breath', Continue use of incentive spirometry at least 1 week post discharge, Take showers only when approved by MD-sponge bathe until then   Is the patient/caregiver able to teach back signs and symptoms of incisional infection?  Increased drainage or bleeding, Increased  redness, swelling or pain at the incisonal site   Is the patient/caregiver able to teach back steps to recovery at home?  Rest and rebuild strength, gradually increase activity, Set small, achievable goals for return to baseline health   Is the patient /caregiver able to teach back the importance of cardiac rehab?  Yes   Is the patient/caregiver able to teach back the hierarchy of who to call/visit for symptoms/problems? PCP, Specialist, Home health nurse, Urgent Care, ED, 911  Yes   Week 1 call completed?  Yes            Lin Miller RN

## 2021-05-18 ENCOUNTER — OFFICE VISIT (OUTPATIENT)
Dept: CARDIAC SURGERY | Facility: CLINIC | Age: 61
End: 2021-05-18

## 2021-05-18 VITALS
WEIGHT: 180.4 LBS | DIASTOLIC BLOOD PRESSURE: 62 MMHG | BODY MASS INDEX: 25.83 KG/M2 | SYSTOLIC BLOOD PRESSURE: 98 MMHG | HEIGHT: 70 IN | OXYGEN SATURATION: 99 % | HEART RATE: 63 BPM

## 2021-05-18 DIAGNOSIS — M54.2 CHRONIC NECK AND BACK PAIN: ICD-10-CM

## 2021-05-18 DIAGNOSIS — Z72.0 TOBACCO ABUSE: ICD-10-CM

## 2021-05-18 DIAGNOSIS — M54.9 CHRONIC NECK AND BACK PAIN: ICD-10-CM

## 2021-05-18 DIAGNOSIS — I70.213 ATHEROSCLEROSIS OF NATIVE ARTERY OF BOTH LOWER EXTREMITIES WITH INTERMITTENT CLAUDICATION (HCC): ICD-10-CM

## 2021-05-18 DIAGNOSIS — I25.110 CORONARY ARTERY DISEASE INVOLVING NATIVE CORONARY ARTERY OF NATIVE HEART WITH UNSTABLE ANGINA PECTORIS (HCC): ICD-10-CM

## 2021-05-18 DIAGNOSIS — I10 ESSENTIAL HYPERTENSION: ICD-10-CM

## 2021-05-18 DIAGNOSIS — I20.0 UNSTABLE ANGINA PECTORIS (HCC): Primary | ICD-10-CM

## 2021-05-18 DIAGNOSIS — G89.29 CHRONIC NECK AND BACK PAIN: ICD-10-CM

## 2021-05-18 DIAGNOSIS — Z79.899 ON STATIN THERAPY: ICD-10-CM

## 2021-05-18 PROBLEM — R09.02 HYPOXIA: Status: RESOLVED | Noted: 2021-05-17 | Resolved: 2021-05-18

## 2021-05-18 PROCEDURE — 99024 POSTOP FOLLOW-UP VISIT: CPT | Performed by: NURSE PRACTITIONER

## 2021-05-18 RX ORDER — ATORVASTATIN CALCIUM 40 MG/1
40 TABLET, FILM COATED ORAL DAILY
Qty: 30 TABLET | Refills: 2 | Status: SHIPPED | OUTPATIENT
Start: 2021-05-18 | End: 2021-09-09 | Stop reason: SDUPTHER

## 2021-05-18 NOTE — PROGRESS NOTES
"Subjective   Chief Complaint   Patient presents with   • Post-op Follow-up     CABG x4 on 5/10       Patient ID: Tree Shaffer is a 60 y.o. male who is here for follow-up having had Coronary artery bypass grafting-4 vessel (left internal mammary artery/left anterior descending, reverse saphenous vein graft/first diagonal artery, reverse saphenous vein graft/first obtuse marginal, and reverse saphenous vein graft/Posterior descending artery), Right extended endoscopic vein harvest performed on 5/10/2021 by Dr. Menendez.     History of Present Illness  Post operative recovery was uneventful without any major complications. He returns today for 1 week follow up. He is joined by his wife. Sleep habits are fair-he is sleeping in a recliner. Pain control has been good. He is taking percocet every 3-4 hours prn and sometimes breaking them in half. No fevers/sweats/chills. No drainage from incisions.  Occasional sternal \"pop\" with deep breaths. No cardiac chest pain. Shortness of breath has improved. Appetite is good. He has not smoked since discharge! Ambulating 10 minutes twice daily. Weight down 4 pounds since discharge.     The following portions of the patient's history were reviewed and updated as appropriate: allergies, current medications, past family history, past medical history, past social history, past surgical history and problem list.    Review of Systems   Constitutional: Negative for chills, diaphoresis and fever.   Respiratory: Positive for shortness of breath (improving ). Negative for wheezing.    Cardiovascular: Positive for chest pain (incisional ) and leg swelling. Negative for palpitations.   Musculoskeletal: Positive for back pain and neck pain.       Objective   Visit Vitals  BP 98/62 (BP Location: Left arm, Patient Position: Sitting, Cuff Size: Adult)   Pulse 63   Ht 177.8 cm (70\")   Wt 81.8 kg (180 lb 6.4 oz)   SpO2 99%   BMI 25.88 kg/m²       Physical Exam  Vitals reviewed.   Constitutional:       " General: He is not in acute distress.     Appearance: Normal appearance. He is well-developed. He is not diaphoretic.   HENT:      Head: Normocephalic and atraumatic.   Eyes:      Pupils: Pupils are equal, round, and reactive to light.   Cardiovascular:      Rate and Rhythm: Normal rate and regular rhythm.      Heart sounds: Normal heart sounds. No murmur heard.   No friction rub.   Pulmonary:      Effort: Pulmonary effort is normal. No respiratory distress.      Breath sounds: Normal breath sounds. No wheezing or rales.   Abdominal:      General: There is no distension.      Palpations: Abdomen is soft.      Tenderness: There is no abdominal tenderness. There is no guarding.   Musculoskeletal:      Cervical back: Normal range of motion and neck supple.      Right lower leg: Edema (mild LE edema ) present.      Left lower leg: No edema.   Skin:     General: Skin is warm and dry.      Coloration: Skin is not pale.      Findings: Bruising (right leg from vein harvesting site ) present. No rash.      Comments: Sternotomy site clean, dry, and intact. Sternum stable. No clicks. Saphenectomy site clean, dry, and intact.    Neurological:      Mental Status: He is alert and oriented to person, place, and time. Mental status is at baseline.   Psychiatric:         Behavior: Behavior normal.           Assessment/Plan         Diagnoses and all orders for this visit:    1. Unstable angina pectoris (CMS/HCC) (Primary)    2. Coronary artery disease involving native coronary artery of native heart with unstable angina pectoris (CMS/HCC)    3. On statin therapy    4. Essential hypertension    5. Chronic neck and back pain    6. Tobacco abuse    7. Atherosclerosis of native artery of both lower extremities with intermittent claudication (CMS/HCC)    Other orders  -     atorvastatin (Lipitor) 40 MG tablet; Take 1 tablet by mouth Daily.  Dispense: 30 tablet; Refill: 2         Overall, Tree Shaffer is doing well. Surgical sites are  clean and dry without evidence of infection. We discussed current sternotomy precautions and how these will not be advanced yet. Refill of Lipitor sent to his pharmacy of choice. Continue lasix until prescription runs out. Encouraged to space out percocet to every 6-8 hours as needed. OK for tylenol prn pain as well. Following post op cardiac surgery home instructions. Provided support and encouragement. All questions have been answered to the best of my ability. Will discuss starting cardiac rehabilitation at official 1 month post op visit. Patient has follow up with Dr. Menendez in a few weeks. Patient has follow up with Cardiology in a few weeks. Patient has been instructed to contact our office with any questions or concerns should they arise prior to the next office visit. He will make appt for follow up with Dr. Baer. Keep follow up with PCP.     Patient's Body mass index is 25.88 kg/m². indicating that he is overweight (BMI 25-29.9). Obesity-related health conditions include the following: hypertension, coronary heart disease, GERD and peripheral vascular disease. Obesity is unchanged. BMI is is above average; BMI management plan is completed. We discussed portion control, increasing exercise and follow with PCP to accomplish an acceptable weight for age and height..      I have congratulated Tree Shaffer on successful smoking cessation.     Tree Shaffer  reports that he quit smoking 8 days ago. His smoking use included cigarettes. He has never used smokeless tobacco.       Advance Care Planning   ACP discussion was declined by the patient. Patient does not have an advance directive, declines further assistance.

## 2021-05-20 ENCOUNTER — TELEPHONE (OUTPATIENT)
Dept: CARDIAC SURGERY | Facility: CLINIC | Age: 61
End: 2021-05-20

## 2021-05-20 DIAGNOSIS — I25.110 CORONARY ARTERY DISEASE INVOLVING NATIVE CORONARY ARTERY OF NATIVE HEART WITH UNSTABLE ANGINA PECTORIS (HCC): Primary | ICD-10-CM

## 2021-05-20 RX ORDER — OXYCODONE HYDROCHLORIDE AND ACETAMINOPHEN 5; 325 MG/1; MG/1
1 TABLET ORAL EVERY 6 HOURS PRN
Qty: 30 TABLET | Refills: 0 | Status: SHIPPED | OUTPATIENT
Start: 2021-05-20 | End: 2021-06-01

## 2021-05-20 NOTE — TELEPHONE ENCOUNTER
Pt calling to request a refill of his pain med.  States he will run out of the med today.  Informed pt I would send a message to ask about this.  Pt uses [facility].  Can reach pt at #[phone]/randall

## 2021-05-25 ENCOUNTER — READMISSION MANAGEMENT (OUTPATIENT)
Dept: CALL CENTER | Facility: HOSPITAL | Age: 61
End: 2021-05-25

## 2021-05-25 NOTE — OUTREACH NOTE
CT Surgery Week 2 Survey      Responses   [facility] patient discharged from?  [place]   Does the patient have one of the following disease processes/diagnoses(primary or secondary)?  Cardiothoracic surgery   Week 2 attempt successful?  Yes   Call start time  1720   Call end time  1726   Discharge diagnosis  Chest Pain CABG   Meds reviewed with patient/caregiver?  Yes   Is the patient having any side effects they believe may be caused by any medication additions or changes?  No   Does the patient have all medications related to this admission filled (includes all antibiotics, pain medications, cardiac medications, etc.)  Yes   Is the patient taking all medications as directed (includes completed medication regime)?  Yes   Does the patient have a primary care provider?   Yes   Does the patient have an appointment scheduled with their C/T surgeon?  Yes   Has the patient kept scheduled appointments due by today?  Yes   Comments  Has appt with Dr. Andersen tomorrow   Psychosocial issues?  No   Did the patient receive a copy of their discharge instructions?  Yes   Nursing interventions  Reviewed instructions with patient   What is the patient's perception of their health status since discharge?  Improving   Nursing interventions  Nurse provided patient education   Is the patient/caregiver able to teach back normal signs of recovery?  Pain or discomfort at incisional site   Nursing interventions  Reassured on normal signs of recovery   Is the patient /caregiver able to teach back basic post-op care?  Practice 'cough and deep breath', Drive as instructed by MD in discharge instructions, Keep incision areas clean, dry and protected, Lifting as instructed by MD in discharge instructions   Is the patient/caregiver able to teach back signs and symptoms of incisional infection?  Increased redness, swelling or pain at the incisonal site, Increased drainage or bleeding, Incisional warmth, Pus or odor from incision, Fever  "  Is the patient/caregiver able to teach back steps to recovery at home?  Set small, achievable goals for return to baseline health, Rest and rebuild strength, gradually increase activity, Eat a well-balance diet, Make a list of questions for surgeon's appointment   If the patient is a current smoker, are they able to teach back resources for cessation?  Not a smoker [States had stopped \"cold turkey\" before surgery]   Is the patient/caregiver able to teach back the hierarchy of who to call/visit for symptoms/problems? PCP, Specialist, Home health nurse, Urgent Care, ED, 911  Yes   Additional teach back comments  States he is doing well.  Has soome pain the right side ribs.  Believes it may be from coughing since he has COPD.  No fever or chills noted.  States his wife monitors his bp and it has been good.    Week 2 call completed?  Yes   Wrap up additional comments  If no improvement to rib pain he is to contact cardiac surgeon.  Has appt with PCP tomorrow          Mariluz De León LPN  "

## 2021-06-01 ENCOUNTER — OFFICE VISIT (OUTPATIENT)
Dept: CARDIAC SURGERY | Facility: CLINIC | Age: 61
End: 2021-06-01

## 2021-06-01 VITALS
SYSTOLIC BLOOD PRESSURE: 118 MMHG | OXYGEN SATURATION: 98 % | HEART RATE: 66 BPM | HEIGHT: 70 IN | BODY MASS INDEX: 26.05 KG/M2 | DIASTOLIC BLOOD PRESSURE: 68 MMHG | WEIGHT: 182 LBS

## 2021-06-01 DIAGNOSIS — I25.110 CORONARY ARTERY DISEASE INVOLVING NATIVE CORONARY ARTERY OF NATIVE HEART WITH UNSTABLE ANGINA PECTORIS (HCC): Primary | ICD-10-CM

## 2021-06-01 DIAGNOSIS — I70.213 ATHEROSCLEROSIS OF NATIVE ARTERY OF BOTH LOWER EXTREMITIES WITH INTERMITTENT CLAUDICATION (HCC): ICD-10-CM

## 2021-06-01 DIAGNOSIS — M54.2 CHRONIC NECK AND BACK PAIN: ICD-10-CM

## 2021-06-01 DIAGNOSIS — F12.91 MARIJUANA USE IN REMISSION: ICD-10-CM

## 2021-06-01 DIAGNOSIS — I10 ESSENTIAL HYPERTENSION: ICD-10-CM

## 2021-06-01 DIAGNOSIS — Z79.899 ON STATIN THERAPY: ICD-10-CM

## 2021-06-01 DIAGNOSIS — Z87.891 FORMER SMOKER: ICD-10-CM

## 2021-06-01 DIAGNOSIS — G89.29 CHRONIC NECK AND BACK PAIN: ICD-10-CM

## 2021-06-01 DIAGNOSIS — I20.0 UNSTABLE ANGINA PECTORIS (HCC): ICD-10-CM

## 2021-06-01 DIAGNOSIS — M54.9 CHRONIC NECK AND BACK PAIN: ICD-10-CM

## 2021-06-01 PROCEDURE — 99024 POSTOP FOLLOW-UP VISIT: CPT | Performed by: NURSE PRACTITIONER

## 2021-06-01 RX ORDER — OXYCODONE HYDROCHLORIDE AND ACETAMINOPHEN 5; 325 MG/1; MG/1
1 TABLET ORAL EVERY 8 HOURS PRN
Qty: 20 TABLET | Refills: 0 | Status: SHIPPED | OUTPATIENT
Start: 2021-06-01 | End: 2021-06-23

## 2021-06-01 NOTE — PROGRESS NOTES
"Subjective   Chief Complaint   Patient presents with   • Post-op Follow-up     Pt had CABG x4 on 5/10       Patient ID: Tree Shaffer is a 60 y.o. male who is here for follow-up having had Coronary artery bypass grafting-4 vessel (left internal mammary artery/left anterior descending, reverse saphenous vein graft/first diagonal artery, reverse saphenous vein graft/first obtuse marginal, and reverse saphenous vein graft/Posterior descending artery), Right extended endoscopic vein harvest performed on 5/10/2021 by Dr. Menendez.     History of Present Illness  Post operative recovery was uneventful without any major complications. Overall, he has been doing well. He was given a refill of percocet on 5/20/2021 #30 tabs every 6 hours prn pain by Dr. Menendez (second pain med total). He has run out of these over the holiday weekend and called our office-ultimately he spoke to Dr. Page who was on call and advised to present this morning for evaluation. He has been taking tylenol a couple times per day with essentially no relief. He describes a sharp pain with deep inspiration only that shoots from his right scapula through his thorax to his right chest. He believes this pain has been present since surgery. Rates this pain 6-7/10. Pre operatively, he reports being fired by Orthopedic pain management for failing a drug test (+THC) and he fired Dr. West. He started using marijuana to treat his chronic neck and back pain. He has stopped using marijuana because he is afraid this will cause him to start smoking cigarettes again and he has successfully quit smoking cigarettes after hospital discharge from CABG. Indicates he has been doing well with this. Has tried Neurontin in the past but developed hallucinations and peripheral edema and was discontinued.     He is joined by his wife. Sleep habits are good. No fevers or chills. He does have occasional night sweats. No drainage from incisions.  Occasional sternal \"pop\" with " "deep breaths. Shortness of breath has improved. Appetite is good. Ambulating 15 minutes twice daily.     The following portions of the patient's history were reviewed and updated as appropriate: allergies, current medications, past family history, past medical history, past social history, past surgical history and problem list.    Review of Systems   Constitutional: Negative for chills, diaphoresis and fever.   Respiratory: Positive for shortness of breath (improving ). Negative for wheezing.    Cardiovascular: Positive for chest pain (right ) and leg swelling (minimal ). Negative for palpitations.   Musculoskeletal: Positive for back pain and neck pain.        Chronic neck and back pain        Objective   Visit Vitals  /68 (BP Location: Left arm, Patient Position: Sitting, Cuff Size: Adult)   Pulse 66   Ht 177.8 cm (70\")   Wt 82.6 kg (182 lb)   SpO2 98%   BMI 26.11 kg/m²       Physical Exam  Vitals reviewed.   Constitutional:       General: He is not in acute distress.     Appearance: Normal appearance. He is well-developed. He is not diaphoretic.   HENT:      Head: Normocephalic and atraumatic.   Eyes:      Pupils: Pupils are equal, round, and reactive to light.   Cardiovascular:      Rate and Rhythm: Normal rate and regular rhythm.      Heart sounds: Normal heart sounds. No murmur heard.   No friction rub.   Pulmonary:      Effort: Pulmonary effort is normal. No respiratory distress.      Breath sounds: Normal breath sounds. No wheezing or rales.   Abdominal:      General: There is no distension.      Palpations: Abdomen is soft.      Tenderness: There is no abdominal tenderness. There is no guarding.   Musculoskeletal:      Cervical back: Normal range of motion and neck supple.      Right lower leg: Edema (mild LE edema ) present.      Left lower leg: No edema.   Skin:     General: Skin is warm and dry.      Coloration: Skin is not pale.      Findings: Bruising (right leg from vein harvesting site, " improving ) present. No rash.      Comments: Sternotomy site clean, dry, and intact. Sternum stable. No clicks appreciated. Saphenectomy site clean, dry, and intact with 2 small scabs.    Neurological:      Mental Status: He is alert and oriented to person, place, and time. Mental status is at baseline.   Psychiatric:         Behavior: Behavior normal.           Assessment/Plan     HOLLY #[id number] reviewed with no suspicious activity found.    Diagnoses and all orders for this visit:    1. Coronary artery disease involving native coronary artery of native heart with unstable angina pectoris (CMS/HCC) (Primary)  -     oxyCODONE-acetaminophen (Percocet) 5-325 MG per tablet; Take 1 tablet by mouth Every 8 (Eight) Hours As Needed (pain).  Dispense: 20 tablet; Refill: 0    2. Unstable angina pectoris (CMS/HCC)    3. On statin therapy    4. Essential hypertension    5. Atherosclerosis of native artery of both lower extremities with intermittent claudication (CMS/HCC)    6. Chronic neck and back pain    7. Former smoker    8. Marijuana use in remission         Overall, Tree Shaffer is doing well. Surgical sites are clean and dry without evidence of infection. We discussed current sternotomy precautions and how these will not be advanced yet. Discussed acute pain from sternotomy should be improving by now. He denies trauma or any injury. Do not think his pain is acute in nature. Discussed chronic pain with best option for evaluation and treatment by pain management specialist. Encouraged him to speak to his PCP Elder Andersen to further discuss options of referral to pain management specialist. We discussed additional options for pain management to include tylenol around the clock, OTC lidocaine patches, massage, icy hot. He does have a TENS unit and uses a massaging heating chair at times. He was provided a refill of percocet by Dr. Menendez. He was advised that we cannot treat chronic pain. Advised not to use NSAIDS due  to risk of adverse cardiac event. Patient and wife verbalize understanding and indicates he will call PCP. Following post op cardiac surgery home instructions. Provided support and encouragement. All questions have been answered to the best of my ability. Will discuss starting cardiac rehabilitation at official 1 month post op visit. Patient has follow up with Dr. Menendez in a few weeks. Patient has follow up with Cardiology in a few weeks. Patient has been instructed to contact our office with any questions or concerns should they arise prior to the next office visit.     Patient's Body mass index is 26.11 kg/m². indicating that he is overweight (BMI 25-29.9). Obesity-related health conditions include the following: hypertension, coronary heart disease, GERD and peripheral vascular disease. Obesity is unchanged. BMI is is above average; BMI management plan is completed. We discussed portion control, increasing exercise and follow with PCP to accomplish an acceptable weight for age and height.      I have congratulated Tree Shaffer on successful smoking cessation.     Tree Shaffer  reports that he quit smoking about 3 weeks ago. His smoking use included cigarettes. He has never used smokeless tobacco.       Advance Care Planning   ACP discussion was declined by the patient. Patient does not have an advance directive, declines further assistance.

## 2021-06-03 ENCOUNTER — READMISSION MANAGEMENT (OUTPATIENT)
Dept: CALL CENTER | Facility: HOSPITAL | Age: 61
End: 2021-06-03

## 2021-06-03 NOTE — OUTREACH NOTE
CT Surgery Week 3 Survey      Responses   [facility] patient discharged from?  [place]   Does the patient have one of the following disease processes/diagnoses(primary or secondary)?  Cardiothoracic surgery   Week 3 attempt successful?  Yes   Call start time  1703   Call end time  1711   Discharge diagnosis  Chest Pain, CABG   Is patient permission given to speak with other caregiver?  Yes   List who call center can speak with  wife   Person spoke with today (if not patient) and relationship  patient   Meds reviewed with patient/caregiver?  Yes   Does the patient have all medications related to this admission filled (includes all antibiotics, pain medications, cardiac medications, etc.)  Yes   Is the patient taking all medications as directed (includes completed medication regime)?  Yes   Does the patient have a primary care provider?   Yes   Does the patient have an appointment scheduled with their C/T surgeon?  Yes   Has the patient kept scheduled appointments due by today?  Yes   Has home health visited the patient within 72 hours of discharge?  N/A   Psychosocial issues?  No   Did the patient receive a copy of their discharge instructions?  Yes   Nursing interventions  Reviewed instructions with patient   What is the patient's perception of their health status since discharge?  Improving   Nursing interventions  Nurse provided patient education   Is the patient /caregiver able to teach back basic post-op care?  Keep incision areas clean, dry and protected   Is the patient/caregiver able to teach back signs and symptoms of incisional infection?  Increased redness, swelling or pain at the incisonal site, Increased drainage or bleeding, Incisional warmth, Pus or odor from incision, Fever   Is the patient/caregiver able to teach back steps to recovery at home?  Set small, achievable goals for return to baseline health, Rest and rebuild strength, gradually increase activity   Is the patient /caregiver able to  teach back the importance of cardiac rehab?  Yes   Nursing interventions  Provided education on importance of cardiac rehab   If the patient is a current smoker, are they able to teach back resources for cessation?  Not a smoker [Former smoker. ]   Is the patient/caregiver able to teach back the hierarchy of who to call/visit for symptoms/problems? PCP, Specialist, Home health nurse, Urgent Care, ED, 911  Yes   Week 3 call completed?  Yes          Mae Bridges RN

## 2021-06-07 RX ORDER — PANTOPRAZOLE SODIUM 40 MG/1
TABLET, DELAYED RELEASE ORAL
Qty: 30 TABLET | Refills: 0 | OUTPATIENT
Start: 2021-06-07

## 2021-06-23 ENCOUNTER — APPOINTMENT (OUTPATIENT)
Dept: GENERAL RADIOLOGY | Facility: HOSPITAL | Age: 61
End: 2021-06-23

## 2021-06-23 ENCOUNTER — HOSPITAL ENCOUNTER (EMERGENCY)
Facility: HOSPITAL | Age: 61
Discharge: HOME | End: 2021-06-23
Attending: EMERGENCY MEDICINE | Admitting: EMERGENCY MEDICINE

## 2021-06-23 ENCOUNTER — APPOINTMENT (OUTPATIENT)
Dept: CARDIOLOGY | Facility: HOSPITAL | Age: 61
End: 2021-06-23

## 2021-06-23 ENCOUNTER — OFFICE VISIT (OUTPATIENT)
Dept: CARDIAC SURGERY | Facility: CLINIC | Age: 61
End: 2021-06-23

## 2021-06-23 VITALS
HEART RATE: 59 BPM | SYSTOLIC BLOOD PRESSURE: 118 MMHG | DIASTOLIC BLOOD PRESSURE: 82 MMHG | OXYGEN SATURATION: 95 % | WEIGHT: 173 LBS | BODY MASS INDEX: 24.77 KG/M2 | RESPIRATION RATE: 12 BRPM | TEMPERATURE: 98 F | HEIGHT: 70 IN

## 2021-06-23 VITALS
HEART RATE: 53 BPM | HEIGHT: 70 IN | BODY MASS INDEX: 25 KG/M2 | OXYGEN SATURATION: 98 % | SYSTOLIC BLOOD PRESSURE: 109 MMHG | DIASTOLIC BLOOD PRESSURE: 68 MMHG | WEIGHT: 174.6 LBS

## 2021-06-23 DIAGNOSIS — Z72.0 TOBACCO ABUSE: ICD-10-CM

## 2021-06-23 DIAGNOSIS — J43.2 CENTRILOBULAR EMPHYSEMA (HCC): ICD-10-CM

## 2021-06-23 DIAGNOSIS — I25.110 CORONARY ARTERY DISEASE INVOLVING NATIVE CORONARY ARTERY OF NATIVE HEART WITH UNSTABLE ANGINA PECTORIS (HCC): Primary | ICD-10-CM

## 2021-06-23 DIAGNOSIS — R74.01 TRANSAMINITIS: ICD-10-CM

## 2021-06-23 DIAGNOSIS — R61 DIAPHORESIS: Primary | ICD-10-CM

## 2021-06-23 LAB
ALBUMIN SERPL-MCNC: 4.2 G/DL (ref 3.5–5.2)
ALBUMIN/GLOB SERPL: 1.4 G/DL
ALP SERPL-CCNC: 162 U/L (ref 39–117)
ALT SERPL W P-5'-P-CCNC: 54 U/L (ref 1–41)
ANION GAP SERPL CALCULATED.3IONS-SCNC: 9 MMOL/L (ref 5–15)
AST SERPL-CCNC: 86 U/L (ref 1–40)
BASOPHILS # BLD AUTO: 0.04 10*3/MM3 (ref 0–0.2)
BASOPHILS NFR BLD AUTO: 0.4 % (ref 0–1.5)
BILIRUB SERPL-MCNC: 0.6 MG/DL (ref 0–1.2)
BUN SERPL-MCNC: 15 MG/DL (ref 6–20)
BUN/CREAT SERPL: 22.1 (ref 7–25)
CALCIUM SPEC-SCNC: 9.5 MG/DL (ref 8.6–10.5)
CHLORIDE SERPL-SCNC: 102 MMOL/L (ref 98–107)
CO2 SERPL-SCNC: 27 MMOL/L (ref 22–29)
CREAT SERPL-MCNC: 0.68 MG/DL (ref 0.76–1.27)
DEPRECATED RDW RBC AUTO: 43.7 FL (ref 37–54)
EOSINOPHIL # BLD AUTO: 0.66 10*3/MM3 (ref 0–0.4)
EOSINOPHIL NFR BLD AUTO: 7.4 % (ref 0.3–6.2)
ERYTHROCYTE [DISTWIDTH] IN BLOOD BY AUTOMATED COUNT: 12.9 % (ref 12.3–15.4)
GFR SERPL CREATININE-BSD FRML MDRD: 119 ML/MIN/1.73
GLOBULIN UR ELPH-MCNC: 3.1 GM/DL
GLUCOSE BLDC GLUCOMTR-MCNC: 117 MG/DL (ref 70–130)
GLUCOSE SERPL-MCNC: 133 MG/DL (ref 65–99)
HCT VFR BLD AUTO: 43.5 % (ref 37.5–51)
HGB BLD-MCNC: 14.1 G/DL (ref 13–17.7)
HOLD SPECIMEN: NORMAL
HOLD SPECIMEN: NORMAL
IMM GRANULOCYTES # BLD AUTO: 0.02 10*3/MM3 (ref 0–0.05)
IMM GRANULOCYTES NFR BLD AUTO: 0.2 % (ref 0–0.5)
LYMPHOCYTES # BLD AUTO: 1.27 10*3/MM3 (ref 0.7–3.1)
LYMPHOCYTES NFR BLD AUTO: 14.2 % (ref 19.6–45.3)
MCH RBC QN AUTO: 30.1 PG (ref 26.6–33)
MCHC RBC AUTO-ENTMCNC: 32.4 G/DL (ref 31.5–35.7)
MCV RBC AUTO: 92.8 FL (ref 79–97)
MONOCYTES # BLD AUTO: 0.75 10*3/MM3 (ref 0.1–0.9)
MONOCYTES NFR BLD AUTO: 8.4 % (ref 5–12)
NEUTROPHILS NFR BLD AUTO: 6.22 10*3/MM3 (ref 1.7–7)
NEUTROPHILS NFR BLD AUTO: 69.4 % (ref 42.7–76)
NRBC BLD AUTO-RTO: 0 /100 WBC (ref 0–0.2)
PLATELET # BLD AUTO: 356 10*3/MM3 (ref 140–450)
PMV BLD AUTO: 11.2 FL (ref 6–12)
POTASSIUM SERPL-SCNC: 4.5 MMOL/L (ref 3.5–5.2)
PROT SERPL-MCNC: 7.3 G/DL (ref 6–8.5)
RBC # BLD AUTO: 4.69 10*6/MM3 (ref 4.14–5.8)
SODIUM SERPL-SCNC: 138 MMOL/L (ref 136–145)
TROPONIN T SERPL-MCNC: <0.01 NG/ML (ref 0–0.03)
TROPONIN T SERPL-MCNC: <0.01 NG/ML (ref 0–0.03)
WBC # BLD AUTO: 8.96 10*3/MM3 (ref 3.4–10.8)
WHOLE BLOOD HOLD SPECIMEN: NORMAL

## 2021-06-23 PROCEDURE — 82962 GLUCOSE BLOOD TEST: CPT

## 2021-06-23 PROCEDURE — 71045 X-RAY EXAM CHEST 1 VIEW: CPT

## 2021-06-23 PROCEDURE — 99283 EMERGENCY DEPT VISIT LOW MDM: CPT

## 2021-06-23 PROCEDURE — 84484 ASSAY OF TROPONIN QUANT: CPT | Performed by: EMERGENCY MEDICINE

## 2021-06-23 PROCEDURE — 85025 COMPLETE CBC W/AUTO DIFF WBC: CPT | Performed by: EMERGENCY MEDICINE

## 2021-06-23 PROCEDURE — 93306 TTE W/DOPPLER COMPLETE: CPT

## 2021-06-23 PROCEDURE — 93306 TTE W/DOPPLER COMPLETE: CPT | Performed by: INTERNAL MEDICINE

## 2021-06-23 PROCEDURE — 93010 ELECTROCARDIOGRAM REPORT: CPT | Performed by: INTERNAL MEDICINE

## 2021-06-23 PROCEDURE — 93356 MYOCRD STRAIN IMG SPCKL TRCK: CPT

## 2021-06-23 PROCEDURE — 93005 ELECTROCARDIOGRAM TRACING: CPT | Performed by: EMERGENCY MEDICINE

## 2021-06-23 PROCEDURE — 80053 COMPREHEN METABOLIC PANEL: CPT | Performed by: EMERGENCY MEDICINE

## 2021-06-23 PROCEDURE — 93356 MYOCRD STRAIN IMG SPCKL TRCK: CPT | Performed by: INTERNAL MEDICINE

## 2021-06-23 PROCEDURE — 99024 POSTOP FOLLOW-UP VISIT: CPT | Performed by: THORACIC SURGERY (CARDIOTHORACIC VASCULAR SURGERY)

## 2021-06-23 PROCEDURE — 25010000002 PERFLUTREN 6.52 MG/ML SUSPENSION: Performed by: EMERGENCY MEDICINE

## 2021-06-23 RX ORDER — ASPIRIN 81 MG/1
324 TABLET, CHEWABLE ORAL ONCE
Status: COMPLETED | OUTPATIENT
Start: 2021-06-23 | End: 2021-06-23

## 2021-06-23 RX ORDER — BUPRENORPHINE HYDROCHLORIDE AND NALOXONE HYDROCHLORIDE DIHYDRATE 8; 2 MG/1; MG/1
3 TABLET SUBLINGUAL DAILY
COMMUNITY
Start: 2021-06-15 | End: 2022-09-07

## 2021-06-23 RX ORDER — SODIUM CHLORIDE 0.9 % (FLUSH) 0.9 %
10 SYRINGE (ML) INJECTION AS NEEDED
Status: DISCONTINUED | OUTPATIENT
Start: 2021-06-23 | End: 2021-06-23 | Stop reason: HOSPADM

## 2021-06-23 RX ADMIN — ASPIRIN 243 MG: 81 TABLET, CHEWABLE ORAL at 11:32

## 2021-06-23 RX ADMIN — PERFLUTREN 9.78 MG: 6.52 INJECTION, SUSPENSION INTRAVENOUS at 12:51

## 2021-06-24 LAB
BH CV ECHO MEAS - AO MAX PG (FULL): 2.6 MMHG
BH CV ECHO MEAS - AO MAX PG: 7.4 MMHG
BH CV ECHO MEAS - AO MEAN PG (FULL): 1 MMHG
BH CV ECHO MEAS - AO MEAN PG: 4 MMHG
BH CV ECHO MEAS - AO ROOT AREA (BSA CORRECTED): 1.4
BH CV ECHO MEAS - AO ROOT AREA: 6.2 CM^2
BH CV ECHO MEAS - AO ROOT DIAM: 2.8 CM
BH CV ECHO MEAS - AO V2 MAX: 136 CM/SEC
BH CV ECHO MEAS - AO V2 MEAN: 93.1 CM/SEC
BH CV ECHO MEAS - AO V2 VTI: 36.8 CM
BH CV ECHO MEAS - AVA(I,A): 2.7 CM^2
BH CV ECHO MEAS - AVA(I,D): 2.7 CM^2
BH CV ECHO MEAS - AVA(V,A): 2.8 CM^2
BH CV ECHO MEAS - AVA(V,D): 2.8 CM^2
BH CV ECHO MEAS - BSA(HAYCOCK): 2 M^2
BH CV ECHO MEAS - BSA: 2 M^2
BH CV ECHO MEAS - BZI_BMI: 24.8 KILOGRAMS/M^2
BH CV ECHO MEAS - BZI_METRIC_HEIGHT: 177.8 CM
BH CV ECHO MEAS - BZI_METRIC_WEIGHT: 78.5 KG
BH CV ECHO MEAS - EDV(CUBED): 137.4 ML
BH CV ECHO MEAS - EDV(MOD-SP4): 119 ML
BH CV ECHO MEAS - EDV(TEICH): 127.2 ML
BH CV ECHO MEAS - EF(CUBED): 74.3 %
BH CV ECHO MEAS - EF(MOD-SP4): 64.3 %
BH CV ECHO MEAS - EF(TEICH): 65.8 %
BH CV ECHO MEAS - ESV(CUBED): 35.3 ML
BH CV ECHO MEAS - ESV(MOD-SP4): 42.5 ML
BH CV ECHO MEAS - ESV(TEICH): 43.5 ML
BH CV ECHO MEAS - FS: 36.4 %
BH CV ECHO MEAS - IVS/LVPW: 0.95
BH CV ECHO MEAS - IVSD: 0.93 CM
BH CV ECHO MEAS - LA DIMENSION: 3.5 CM
BH CV ECHO MEAS - LA/AO: 1.3
BH CV ECHO MEAS - LAT PEAK E' VEL: 8.9 CM/SEC
BH CV ECHO MEAS - LV DIASTOLIC VOL/BSA (35-75): 60.6 ML/M^2
BH CV ECHO MEAS - LV MASS(C)D: 180.1 GRAMS
BH CV ECHO MEAS - LV MASS(C)DI: 91.8 GRAMS/M^2
BH CV ECHO MEAS - LV MAX PG: 4.8 MMHG
BH CV ECHO MEAS - LV MEAN PG: 3 MMHG
BH CV ECHO MEAS - LV SYSTOLIC VOL/BSA (12-30): 21.7 ML/M^2
BH CV ECHO MEAS - LV V1 MAX: 109 CM/SEC
BH CV ECHO MEAS - LV V1 MEAN: 78.4 CM/SEC
BH CV ECHO MEAS - LV V1 VTI: 28.9 CM
BH CV ECHO MEAS - LVIDD: 5.2 CM
BH CV ECHO MEAS - LVIDS: 3.3 CM
BH CV ECHO MEAS - LVLD AP4: 8.6 CM
BH CV ECHO MEAS - LVLS AP4: 7.5 CM
BH CV ECHO MEAS - LVOT AREA (M): 3.5 CM^2
BH CV ECHO MEAS - LVOT AREA: 3.5 CM^2
BH CV ECHO MEAS - LVOT DIAM: 2.1 CM
BH CV ECHO MEAS - LVPWD: 0.98 CM
BH CV ECHO MEAS - MED PEAK E' VEL: 4.68 CM/SEC
BH CV ECHO MEAS - MV A MAX VEL: 60.6 CM/SEC
BH CV ECHO MEAS - MV DEC TIME: 0.32 SEC
BH CV ECHO MEAS - MV E MAX VEL: 96.5 CM/SEC
BH CV ECHO MEAS - MV E/A: 1.6
BH CV ECHO MEAS - PA MAX PG: 1.9 MMHG
BH CV ECHO MEAS - PA V2 MAX: 68.9 CM/SEC
BH CV ECHO MEAS - RAP SYSTOLE: 5 MMHG
BH CV ECHO MEAS - RVDD: 3 CM
BH CV ECHO MEAS - RVSP: 17.1 MMHG
BH CV ECHO MEAS - SI(AO): 115.5 ML/M^2
BH CV ECHO MEAS - SI(CUBED): 52 ML/M^2
BH CV ECHO MEAS - SI(LVOT): 51 ML/M^2
BH CV ECHO MEAS - SI(MOD-SP4): 39 ML/M^2
BH CV ECHO MEAS - SI(TEICH): 42.7 ML/M^2
BH CV ECHO MEAS - SV(AO): 226.6 ML
BH CV ECHO MEAS - SV(CUBED): 102.1 ML
BH CV ECHO MEAS - SV(LVOT): 100.1 ML
BH CV ECHO MEAS - SV(MOD-SP4): 76.5 ML
BH CV ECHO MEAS - SV(TEICH): 83.7 ML
BH CV ECHO MEAS - TR MAX VEL: 174 CM/SEC
BH CV ECHO MEASUREMENTS AVERAGE E/E' RATIO: 14.21
LEFT ATRIUM VOLUME INDEX: 24.9 ML/M2
LEFT ATRIUM VOLUME: 48.9 CM3
MAXIMAL PREDICTED HEART RATE: 161 BPM
QT INTERVAL: 450 MS
QT INTERVAL: 482 MS
QTC INTERVAL: 414 MS
QTC INTERVAL: 430 MS
STRESS TARGET HR: 137 BPM

## 2021-06-29 ENCOUNTER — OFFICE VISIT (OUTPATIENT)
Dept: CARDIOLOGY | Facility: CLINIC | Age: 61
End: 2021-06-29

## 2021-06-29 VITALS
HEIGHT: 70 IN | OXYGEN SATURATION: 98 % | SYSTOLIC BLOOD PRESSURE: 110 MMHG | BODY MASS INDEX: 25.05 KG/M2 | HEART RATE: 60 BPM | WEIGHT: 175 LBS | DIASTOLIC BLOOD PRESSURE: 60 MMHG

## 2021-06-29 DIAGNOSIS — I70.213 ATHEROSCLEROSIS OF NATIVE ARTERY OF BOTH LOWER EXTREMITIES WITH INTERMITTENT CLAUDICATION (HCC): ICD-10-CM

## 2021-06-29 DIAGNOSIS — Z79.899 ON STATIN THERAPY: ICD-10-CM

## 2021-06-29 DIAGNOSIS — F12.90 MARIJUANA USE: ICD-10-CM

## 2021-06-29 DIAGNOSIS — I10 ESSENTIAL HYPERTENSION: ICD-10-CM

## 2021-06-29 DIAGNOSIS — I25.110 CORONARY ARTERY DISEASE INVOLVING NATIVE CORONARY ARTERY OF NATIVE HEART WITH UNSTABLE ANGINA PECTORIS (HCC): ICD-10-CM

## 2021-06-29 DIAGNOSIS — J43.2 CENTRILOBULAR EMPHYSEMA (HCC): ICD-10-CM

## 2021-06-29 DIAGNOSIS — Z72.0 TOBACCO ABUSE: ICD-10-CM

## 2021-06-29 DIAGNOSIS — I73.9 PERIPHERAL ARTERY DISEASE (HCC): Primary | ICD-10-CM

## 2021-06-29 PROCEDURE — 99214 OFFICE O/P EST MOD 30 MIN: CPT | Performed by: EMERGENCY MEDICINE

## 2021-06-30 NOTE — PROGRESS NOTES
[facility] - CARDIOLOGY  New Patient Initial Outpatient [place]    Primary Care Physician: Elder Andersen APRN    Subjective     Chief Complaint   Patient presents with   • Chest Pain     1 MO D/C FU- SP CATH  - RIGHT SIDE SHOULDER PAIN         History of Present Illness    Patient is a very pleasant 60-year-old male with a past medical history of nicotine abuse, COPD, hypertension, hyperlipidemia and peripheral vascular disease who we first saw in the hospital back in May of this year for chest pain. He underwent left heart catheterization which showed a 70% stenosis in the proximal LAD, a small nondominant left circumflex and 100% stenotic right coronary artery. On May 10th he underwent successful four-vessel bypass with Dr. Menendez including LIMA to LAD, SVG to D1, SVG to OM and SVG to PDA. He was discharged on May 15th in stable condition on aspirin 81, Plavix 75, Lasix 20, lisinopril 2.5, Lopressor 50 twice daily in addition to continuing on Lipitor 40. On June 23rd he presented to the emergency room after presenting to the hospital for a CT scan in developing diaphoresis and weakness. Work-up was essentially normal. It was felt that it was likely a reaction from his Suboxone therapy.    Other than the episode on June 23rd, patient states he believes he is making a good recovery from surgery. He has noticed some swelling in his right leg. He denies any shortness of breath. He does have some chest pain which radiates into his right shoulder blade. It is exacerbated by moving his right upper extremity.    Review of Systems   Constitutional: Positive for diaphoresis. Negative for fever and malaise/fatigue.   HENT: Negative for congestion.    Eyes: Negative for vision loss in left eye and vision loss in right eye.   Cardiovascular: Positive for chest pain and leg swelling. Negative for claudication, dyspnea on exertion, irregular heartbeat, orthopnea, palpitations and syncope.   Respiratory: Negative for cough,  shortness of breath and wheezing.    Hematologic/Lymphatic: Negative for adenopathy.   Skin: Negative for rash.   Musculoskeletal: Negative for joint pain and joint swelling.   Gastrointestinal: Negative for abdominal pain, diarrhea, nausea and vomiting.   Neurological: Negative for excessive daytime sleepiness, dizziness, focal weakness, light-headedness, numbness and weakness.   Psychiatric/Behavioral: Negative for depression. The patient does not have insomnia.         Otherwise complete ROS reviewed and negative except as mentioned in the HPI.      Past Medical History:   Past Medical History:   Diagnosis Date   • Anxiety    • Arthritis    • Back pain    • Bloating    • Bronchitis    • COPD (chronic obstructive pulmonary disease) (CMS/HCC)    • DDD (degenerative disc disease), cervical    • Gallstones    • Hemorrhoid    • Hypercholesteremia    • Hypertension    • IBS (irritable bowel syndrome)    • Muscle weakness (generalized)    • Neck pain    • PVD (peripheral vascular disease) (CMS/[facility])    • Stress headache    • Vision loss        Past Surgical History:  Past Surgical History:   Procedure Laterality Date   • CARDIAC CATHETERIZATION Right 5/7/2021    Procedure: Left Heart Cath R radial US guided w poss intervention;  Surgeon: Go Baer DO;  Location:  [facility] CATH INVASIVE LOCATION;  Service: Cardiology;  Laterality: Right;   • CHOLECYSTECTOMY     • CHOLECYSTECTOMY WITH INTRAOPERATIVE CHOLANGIOGRAM N/A 9/10/2019    Procedure: LAPAROSCOPIC CHOLECYSTECTOMY WITH INTRAOPERATIVE, CHOLANGIOGRAM;  Surgeon: Leon Pulido MD;  Location: [facility] OR;  Service: General   • CORONARY ARTERY BYPASS GRAFT N/A 5/10/2021    Procedure: CORONARY ARTERY BYPASS GRAFTING x 4, lima, endoscopic vein harvest right leg, debra;  Surgeon: Alex Menendez MD;  Location: [facility] OR;  Service: Cardiothoracic;  Laterality: N/A;   • CYST REMOVAL      BACK    • HYDROCELECTOMY Left    • KNEE SURGERY     • NECK SURGERY    "      Family History: family history includes COPD in his mother; Cancer in his mother; Neuromuscular disorder in his father.    Social History:  reports that he quit smoking about 7 weeks ago. His smoking use included cigarettes. He has never used smokeless tobacco. He reports current drug use. Drug: Marijuana. He reports that he does not drink alcohol.    Medications:  Prior to Admission medications    Medication Sig Start Date End Date Taking? Authorizing Provider   aspirin 81 MG EC tablet Take 1 tablet by mouth Daily. 5/15/21  Yes Stefan Sibley MD   atorvastatin (Lipitor) 40 MG tablet Take 1 tablet by mouth Daily. 5/18/21  Yes Janice Moe APRN   buprenorphine-naloxone (SUBOXONE) 8-2 MG per SL tablet Take 3 tablets under tongue once a day . Patient reports that he takes one tablet three times per day 6/15/21  Yes ProviderClarence MD   clopidogrel (PLAVIX) 75 MG tablet Take 1 tablet by mouth Daily. 5/15/21  Yes Stefan Sibley MD   lisinopril (PRINIVIL,ZESTRIL) 2.5 MG tablet Take 1 tablet by mouth Daily. 5/16/21  Yes Stefan Sibley MD   metoprolol tartrate (LOPRESSOR) 50 MG tablet Take 1 tablet by mouth Every 12 (Twelve) Hours. 5/15/21  Yes Stefan Sibley MD   pantoprazole (PROTONIX) 40 MG EC tablet Take 1 tablet by mouth Daily. 5/15/21  Yes Stefan Sibley MD   SYMBICORT 160-4.5 MCG/ACT inhaler Inhale 2 puffs 2 (Two) Times a Day. 6/8/19  Yes ProviderClarence MD     Allergies:  Allergies   Allergen Reactions   • Codeine Other (See Comments)   • Penicillins      Pt reports he does not know what kind of reaction he has, states \"I'm highly allergic.\"       Objective     Vital Signs: /60 (BP Location: Left arm, Patient Position: Sitting, Cuff Size: Adult)   Pulse 60   Ht 177.8 cm (70\")   Wt 79.4 kg (175 lb)   SpO2 98%   BMI 25.11 kg/m²     Vitals and nursing note reviewed.   Constitutional:       Appearance: Normal and healthy appearance. Well-developed and not in distress.   Eyes:     "  Extraocular Movements: Extraocular movements intact.      Pupils: Pupils are equal, round, and reactive to light.   HENT:      Head: Normocephalic and atraumatic.    Mouth/Throat:      Pharynx: Oropharynx is clear.   Neck:      Vascular: JVD normal.      Trachea: Trachea normal.   Pulmonary:      Effort: Pulmonary effort is normal.      Breath sounds: Normal breath sounds. No wheezing. No rhonchi. No rales.   Cardiovascular:      PMI at left midclavicular line. Normal rate. Regular rhythm. Normal S1. Normal S2.      Murmurs: There is no murmur.      No gallop. No click. No rub.   Pulses:     Decreased pulses.   Abdominal:      General: Bowel sounds are normal.      Palpations: Abdomen is soft.      Tenderness: There is no abdominal tenderness.   Musculoskeletal: Normal range of motion.      Cervical back: Normal range of motion and neck supple. Skin:     General: Skin is warm and dry.      Capillary Refill: Capillary refill takes less than 2 seconds.   Feet:      Right foot:      Skin integrity: Skin integrity normal.      Left foot:      Skin integrity: Skin integrity normal.   Neurological:      Mental Status: Alert and oriented to person, place and time.      Cranial Nerves: Cranial nerves are intact.      Sensory: Sensation is intact.      Motor: Motor function is intact.      Coordination: Coordination is intact.   Psychiatric:         Speech: Speech normal.         Behavior: Behavior is cooperative.         Results Reviewed:          Lab Results   Component Value Date    CHOL 153 05/06/2021    TRIG 110 05/06/2021    HDL 34 (L) 05/06/2021    VLDL 20 05/06/2021    LDLHDL 2.85 05/06/2021     Lab Results   Component Value Date    HGBA1C 5.80 (H) 05/06/2021       Assessment / Plan        Problem List Items Addressed This Visit        Advance Directives and General Issues    Marijuana use       Cardiac and Vasculature    Essential hypertension    Coronary artery disease involving native coronary artery of native  heart with unstable angina pectoris (CMS/HCC)    Overview     Added automatically from request for surgery [id number]         Atherosclerosis of native artery of both lower extremities with intermittent claudication (CMS/HCC)    Peripheral artery disease (CMS/[facility]) - Primary    Relevant Orders    US Ankle / Brachial Indices Extremity Complete    On statin therapy       Pulmonary and Pneumonias    COPD (chronic obstructive pulmonary disease) (CMS/[facility])       Tobacco    Tobacco abuse          Plan:    Patient is doing well from a cardiovascular standpoint status post four-vessel bypass surgery by Dr. Menendez last month. He is making a good recovery. His incision sites are healing well with no signs of complications. He has atypical chest pains that is reproduced with palpation and motion of his right upper extremity. No significant shortness of breath, dizziness, or palpitations. He does have claudication in both of his legs. Right greater than left. He has a known history of peripheral vascular disease and has been evaluated by Dr. YLOANDA MARX in the past.    His current cardiac medications include aspirin 81, Lipitor 40, Plavix 75, lisinopril 2.5, Lopressor 50 twice daily.    I am going to order lower extremity ABIs. Cardiac rehab.    Follow-up with cardiology in 3 months        Go Baer DO   Interventional cardiology  [facility]  06/30/21   14:17 CDT

## 2021-07-12 NOTE — PROGRESS NOTES
"Subjective   Chief Complaint   Patient presents with   • Post-op Follow-up     CABG x4 on 5/10/2021       Patient ID: Tree Shaffer is a 60 y.o. male who is here for follow-up having had Coronary artery bypass grafting-4 vessel (left internal mammary artery/left anterior descending, reverse saphenous vein graft/first diagonal artery, reverse saphenous vein graft/first obtuse marginal, and reverse saphenous vein graft/Posterior descending artery), Right extended endoscopic vein harvest performed on 5/10/2021 by Dr. Menendez.     History of Present Illness  Post operative recovery was uneventful without any major complications. Overall, he has been doing well.  He has required as an outpatient additional narcotics but has made good recovery.  He denies any sternal clicks.  Denies any fevers, sweats, or chills.  He remains free from smoking.  He is eating well.  No cardiac chest pain or shortness of breath.  In fact he feels with increased enema.  His previous described sternal pop has stopped.    Sleep habits are good. No fevers, sweats, or chills. Appetite is good. Ambulating 15-20 minutes twice daily.     The following portions of the patient's history were reviewed and updated as appropriate: allergies, current medications, past family history, past medical history, past social history, past surgical history and problem list.      Objective   Visit Vitals  /68 (BP Location: Left arm, Patient Position: Sitting, Cuff Size: Adult)   Pulse 53   Ht 177.8 cm (70\")   Wt 79.2 kg (174 lb 9.6 oz)   SpO2 98%   BMI 25.05 kg/m²       Physical Exam  Vitals reviewed.   Constitutional:       General: He is not in acute distress.     Appearance: Normal appearance. He is well-developed. He is not diaphoretic.   HENT:      Head: Normocephalic and atraumatic.   Eyes:      Pupils: Pupils are equal, round, and reactive to light.   Cardiovascular:      Rate and Rhythm: Normal rate and regular rhythm.      Heart sounds: Normal heart " sounds. No murmur heard.   No friction rub.   Pulmonary:      Effort: Pulmonary effort is normal. No respiratory distress.      Breath sounds: Normal breath sounds. No wheezing or rales.   Abdominal:      General: There is no distension.      Palpations: Abdomen is soft.      Tenderness: There is no abdominal tenderness. There is no guarding.   Musculoskeletal:      Cervical back: Normal range of motion and neck supple.      Right lower leg: No edema.      Left lower leg: No edema.   Skin:     General: Skin is warm and dry.      Coloration: Skin is not pale.      Findings: No bruising or rash.      Comments: Sternotomy site well healed.  Sternum stable. No clicks appreciated. Saphenectomy sites well healed.     Neurological:      Mental Status: He is alert and oriented to person, place, and time. Mental status is at baseline.   Psychiatric:         Behavior: Behavior normal.           Assessment/Plan     [facility] #[id number] reviewed with no suspicious activity found.    Diagnoses and all orders for this visit:    1. Coronary artery disease involving native coronary artery of native heart with unstable angina pectoris (CMS/HCC) (Primary)  -     Ambulatory Referral to Cardiac Rehab -[place]    2. Tobacco abuse    3. Centrilobular emphysema (CMS/HCC)         Overall, Tree Shaffer is doing well.  We discussed the sternal precautions how these advance over the next 1 month.  I would like him to see Janice Moe in 1 month to evaluate his ongoing sternal healing.  He has an increased risk of sternal complications.  We discussed the importance of long term risk factor modification including strict medical regimen adherence, exercise program that is sustained and progressive, and a heart healthy diet.  We discussed the importance of good dental care as the latter is associated with improved cardiovascular outcomes.  He is cleared to begin cardiac rehab.  He does have follow-up with Dr. Baer.      Patient's Body mass  index is 25.05 kg/m². indicating that he is overweight (BMI 25-29.9). Obesity-related health conditions include the following: hypertension, coronary heart disease, GERD and peripheral vascular disease. Obesity is unchanged. BMI is is above average; BMI management plan is completed. We discussed portion control, increasing exercise and follow with PCP to accomplish an acceptable weight for age and height.      I have congratulated Tree Shaffer on successful smoking cessation.     Tree Shaffer  reports that he quit smoking about 2 months ago. His smoking use included cigarettes. He has never used smokeless tobacco.       Advance Care Planning   ACP discussion was declined by the patient. Patient does not have an advance directive, declines further assistance.      He is on statin therapy    RTC 1 month with Janice.      We discussed that given his smoking history and age he does qualify for lung cancer screening.  He will follow that up with his primary care provider.

## 2021-07-21 ENCOUNTER — OFFICE VISIT (OUTPATIENT)
Dept: CARDIAC SURGERY | Facility: CLINIC | Age: 61
End: 2021-07-21

## 2021-07-21 VITALS
DIASTOLIC BLOOD PRESSURE: 70 MMHG | WEIGHT: 178.2 LBS | OXYGEN SATURATION: 97 % | HEIGHT: 70 IN | HEART RATE: 51 BPM | SYSTOLIC BLOOD PRESSURE: 109 MMHG | BODY MASS INDEX: 25.51 KG/M2

## 2021-07-21 DIAGNOSIS — Z87.891 FORMER SMOKER: ICD-10-CM

## 2021-07-21 DIAGNOSIS — I25.110 CORONARY ARTERY DISEASE INVOLVING NATIVE CORONARY ARTERY OF NATIVE HEART WITH UNSTABLE ANGINA PECTORIS (HCC): Primary | ICD-10-CM

## 2021-07-21 PROCEDURE — 99024 POSTOP FOLLOW-UP VISIT: CPT | Performed by: NURSE PRACTITIONER

## 2021-07-21 NOTE — PROGRESS NOTES
"Subjective   Chief Complaint   Patient presents with   • Post-op Follow-up     Pt had CABG x4 on 5/10       Patient ID: Tree Shaffer is a 60 y.o. male who is here for follow-up having had Coronary artery bypass grafting-4 vessel (left internal mammary artery/left anterior descending, reverse saphenous vein graft/first diagonal artery, reverse saphenous vein graft/first obtuse marginal, and reverse saphenous vein graft/Posterior descending artery), Right extended endoscopic vein harvest performed on 5/10/2021 by Dr. Menendez.     History of Present Illness  Post operative recovery was uneventful without any major complications. He returns today for 2 month follow up and wound check. Overall, he reports he is doing well. He has remained free from smoking, drinking, and using marijuana. He follows with pain clinic for chronic pain syndrome. He has been cleared to start cardiac rehab but not been scheduled yet. He denies any sternal clicks.  Denies any fevers, sweats, or chills. He is eating well.  No cardiac chest pain or shortness of breath. Sleep habits are good. No fevers, sweats, or chills. Appetite is good. Ambulating 30 minutes twice daily.     The following portions of the patient's history were reviewed and updated as appropriate: allergies, current medications, past family history, past medical history, past social history, past surgical history and problem list.      Objective   Visit Vitals  /70 (BP Location: Left arm, Patient Position: Sitting, Cuff Size: Adult)   Pulse 51   Ht 177.8 cm (70\")   Wt 80.8 kg (178 lb 3.2 oz)   SpO2 97%   BMI 25.57 kg/m²       Physical Exam  Vitals reviewed.   Constitutional:       General: He is not in acute distress.     Appearance: Normal appearance. He is well-developed. He is not diaphoretic.   HENT:      Head: Normocephalic and atraumatic.   Eyes:      Pupils: Pupils are equal, round, and reactive to light.   Cardiovascular:      Rate and Rhythm: Normal rate and " "regular rhythm.      Heart sounds: Normal heart sounds. No murmur heard.   No friction rub.   Pulmonary:      Effort: Pulmonary effort is normal. No respiratory distress.      Breath sounds: Normal breath sounds. No wheezing or rales.   Abdominal:      General: There is no distension.      Palpations: Abdomen is soft.      Tenderness: There is no abdominal tenderness. There is no guarding.   Musculoskeletal:      Cervical back: Normal range of motion and neck supple.      Right lower leg: No edema.      Left lower leg: No edema.   Skin:     General: Skin is warm and dry.      Coloration: Skin is not pale.      Findings: No bruising or rash.      Comments: Sternotomy site well healed.  Sternum stable. No clicks appreciated. Saphenectomy sites well healed.    He does have a scab distal to vein harvesting on right leg. He states this is from a \"radiation burn\" pre-existing of cabg   Neurological:      Mental Status: He is alert and oriented to person, place, and time. Mental status is at baseline.   Psychiatric:         Behavior: Behavior normal.         Assessment/Plan         Diagnoses and all orders for this visit:    1. Coronary artery disease involving native coronary artery of native heart with unstable angina pectoris (CMS/HCC) (Primary)    2. Former smoker         Overall, Tree Shaffer is doing well. Surgical sites are clean and dry without evidence of infection. We discussed the sternal precautions how these advance over the next 1 month. We discussed the importance of long term risk factor modification including strict medical regimen adherence, exercise program that is sustained and progressive, and a heart healthy diet.  We discussed the importance of good dental care as the latter is associated with improved cardiovascular outcomes. Will reach out to expedite starting cardiac rehab. He does have follow-up with Dr. Baer. He has GEOFF of the lower extremities on Friday. Follow up in a few weeks for wound " check.      Patient's Body mass index is 25.57 kg/m². indicating that he is overweight (BMI 25-29.9). Obesity-related health conditions include the following: hypertension, coronary heart disease, GERD and peripheral vascular disease. Obesity is unchanged. BMI is is above average; BMI management plan is completed. We discussed portion control, increasing exercise and follow with PCP to accomplish an acceptable weight for age and height.      I have congratulated Tree Shaffer on successful smoking cessation. Tree Shaffer  reports that he quit smoking about 2 months ago. His smoking use included cigarettes. He has never used smokeless tobacco.       Advance Care Planning   ACP discussion was declined by the patient. Patient does not have an advance directive, declines further assistance.

## 2021-07-23 ENCOUNTER — HOSPITAL ENCOUNTER (OUTPATIENT)
Dept: ULTRASOUND IMAGING | Facility: HOSPITAL | Age: 61
Discharge: HOME | End: 2021-07-23
Admitting: EMERGENCY MEDICINE

## 2021-07-23 DIAGNOSIS — I73.9 PERIPHERAL ARTERY DISEASE (HCC): ICD-10-CM

## 2021-07-23 PROCEDURE — 93923 UPR/LXTR ART STDY 3+ LVLS: CPT | Performed by: SURGERY

## 2021-07-23 PROCEDURE — 93923 UPR/LXTR ART STDY 3+ LVLS: CPT

## 2021-07-24 DIAGNOSIS — I73.9 PVD (PERIPHERAL VASCULAR DISEASE) WITH CLAUDICATION (HCC): Primary | ICD-10-CM

## 2021-07-24 DIAGNOSIS — I70.211 ATHEROSCLEROSIS OF NATIVE ARTERIES OF EXTREMITIES WITH INTERMITTENT CLAUDICATION, RIGHT LEG (HCC): ICD-10-CM

## 2021-07-29 ENCOUNTER — HOSPITAL ENCOUNTER (OUTPATIENT)
Dept: CT IMAGING | Facility: HOSPITAL | Age: 61
Discharge: HOME | End: 2021-07-29
Admitting: EMERGENCY MEDICINE

## 2021-07-29 DIAGNOSIS — I73.9 PVD (PERIPHERAL VASCULAR DISEASE) WITH CLAUDICATION (HCC): ICD-10-CM

## 2021-07-29 DIAGNOSIS — I70.211 ATHEROSCLEROSIS OF NATIVE ARTERIES OF EXTREMITIES WITH INTERMITTENT CLAUDICATION, RIGHT LEG (HCC): ICD-10-CM

## 2021-07-29 LAB — CREAT BLDA-MCNC: 0.9 MG/DL (ref 0.6–1.3)

## 2021-07-29 PROCEDURE — 0 IOPAMIDOL PER 1 ML: Performed by: EMERGENCY MEDICINE

## 2021-07-29 PROCEDURE — 82565 ASSAY OF CREATININE: CPT

## 2021-07-29 PROCEDURE — 75635 CT ANGIO ABDOMINAL ARTERIES: CPT

## 2021-07-29 RX ADMIN — IOPAMIDOL 100 ML: 755 INJECTION, SOLUTION INTRAVENOUS at 13:00

## 2021-07-30 RX ORDER — CILOSTAZOL 100 MG/1
100 TABLET ORAL 2 TIMES DAILY
Qty: 60 TABLET | Refills: 5 | Status: SHIPPED | OUTPATIENT
Start: 2021-07-30 | End: 2022-07-11 | Stop reason: SDUPTHER

## 2021-08-03 ENCOUNTER — TREATMENT (OUTPATIENT)
Dept: CARDIAC REHAB | Facility: HOSPITAL | Age: 61
End: 2021-08-03

## 2021-08-03 DIAGNOSIS — I25.110 CORONARY ARTERY DISEASE INVOLVING NATIVE CORONARY ARTERY OF NATIVE HEART WITH UNSTABLE ANGINA PECTORIS (HCC): Primary | ICD-10-CM

## 2021-08-03 PROCEDURE — 93798 PHYS/QHP OP CAR RHAB W/ECG: CPT

## 2021-08-05 ENCOUNTER — APPOINTMENT (OUTPATIENT)
Dept: CARDIAC REHAB | Facility: HOSPITAL | Age: 61
End: 2021-08-05

## 2021-08-06 ENCOUNTER — APPOINTMENT (OUTPATIENT)
Dept: CARDIAC REHAB | Facility: HOSPITAL | Age: 61
End: 2021-08-06

## 2021-08-09 ENCOUNTER — TREATMENT (OUTPATIENT)
Dept: CARDIAC REHAB | Facility: HOSPITAL | Age: 61
End: 2021-08-09

## 2021-08-09 DIAGNOSIS — I25.110 CORONARY ARTERY DISEASE INVOLVING NATIVE CORONARY ARTERY OF NATIVE HEART WITH UNSTABLE ANGINA PECTORIS (HCC): Primary | ICD-10-CM

## 2021-08-09 PROCEDURE — 93798 PHYS/QHP OP CAR RHAB W/ECG: CPT

## 2021-08-10 ENCOUNTER — APPOINTMENT (OUTPATIENT)
Dept: CARDIAC REHAB | Facility: HOSPITAL | Age: 61
End: 2021-08-10

## 2021-08-11 ENCOUNTER — OFFICE VISIT (OUTPATIENT)
Dept: CARDIAC SURGERY | Facility: CLINIC | Age: 61
End: 2021-08-11

## 2021-08-11 ENCOUNTER — APPOINTMENT (OUTPATIENT)
Dept: CARDIAC REHAB | Facility: HOSPITAL | Age: 61
End: 2021-08-11

## 2021-08-11 VITALS
HEIGHT: 70 IN | SYSTOLIC BLOOD PRESSURE: 119 MMHG | HEART RATE: 49 BPM | BODY MASS INDEX: 25.2 KG/M2 | WEIGHT: 176 LBS | OXYGEN SATURATION: 96 % | DIASTOLIC BLOOD PRESSURE: 70 MMHG

## 2021-08-11 DIAGNOSIS — I70.213 ATHEROSCLEROSIS OF NATIVE ARTERY OF BOTH LOWER EXTREMITIES WITH INTERMITTENT CLAUDICATION (HCC): ICD-10-CM

## 2021-08-11 DIAGNOSIS — Z87.891 FORMER SMOKER: ICD-10-CM

## 2021-08-11 DIAGNOSIS — I25.110 CORONARY ARTERY DISEASE INVOLVING NATIVE CORONARY ARTERY OF NATIVE HEART WITH UNSTABLE ANGINA PECTORIS (HCC): Primary | ICD-10-CM

## 2021-08-11 PROCEDURE — 99212 OFFICE O/P EST SF 10 MIN: CPT | Performed by: NURSE PRACTITIONER

## 2021-08-11 NOTE — PROGRESS NOTES
"Subjective   Chief Complaint   Patient presents with   • Coronary Artery Disease     Pt is here for follow up        Patient ID: Tree Shaffer is a 61 y.o. male who is here for follow-up having had Coronary artery bypass grafting-4 vessel (left internal mammary artery/left anterior descending, reverse saphenous vein graft/first diagonal artery, reverse saphenous vein graft/first obtuse marginal, and reverse saphenous vein graft/Posterior descending artery), Right extended endoscopic vein harvest performed on 5/10/2021 by Dr. Menendez.     History of Present Illness  Post operative recovery was uneventful without any major complications. He returns today for wound check. Reports scab is improving. Surrounding skin is slightly red. He thinks he is having a reaction to a change in soap. No drainage. Otherwise he is doing well. He is continuing cardiac rehab. He has remained free from smoking, drinking, and using marijuana. He follows with pain clinic for chronic pain syndrome. He denies any sternal clicks.  Denies any fevers, sweats, or chills. He is eating well.  No cardiac chest pain or shortness of breath. Sleep habits are good. No fevers, sweats, or chills. Appetite is good. Ambulating 30 minutes twice daily.     The following portions of the patient's history were reviewed and updated as appropriate: allergies, current medications, past family history, past medical history, past social history, past surgical history and problem list.      Objective   Visit Vitals  /70 (BP Location: Left arm, Patient Position: Sitting, Cuff Size: Adult)   Pulse (!) 49   Ht 177.8 cm (70\")   Wt 79.8 kg (176 lb)   SpO2 96%   BMI 25.25 kg/m²       Physical Exam  Vitals reviewed.   Constitutional:       General: He is not in acute distress.     Appearance: Normal appearance. He is well-developed. He is not diaphoretic.   HENT:      Head: Normocephalic and atraumatic.   Eyes:      Pupils: Pupils are equal, round, and reactive to " light.   Cardiovascular:      Rate and Rhythm: Regular rhythm. Bradycardia present.      Heart sounds: Normal heart sounds. No murmur heard.   No friction rub.   Pulmonary:      Effort: Pulmonary effort is normal. No respiratory distress.      Breath sounds: Normal breath sounds. No wheezing or rales.   Abdominal:      General: There is no distension.      Palpations: Abdomen is soft.      Tenderness: There is no abdominal tenderness. There is no guarding.   Musculoskeletal:      Cervical back: Normal range of motion and neck supple.      Right lower leg: No edema.      Left lower leg: No edema.   Skin:     General: Skin is warm and dry.      Coloration: Skin is not pale.      Findings: No bruising or rash.      Comments: Sternotomy site well healed.  Sternum stable. No clicks appreciated. Saphenectomy sites well healed. He continues to have a scab distal to vein harvesting site. This is improved from last visit. Minimal surrounding edema with pink skin changes.    Neurological:      Mental Status: He is alert and oriented to person, place, and time. Mental status is at baseline.   Psychiatric:         Behavior: Behavior normal.         Assessment/Plan         Diagnoses and all orders for this visit:    1. Coronary artery disease involving native coronary artery of native heart with unstable angina pectoris (CMS/HCC) (Primary)    2. Former smoker    3. Atherosclerosis of native artery of both lower extremities with intermittent claudication (CMS/HCC)         Overall, Tree Shaffer is doing well. Continue wound care with antibacterial soap and water. Said he had recent ankle brachial indices and CT angio abdominal aorta bilateral iliofemoral runoff by Dr. Baer. Reviewed these with identified mild arterial insufficiency of the right lower extremity at rest and atherosclerotic disease throughout the arteries, with moderate soft plaque burden and questionable focal dissection in the left common iliac artery. He has  seen vascular surgery previously but has been lost to follow up. Recommend follow up with vascular surgery. He is agreeable to this and will call the office for an appointment. Continue cardiac rehab. Follow up in 6 weeks for wound recheck.     Patient's Body mass index is 25.25 kg/m². indicating that he is overweight (BMI 25-29.9). Obesity-related health conditions include the following: hypertension, coronary heart disease, GERD and peripheral vascular disease. Obesity is unchanged. BMI is is above average; BMI management plan is completed. We discussed portion control, increasing exercise and follow with PCP to accomplish an acceptable weight for age and height.      I have congratulated Tree Shaffer on successful smoking cessation. Tree Shaffer  reports that he quit smoking about 3 months ago. His smoking use included cigarettes. He has never used smokeless tobacco.       Advance Care Planning   ACP discussion was declined by the patient. Patient does not have an advance directive, declines further assistance.

## 2021-08-12 ENCOUNTER — APPOINTMENT (OUTPATIENT)
Dept: CARDIAC REHAB | Facility: HOSPITAL | Age: 61
End: 2021-08-12

## 2021-08-13 ENCOUNTER — TELEPHONE (OUTPATIENT)
Dept: VASCULAR SURGERY | Facility: CLINIC | Age: 61
End: 2021-08-13

## 2021-08-16 ENCOUNTER — TREATMENT (OUTPATIENT)
Dept: CARDIAC REHAB | Facility: HOSPITAL | Age: 61
End: 2021-08-16

## 2021-08-16 ENCOUNTER — OFFICE VISIT (OUTPATIENT)
Dept: VASCULAR SURGERY | Facility: CLINIC | Age: 61
End: 2021-08-16

## 2021-08-16 VITALS
BODY MASS INDEX: 24.91 KG/M2 | WEIGHT: 174 LBS | SYSTOLIC BLOOD PRESSURE: 132 MMHG | HEIGHT: 70 IN | OXYGEN SATURATION: 95 % | DIASTOLIC BLOOD PRESSURE: 76 MMHG | HEART RATE: 58 BPM

## 2021-08-16 DIAGNOSIS — I10 ESSENTIAL HYPERTENSION: ICD-10-CM

## 2021-08-16 DIAGNOSIS — E78.2 MIXED HYPERLIPIDEMIA: ICD-10-CM

## 2021-08-16 DIAGNOSIS — I25.110 CORONARY ARTERY DISEASE INVOLVING NATIVE CORONARY ARTERY OF NATIVE HEART WITH UNSTABLE ANGINA PECTORIS (HCC): Primary | ICD-10-CM

## 2021-08-16 DIAGNOSIS — I70.219 ATHEROSCLEROSIS OF ARTERY OF EXTREMITY WITH INTERMITTENT CLAUDICATION (HCC): ICD-10-CM

## 2021-08-16 DIAGNOSIS — I65.23 CAROTID STENOSIS, ASYMPTOMATIC, BILATERAL: ICD-10-CM

## 2021-08-16 DIAGNOSIS — I73.9 PERIPHERAL ARTERIAL DISEASE (HCC): Primary | ICD-10-CM

## 2021-08-16 PROCEDURE — 99214 OFFICE O/P EST MOD 30 MIN: CPT | Performed by: SURGERY

## 2021-08-16 PROCEDURE — 93798 PHYS/QHP OP CAR RHAB W/ECG: CPT

## 2021-08-16 NOTE — PROGRESS NOTES
08/16/2021      Elder Andersen, APRN  [address] [place] [address]        Tree Shaffer  1960    Chief Complaint   Patient presents with   • Follow-up     pt wants to discuss blockage in right leg. pt denies any stroke like symptoms.   • Med Management     verbally reviewed medication with pt   • Nicotine Dependence     pt does not smoke       Dear Elder Andersen, APRN:    Nicotine Dependence  Symptoms are negative for fatigue and sore throat.        I had the pleasure of seeing your patient in the office today for follow up.  As you recall, the patient is a 61 y.o. male who we are currently following for peripheral vascular disease and lower extremity claudication.  He was last seen here in the office in September 2019.  At that time he had had an GEOFF/PVR which showed a value of 0.81 on the right, and 0.94 on the left.  This was noted to represent mild arterial insufficiency of the right lower extremity at rest, and no significant arterial insufficiency on the left.  He had noted mild calf claudication, mainly on the right and moderate distances but this was not lifestyle limiting.  At that time I recommended a structured walking program and continuing with his aspirin as well as statin and also smoking cessation.  Unfortunately due to the Covid pandemic he did not keep any follow-up appointments and has not been seen since that time.  More recently had presented here to the hospital in May of this year with chest pain and was found to have multivessel coronary artery disease.  He underwent bypass surgery with Dr. Menendez of CT surgery on 5/10.  He tolerated this procedure well.  He did have saphenous vein harvest from the right lower extremity for the procedure.  He is started his cardiac rehab and overall is doing well.  He also has completely quit smoking.  He followed up with his cardiologist Dr. Baer who would repeat an GEOFF/PVR in mid July.  It showed an GEOFF value on the right of 0.92 and  on the left of 0.95.  Post exercise GEOFF on the right dropped to 0.76, however stayed at 0.94 on the left.  This represented mild arterial insufficiency in the right lower extremity, and no significant insufficiency in the left lower extremity.  Dr. Baer did subsequently order a CTA of the abdominal aorta with bilateral lower extremity runoff.  I did personally review the study in the office today.  And showed some evidence of atherosclerotic disease throughout the aortoiliac segments with no significant aneurysm or significant stenosis/occlusion.  His left lower extremity arterial system appeared patent with no obvious significant stenoses or occlusions.  On the right he was limited to have an occlusion of his SFA just after its takeoff with reconstitution in the mid thigh at Diego's canal.  There was maintained popliteal artery and three-vessel runoff.  Given these findings he has been referred back here to vascular surgery for continued care of his peripheral arterial disease.  In the office today he continues to report some claudication mainly in the right calf at moderate distances.  However again this is not lifestyle limiting and he notes is actually improved now that he is quit smoking.  He currently is maintained on aspirin as well as Plavix after his CABG, and was also started on Pletal 100 mg twice daily by Dr Baer.  He currently denies any ischemic rest pain.  He has no other significant acute complaints today.    Review of Systems   Constitutional: Negative for activity change, appetite change, chills, diaphoresis, fatigue and fever.   HENT: Negative.  Negative for congestion, sneezing, sore throat and trouble swallowing.    Eyes: Negative.  Negative for visual disturbance.   Respiratory: Negative.  Negative for chest tightness and shortness of breath.    Cardiovascular: Negative.  Negative for chest pain, palpitations and leg swelling.   Gastrointestinal: Negative.  Negative for abdominal  "distention, abdominal pain, nausea and vomiting.   Endocrine: Negative.    Genitourinary: Negative.    Musculoskeletal:        Mild calf claudication, worse on the right than the left.  This is not lifestyle limiting.   Skin: Negative.    Allergic/Immunologic: Negative.    Neurological: Negative.    Hematological: Negative.    Psychiatric/Behavioral: Negative.        /76 (BP Location: Left arm, Patient Position: Sitting, Cuff Size: Adult)   Pulse 58   Ht 177.8 cm (70\")   Wt 78.9 kg (174 lb)   SpO2 95%   BMI 24.97 kg/m²   Physical Exam   Constitutional: He is oriented to person, place, and time. He appears well-developed.   HENT:   Head: Normocephalic and atraumatic.   Eyes: Pupils are equal, round, and reactive to light.   Neck: No JVD present.   Cardiovascular: Normal rate and regular rhythm.   Pulses:       Carotid pulses are 2+ on the right side and 2+ on the left side.       Radial pulses are 2+ on the right side and 2+ on the left side.        Femoral pulses are 2+ on the right side and 2+ on the left side.       Dorsalis pedis pulses are 1+ on the right side and 1+ on the left side.        Posterior tibial pulses are 1+ on the right side and 2+ on the left side.   Does have palpable femoral pulses bilaterally.  I am also able to palpate pedal pulses today.  In the right foot the DP and PT are 1+, and on the left the PT is 2+ and the DP is 1+.  Both feet are warm and appear well-perfused.    He has a well-healed median sternotomy incision from his recent CABG.  He also has a well-healed incision on the right proximal lower leg from endoscopic saphenous vein harvest.   Pulmonary/Chest: Effort normal. No respiratory distress.   Abdominal: Soft. He exhibits no distension and no mass. There is no abdominal tenderness.   Musculoskeletal: Normal range of motion. No tenderness or deformity.   Neurological: He is alert and oriented to person, place, and time. No sensory deficit. He exhibits normal muscle " tone.   Skin: Skin is warm and dry. Capillary refill takes 2 to 3 seconds.   Psychiatric: His behavior is normal. Judgment and thought content normal.   Vitals reviewed.      DIAGNOSTIC DATA:    CT Angio Abdominal Aorta Bilateral Iliofem Runoff    Result Date: 7/29/2021  Narrative: Exam:   CT ANGIO ABDOMINAL AORTA BILAT ILIOFEM RUNOFF-   Date:  7/29/2021  History:  Male, age  60 years;Claudication or leg ischemia; I73.9-Peripheral vascular disease, unspecified; I70.211-Atherosclerosis of native arteries of extremities with intermittent claudication, right leg Technique: Postcontrast images through the abdomen and pelvis and lower extremity is obtained. 3-D MIPS reconstructions provided for analysis. Radiation dose equals  mGy-cm.  Automated exposure control dose reduction technique was implemented.   COMPARISON:  None.  Findings : Nonvascular findings: Lung bases demonstrate no focal consolidation, pleural effusion or pneumothorax. Cardiomegaly.  Liver, kidneys, pancreas, spleen, and adrenal glands demonstrate no acute findings. The gallbladder is surgically absent. Colonic diverticulosis, without evidence of acute diverticulitis. No free air. No free fluid. No organized fluid collection. No lymphadenopathy by size criteria. No acute osseous pathology.  Vascular findings:  There is no aortic aneurysm.  Distal disease throughout the aorta with patent ostia of the celiac trunk, SMA, bilateral renal arteries. The KIERSTEN origin is also patent.  The right common iliac artery demonstrates soft plaques throughout, with mild stenosis. The right external iliac arteries also demonstrate soft plaques throughout, with mild stenosis. The right common femoral, superficial femoral artery are well opacified. The popliteal artery is well visualized. There is well visualized anterior tibial, posterior tibial and peroneal arteries up to the foot.  The left common iliac artery demonstrates an area of linear filling defect (image  131, series 4) concerning for small dissection. Soft plaques are seen throughout the left common iliac artery extending to the left internal/external iliac artery. The left common femoral artery,, superficial femoral, deep femoral arteries are opacified. The left popliteal artery is well opacified as well. The left anterior tibial, posterior tibial and peroneal arteries are also well opacified.       Impression: Impression:   1.  Atherosclerotic disease throughout the arteries, with moderate soft plaque burden. 2.  Questionable focal dissection in the left common iliac artery.   This report was finalized on 07/29/2021 14:27 by Dr. Sanjuana Jamil MD.    US Ankle / Brachial Indices Extremity Complete    Result Date: 7/23/2021  Narrative:  History: Claudication  Comments: Bilateral lower extremity arterial with multi-level pulse volume recordings and segmental pressures were performed at rest and stress.  The right ankle/brachial index is 0.92. The waveforms are biphasic without dampening.These findings are consistent with mild arterial insufficiency of the right lower extremity at rest.  The postexercise ABIs 0.76. This is indicative of claudication.  The left ankle/brachial index is 0.95. The waveforms are biphasic without dampening. These findings are consistent with no significant arterial insufficiency of the left lower extremity at rest.    The postexercise ABIs 0.94.      Impression: Impression: 1. Mild arterial insufficiency of the right lower extremity at rest. 2. No significant arterial insufficiency of the left lower extremity at rest.   This report was finalized on 07/23/2021 15:13 by Dr. Emmanuel Flores MD.      Patient Active Problem List   Diagnosis   • Tobacco abuse   • Marijuana use   • Essential hypertension   • Abnormal stress test   • Unstable angina pectoris (CMS/HCC)   • Coronary artery disease involving native coronary artery of native heart with unstable angina pectoris (CMS/HCC)   • Cervical  myelopathy (CMS/HCC)   • Atherosclerosis of native artery of both lower extremities with intermittent claudication (CMS/HCC)   • GERD (gastroesophageal reflux disease)   • Peripheral artery disease (CMS/HCC)   • Anxiety   • Poor dentition   • Chronic neck and back pain   • COPD (chronic obstructive pulmonary disease) (CMS/HCC)   • On statin therapy         ICD-10-CM ICD-9-CM   1. Peripheral arterial disease (CMS/HCC)  I73.9 443.9   2. Mixed hyperlipidemia  E78.2 272.2   3. Essential hypertension  I10 401.9   4. Atherosclerosis of artery of extremity with intermittent claudication (CMS/HCC)  I70.219 440.21   5. Carotid stenosis, asymptomatic, bilateral  I65.23 433.10     433.30       Lab Frequency Next Occurrence   US Ankle / Brachial Indices Extremity Complete Once 09/14/2019       PLAN: After thoroughly evaluating Tree Shaffer, I believe the best course of action is to remain conservative from a vascular standpoint.  He does continue to have mild claudication mainly in the right lower extremity however at this point it is not lifestyle limiting.  He did have a repeat GEOFF/PVR done recently which I did review.  GEOFF on the right is 0.92 which represents mild arterial insufficiency and on the left is 0.95 which represents no significant arterial insufficiency.  Also did have a CTA of the abdominal aorta with lower extremity runoff which I reviewed personally.  It shows some atherosclerotic disease throughout the aortoiliac segments and on the right moderate length SFA chronic occlusion with reconstitution of the distal SFA and maintained popliteal and three-vessel runoff to the foot and ankle.  The left lower extremity arterial system had no obvious significant stenoses or occlusions.  He has quit smoking since his CABG in May.  At this point we again discussed him attempting an exercise program with walking at least 3-4 times a week to the point where he has claudication and to try and push past this slightly to  help with the formation of collaterals.  He reports that he again will try to do this now that he is feeling better after his recent CABG and is starting cardiac rehab.  I will plan to see him back here in 6 months with a repeat GEOFF/PVR for continued surveillance.  Also given history of some carotid stenosis and his history of CABG as well as peripheral arterial disease I ordered a carotid duplex to be done in 6 months when he returns.  The patient is to continue taking their medications as previously discussed.  He is currently on aspirin and Plavix after his CABG, and was also started on Pletal by his cardiologist   I did discuss vascular risk factors as they pertain to the progression of vascular disease including controlling hyperlipidemia. These factors remain stable. Patient's Body mass index is 24.97 kg/m². BMI is above normal parameters. Recommendations include: educational material. This was all discussed in full with complete understanding.  Thank you for allowing me to participate in the care of your patient.  Please do not hesitate to call with any questions or concerns.  We will keep you aware of any further encounters with Tree Shaffer.      Sincerely Yours,      Leo Biggs MD

## 2021-08-17 ENCOUNTER — APPOINTMENT (OUTPATIENT)
Dept: CARDIAC REHAB | Facility: HOSPITAL | Age: 61
End: 2021-08-17

## 2021-08-18 ENCOUNTER — TREATMENT (OUTPATIENT)
Dept: CARDIAC REHAB | Facility: HOSPITAL | Age: 61
End: 2021-08-18

## 2021-08-18 DIAGNOSIS — I25.110 CORONARY ARTERY DISEASE INVOLVING NATIVE CORONARY ARTERY OF NATIVE HEART WITH UNSTABLE ANGINA PECTORIS (HCC): Primary | ICD-10-CM

## 2021-08-18 PROCEDURE — 93798 PHYS/QHP OP CAR RHAB W/ECG: CPT

## 2021-08-19 ENCOUNTER — APPOINTMENT (OUTPATIENT)
Dept: CARDIAC REHAB | Facility: HOSPITAL | Age: 61
End: 2021-08-19

## 2021-08-20 ENCOUNTER — TREATMENT (OUTPATIENT)
Dept: CARDIAC REHAB | Facility: HOSPITAL | Age: 61
End: 2021-08-20

## 2021-08-20 DIAGNOSIS — I25.110 CORONARY ARTERY DISEASE INVOLVING NATIVE CORONARY ARTERY OF NATIVE HEART WITH UNSTABLE ANGINA PECTORIS (HCC): Primary | ICD-10-CM

## 2021-08-20 PROCEDURE — 93798 PHYS/QHP OP CAR RHAB W/ECG: CPT

## 2021-08-23 ENCOUNTER — TREATMENT (OUTPATIENT)
Dept: CARDIAC REHAB | Facility: HOSPITAL | Age: 61
End: 2021-08-23

## 2021-08-23 DIAGNOSIS — I25.110 CORONARY ARTERY DISEASE INVOLVING NATIVE CORONARY ARTERY OF NATIVE HEART WITH UNSTABLE ANGINA PECTORIS (HCC): Primary | ICD-10-CM

## 2021-08-23 PROCEDURE — 93798 PHYS/QHP OP CAR RHAB W/ECG: CPT

## 2021-08-24 ENCOUNTER — APPOINTMENT (OUTPATIENT)
Dept: CARDIAC REHAB | Facility: HOSPITAL | Age: 61
End: 2021-08-24

## 2021-08-25 ENCOUNTER — TREATMENT (OUTPATIENT)
Dept: CARDIAC REHAB | Facility: HOSPITAL | Age: 61
End: 2021-08-25

## 2021-08-25 DIAGNOSIS — I25.110 CORONARY ARTERY DISEASE INVOLVING NATIVE CORONARY ARTERY OF NATIVE HEART WITH UNSTABLE ANGINA PECTORIS (HCC): Primary | ICD-10-CM

## 2021-08-25 PROCEDURE — 93798 PHYS/QHP OP CAR RHAB W/ECG: CPT

## 2021-08-26 ENCOUNTER — APPOINTMENT (OUTPATIENT)
Dept: CARDIAC REHAB | Facility: HOSPITAL | Age: 61
End: 2021-08-26

## 2021-08-27 ENCOUNTER — TREATMENT (OUTPATIENT)
Dept: CARDIAC REHAB | Facility: HOSPITAL | Age: 61
End: 2021-08-27

## 2021-08-27 DIAGNOSIS — I25.110 CORONARY ARTERY DISEASE INVOLVING NATIVE CORONARY ARTERY OF NATIVE HEART WITH UNSTABLE ANGINA PECTORIS (HCC): Primary | ICD-10-CM

## 2021-08-27 PROCEDURE — 93798 PHYS/QHP OP CAR RHAB W/ECG: CPT

## 2021-08-30 ENCOUNTER — TREATMENT (OUTPATIENT)
Dept: CARDIAC REHAB | Facility: HOSPITAL | Age: 61
End: 2021-08-30

## 2021-08-30 DIAGNOSIS — I25.110 CORONARY ARTERY DISEASE INVOLVING NATIVE CORONARY ARTERY OF NATIVE HEART WITH UNSTABLE ANGINA PECTORIS (HCC): Primary | ICD-10-CM

## 2021-08-30 PROCEDURE — 93798 PHYS/QHP OP CAR RHAB W/ECG: CPT

## 2021-08-31 ENCOUNTER — APPOINTMENT (OUTPATIENT)
Dept: CARDIAC REHAB | Facility: HOSPITAL | Age: 61
End: 2021-08-31

## 2021-09-01 ENCOUNTER — TREATMENT (OUTPATIENT)
Dept: CARDIAC REHAB | Facility: HOSPITAL | Age: 61
End: 2021-09-01

## 2021-09-01 DIAGNOSIS — I25.110 CORONARY ARTERY DISEASE INVOLVING NATIVE CORONARY ARTERY OF NATIVE HEART WITH UNSTABLE ANGINA PECTORIS (HCC): Primary | ICD-10-CM

## 2021-09-01 PROCEDURE — 93798 PHYS/QHP OP CAR RHAB W/ECG: CPT

## 2021-09-02 ENCOUNTER — APPOINTMENT (OUTPATIENT)
Dept: CARDIAC REHAB | Facility: HOSPITAL | Age: 61
End: 2021-09-02

## 2021-09-03 ENCOUNTER — TREATMENT (OUTPATIENT)
Dept: CARDIAC REHAB | Facility: HOSPITAL | Age: 61
End: 2021-09-03

## 2021-09-03 DIAGNOSIS — I25.110 CORONARY ARTERY DISEASE INVOLVING NATIVE CORONARY ARTERY OF NATIVE HEART WITH UNSTABLE ANGINA PECTORIS (HCC): Primary | ICD-10-CM

## 2021-09-03 PROCEDURE — 93798 PHYS/QHP OP CAR RHAB W/ECG: CPT

## 2021-09-07 ENCOUNTER — APPOINTMENT (OUTPATIENT)
Dept: CARDIAC REHAB | Facility: HOSPITAL | Age: 61
End: 2021-09-07

## 2021-09-08 ENCOUNTER — TREATMENT (OUTPATIENT)
Dept: CARDIAC REHAB | Facility: HOSPITAL | Age: 61
End: 2021-09-08

## 2021-09-08 DIAGNOSIS — I25.110 CORONARY ARTERY DISEASE INVOLVING NATIVE CORONARY ARTERY OF NATIVE HEART WITH UNSTABLE ANGINA PECTORIS (HCC): Primary | ICD-10-CM

## 2021-09-08 PROCEDURE — 93798 PHYS/QHP OP CAR RHAB W/ECG: CPT

## 2021-09-09 ENCOUNTER — APPOINTMENT (OUTPATIENT)
Dept: CARDIAC REHAB | Facility: HOSPITAL | Age: 61
End: 2021-09-09

## 2021-09-09 RX ORDER — ATORVASTATIN CALCIUM 40 MG/1
40 TABLET, FILM COATED ORAL DAILY
Qty: 90 TABLET | Refills: 4 | Status: SHIPPED | OUTPATIENT
Start: 2021-09-09 | End: 2022-07-11 | Stop reason: SDUPTHER

## 2021-09-10 ENCOUNTER — TREATMENT (OUTPATIENT)
Dept: CARDIAC REHAB | Facility: HOSPITAL | Age: 61
End: 2021-09-10

## 2021-09-10 DIAGNOSIS — I25.110 CORONARY ARTERY DISEASE INVOLVING NATIVE CORONARY ARTERY OF NATIVE HEART WITH UNSTABLE ANGINA PECTORIS (HCC): Primary | ICD-10-CM

## 2021-09-10 PROCEDURE — 93798 PHYS/QHP OP CAR RHAB W/ECG: CPT

## 2021-09-14 ENCOUNTER — APPOINTMENT (OUTPATIENT)
Dept: CARDIAC REHAB | Facility: HOSPITAL | Age: 61
End: 2021-09-14

## 2021-09-15 ENCOUNTER — TREATMENT (OUTPATIENT)
Dept: CARDIAC REHAB | Facility: HOSPITAL | Age: 61
End: 2021-09-15

## 2021-09-15 DIAGNOSIS — I25.110 CORONARY ARTERY DISEASE INVOLVING NATIVE CORONARY ARTERY OF NATIVE HEART WITH UNSTABLE ANGINA PECTORIS (HCC): Primary | ICD-10-CM

## 2021-09-15 PROCEDURE — 93798 PHYS/QHP OP CAR RHAB W/ECG: CPT

## 2021-09-16 ENCOUNTER — APPOINTMENT (OUTPATIENT)
Dept: CARDIAC REHAB | Facility: HOSPITAL | Age: 61
End: 2021-09-16

## 2021-09-17 ENCOUNTER — APPOINTMENT (OUTPATIENT)
Dept: CARDIAC REHAB | Facility: HOSPITAL | Age: 61
End: 2021-09-17

## 2021-09-18 ENCOUNTER — NURSE TRIAGE (OUTPATIENT)
Dept: CALL CENTER | Facility: HOSPITAL | Age: 61
End: 2021-09-18

## 2021-09-18 NOTE — TELEPHONE ENCOUNTER
Caller says he is out of his medications and needs refills Plavix 75 mg  Metroprolol 50 mg  Protonix 40 mg Lisinopril 2.5 mg  Advised to call PCP or speak with pharmacy until he can call MD for refill.    Reason for Disposition  • [1] Caller has URGENT medicine question about med that PCP or specialist prescribed AND [2] triager unable to answer question    Additional Information  • Negative: [1] Intentional drug overdose AND [2] suicidal thoughts or ideas  • Negative: Drug overdose and triager unable to answer question  • Negative: Caller requesting information unrelated to medicine  • Negative: Caller requesting information about COVID-19 Vaccine  • Negative: Caller requesting information about Emergency Contraception  • Negative: Caller requesting information about Combined Birth Control Pills  • Negative: Caller requesting information about Progestin Birth Control Pills  • Negative: Caller requesting information about Post-Op pain or medicines  • Negative: Caller requesting a prescription antibiotic (such as Penicillin) for Strep throat and has a positive culture result  • Negative: Caller requesting a prescription anti-viral med (such as Tamiflu) and has influenza (flu)  symptoms  • Negative: Immunization reaction suspected  • Negative: Rash while taking a medicine or within 3 days of stopping it  • Negative: [1] Asthma and [2] having symptoms of asthma (cough, wheezing, etc.)  • Negative: [1] Symptom of illness (e.g., headache, abdominal pain, earache, vomiting) AND [2] more than mild  • Negative: Breastfeeding questions about mother's medicines and diet  • Negative: MORE THAN A DOUBLE DOSE of a prescription or over-the-counter (OTC) drug  • Negative: [1] DOUBLE DOSE (an extra dose or lesser amount) of prescription drug AND [2] any symptoms (e.g., dizziness, nausea, pain, sleepiness)  • Negative: [1] DOUBLE DOSE (an extra dose or lesser amount) of over-the-counter (OTC) drug AND [2] any symptoms (e.g.,  "dizziness, nausea, pain, sleepiness)  • Negative: Took another person's prescription drug  • Negative: [1] DOUBLE DOSE (an extra dose or lesser amount) of prescription drug AND [2] NO symptoms (Exception: a double dose of antibiotics)  • Negative: Diabetes drug error or overdose (e.g., took wrong type of insulin or took extra dose)  • Negative: [1] Prescription refill request for ESSENTIAL medicine (i.e., likelihood of harm to patient if not taken) AND [2] triager unable to refill per department policy  • Negative: [1] Prescription not at pharmacy AND [2] was prescribed by PCP recently (Exception: triager has access to EMR and prescription is recorded there. Go to Home Care and confirm for pharmacy.)  • Negative: [1] Pharmacy calling with prescription question AND [2] triager unable to answer question    Answer Assessment - Initial Assessment Questions  1. NAME of MEDICATION: \"What medicine are you calling about?\"    Plavix Metroprolol Protonix Lisinopril  2. QUESTION: \"What is your question?\" (e.g., medication refill, side effect)  Medication refill  3. PRESCRIBING HCP: \"Who prescribed it?\" Reason: if prescribed by specialist, call should be referred to that group.  Ganesh  4. SYMPTOMS: \"Do you have any symptoms?\"   no  5. SEVERITY: If symptoms are present, ask \"Are they mild, moderate or severe?\"      *No Answer*  6. PREGNANCY:  \"Is there any chance that you are pregnant?\" \"When was your last menstrual period?\"  na    Protocols used: MEDICATION QUESTION CALL-ADULT-      "

## 2021-09-20 ENCOUNTER — TREATMENT (OUTPATIENT)
Dept: CARDIAC REHAB | Facility: HOSPITAL | Age: 61
End: 2021-09-20

## 2021-09-20 DIAGNOSIS — I25.110 CORONARY ARTERY DISEASE INVOLVING NATIVE CORONARY ARTERY OF NATIVE HEART WITH UNSTABLE ANGINA PECTORIS (HCC): Primary | ICD-10-CM

## 2021-09-20 PROCEDURE — 93798 PHYS/QHP OP CAR RHAB W/ECG: CPT

## 2021-09-21 ENCOUNTER — APPOINTMENT (OUTPATIENT)
Dept: CARDIAC REHAB | Facility: HOSPITAL | Age: 61
End: 2021-09-21

## 2021-09-22 ENCOUNTER — APPOINTMENT (OUTPATIENT)
Dept: CARDIAC REHAB | Facility: HOSPITAL | Age: 61
End: 2021-09-22

## 2021-09-22 ENCOUNTER — OFFICE VISIT (OUTPATIENT)
Dept: CARDIAC SURGERY | Facility: CLINIC | Age: 61
End: 2021-09-22

## 2021-09-22 VITALS
HEART RATE: 59 BPM | HEIGHT: 70 IN | DIASTOLIC BLOOD PRESSURE: 80 MMHG | WEIGHT: 178 LBS | BODY MASS INDEX: 25.48 KG/M2 | OXYGEN SATURATION: 97 % | SYSTOLIC BLOOD PRESSURE: 130 MMHG

## 2021-09-22 DIAGNOSIS — I25.10 CORONARY ARTERY DISEASE INVOLVING NATIVE CORONARY ARTERY OF NATIVE HEART WITHOUT ANGINA PECTORIS: ICD-10-CM

## 2021-09-22 DIAGNOSIS — Z51.89 VISIT FOR WOUND CHECK: Primary | ICD-10-CM

## 2021-09-22 DIAGNOSIS — R21 RASH: ICD-10-CM

## 2021-09-22 DIAGNOSIS — Z95.1 S/P CABG (CORONARY ARTERY BYPASS GRAFT): ICD-10-CM

## 2021-09-22 DIAGNOSIS — I73.9 PERIPHERAL ARTERY DISEASE (HCC): ICD-10-CM

## 2021-09-22 PROCEDURE — 99213 OFFICE O/P EST LOW 20 MIN: CPT | Performed by: NURSE PRACTITIONER

## 2021-09-22 NOTE — PROGRESS NOTES
"Subjective   Chief Complaint   Patient presents with   • Wound Check     Pt is here for wound check       Patient ID: Tree Shaffer is a 61 y.o. male who is here for follow-up having had Coronary artery bypass grafting-4 vessel (left internal mammary artery/left anterior descending, reverse saphenous vein graft/first diagonal artery, reverse saphenous vein graft/first obtuse marginal, and reverse saphenous vein graft/Posterior descending artery), Right extended endoscopic vein harvest performed on 5/10/2021 by Dr. Menendez.     Coronary Artery Disease    Wound Check      Post operative recovery was uneventful without any major complications. He returns today for wound check. Sternal incision and EVH site is clean and dry and well healed. Distal to EVH site on the right leg, there is a scab with surrounding rash. He has an appointment tomorrow afternoon with his PCP regarding this. He maintains that this area was problematic prior to surgery with a scab that has not fully healed. Over the last month, he reports worsening redness and pruritis. He is using an unknown antibiotic cream and a topical spray. No drainage. Otherwise he is doing well.     He has remained free from smoking, drinking, and using marijuana. He follows with pain clinic for chronic pain syndrome. He denies any sternal clicks.  Denies any fevers, sweats, or chills. He is eating well.  No cardiac chest pain or shortness of breath. Sleep habits are good. No fevers, sweats, or chills. Appetite is good. Ambulating 30 minutes twice daily.     The following portions of the patient's history were reviewed and updated as appropriate: allergies, current medications, past family history, past medical history, past social history, past surgical history and problem list.      Objective   Visit Vitals  /80 (BP Location: Left arm, Patient Position: Sitting, Cuff Size: Adult)   Pulse 59   Ht 177.8 cm (70\")   Wt 80.7 kg (178 lb)   SpO2 97%   BMI 25.54 kg/m² "       Physical Exam  Vitals reviewed.   Constitutional:       General: He is not in acute distress.     Appearance: Normal appearance. He is well-developed. He is not diaphoretic.   HENT:      Head: Normocephalic and atraumatic.   Eyes:      Pupils: Pupils are equal, round, and reactive to light.   Cardiovascular:      Rate and Rhythm: Regular rhythm. Bradycardia present.      Heart sounds: Normal heart sounds. No murmur heard.   No friction rub.   Pulmonary:      Effort: Pulmonary effort is normal. No respiratory distress.      Breath sounds: Normal breath sounds. No wheezing or rales.   Abdominal:      General: There is no distension.      Palpations: Abdomen is soft.      Tenderness: There is no abdominal tenderness. There is no guarding.   Musculoskeletal:      Cervical back: Normal range of motion and neck supple.      Right lower leg: Edema (minimal edema) present.      Left lower leg: No edema.   Skin:     General: Skin is warm and dry.      Coloration: Skin is not pale.      Findings: No bruising or rash.      Comments: Sternotomy site well healed.  Sternum stable. No clicks appreciated. Saphenectomy site well healed. He continues to have a scab distal to vein harvesting site. New dry red rash around site. No warmth.     Neurological:      Mental Status: He is alert and oriented to person, place, and time. Mental status is at baseline.   Psychiatric:         Behavior: Behavior normal.         Assessment/Plan         Diagnoses and all orders for this visit:    1. Visit for wound check (Primary)    2. Rash    3. Coronary artery disease involving native coronary artery of native heart without angina pectoris    4. Peripheral artery disease (CMS/HCC)    5. S/P CABG (coronary artery bypass graft)         Overall, Tree Shaffer is doing well. Advised to discontinue antibiotic cream and sprays. Use soap and water only to clean. Again, this area is distal to the vein harvesting site and the incision itself appears  well healed. He has follow up with his PCP tomorrow. Asked he call our office to relay the plan afterwards. If needed, he may present back for evaluation but I would like for this to be when Dr. Menendez is available for second opinion. Follow up prn. He is agreeable with the above plan.     Continue to follow with vascular surgery and cardiology.     Patient's Body mass index is 25.54 kg/m². indicating that he is overweight (BMI 25-29.9). Obesity-related health conditions include the following: hypertension, coronary heart disease, GERD and peripheral vascular disease. Obesity is unchanged. BMI is is above average; BMI management plan is completed. We discussed portion control, increasing exercise and follow with PCP to accomplish an acceptable weight for age and height.      I have congratulated Tree Shaffer on successful smoking cessation. Tree Shaffer  reports that he quit smoking about 4 months ago. His smoking use included cigarettes. He has never used smokeless tobacco.       Advance Care Planning   ACP discussion was declined by the patient. Patient does not have an advance directive, declines further assistance.

## 2021-09-23 ENCOUNTER — APPOINTMENT (OUTPATIENT)
Dept: CARDIAC REHAB | Facility: HOSPITAL | Age: 61
End: 2021-09-23

## 2021-09-24 ENCOUNTER — TELEPHONE (OUTPATIENT)
Dept: CARDIAC SURGERY | Facility: CLINIC | Age: 61
End: 2021-09-24

## 2021-09-24 NOTE — TELEPHONE ENCOUNTER
Pt left message with the answering service requesting to speak with Janice BALLARD re: his leg.  Can reach him at #[phone]/[facility]

## 2021-09-24 NOTE — TELEPHONE ENCOUNTER
Called again and was able to speak to Mr. Shaffer. Says he saw Elder Andersen ELIO yesterday afternoon and has prescribed him 3 medications (he thinks an antibiotic and 2 creams) but he has not picked up from the pharmacy yet. Advised that I would speak to Dr. Menendez and call him back.

## 2021-09-27 ENCOUNTER — TREATMENT (OUTPATIENT)
Dept: CARDIAC REHAB | Facility: HOSPITAL | Age: 61
End: 2021-09-27

## 2021-09-27 DIAGNOSIS — I25.110 CORONARY ARTERY DISEASE INVOLVING NATIVE CORONARY ARTERY OF NATIVE HEART WITH UNSTABLE ANGINA PECTORIS (HCC): Primary | ICD-10-CM

## 2021-09-27 PROCEDURE — 93798 PHYS/QHP OP CAR RHAB W/ECG: CPT

## 2021-09-27 NOTE — TELEPHONE ENCOUNTER
Reviewed with Dr. Menendez. He advises for primary care to continue treatment of rash. Called patient and discussed. He states the rash is getting better and it does not itch as much as previously. He is not home now, so he cannot tell me the name of the antibiotics he is on. He will call back if this worsens.

## 2021-09-28 ENCOUNTER — APPOINTMENT (OUTPATIENT)
Dept: CARDIAC REHAB | Facility: HOSPITAL | Age: 61
End: 2021-09-28

## 2021-09-29 ENCOUNTER — TREATMENT (OUTPATIENT)
Dept: CARDIAC REHAB | Facility: HOSPITAL | Age: 61
End: 2021-09-29

## 2021-09-29 DIAGNOSIS — I25.110 CORONARY ARTERY DISEASE INVOLVING NATIVE CORONARY ARTERY OF NATIVE HEART WITH UNSTABLE ANGINA PECTORIS (HCC): Primary | ICD-10-CM

## 2021-09-29 PROCEDURE — 93798 PHYS/QHP OP CAR RHAB W/ECG: CPT

## 2021-09-30 ENCOUNTER — APPOINTMENT (OUTPATIENT)
Dept: CARDIAC REHAB | Facility: HOSPITAL | Age: 61
End: 2021-09-30

## 2021-10-01 ENCOUNTER — TREATMENT (OUTPATIENT)
Dept: CARDIAC REHAB | Facility: HOSPITAL | Age: 61
End: 2021-10-01

## 2021-10-01 DIAGNOSIS — I25.110 CORONARY ARTERY DISEASE INVOLVING NATIVE CORONARY ARTERY OF NATIVE HEART WITH UNSTABLE ANGINA PECTORIS (HCC): Primary | ICD-10-CM

## 2021-10-01 PROCEDURE — 93798 PHYS/QHP OP CAR RHAB W/ECG: CPT

## 2021-10-04 ENCOUNTER — TREATMENT (OUTPATIENT)
Dept: CARDIAC REHAB | Facility: HOSPITAL | Age: 61
End: 2021-10-04

## 2021-10-04 DIAGNOSIS — I25.110 CORONARY ARTERY DISEASE INVOLVING NATIVE CORONARY ARTERY OF NATIVE HEART WITH UNSTABLE ANGINA PECTORIS (HCC): Primary | ICD-10-CM

## 2021-10-04 PROCEDURE — 93798 PHYS/QHP OP CAR RHAB W/ECG: CPT

## 2021-10-05 ENCOUNTER — APPOINTMENT (OUTPATIENT)
Dept: CARDIAC REHAB | Facility: HOSPITAL | Age: 61
End: 2021-10-05

## 2021-10-06 ENCOUNTER — APPOINTMENT (OUTPATIENT)
Dept: CARDIAC REHAB | Facility: HOSPITAL | Age: 61
End: 2021-10-06

## 2021-10-07 ENCOUNTER — APPOINTMENT (OUTPATIENT)
Dept: CARDIAC REHAB | Facility: HOSPITAL | Age: 61
End: 2021-10-07

## 2021-10-08 ENCOUNTER — TREATMENT (OUTPATIENT)
Dept: CARDIAC REHAB | Facility: HOSPITAL | Age: 61
End: 2021-10-08

## 2021-10-08 DIAGNOSIS — I25.110 CORONARY ARTERY DISEASE INVOLVING NATIVE CORONARY ARTERY OF NATIVE HEART WITH UNSTABLE ANGINA PECTORIS (HCC): Primary | ICD-10-CM

## 2021-10-08 PROCEDURE — 93798 PHYS/QHP OP CAR RHAB W/ECG: CPT

## 2021-10-11 ENCOUNTER — TREATMENT (OUTPATIENT)
Dept: CARDIAC REHAB | Facility: HOSPITAL | Age: 61
End: 2021-10-11

## 2021-10-11 DIAGNOSIS — I25.110 CORONARY ARTERY DISEASE INVOLVING NATIVE CORONARY ARTERY OF NATIVE HEART WITH UNSTABLE ANGINA PECTORIS (HCC): Primary | ICD-10-CM

## 2021-10-11 PROCEDURE — 93798 PHYS/QHP OP CAR RHAB W/ECG: CPT

## 2021-10-12 ENCOUNTER — APPOINTMENT (OUTPATIENT)
Dept: CARDIAC REHAB | Facility: HOSPITAL | Age: 61
End: 2021-10-12

## 2021-10-13 ENCOUNTER — APPOINTMENT (OUTPATIENT)
Dept: CARDIAC REHAB | Facility: HOSPITAL | Age: 61
End: 2021-10-13

## 2021-10-14 ENCOUNTER — APPOINTMENT (OUTPATIENT)
Dept: CARDIAC REHAB | Facility: HOSPITAL | Age: 61
End: 2021-10-14

## 2021-10-18 ENCOUNTER — TREATMENT (OUTPATIENT)
Dept: CARDIAC REHAB | Facility: HOSPITAL | Age: 61
End: 2021-10-18

## 2021-10-18 DIAGNOSIS — I25.110 CORONARY ARTERY DISEASE INVOLVING NATIVE CORONARY ARTERY OF NATIVE HEART WITH UNSTABLE ANGINA PECTORIS (HCC): Primary | ICD-10-CM

## 2021-10-18 PROCEDURE — 93798 PHYS/QHP OP CAR RHAB W/ECG: CPT

## 2021-10-19 ENCOUNTER — APPOINTMENT (OUTPATIENT)
Dept: CARDIAC REHAB | Facility: HOSPITAL | Age: 61
End: 2021-10-19

## 2021-10-20 ENCOUNTER — APPOINTMENT (OUTPATIENT)
Dept: CARDIAC REHAB | Facility: HOSPITAL | Age: 61
End: 2021-10-20

## 2021-10-21 ENCOUNTER — APPOINTMENT (OUTPATIENT)
Dept: CARDIAC REHAB | Facility: HOSPITAL | Age: 61
End: 2021-10-21

## 2021-10-22 ENCOUNTER — TREATMENT (OUTPATIENT)
Dept: CARDIAC REHAB | Facility: HOSPITAL | Age: 61
End: 2021-10-22

## 2021-10-22 DIAGNOSIS — I25.110 CORONARY ARTERY DISEASE INVOLVING NATIVE CORONARY ARTERY OF NATIVE HEART WITH UNSTABLE ANGINA PECTORIS (HCC): Primary | ICD-10-CM

## 2021-10-22 PROCEDURE — 93798 PHYS/QHP OP CAR RHAB W/ECG: CPT

## 2021-10-25 ENCOUNTER — TREATMENT (OUTPATIENT)
Dept: CARDIAC REHAB | Facility: HOSPITAL | Age: 61
End: 2021-10-25

## 2021-10-25 DIAGNOSIS — I25.110 CORONARY ARTERY DISEASE INVOLVING NATIVE CORONARY ARTERY OF NATIVE HEART WITH UNSTABLE ANGINA PECTORIS (HCC): Primary | ICD-10-CM

## 2021-10-25 PROCEDURE — 93798 PHYS/QHP OP CAR RHAB W/ECG: CPT

## 2021-10-26 ENCOUNTER — APPOINTMENT (OUTPATIENT)
Dept: CARDIAC REHAB | Facility: HOSPITAL | Age: 61
End: 2021-10-26

## 2021-10-27 ENCOUNTER — TREATMENT (OUTPATIENT)
Dept: CARDIAC REHAB | Facility: HOSPITAL | Age: 61
End: 2021-10-27

## 2021-10-27 DIAGNOSIS — I25.110 CORONARY ARTERY DISEASE INVOLVING NATIVE CORONARY ARTERY OF NATIVE HEART WITH UNSTABLE ANGINA PECTORIS (HCC): Primary | ICD-10-CM

## 2021-10-27 PROCEDURE — 93798 PHYS/QHP OP CAR RHAB W/ECG: CPT

## 2021-10-28 ENCOUNTER — APPOINTMENT (OUTPATIENT)
Dept: CARDIAC REHAB | Facility: HOSPITAL | Age: 61
End: 2021-10-28

## 2021-10-29 ENCOUNTER — TREATMENT (OUTPATIENT)
Dept: CARDIAC REHAB | Facility: HOSPITAL | Age: 61
End: 2021-10-29

## 2021-10-29 DIAGNOSIS — I25.110 CORONARY ARTERY DISEASE INVOLVING NATIVE CORONARY ARTERY OF NATIVE HEART WITH UNSTABLE ANGINA PECTORIS (HCC): Primary | ICD-10-CM

## 2021-10-29 PROCEDURE — 93798 PHYS/QHP OP CAR RHAB W/ECG: CPT

## 2021-11-01 ENCOUNTER — TREATMENT (OUTPATIENT)
Dept: CARDIAC REHAB | Facility: HOSPITAL | Age: 61
End: 2021-11-01

## 2021-11-01 DIAGNOSIS — I25.110 CORONARY ARTERY DISEASE INVOLVING NATIVE CORONARY ARTERY OF NATIVE HEART WITH UNSTABLE ANGINA PECTORIS (HCC): Primary | ICD-10-CM

## 2021-11-01 PROCEDURE — 93798 PHYS/QHP OP CAR RHAB W/ECG: CPT

## 2021-11-02 ENCOUNTER — APPOINTMENT (OUTPATIENT)
Dept: CARDIAC REHAB | Facility: HOSPITAL | Age: 61
End: 2021-11-02

## 2021-11-03 ENCOUNTER — TREATMENT (OUTPATIENT)
Dept: CARDIAC REHAB | Facility: HOSPITAL | Age: 61
End: 2021-11-03

## 2021-11-03 DIAGNOSIS — I25.110 CORONARY ARTERY DISEASE INVOLVING NATIVE CORONARY ARTERY OF NATIVE HEART WITH UNSTABLE ANGINA PECTORIS (HCC): Primary | ICD-10-CM

## 2021-11-03 PROCEDURE — 93798 PHYS/QHP OP CAR RHAB W/ECG: CPT

## 2021-11-04 ENCOUNTER — APPOINTMENT (OUTPATIENT)
Dept: CARDIAC REHAB | Facility: HOSPITAL | Age: 61
End: 2021-11-04

## 2021-11-05 ENCOUNTER — TREATMENT (OUTPATIENT)
Dept: CARDIAC REHAB | Facility: HOSPITAL | Age: 61
End: 2021-11-05

## 2021-11-05 DIAGNOSIS — I25.110 CORONARY ARTERY DISEASE INVOLVING NATIVE CORONARY ARTERY OF NATIVE HEART WITH UNSTABLE ANGINA PECTORIS (HCC): Primary | ICD-10-CM

## 2021-11-05 PROCEDURE — 93798 PHYS/QHP OP CAR RHAB W/ECG: CPT

## 2021-11-08 ENCOUNTER — TREATMENT (OUTPATIENT)
Dept: CARDIAC REHAB | Facility: HOSPITAL | Age: 61
End: 2021-11-08

## 2021-11-08 DIAGNOSIS — I25.110 CORONARY ARTERY DISEASE INVOLVING NATIVE CORONARY ARTERY OF NATIVE HEART WITH UNSTABLE ANGINA PECTORIS (HCC): Primary | ICD-10-CM

## 2021-11-08 PROCEDURE — 93798 PHYS/QHP OP CAR RHAB W/ECG: CPT

## 2021-11-09 ENCOUNTER — APPOINTMENT (OUTPATIENT)
Dept: CARDIAC REHAB | Facility: HOSPITAL | Age: 61
End: 2021-11-09

## 2021-11-10 ENCOUNTER — TREATMENT (OUTPATIENT)
Dept: CARDIAC REHAB | Facility: HOSPITAL | Age: 61
End: 2021-11-10

## 2021-11-10 DIAGNOSIS — I25.110 CORONARY ARTERY DISEASE INVOLVING NATIVE CORONARY ARTERY OF NATIVE HEART WITH UNSTABLE ANGINA PECTORIS (HCC): Primary | ICD-10-CM

## 2021-11-10 PROCEDURE — 93798 PHYS/QHP OP CAR RHAB W/ECG: CPT

## 2021-11-11 ENCOUNTER — APPOINTMENT (OUTPATIENT)
Dept: CARDIAC REHAB | Facility: HOSPITAL | Age: 61
End: 2021-11-11

## 2021-11-16 ENCOUNTER — APPOINTMENT (OUTPATIENT)
Dept: CARDIAC REHAB | Facility: HOSPITAL | Age: 61
End: 2021-11-16

## 2021-11-17 ENCOUNTER — TREATMENT (OUTPATIENT)
Dept: CARDIAC REHAB | Facility: HOSPITAL | Age: 61
End: 2021-11-17

## 2021-11-17 DIAGNOSIS — I25.110 CORONARY ARTERY DISEASE INVOLVING NATIVE CORONARY ARTERY OF NATIVE HEART WITH UNSTABLE ANGINA PECTORIS (HCC): Primary | ICD-10-CM

## 2021-11-17 PROCEDURE — 93798 PHYS/QHP OP CAR RHAB W/ECG: CPT

## 2021-11-18 ENCOUNTER — APPOINTMENT (OUTPATIENT)
Dept: CARDIAC REHAB | Facility: HOSPITAL | Age: 61
End: 2021-11-18

## 2021-11-19 ENCOUNTER — TREATMENT (OUTPATIENT)
Dept: CARDIAC REHAB | Facility: HOSPITAL | Age: 61
End: 2021-11-19

## 2021-11-19 DIAGNOSIS — I25.110 CORONARY ARTERY DISEASE INVOLVING NATIVE CORONARY ARTERY OF NATIVE HEART WITH UNSTABLE ANGINA PECTORIS (HCC): Primary | ICD-10-CM

## 2021-11-19 PROCEDURE — 93798 PHYS/QHP OP CAR RHAB W/ECG: CPT

## 2021-11-23 ENCOUNTER — APPOINTMENT (OUTPATIENT)
Dept: CARDIAC REHAB | Facility: HOSPITAL | Age: 61
End: 2021-11-23

## 2021-11-24 ENCOUNTER — TREATMENT (OUTPATIENT)
Dept: CARDIAC REHAB | Facility: HOSPITAL | Age: 61
End: 2021-11-24

## 2021-11-24 DIAGNOSIS — I25.110 CORONARY ARTERY DISEASE INVOLVING NATIVE CORONARY ARTERY OF NATIVE HEART WITH UNSTABLE ANGINA PECTORIS (HCC): Primary | ICD-10-CM

## 2021-11-24 PROCEDURE — 93798 PHYS/QHP OP CAR RHAB W/ECG: CPT

## 2021-11-26 ENCOUNTER — APPOINTMENT (OUTPATIENT)
Dept: CARDIAC REHAB | Facility: HOSPITAL | Age: 61
End: 2021-11-26

## 2021-11-29 ENCOUNTER — APPOINTMENT (OUTPATIENT)
Dept: CARDIAC REHAB | Facility: HOSPITAL | Age: 61
End: 2021-11-29

## 2021-11-29 DIAGNOSIS — I25.110 CORONARY ARTERY DISEASE INVOLVING NATIVE CORONARY ARTERY OF NATIVE HEART WITH UNSTABLE ANGINA PECTORIS (HCC): Primary | ICD-10-CM

## 2021-11-29 PROCEDURE — 93798 PHYS/QHP OP CAR RHAB W/ECG: CPT

## 2021-11-30 ENCOUNTER — APPOINTMENT (OUTPATIENT)
Dept: CARDIAC REHAB | Facility: HOSPITAL | Age: 61
End: 2021-11-30

## 2021-12-01 ENCOUNTER — TREATMENT (OUTPATIENT)
Dept: CARDIAC REHAB | Facility: HOSPITAL | Age: 61
End: 2021-12-01

## 2021-12-01 DIAGNOSIS — I25.110 CORONARY ARTERY DISEASE INVOLVING NATIVE CORONARY ARTERY OF NATIVE HEART WITH UNSTABLE ANGINA PECTORIS (HCC): Primary | ICD-10-CM

## 2021-12-01 PROCEDURE — 93798 PHYS/QHP OP CAR RHAB W/ECG: CPT

## 2021-12-02 ENCOUNTER — APPOINTMENT (OUTPATIENT)
Dept: CARDIAC REHAB | Facility: HOSPITAL | Age: 61
End: 2021-12-02

## 2021-12-03 ENCOUNTER — OFFICE VISIT (OUTPATIENT)
Dept: CARDIAC REHAB | Facility: HOSPITAL | Age: 61
End: 2021-12-03

## 2021-12-03 VITALS — HEART RATE: 58 BPM | DIASTOLIC BLOOD PRESSURE: 66 MMHG | SYSTOLIC BLOOD PRESSURE: 138 MMHG

## 2021-12-03 DIAGNOSIS — I25.110 CORONARY ARTERY DISEASE INVOLVING NATIVE CORONARY ARTERY OF NATIVE HEART WITH UNSTABLE ANGINA PECTORIS (HCC): Primary | ICD-10-CM

## 2021-12-06 ENCOUNTER — OFFICE VISIT (OUTPATIENT)
Dept: CARDIAC REHAB | Facility: HOSPITAL | Age: 61
End: 2021-12-06

## 2021-12-06 VITALS — HEART RATE: 62 BPM | SYSTOLIC BLOOD PRESSURE: 138 MMHG | DIASTOLIC BLOOD PRESSURE: 76 MMHG

## 2021-12-06 DIAGNOSIS — I25.110 CORONARY ARTERY DISEASE INVOLVING NATIVE CORONARY ARTERY OF NATIVE HEART WITH UNSTABLE ANGINA PECTORIS (HCC): Primary | ICD-10-CM

## 2021-12-10 ENCOUNTER — OFFICE VISIT (OUTPATIENT)
Dept: CARDIAC REHAB | Facility: HOSPITAL | Age: 61
End: 2021-12-10

## 2021-12-10 VITALS — DIASTOLIC BLOOD PRESSURE: 64 MMHG | SYSTOLIC BLOOD PRESSURE: 118 MMHG

## 2021-12-10 DIAGNOSIS — I25.110 CORONARY ARTERY DISEASE INVOLVING NATIVE CORONARY ARTERY OF NATIVE HEART WITH UNSTABLE ANGINA PECTORIS (HCC): Primary | ICD-10-CM

## 2021-12-13 ENCOUNTER — OFFICE VISIT (OUTPATIENT)
Dept: CARDIAC REHAB | Facility: HOSPITAL | Age: 61
End: 2021-12-13

## 2021-12-13 VITALS — SYSTOLIC BLOOD PRESSURE: 130 MMHG | DIASTOLIC BLOOD PRESSURE: 70 MMHG

## 2021-12-13 DIAGNOSIS — I25.110 CORONARY ARTERY DISEASE INVOLVING NATIVE CORONARY ARTERY OF NATIVE HEART WITH UNSTABLE ANGINA PECTORIS (HCC): Primary | ICD-10-CM

## 2021-12-15 ENCOUNTER — OFFICE VISIT (OUTPATIENT)
Dept: CARDIAC REHAB | Facility: HOSPITAL | Age: 61
End: 2021-12-15

## 2021-12-15 VITALS — SYSTOLIC BLOOD PRESSURE: 122 MMHG | HEART RATE: 61 BPM | DIASTOLIC BLOOD PRESSURE: 68 MMHG

## 2021-12-15 DIAGNOSIS — I25.110 CORONARY ARTERY DISEASE INVOLVING NATIVE CORONARY ARTERY OF NATIVE HEART WITH UNSTABLE ANGINA PECTORIS (HCC): Primary | ICD-10-CM

## 2021-12-17 ENCOUNTER — OFFICE VISIT (OUTPATIENT)
Dept: CARDIAC REHAB | Facility: HOSPITAL | Age: 61
End: 2021-12-17

## 2021-12-17 VITALS — SYSTOLIC BLOOD PRESSURE: 106 MMHG | DIASTOLIC BLOOD PRESSURE: 54 MMHG

## 2021-12-17 DIAGNOSIS — I25.110 CORONARY ARTERY DISEASE INVOLVING NATIVE CORONARY ARTERY OF NATIVE HEART WITH UNSTABLE ANGINA PECTORIS (HCC): Primary | ICD-10-CM

## 2021-12-22 ENCOUNTER — OFFICE VISIT (OUTPATIENT)
Dept: CARDIAC REHAB | Facility: HOSPITAL | Age: 61
End: 2021-12-22

## 2021-12-22 VITALS — BODY MASS INDEX: 26.4 KG/M2 | SYSTOLIC BLOOD PRESSURE: 110 MMHG | DIASTOLIC BLOOD PRESSURE: 66 MMHG | WEIGHT: 184 LBS

## 2021-12-22 DIAGNOSIS — I25.110 CORONARY ARTERY DISEASE INVOLVING NATIVE CORONARY ARTERY OF NATIVE HEART WITH UNSTABLE ANGINA PECTORIS (HCC): Primary | ICD-10-CM

## 2021-12-27 ENCOUNTER — OFFICE VISIT (OUTPATIENT)
Dept: CARDIAC REHAB | Facility: HOSPITAL | Age: 61
End: 2021-12-27

## 2021-12-27 VITALS — DIASTOLIC BLOOD PRESSURE: 50 MMHG | SYSTOLIC BLOOD PRESSURE: 98 MMHG

## 2021-12-27 DIAGNOSIS — I25.110 CORONARY ARTERY DISEASE INVOLVING NATIVE CORONARY ARTERY OF NATIVE HEART WITH UNSTABLE ANGINA PECTORIS (HCC): Primary | ICD-10-CM

## 2021-12-29 ENCOUNTER — OFFICE VISIT (OUTPATIENT)
Dept: CARDIAC REHAB | Facility: HOSPITAL | Age: 61
End: 2021-12-29

## 2021-12-29 VITALS — DIASTOLIC BLOOD PRESSURE: 60 MMHG | SYSTOLIC BLOOD PRESSURE: 126 MMHG

## 2021-12-29 DIAGNOSIS — I25.110 CORONARY ARTERY DISEASE INVOLVING NATIVE CORONARY ARTERY OF NATIVE HEART WITH UNSTABLE ANGINA PECTORIS (HCC): Primary | ICD-10-CM

## 2022-01-03 DIAGNOSIS — I25.110 CORONARY ARTERY DISEASE INVOLVING NATIVE CORONARY ARTERY OF NATIVE HEART WITH UNSTABLE ANGINA PECTORIS: Primary | ICD-10-CM

## 2022-01-18 ENCOUNTER — TELEPHONE (OUTPATIENT)
Dept: VASCULAR SURGERY | Facility: CLINIC | Age: 62
End: 2022-01-18

## 2022-01-18 NOTE — TELEPHONE ENCOUNTER
Left message for a return call . Pts appointment with Dr. Biggs had to be changed to 02/25. Dr. Biggs would like patient to go ahead and have his test on 02/18

## 2022-01-19 ENCOUNTER — OFFICE VISIT (OUTPATIENT)
Dept: CARDIAC REHAB | Facility: HOSPITAL | Age: 62
End: 2022-01-19

## 2022-01-19 VITALS — HEART RATE: 64 BPM | SYSTOLIC BLOOD PRESSURE: 146 MMHG | DIASTOLIC BLOOD PRESSURE: 76 MMHG

## 2022-01-19 DIAGNOSIS — I25.110 CORONARY ARTERY DISEASE INVOLVING NATIVE CORONARY ARTERY OF NATIVE HEART WITH UNSTABLE ANGINA PECTORIS: Primary | ICD-10-CM

## 2022-01-21 ENCOUNTER — OFFICE VISIT (OUTPATIENT)
Dept: CARDIAC REHAB | Facility: HOSPITAL | Age: 62
End: 2022-01-21

## 2022-01-21 VITALS — DIASTOLIC BLOOD PRESSURE: 68 MMHG | SYSTOLIC BLOOD PRESSURE: 130 MMHG | HEART RATE: 60 BPM

## 2022-01-21 DIAGNOSIS — I25.110 CORONARY ARTERY DISEASE INVOLVING NATIVE CORONARY ARTERY OF NATIVE HEART WITH UNSTABLE ANGINA PECTORIS: Primary | ICD-10-CM

## 2022-01-24 ENCOUNTER — OFFICE VISIT (OUTPATIENT)
Dept: CARDIAC REHAB | Facility: HOSPITAL | Age: 62
End: 2022-01-24

## 2022-01-24 VITALS — DIASTOLIC BLOOD PRESSURE: 80 MMHG | SYSTOLIC BLOOD PRESSURE: 162 MMHG

## 2022-01-24 DIAGNOSIS — I25.110 CORONARY ARTERY DISEASE INVOLVING NATIVE CORONARY ARTERY OF NATIVE HEART WITH UNSTABLE ANGINA PECTORIS: Primary | ICD-10-CM

## 2022-01-28 ENCOUNTER — OFFICE VISIT (OUTPATIENT)
Dept: CARDIAC REHAB | Facility: HOSPITAL | Age: 62
End: 2022-01-28

## 2022-01-28 VITALS — DIASTOLIC BLOOD PRESSURE: 76 MMHG | SYSTOLIC BLOOD PRESSURE: 132 MMHG

## 2022-01-28 DIAGNOSIS — I25.110 CORONARY ARTERY DISEASE INVOLVING NATIVE CORONARY ARTERY OF NATIVE HEART WITH UNSTABLE ANGINA PECTORIS: Primary | ICD-10-CM

## 2022-02-02 ENCOUNTER — OFFICE VISIT (OUTPATIENT)
Dept: CARDIAC REHAB | Facility: HOSPITAL | Age: 62
End: 2022-02-02

## 2022-02-02 VITALS — SYSTOLIC BLOOD PRESSURE: 130 MMHG | DIASTOLIC BLOOD PRESSURE: 82 MMHG

## 2022-02-02 DIAGNOSIS — I25.110 CORONARY ARTERY DISEASE INVOLVING NATIVE CORONARY ARTERY OF NATIVE HEART WITH UNSTABLE ANGINA PECTORIS: Primary | ICD-10-CM

## 2022-02-04 ENCOUNTER — APPOINTMENT (OUTPATIENT)
Dept: CARDIAC REHAB | Facility: HOSPITAL | Age: 62
End: 2022-02-04

## 2022-02-07 ENCOUNTER — OFFICE VISIT (OUTPATIENT)
Dept: CARDIAC REHAB | Facility: HOSPITAL | Age: 62
End: 2022-02-07

## 2022-02-07 VITALS — DIASTOLIC BLOOD PRESSURE: 68 MMHG | SYSTOLIC BLOOD PRESSURE: 138 MMHG

## 2022-02-07 DIAGNOSIS — I25.110 CORONARY ARTERY DISEASE INVOLVING NATIVE CORONARY ARTERY OF NATIVE HEART WITH UNSTABLE ANGINA PECTORIS: Primary | ICD-10-CM

## 2022-02-09 ENCOUNTER — OFFICE VISIT (OUTPATIENT)
Dept: CARDIAC REHAB | Facility: HOSPITAL | Age: 62
End: 2022-02-09

## 2022-02-09 VITALS — DIASTOLIC BLOOD PRESSURE: 70 MMHG | SYSTOLIC BLOOD PRESSURE: 128 MMHG

## 2022-02-09 DIAGNOSIS — I25.110 CORONARY ARTERY DISEASE INVOLVING NATIVE CORONARY ARTERY OF NATIVE HEART WITH UNSTABLE ANGINA PECTORIS: Primary | ICD-10-CM

## 2022-02-11 ENCOUNTER — OFFICE VISIT (OUTPATIENT)
Dept: CARDIAC REHAB | Facility: HOSPITAL | Age: 62
End: 2022-02-11

## 2022-02-11 VITALS
WEIGHT: 171 LBS | SYSTOLIC BLOOD PRESSURE: 136 MMHG | DIASTOLIC BLOOD PRESSURE: 72 MMHG | HEART RATE: 73 BPM | BODY MASS INDEX: 24.54 KG/M2

## 2022-02-11 DIAGNOSIS — I25.110 CORONARY ARTERY DISEASE INVOLVING NATIVE CORONARY ARTERY OF NATIVE HEART WITH UNSTABLE ANGINA PECTORIS: Primary | ICD-10-CM

## 2022-02-14 ENCOUNTER — OFFICE VISIT (OUTPATIENT)
Dept: CARDIAC REHAB | Facility: HOSPITAL | Age: 62
End: 2022-02-14

## 2022-02-14 VITALS — SYSTOLIC BLOOD PRESSURE: 120 MMHG | DIASTOLIC BLOOD PRESSURE: 64 MMHG

## 2022-02-14 DIAGNOSIS — I25.110 CORONARY ARTERY DISEASE INVOLVING NATIVE CORONARY ARTERY OF NATIVE HEART WITH UNSTABLE ANGINA PECTORIS: Primary | ICD-10-CM

## 2022-02-16 ENCOUNTER — OFFICE VISIT (OUTPATIENT)
Dept: CARDIAC REHAB | Facility: HOSPITAL | Age: 62
End: 2022-02-16

## 2022-02-16 VITALS — DIASTOLIC BLOOD PRESSURE: 70 MMHG | SYSTOLIC BLOOD PRESSURE: 124 MMHG

## 2022-02-16 DIAGNOSIS — I25.110 CORONARY ARTERY DISEASE INVOLVING NATIVE CORONARY ARTERY OF NATIVE HEART WITH UNSTABLE ANGINA PECTORIS: Primary | ICD-10-CM

## 2022-02-18 ENCOUNTER — HOSPITAL ENCOUNTER (OUTPATIENT)
Dept: ULTRASOUND IMAGING | Facility: HOSPITAL | Age: 62
Discharge: HOME | End: 2022-02-18

## 2022-02-18 ENCOUNTER — OFFICE VISIT (OUTPATIENT)
Dept: CARDIAC REHAB | Facility: HOSPITAL | Age: 62
End: 2022-02-18

## 2022-02-18 VITALS — SYSTOLIC BLOOD PRESSURE: 128 MMHG | DIASTOLIC BLOOD PRESSURE: 70 MMHG

## 2022-02-18 DIAGNOSIS — I70.219 ATHEROSCLEROSIS OF ARTERY OF EXTREMITY WITH INTERMITTENT CLAUDICATION: ICD-10-CM

## 2022-02-18 DIAGNOSIS — I25.110 CORONARY ARTERY DISEASE INVOLVING NATIVE CORONARY ARTERY OF NATIVE HEART WITH UNSTABLE ANGINA PECTORIS: Primary | ICD-10-CM

## 2022-02-18 DIAGNOSIS — I73.9 PERIPHERAL ARTERIAL DISEASE: ICD-10-CM

## 2022-02-18 DIAGNOSIS — I65.23 CAROTID STENOSIS, ASYMPTOMATIC, BILATERAL: ICD-10-CM

## 2022-02-18 PROCEDURE — 93880 EXTRACRANIAL BILAT STUDY: CPT

## 2022-02-18 PROCEDURE — 93923 UPR/LXTR ART STDY 3+ LVLS: CPT | Performed by: SURGERY

## 2022-02-18 PROCEDURE — 93923 UPR/LXTR ART STDY 3+ LVLS: CPT

## 2022-02-18 PROCEDURE — 93880 EXTRACRANIAL BILAT STUDY: CPT | Performed by: SURGERY

## 2022-02-21 ENCOUNTER — OFFICE VISIT (OUTPATIENT)
Dept: CARDIAC REHAB | Facility: HOSPITAL | Age: 62
End: 2022-02-21

## 2022-02-21 VITALS — SYSTOLIC BLOOD PRESSURE: 106 MMHG | DIASTOLIC BLOOD PRESSURE: 62 MMHG

## 2022-02-21 DIAGNOSIS — I25.110 CORONARY ARTERY DISEASE INVOLVING NATIVE CORONARY ARTERY OF NATIVE HEART WITH UNSTABLE ANGINA PECTORIS: Primary | ICD-10-CM

## 2022-02-23 ENCOUNTER — OFFICE VISIT (OUTPATIENT)
Dept: CARDIAC REHAB | Facility: HOSPITAL | Age: 62
End: 2022-02-23

## 2022-02-23 VITALS — DIASTOLIC BLOOD PRESSURE: 72 MMHG | SYSTOLIC BLOOD PRESSURE: 138 MMHG

## 2022-02-23 DIAGNOSIS — I25.110 CORONARY ARTERY DISEASE INVOLVING NATIVE CORONARY ARTERY OF NATIVE HEART WITH UNSTABLE ANGINA PECTORIS: Primary | ICD-10-CM

## 2022-02-24 ENCOUNTER — TELEPHONE (OUTPATIENT)
Dept: VASCULAR SURGERY | Facility: CLINIC | Age: 62
End: 2022-02-24

## 2022-02-25 ENCOUNTER — OFFICE VISIT (OUTPATIENT)
Dept: CARDIAC REHAB | Facility: HOSPITAL | Age: 62
End: 2022-02-25

## 2022-02-25 ENCOUNTER — OFFICE VISIT (OUTPATIENT)
Dept: VASCULAR SURGERY | Facility: CLINIC | Age: 62
End: 2022-02-25

## 2022-02-25 VITALS
HEIGHT: 70 IN | HEART RATE: 76 BPM | RESPIRATION RATE: 18 BRPM | BODY MASS INDEX: 27.63 KG/M2 | DIASTOLIC BLOOD PRESSURE: 76 MMHG | WEIGHT: 193 LBS | OXYGEN SATURATION: 96 % | SYSTOLIC BLOOD PRESSURE: 142 MMHG

## 2022-02-25 VITALS — SYSTOLIC BLOOD PRESSURE: 130 MMHG | DIASTOLIC BLOOD PRESSURE: 78 MMHG

## 2022-02-25 DIAGNOSIS — E78.2 MIXED HYPERLIPIDEMIA: ICD-10-CM

## 2022-02-25 DIAGNOSIS — I10 ESSENTIAL HYPERTENSION: ICD-10-CM

## 2022-02-25 DIAGNOSIS — I73.9 PERIPHERAL ARTERIAL DISEASE: Primary | ICD-10-CM

## 2022-02-25 DIAGNOSIS — I65.23 BILATERAL CAROTID ARTERY STENOSIS: ICD-10-CM

## 2022-02-25 DIAGNOSIS — I25.110 CORONARY ARTERY DISEASE INVOLVING NATIVE CORONARY ARTERY OF NATIVE HEART WITH UNSTABLE ANGINA PECTORIS: Primary | ICD-10-CM

## 2022-02-25 PROBLEM — Z86.010 HISTORY OF ADENOMATOUS POLYP OF COLON: Status: ACTIVE | Noted: 2017-08-09

## 2022-02-25 PROBLEM — K64.9 HEMORRHOIDS: Status: ACTIVE | Noted: 2017-08-09

## 2022-02-25 PROBLEM — M50.321 DEGENERATION OF INTERVERTEBRAL DISC AT C4-C5 LEVEL: Status: ACTIVE | Noted: 2017-12-12

## 2022-02-25 PROBLEM — Z86.0101 HISTORY OF ADENOMATOUS POLYP OF COLON: Status: ACTIVE | Noted: 2017-08-09

## 2022-02-25 PROCEDURE — 99214 OFFICE O/P EST MOD 30 MIN: CPT | Performed by: SURGERY

## 2022-02-25 RX ORDER — FUROSEMIDE 20 MG/1
40 TABLET ORAL 2 TIMES DAILY PRN
COMMUNITY
End: 2022-07-11 | Stop reason: SDUPTHER

## 2022-02-25 NOTE — PROGRESS NOTES
02/25/2022      Madhu Andersen, APRN  [address] [place] [address]        Tree Shaffer  1960    Chief Complaint   Patient presents with   • Follow-up     3 month follow-up for Carotid Artery Stenosis and Peripheral Artery Disease with Test 2/18/22.  Pt states he has some swelling in his lower legs and some pain. Pt denies any stroke-like symptoms.   • Former Smoker     Pt verified Former Smoker - Quit:  5/10/21       Dear Madhu Andersen, APRN:    HPI     I had the pleasure of seeing your patient in the office today for follow up.  As you recall, the patient is a 61 y.o. male who we are currently following for history of lower extremity peripheral arterial disease as well as asymptomatic carotid disease.  He is currently maintained on aspirin and Pletal.  He returns today after having undergone repeat GEOFF/PVR as well as carotid duplex for ongoing surveillance.  I did personally review both of the studies in the office today.  The GEOFF/PVR shows normal values bilaterally with no evidence of any significant arterial insufficiency at rest.  His carotid duplex shows less than 50 % stenosis of the bilateral internal carotid arteries.  Today he denies any significant claudication symptoms in the bilateral lower extremities.  He denies any rest pain.  From a carotid standpoint he is asymptomatic with no TIA or strokelike symptoms.  He otherwise has no new significant complaints today.      Review of Systems   Constitutional: Negative for activity change, appetite change, chills, diaphoresis, fatigue and fever.   HENT: Negative.  Negative for congestion, sneezing, sore throat and trouble swallowing.    Eyes: Negative.  Negative for visual disturbance.   Respiratory: Negative.  Negative for chest tightness and shortness of breath.    Cardiovascular: Positive for leg swelling. Negative for chest pain and palpitations.   Gastrointestinal: Negative.  Negative for abdominal distention,  "abdominal pain, nausea and vomiting.   Endocrine: Negative.    Genitourinary: Negative.    Musculoskeletal:        He denies any significant claudication in the lower extremities today.   Skin: Negative.    Allergic/Immunologic: Negative.    Neurological: Negative.    Hematological: Negative.    Psychiatric/Behavioral: Negative.        /76 (BP Location: Left arm, Patient Position: Sitting, Cuff Size: Adult)   Pulse 76   Resp 18   Ht 177.8 cm (70\")   Wt 87.5 kg (193 lb)   SpO2 96%   BMI 27.69 kg/m²   Physical Exam  Vitals reviewed.   Constitutional:       Appearance: Normal appearance.   HENT:      Head: Normocephalic and atraumatic.      Nose: Nose normal.      Mouth/Throat:      Mouth: Mucous membranes are moist.   Eyes:      Extraocular Movements: Extraocular movements intact.      Pupils: Pupils are equal, round, and reactive to light.   Cardiovascular:      Rate and Rhythm: Normal rate and regular rhythm.      Pulses: Normal pulses.           Carotid pulses are 2+ on the right side and 2+ on the left side.       Radial pulses are 2+ on the right side and 2+ on the left side.        Femoral pulses are 2+ on the right side and 2+ on the left side.       Dorsalis pedis pulses are 2+ on the right side and 2+ on the left side.        Posterior tibial pulses are 2+ on the right side and 2+ on the left side.      Comments: He has palpable pulses at the bilateral femoral as well as the DP and PT regions.  Both feet are warm and appear well-perfused.  He has well-healed incisions to the right lower leg from previous saphenous vein harvest at the time of CABG.  He does have some mild edema of the bilateral lower extremities from ankle to knee and he has some small varicosities in the bilateral lower legs.  Pulmonary:      Effort: Pulmonary effort is normal. No respiratory distress.   Abdominal:      General: There is no distension.      Palpations: Abdomen is soft. There is no mass.      Tenderness: There is " no abdominal tenderness.   Musculoskeletal:         General: Normal range of motion.      Cervical back: Normal range of motion and neck supple.      Right lower leg: Edema present.      Left lower leg: Edema present.   Skin:     General: Skin is warm and dry.      Capillary Refill: Capillary refill takes less than 2 seconds.   Neurological:      General: No focal deficit present.      Mental Status: He is alert and oriented to person, place, and time.   Psychiatric:         Mood and Affect: Mood normal.         Behavior: Behavior normal.         Thought Content: Thought content normal.         Judgment: Judgment normal.         DIAGNOSTIC DATA:        US Carotid Bilateral    Result Date: 2/18/2022  Narrative: History: Carotid occlusive disease      Impression: Impression: 1. There is less than 50% stenosis of the right internal carotid artery. 2. There is less than 50% stenosis of the left internal carotid artery. 3. Antegrade flow is demonstrated in bilateral vertebral arteries.  Comments: Bilateral carotid vertebral arterial duplex scan was performed.  Grayscale imaging shows intimal thickening and calcified elements at the carotid bifurcation. The right internal carotid artery peak systolic velocity is 76.1 cm/sec. The end-diastolic velocity is 23.2 cm/sec. The right ICA/CCA ratio is approximately 0.7 . These findings correlate with less than 50% stenosis of the right internal carotid artery.  Grayscale imaging shows intimal thickening and calcified elements at the carotid bifurcation. The left internal carotid artery peak systolic velocity is 95.1 cm/sec. The end-diastolic velocity is 38.1 cm/sec. The left ICA/CCA ratio is approximately 0.9 . These findings correlate with less than 50% stenosis of the left internal carotid artery.  Antegrade flow is demonstrated in bilateral vertebral arteries.  This report was finalized on 02/18/2022 16:06 by Dr. Emmanuel Flores MD.    US Ankle / Brachial Indices Extremity  Complete    Result Date: 2/18/2022  Narrative:  History: PAD  Comments: Bilateral lower extremity arterial with multi-level pulse volume recordings and segmental pressures were performed at rest and stress.  The right ankle/brachial index is 0.97. The waveforms are biphasic without dampening.These findings are consistent with no significant arterial insufficiency of the right lower extremity at rest.  The left ankle/brachial index is 0.95. The waveforms are biphasic without dampening. These findings are consistent with no significant arterial insufficiency of the left lower extremity at rest.      Impression: Impression: 1. No significant arterial insufficiency of the right lower extremity at rest. 2. No significant arterial insufficiency of the left lower extremity at rest.   This report was finalized on 02/18/2022 15:50 by Dr. Emmanuel Flores MD.      Patient Active Problem List   Diagnosis   • Tobacco abuse   • Marijuana use   • Essential hypertension   • Abnormal stress test   • Unstable angina pectoris (HCC)   • Coronary artery disease involving native coronary artery of native heart without angina pectoris   • Cervical myelopathy (HCC)   • Atherosclerosis of native artery of both lower extremities with intermittent claudication (HCC)   • GERD (gastroesophageal reflux disease)   • Peripheral artery disease (HCC)   • Anxiety   • Poor dentition   • Chronic neck and back pain   • COPD (chronic obstructive pulmonary disease) (HCC)   • On statin therapy   • Degeneration of intervertebral disc at C4-C5 level   • Hemorrhoids   • History of adenomatous polyp of colon         ICD-10-CM ICD-9-CM   1. Peripheral arterial disease (HCC)  I73.9 443.9   2. Mixed hyperlipidemia  E78.2 272.2   3. Essential hypertension  I10 401.9   4. Bilateral carotid artery stenosis  I65.23 433.10     433.30       Lab Frequency Next Occurrence   US Carotid Bilateral Once 08/24/2022   US Ankle / Brachial Indices Extremity Complete Once  08/24/2022       PLAN: After thoroughly evaluating Tree Shaffer, I believe the best course of action is to remain conservative from a vascular standpoint.  Overall he continues to do well.  From a peripheral vascular standpoint he denies any lower extremity claudication or rest pain today.  His GEOFF/PVR done recently showed normal values bilaterally with no evidence of any arterial insufficiency at rest.  From a carotid standpoint he remains asymptomatic and recent carotid duplex shows less than 50 % stenosis of the bilateral internal carotid arteries.  Finally from a lower extremity edema standpoint I recommended that he start a trial of compression to the bilateral lower legs to help reduce edema and he can also use leg elevation and exercise to help reduce this as well. Plan moving forward will be for him to continue his aspirin, and Pletal, and I will see him back in the office in 6 months with repeat GEOFF/PVR and carotid duplex for ongoing surveillance.  The patient is to continue taking their medications as previously discussed.   I did discuss vascular risk factors as they pertain to the progression of vascular disease including controlling hyperlipidemia, and hypertension. These factors remain stable. Patient's Body mass index is 27.69 kg/m². indicating that he is overweight (BMI 25-29.9). Patient's (Body mass index is 27.69 kg/m².) indicates that they are overweight with health conditions that include hypertension, dyslipidemias and peripheral vascular disease . Weight is unchanged. BMI is is above average; BMI management plan is completed. We discussed portion control and increasing exercise. .   This was all discussed in full with complete understanding.  Thank you for allowing me to participate in the care of your patient.  Please do not hesitate to call with any questions or concerns.  We will keep you aware of any further encounters with Tree Shaffer.      Sincerely Yours,      Leo CRUZ  MD Dian

## 2022-02-25 NOTE — PATIENT INSTRUCTIONS
"BMI for Adults  What is BMI?  Body mass index (BMI) is a number that is calculated from a person's weight and height. BMI can help estimate how much of a person's weight is composed of fat. BMI does not measure body fat directly. Rather, it is an alternative to procedures that directly measure body fat, which can be difficult and expensive.  BMI can help identify people who may be at higher risk for certain medical problems.  What are BMI measurements used for?  BMI is used as a screening tool to identify possible weight problems. It helps determine whether a person is obese, overweight, a healthy weight, or underweight.  BMI is useful for:  · Identifying a weight problem that may be related to a medical condition or may increase the risk for medical problems.  · Promoting changes, such as changes in diet and exercise, to help reach a healthy weight. BMI screening can be repeated to see if these changes are working.  How is BMI calculated?  BMI involves measuring your weight in relation to your height. Both height and weight are measured, and the BMI is calculated from those numbers. This can be done either in English (U[place] or metric measurements. Note that charts and online BMI calculators are available to help you find your BMI quickly and easily without having to do these calculations yourself.  To calculate your BMI in English (U.S[place] measurements:    1. Measure your weight in pounds (lb).  2. Multiply the number of pounds by 703.  ? For example, for a person who weighs 180 lb, multiply that number by 703, which equals 126,540.  3. Measure your height in inches. Then multiply that number by itself to get a measurement called \"inches squared.\"  ? For example, for a person who is 70 inches tall, the \"inches squared\" measurement is 70 inches x 70 inches, which equals 4,900 inches squared.  4. Divide the total from step 2 (number of lb x 703) by the total from step 3 (inches squared): 126,540 ÷ 4,900 = 25.8. This is " "your BMI.    To calculate your BMI in metric measurements:  1. Measure your weight in kilograms (kg).  2. Measure your height in meters (m). Then multiply that number by itself to get a measurement called \"meters squared.\"  ? For example, for a person who is 1.75 m tall, the \"meters squared\" measurement is 1.75 m x 1.75 m, which is equal to 3.1 meters squared.  3. Divide the number of kilograms (your weight) by the meters squared number. In this example: 70 ÷ 3.1 = 22.6. This is your BMI.  What do the results mean?  BMI charts are used to identify whether you are underweight, normal weight, overweight, or obese. The following guidelines will be used:  · Underweight: BMI less than 18.5.  · Normal weight: BMI between 18.5 and 24.9.  · Overweight: BMI between 25 and 29.9.  · Obese: BMI of 30 or above.  Keep these notes in mind:  · Weight includes both fat and muscle, so someone with a muscular build, such as an athlete, may have a BMI that is higher than 24.9. In cases like these, BMI is not an accurate measure of body fat.  · To determine if excess body fat is the cause of a BMI of 25 or higher, further assessments may need to be done by a health care provider.  · BMI is usually interpreted in the same way for men and women.  Where to find more information  For more information about BMI, including tools to quickly calculate your BMI, go to these websites:  · Centers for Disease Control and Prevention: www.cdc.gov  · American Heart Association: www.heart.org  · National Heart, Lung, and Blood Sun: www.nhlbi.nih.gov  Summary  · Body mass index (BMI) is a number that is calculated from a person's weight and height.  · BMI may help estimate how much of a person's weight is composed of fat. BMI can help identify those who may be at higher risk for certain medical problems.  · BMI can be measured using English measurements or metric measurements.  · BMI charts are used to identify whether you are underweight, normal " weight, overweight, or obese.  This information is not intended to replace advice given to you by your health care provider. Make sure you discuss any questions you have with your health care provider.  Document Revised: 09/09/2020 Document Reviewed: 07/17/2020  Elsevier Patient Education © 2021 [employer]

## 2022-02-28 ENCOUNTER — OFFICE VISIT (OUTPATIENT)
Dept: CARDIAC REHAB | Facility: HOSPITAL | Age: 62
End: 2022-02-28

## 2022-02-28 VITALS — DIASTOLIC BLOOD PRESSURE: 80 MMHG | SYSTOLIC BLOOD PRESSURE: 128 MMHG

## 2022-02-28 DIAGNOSIS — I25.110 CORONARY ARTERY DISEASE INVOLVING NATIVE CORONARY ARTERY OF NATIVE HEART WITH UNSTABLE ANGINA PECTORIS: Primary | ICD-10-CM

## 2022-03-07 ENCOUNTER — OFFICE VISIT (OUTPATIENT)
Dept: CARDIAC REHAB | Facility: HOSPITAL | Age: 62
End: 2022-03-07

## 2022-03-07 VITALS — HEART RATE: 66 BPM | DIASTOLIC BLOOD PRESSURE: 76 MMHG | SYSTOLIC BLOOD PRESSURE: 130 MMHG

## 2022-03-07 DIAGNOSIS — I25.110 CORONARY ARTERY DISEASE INVOLVING NATIVE CORONARY ARTERY OF NATIVE HEART WITH UNSTABLE ANGINA PECTORIS: Primary | ICD-10-CM

## 2022-03-09 ENCOUNTER — TRANSCRIBE ORDERS (OUTPATIENT)
Dept: ADMINISTRATIVE | Facility: HOSPITAL | Age: 62
End: 2022-03-09

## 2022-03-09 ENCOUNTER — OFFICE VISIT (OUTPATIENT)
Dept: CARDIAC REHAB | Facility: HOSPITAL | Age: 62
End: 2022-03-09

## 2022-03-09 VITALS — DIASTOLIC BLOOD PRESSURE: 60 MMHG | SYSTOLIC BLOOD PRESSURE: 122 MMHG

## 2022-03-09 DIAGNOSIS — I25.10 ATHEROSCLEROSIS OF NATIVE CORONARY ARTERY WITHOUT ANGINA PECTORIS, UNSPECIFIED WHETHER NATIVE OR TRANSPLANTED HEART: Primary | ICD-10-CM

## 2022-03-09 DIAGNOSIS — I25.110 CORONARY ARTERY DISEASE INVOLVING NATIVE CORONARY ARTERY OF NATIVE HEART WITH UNSTABLE ANGINA PECTORIS: Primary | ICD-10-CM

## 2022-03-11 ENCOUNTER — OFFICE VISIT (OUTPATIENT)
Dept: CARDIAC REHAB | Facility: HOSPITAL | Age: 62
End: 2022-03-11

## 2022-03-11 VITALS — DIASTOLIC BLOOD PRESSURE: 70 MMHG | SYSTOLIC BLOOD PRESSURE: 118 MMHG

## 2022-03-11 DIAGNOSIS — I25.110 CORONARY ARTERY DISEASE INVOLVING NATIVE CORONARY ARTERY OF NATIVE HEART WITH UNSTABLE ANGINA PECTORIS: Primary | ICD-10-CM

## 2022-03-14 ENCOUNTER — OFFICE VISIT (OUTPATIENT)
Dept: CARDIAC REHAB | Facility: HOSPITAL | Age: 62
End: 2022-03-14

## 2022-03-14 ENCOUNTER — OFFICE VISIT (OUTPATIENT)
Dept: CARDIOLOGY | Facility: CLINIC | Age: 62
End: 2022-03-14

## 2022-03-14 VITALS
SYSTOLIC BLOOD PRESSURE: 142 MMHG | HEIGHT: 70 IN | DIASTOLIC BLOOD PRESSURE: 80 MMHG | WEIGHT: 198 LBS | BODY MASS INDEX: 28.35 KG/M2 | OXYGEN SATURATION: 96 % | HEART RATE: 59 BPM

## 2022-03-14 VITALS — DIASTOLIC BLOOD PRESSURE: 56 MMHG | SYSTOLIC BLOOD PRESSURE: 104 MMHG

## 2022-03-14 DIAGNOSIS — I25.110 CORONARY ARTERY DISEASE INVOLVING NATIVE CORONARY ARTERY OF NATIVE HEART WITH UNSTABLE ANGINA PECTORIS: Primary | ICD-10-CM

## 2022-03-14 DIAGNOSIS — Z79.899 ON STATIN THERAPY: ICD-10-CM

## 2022-03-14 DIAGNOSIS — I73.9 PERIPHERAL ARTERY DISEASE: ICD-10-CM

## 2022-03-14 DIAGNOSIS — I70.213 ATHEROSCLEROSIS OF NATIVE ARTERY OF BOTH LOWER EXTREMITIES WITH INTERMITTENT CLAUDICATION: ICD-10-CM

## 2022-03-14 DIAGNOSIS — E78.2 MIXED HYPERLIPIDEMIA: ICD-10-CM

## 2022-03-14 DIAGNOSIS — I25.10 CORONARY ARTERY DISEASE INVOLVING NATIVE CORONARY ARTERY OF NATIVE HEART WITHOUT ANGINA PECTORIS: ICD-10-CM

## 2022-03-14 DIAGNOSIS — I10 ESSENTIAL HYPERTENSION: ICD-10-CM

## 2022-03-14 DIAGNOSIS — R07.89 CHEST PAIN, ATYPICAL: ICD-10-CM

## 2022-03-14 DIAGNOSIS — J43.2 CENTRILOBULAR EMPHYSEMA: ICD-10-CM

## 2022-03-14 DIAGNOSIS — Z72.0 TOBACCO ABUSE: ICD-10-CM

## 2022-03-14 DIAGNOSIS — R06.09 DYSPNEA ON EXERTION: ICD-10-CM

## 2022-03-14 PROCEDURE — 93000 ELECTROCARDIOGRAM COMPLETE: CPT | Performed by: EMERGENCY MEDICINE

## 2022-03-14 PROCEDURE — 99214 OFFICE O/P EST MOD 30 MIN: CPT | Performed by: EMERGENCY MEDICINE

## 2022-03-14 NOTE — PROGRESS NOTES
Subjective:     Encounter Date:03/14/2022      Patient ID: Tree Shaffer is a 61 y.o. male.    Chief Complaint:  History of Present Illness    Patient is a 61-year-old male who is here for a 9-month follow-up.  He has a history of coronary artery disease status post four-vessel bypass on May 10 with Dr. Menendez.    He says recently he has been having episodes of chest pain similar to the chest pain he had before surgery.  He describes it as a dull ache in the center of his chest.  No significant shortness of breath however.  No significant palpitations, dizziness or near syncope episodes.  Currently, he is on aspirin, Plavix, Lipitor, lisinopril, Lopressor and Pletal.  He has quit smoking.        Review of Systems   Constitutional: Negative for diaphoresis, fever and malaise/fatigue.   HENT: Negative for congestion.    Eyes: Negative for vision loss in left eye and vision loss in right eye.   Cardiovascular: Positive for chest pain. Negative for claudication, dyspnea on exertion, irregular heartbeat, leg swelling, orthopnea, palpitations and syncope.   Respiratory: Negative for cough, shortness of breath and wheezing.    Hematologic/Lymphatic: Negative for adenopathy.   Skin: Negative for rash.   Musculoskeletal: Negative for joint pain and joint swelling.   Gastrointestinal: Negative for abdominal pain, diarrhea, nausea and vomiting.   Neurological: Negative for excessive daytime sleepiness, dizziness, focal weakness, light-headedness, numbness and weakness.   Psychiatric/Behavioral: Negative for depression. The patient does not have insomnia.            Current Outpatient Medications:   •  aspirin 81 MG EC tablet, Take 1 tablet by mouth Daily., Disp: 30 tablet, Rfl: 2  •  atorvastatin (Lipitor) 40 MG tablet, Take 1 tablet by mouth Daily., Disp: 90 tablet, Rfl: 4  •  buprenorphine-naloxone (SUBOXONE) 8-2 MG per SL tablet, 3 tablets Daily. Take 3 tablets under tongue once a day . Patient reports that he  takes one tablet three times per day, Disp: , Rfl:   •  cilostazol (PLETAL) 100 MG tablet, Take 1 tablet by mouth 2 (Two) Times a Day., Disp: 60 tablet, Rfl: 5  •  clopidogrel (PLAVIX) 75 MG tablet, Take 1 tablet by mouth Daily., Disp: 30 tablet, Rfl: 2  •  furosemide (LASIX) 20 MG tablet, Take 20 mg by mouth 2 (Two) Times a Day As Needed., Disp: , Rfl:   •  lisinopril (PRINIVIL,ZESTRIL) 2.5 MG tablet, Take 1 tablet by mouth Daily., Disp: 30 tablet, Rfl: 3  •  metoprolol tartrate (LOPRESSOR) 50 MG tablet, Take 1 tablet by mouth Every 12 (Twelve) Hours., Disp: 60 tablet, Rfl: 2  •  pantoprazole (PROTONIX) 40 MG EC tablet, Take 1 tablet by mouth Daily., Disp: 30 tablet, Rfl: 0  •  SYMBICORT 160-4.5 MCG/ACT inhaler, Inhale 2 puffs 2 (Two) Times a Day., Disp: , Rfl: 2       Objective:      Vitals:    03/14/22 1050   BP: 142/80   Pulse: 59   SpO2: 96%     Vitals and nursing note reviewed.   Constitutional:       Appearance: Normal and healthy appearance. Well-developed and not in distress.   Eyes:      Extraocular Movements: Extraocular movements intact.      Pupils: Pupils are equal, round, and reactive to light.   HENT:      Head: Normocephalic and atraumatic.    Mouth/Throat:      Pharynx: Oropharynx is clear.   Neck:      Vascular: JVD normal.      Trachea: Trachea normal.   Pulmonary:      Effort: Pulmonary effort is normal.      Breath sounds: Normal breath sounds. No wheezing. No rhonchi. No rales.   Cardiovascular:      PMI at left midclavicular line. Normal rate. Regular rhythm. Normal S1. Normal S2.      Murmurs: There is a grade 2/6 systolic murmur.      No gallop. No click. No rub.   Pulses:     Dorsalis pedis: 2+ bilaterally.     Posterior tibial: 2+ bilaterally.  Abdominal:      General: Bowel sounds are normal.      Palpations: Abdomen is soft.      Tenderness: There is no abdominal tenderness.   Musculoskeletal: Normal range of motion.      Cervical back: Normal range of motion and neck supple. Skin:      General: Skin is warm and dry.      Capillary Refill: Capillary refill takes less than 2 seconds.   Feet:      Right foot:      Skin integrity: Skin integrity normal.      Left foot:      Skin integrity: Skin integrity normal.   Neurological:      Mental Status: Alert and oriented to person, place and time.      Cranial Nerves: Cranial nerves are intact.      Sensory: Sensation is intact.      Motor: Motor function is intact.      Coordination: Coordination is intact.   Psychiatric:         Speech: Speech normal.         Behavior: Behavior is cooperative.         Lab Review:         ECG 12 Lead    Date/Time: 3/14/2022 6:27 PM  Performed by: Go Baer DO  Authorized by: Go Baer DO   Comparison: compared with previous ECG   Similar to previous ECG  Rhythm: sinus rhythm  Rate: normal  Conduction: incomplete right bundle branch block  ST Segments: ST segments normal  T Waves: T waves normal  QRS axis: normal  Other: no other findings    Clinical impression: abnormal EKG              Assessment/Plan:     Problem List Items Addressed This Visit        Cardiac and Vasculature    Essential hypertension    Coronary artery disease involving native coronary artery of native heart with unstable angina pectoris (HCC) - Primary    Overview     Added automatically from request for surgery [id number]           Relevant Orders    Stress Test With Myocardial Perfusion (1 Day)    Atherosclerosis of native artery of both lower extremities with intermittent claudication (HCC)    Peripheral artery disease (HCC)    On statin therapy    Dyspnea on exertion    Relevant Orders    Adult Transthoracic Echo Complete W/ Cont if Necessary Per Protocol    Mixed hyperlipidemia       Pulmonary and Pneumonias    COPD (chronic obstructive pulmonary disease) (HCC)       Symptoms and Signs    Chest pain, atypical    Relevant Orders    Stress Test With Myocardial Perfusion (1 Day)       Tobacco    Tobacco abuse             Recommendations/plans:    We will repeat a stress test and transthoracic echocardiogram today to make sure there is no evidence of ischemia or progression of his congestive heart failure.  Echocardiogram performed in May of last year showed normal systolic function, severe concentric hypertrophy and grade 1 diastolic dysfunction.    Patient is okay to stop Plavix from a cardiovascular standpoint.  He does need to continue on aspirin 81 however.  I told him to increase the dose of Lasix from 20 mg daily to 20 mg twice a day.    Follow-up with cardiology in 1 month to go over test results and make sure we do not need to perform any other testing or changes to medications      Go Baer, DO   Interventional cardiology  [facility]  03/14/2022  18:28 CDT

## 2022-03-16 ENCOUNTER — OFFICE VISIT (OUTPATIENT)
Dept: CARDIAC REHAB | Facility: HOSPITAL | Age: 62
End: 2022-03-16

## 2022-03-16 VITALS — SYSTOLIC BLOOD PRESSURE: 106 MMHG | DIASTOLIC BLOOD PRESSURE: 60 MMHG

## 2022-03-16 DIAGNOSIS — I25.110 CORONARY ARTERY DISEASE INVOLVING NATIVE CORONARY ARTERY OF NATIVE HEART WITH UNSTABLE ANGINA PECTORIS: Primary | ICD-10-CM

## 2022-03-18 ENCOUNTER — OFFICE VISIT (OUTPATIENT)
Dept: CARDIAC REHAB | Facility: HOSPITAL | Age: 62
End: 2022-03-18

## 2022-03-18 VITALS — DIASTOLIC BLOOD PRESSURE: 70 MMHG | SYSTOLIC BLOOD PRESSURE: 118 MMHG

## 2022-03-18 DIAGNOSIS — I25.110 CORONARY ARTERY DISEASE INVOLVING NATIVE CORONARY ARTERY OF NATIVE HEART WITH UNSTABLE ANGINA PECTORIS: Primary | ICD-10-CM

## 2022-03-21 ENCOUNTER — OFFICE VISIT (OUTPATIENT)
Dept: CARDIAC REHAB | Facility: HOSPITAL | Age: 62
End: 2022-03-21

## 2022-03-21 VITALS — DIASTOLIC BLOOD PRESSURE: 68 MMHG | SYSTOLIC BLOOD PRESSURE: 118 MMHG

## 2022-03-21 DIAGNOSIS — I25.110 CORONARY ARTERY DISEASE INVOLVING NATIVE CORONARY ARTERY OF NATIVE HEART WITH UNSTABLE ANGINA PECTORIS: Primary | ICD-10-CM

## 2022-03-23 ENCOUNTER — APPOINTMENT (OUTPATIENT)
Dept: CARDIAC REHAB | Facility: HOSPITAL | Age: 62
End: 2022-03-23

## 2022-03-25 ENCOUNTER — OFFICE VISIT (OUTPATIENT)
Dept: CARDIAC REHAB | Facility: HOSPITAL | Age: 62
End: 2022-03-25

## 2022-03-25 VITALS — DIASTOLIC BLOOD PRESSURE: 76 MMHG | SYSTOLIC BLOOD PRESSURE: 128 MMHG

## 2022-03-25 DIAGNOSIS — I25.110 CORONARY ARTERY DISEASE INVOLVING NATIVE CORONARY ARTERY OF NATIVE HEART WITH UNSTABLE ANGINA PECTORIS: Primary | ICD-10-CM

## 2022-03-28 ENCOUNTER — OFFICE VISIT (OUTPATIENT)
Dept: CARDIAC REHAB | Facility: HOSPITAL | Age: 62
End: 2022-03-28

## 2022-03-28 ENCOUNTER — TRANSCRIBE ORDERS (OUTPATIENT)
Dept: ADMINISTRATIVE | Facility: HOSPITAL | Age: 62
End: 2022-03-28

## 2022-03-28 VITALS — DIASTOLIC BLOOD PRESSURE: 68 MMHG | SYSTOLIC BLOOD PRESSURE: 114 MMHG

## 2022-03-28 DIAGNOSIS — I25.110 CORONARY ARTERY DISEASE INVOLVING NATIVE CORONARY ARTERY OF NATIVE HEART WITH UNSTABLE ANGINA PECTORIS: Primary | ICD-10-CM

## 2022-03-28 DIAGNOSIS — I25.10 ATHEROSCLEROSIS OF NATIVE CORONARY ARTERY OF NATIVE HEART WITHOUT ANGINA PECTORIS: Primary | ICD-10-CM

## 2022-03-30 ENCOUNTER — OFFICE VISIT (OUTPATIENT)
Dept: CARDIAC REHAB | Facility: HOSPITAL | Age: 62
End: 2022-03-30

## 2022-03-30 VITALS — DIASTOLIC BLOOD PRESSURE: 58 MMHG | SYSTOLIC BLOOD PRESSURE: 98 MMHG

## 2022-03-30 DIAGNOSIS — I25.110 CORONARY ARTERY DISEASE INVOLVING NATIVE CORONARY ARTERY OF NATIVE HEART WITH UNSTABLE ANGINA PECTORIS: Primary | ICD-10-CM

## 2022-04-04 ENCOUNTER — OFFICE VISIT (OUTPATIENT)
Dept: CARDIAC REHAB | Facility: HOSPITAL | Age: 62
End: 2022-04-04

## 2022-04-04 VITALS — DIASTOLIC BLOOD PRESSURE: 68 MMHG | SYSTOLIC BLOOD PRESSURE: 104 MMHG

## 2022-04-04 DIAGNOSIS — I25.110 CORONARY ARTERY DISEASE INVOLVING NATIVE CORONARY ARTERY OF NATIVE HEART WITH UNSTABLE ANGINA PECTORIS: Primary | ICD-10-CM

## 2022-04-05 ENCOUNTER — HOSPITAL ENCOUNTER (OUTPATIENT)
Dept: CARDIOLOGY | Facility: HOSPITAL | Age: 62
End: 2022-04-05

## 2022-04-05 ENCOUNTER — HOSPITAL ENCOUNTER (OUTPATIENT)
Dept: CARDIOLOGY | Facility: HOSPITAL | Age: 62
Discharge: HOME | End: 2022-04-05

## 2022-04-05 DIAGNOSIS — I25.110 CORONARY ARTERY DISEASE INVOLVING NATIVE CORONARY ARTERY OF NATIVE HEART WITH UNSTABLE ANGINA PECTORIS: ICD-10-CM

## 2022-04-05 DIAGNOSIS — R07.89 CHEST PAIN, ATYPICAL: ICD-10-CM

## 2022-04-05 LAB
MAXIMAL PREDICTED HEART RATE: 160 BPM
STRESS TARGET HR: 136 BPM

## 2022-04-05 PROCEDURE — 0 TECHNETIUM SESTAMIBI: Performed by: EMERGENCY MEDICINE

## 2022-04-05 PROCEDURE — A9500 TC99M SESTAMIBI: HCPCS | Performed by: EMERGENCY MEDICINE

## 2022-04-05 RX ADMIN — TECHNETIUM TC 99M SESTAMIBI 1 DOSE: 1 INJECTION INTRAVENOUS at 07:30

## 2022-04-06 ENCOUNTER — OFFICE VISIT (OUTPATIENT)
Dept: CARDIAC REHAB | Facility: HOSPITAL | Age: 62
End: 2022-04-06

## 2022-04-06 VITALS — HEART RATE: 64 BPM | DIASTOLIC BLOOD PRESSURE: 74 MMHG | SYSTOLIC BLOOD PRESSURE: 120 MMHG

## 2022-04-06 DIAGNOSIS — I25.110 CORONARY ARTERY DISEASE INVOLVING NATIVE CORONARY ARTERY OF NATIVE HEART WITH UNSTABLE ANGINA PECTORIS: Primary | ICD-10-CM

## 2022-04-08 ENCOUNTER — OFFICE VISIT (OUTPATIENT)
Dept: CARDIAC REHAB | Facility: HOSPITAL | Age: 62
End: 2022-04-08

## 2022-04-08 VITALS — DIASTOLIC BLOOD PRESSURE: 70 MMHG | SYSTOLIC BLOOD PRESSURE: 128 MMHG

## 2022-04-08 DIAGNOSIS — I25.110 CORONARY ARTERY DISEASE INVOLVING NATIVE CORONARY ARTERY OF NATIVE HEART WITH UNSTABLE ANGINA PECTORIS: Primary | ICD-10-CM

## 2022-04-11 ENCOUNTER — OFFICE VISIT (OUTPATIENT)
Dept: CARDIAC REHAB | Facility: HOSPITAL | Age: 62
End: 2022-04-11

## 2022-04-11 ENCOUNTER — OFFICE VISIT (OUTPATIENT)
Dept: CARDIOLOGY | Facility: CLINIC | Age: 62
End: 2022-04-11

## 2022-04-11 VITALS
HEIGHT: 70 IN | HEART RATE: 58 BPM | OXYGEN SATURATION: 98 % | DIASTOLIC BLOOD PRESSURE: 72 MMHG | SYSTOLIC BLOOD PRESSURE: 120 MMHG | WEIGHT: 196 LBS | BODY MASS INDEX: 28.06 KG/M2

## 2022-04-11 VITALS — DIASTOLIC BLOOD PRESSURE: 70 MMHG | SYSTOLIC BLOOD PRESSURE: 118 MMHG

## 2022-04-11 DIAGNOSIS — R06.09 DYSPNEA ON EXERTION: ICD-10-CM

## 2022-04-11 DIAGNOSIS — I25.110 CORONARY ARTERY DISEASE INVOLVING NATIVE CORONARY ARTERY OF NATIVE HEART WITH UNSTABLE ANGINA PECTORIS: Primary | ICD-10-CM

## 2022-04-11 DIAGNOSIS — R07.89 CHEST PAIN, ATYPICAL: Primary | ICD-10-CM

## 2022-04-11 DIAGNOSIS — E78.2 MIXED HYPERLIPIDEMIA: ICD-10-CM

## 2022-04-11 DIAGNOSIS — I10 ESSENTIAL HYPERTENSION: ICD-10-CM

## 2022-04-11 DIAGNOSIS — I25.110 CORONARY ARTERY DISEASE INVOLVING NATIVE CORONARY ARTERY OF NATIVE HEART WITH UNSTABLE ANGINA PECTORIS: ICD-10-CM

## 2022-04-11 DIAGNOSIS — I73.9 PERIPHERAL ARTERY DISEASE: ICD-10-CM

## 2022-04-11 DIAGNOSIS — J43.2 CENTRILOBULAR EMPHYSEMA: ICD-10-CM

## 2022-04-11 PROCEDURE — 99214 OFFICE O/P EST MOD 30 MIN: CPT | Performed by: EMERGENCY MEDICINE

## 2022-04-11 NOTE — PROGRESS NOTES
Subjective:     Encounter Date:04/11/2022      Patient ID: Tree Shaffer is a 61 y.o. male.    Chief Complaint:  History of Present Illness    Patient is a 61-year-old male with a history of coronary artery disease, hypertension and hyperlipidemia who presents to the cardiology clinic today for an 1 month follow-up.  He underwent four-vessel bypass on May 10, 2021 with Dr. Menendez.    He complained of episodes of chest pain similar to the ones he had prior to surgery at his last visit last month.  We set him up for a stress test and echocardiogram.  He was unable to get given 1 of these test performed.  The echo is set up for April 18.  He was unable to do the nuclear stress test due to inability to lay on the table with his arms above his head.      He continues to have the same chest pain episodes as last month.      Review of Systems   Constitutional: Negative for diaphoresis, fever and malaise/fatigue.   HENT: Negative for congestion.    Eyes: Negative for vision loss in left eye and vision loss in right eye.   Cardiovascular: Positive for chest pain. Negative for claudication, dyspnea on exertion, irregular heartbeat, leg swelling, orthopnea, palpitations and syncope.   Respiratory: Negative for cough, shortness of breath and wheezing.    Hematologic/Lymphatic: Negative for adenopathy.   Skin: Negative for rash.   Musculoskeletal: Negative for joint pain and joint swelling.   Gastrointestinal: Negative for abdominal pain, diarrhea, nausea and vomiting.   Neurological: Negative for excessive daytime sleepiness, dizziness, focal weakness, light-headedness, numbness and weakness.   Psychiatric/Behavioral: Negative for depression. The patient does not have insomnia.            Current Outpatient Medications:   •  aspirin 81 MG EC tablet, Take 1 tablet by mouth Daily., Disp: 30 tablet, Rfl: 2  •  atorvastatin (Lipitor) 40 MG tablet, Take 1 tablet by mouth Daily., Disp: 90 tablet, Rfl: 4  •   buprenorphine-naloxone (SUBOXONE) 8-2 MG per SL tablet, 3 tablets Daily. Take 3 tablets under tongue once a day . Patient reports that he takes one tablet three times per day, Disp: , Rfl:   •  cilostazol (PLETAL) 100 MG tablet, Take 1 tablet by mouth 2 (Two) Times a Day., Disp: 60 tablet, Rfl: 5  •  furosemide (LASIX) 20 MG tablet, Take 20 mg by mouth 2 (Two) Times a Day As Needed., Disp: , Rfl:   •  lisinopril (PRINIVIL,ZESTRIL) 2.5 MG tablet, Take 1 tablet by mouth Daily., Disp: 30 tablet, Rfl: 3  •  metoprolol tartrate (LOPRESSOR) 50 MG tablet, Take 1 tablet by mouth Every 12 (Twelve) Hours., Disp: 60 tablet, Rfl: 2  •  pantoprazole (PROTONIX) 40 MG EC tablet, Take 1 tablet by mouth Daily., Disp: 30 tablet, Rfl: 0  •  SYMBICORT 160-4.5 MCG/ACT inhaler, Inhale 2 puffs 2 (Two) Times a Day., Disp: , Rfl: 2       Objective:      Vitals:    04/11/22 1046   BP: 120/72   Pulse: 58   SpO2: 98%     Vitals and nursing note reviewed.   Constitutional:       Appearance: Normal and healthy appearance. Well-developed and not in distress.   Eyes:      Extraocular Movements: Extraocular movements intact.      Pupils: Pupils are equal, round, and reactive to light.   HENT:      Head: Normocephalic and atraumatic.    Mouth/Throat:      Pharynx: Oropharynx is clear.   Neck:      Vascular: JVD normal.      Trachea: Trachea normal.   Pulmonary:      Effort: Pulmonary effort is normal.      Breath sounds: Normal breath sounds. No wheezing. No rhonchi. No rales.   Cardiovascular:      PMI at left midclavicular line. Normal rate. Regular rhythm. Normal S1. Normal S2.      Murmurs: There is no murmur.      No gallop. No click. No rub.   Pulses:     Dorsalis pedis: 2+ bilaterally.     Posterior tibial: 2+ bilaterally.  Abdominal:      General: Bowel sounds are normal.      Palpations: Abdomen is soft.      Tenderness: There is no abdominal tenderness.   Musculoskeletal: Normal range of motion.      Cervical back: Normal range of motion  and neck supple. Skin:     General: Skin is warm and dry.      Capillary Refill: Capillary refill takes less than 2 seconds.   Feet:      Right foot:      Skin integrity: Skin integrity normal.      Left foot:      Skin integrity: Skin integrity normal.   Neurological:      Mental Status: Alert and oriented to person, place and time.      Cranial Nerves: Cranial nerves are intact.      Sensory: Sensation is intact.      Motor: Motor function is intact.      Coordination: Coordination is intact.   Psychiatric:         Speech: Speech normal.         Behavior: Behavior is cooperative.         Lab Review:       Procedures      Assessment/Plan:     Problem List Items Addressed This Visit        Cardiac and Vasculature    Essential hypertension    Coronary artery disease involving native coronary artery of native heart with unstable angina pectoris (HCC)    Overview     Added automatically from request for surgery [id number]           Peripheral artery disease ([facility])    Dyspnea on exertion    Mixed hyperlipidemia       Pulmonary and Pneumonias    COPD (chronic obstructive pulmonary disease) ([facility])       Symptoms and Signs    Chest pain, atypical - Primary    Relevant Orders    Adult Stress Echo W/ Cont or Stress Agent if Necessary Per Protocol            Recommendations/plans:    Patient encouraged to keep his appointment for the echocardiogram on April 18.  We will reschedule him for a stress test in the form of a stress echocardiogram to see if there is any evidence of ischemia.    Continue on his current cardiac regimen consist on aspirin 81, Lipitor 40, Lasix 20, lisinopril 2.5, Lopressor 50 twice daily.    Follow-up with cardiology in 3 months or sooner if necessary    Go Baer,    Interventional cardiology  [facility]  04/11/2022  17:51 CDT

## 2022-04-13 ENCOUNTER — OFFICE VISIT (OUTPATIENT)
Dept: CARDIAC REHAB | Facility: HOSPITAL | Age: 62
End: 2022-04-13

## 2022-04-13 VITALS — DIASTOLIC BLOOD PRESSURE: 60 MMHG | SYSTOLIC BLOOD PRESSURE: 104 MMHG

## 2022-04-13 DIAGNOSIS — I25.110 CORONARY ARTERY DISEASE INVOLVING NATIVE CORONARY ARTERY OF NATIVE HEART WITH UNSTABLE ANGINA PECTORIS: Primary | ICD-10-CM

## 2022-04-18 ENCOUNTER — OFFICE VISIT (OUTPATIENT)
Dept: CARDIAC REHAB | Facility: HOSPITAL | Age: 62
End: 2022-04-18

## 2022-04-18 ENCOUNTER — APPOINTMENT (OUTPATIENT)
Dept: CARDIOLOGY | Facility: HOSPITAL | Age: 62
End: 2022-04-18

## 2022-04-18 ENCOUNTER — HOSPITAL ENCOUNTER (OUTPATIENT)
Dept: CARDIOLOGY | Facility: HOSPITAL | Age: 62
Discharge: HOME | End: 2022-04-18

## 2022-04-18 VITALS — SYSTOLIC BLOOD PRESSURE: 126 MMHG | DIASTOLIC BLOOD PRESSURE: 76 MMHG

## 2022-04-18 VITALS — HEIGHT: 70 IN | BODY MASS INDEX: 27.2 KG/M2 | WEIGHT: 190 LBS

## 2022-04-18 DIAGNOSIS — I25.10 ATHEROSCLEROSIS OF NATIVE CORONARY ARTERY OF NATIVE HEART WITHOUT ANGINA PECTORIS: ICD-10-CM

## 2022-04-18 DIAGNOSIS — R06.09 DYSPNEA ON EXERTION: ICD-10-CM

## 2022-04-18 DIAGNOSIS — I25.110 CORONARY ARTERY DISEASE INVOLVING NATIVE CORONARY ARTERY OF NATIVE HEART WITH UNSTABLE ANGINA PECTORIS: Primary | ICD-10-CM

## 2022-04-18 DIAGNOSIS — R07.89 CHEST PAIN, ATYPICAL: ICD-10-CM

## 2022-04-18 LAB
BH CV ECHO MEAS - AO MAX PG: 6.7 MMHG
BH CV ECHO MEAS - AO MEAN PG: 4 MMHG
BH CV ECHO MEAS - AO ROOT DIAM: 2.9 CM
BH CV ECHO MEAS - AO V2 MAX: 129 CM/SEC
BH CV ECHO MEAS - AO V2 VTI: 31.5 CM
BH CV ECHO MEAS - AVA(I,D): 3.3 CM2
BH CV ECHO MEAS - EDV(CUBED): 140.6 ML
BH CV ECHO MEAS - EDV(MOD-SP4): 118 ML
BH CV ECHO MEAS - EF(MOD-SP4): 78 %
BH CV ECHO MEAS - ESV(CUBED): 35.9 ML
BH CV ECHO MEAS - ESV(MOD-SP4): 26 ML
BH CV ECHO MEAS - FS: 36.5 %
BH CV ECHO MEAS - IVS/LVPW: 1.38 CM
BH CV ECHO MEAS - IVSD: 1.1 CM
BH CV ECHO MEAS - LA DIMENSION: 4.3 CM
BH CV ECHO MEAS - LAT PEAK E' VEL: 7.7 CM/SEC
BH CV ECHO MEAS - LV DIASTOLIC VOL/BSA (35-75): 57.8 CM2
BH CV ECHO MEAS - LV MASS(C)D: 181.4 GRAMS
BH CV ECHO MEAS - LV MAX PG: 6.5 MMHG
BH CV ECHO MEAS - LV MEAN PG: 3 MMHG
BH CV ECHO MEAS - LV SYSTOLIC VOL/BSA (12-30): 12.7 CM2
BH CV ECHO MEAS - LV V1 MAX: 127 CM/SEC
BH CV ECHO MEAS - LV V1 VTI: 27.4 CM
BH CV ECHO MEAS - LVIDD: 5.2 CM
BH CV ECHO MEAS - LVIDS: 3.3 CM
BH CV ECHO MEAS - LVOT AREA: 3.8 CM2
BH CV ECHO MEAS - LVOT DIAM: 2.2 CM
BH CV ECHO MEAS - LVPWD: 0.8 CM
BH CV ECHO MEAS - MED PEAK E' VEL: 4.97 CM/SEC
BH CV ECHO MEAS - MV A MAX VEL: 62.2 CM/SEC
BH CV ECHO MEAS - MV DEC TIME: 0.22 MSEC
BH CV ECHO MEAS - MV E MAX VEL: 122 CM/SEC
BH CV ECHO MEAS - MV E/A: 1.96
BH CV ECHO MEAS - PA V2 MAX: 67.6 CM/SEC
BH CV ECHO MEAS - SI(MOD-SP4): 45 ML/M2
BH CV ECHO MEAS - SV(LVOT): 104.2 ML
BH CV ECHO MEAS - SV(MOD-SP4): 92 ML
BH CV ECHO MEASUREMENTS AVERAGE E/E' RATIO: 19.26
MAXIMAL PREDICTED HEART RATE: 160 BPM
STRESS TARGET HR: 136 BPM

## 2022-04-18 PROCEDURE — 93017 CV STRESS TEST TRACING ONLY: CPT

## 2022-04-18 PROCEDURE — 93306 TTE W/DOPPLER COMPLETE: CPT

## 2022-04-18 PROCEDURE — 93352 ADMIN ECG CONTRAST AGENT: CPT | Performed by: EMERGENCY MEDICINE

## 2022-04-18 PROCEDURE — 93350 STRESS TTE ONLY: CPT | Performed by: EMERGENCY MEDICINE

## 2022-04-18 PROCEDURE — 93018 CV STRESS TEST I&R ONLY: CPT | Performed by: EMERGENCY MEDICINE

## 2022-04-18 PROCEDURE — 93350 STRESS TTE ONLY: CPT

## 2022-04-18 PROCEDURE — 25010000002 ATROPINE SULFATE: Performed by: EMERGENCY MEDICINE

## 2022-04-18 PROCEDURE — 93325 DOPPLER ECHO COLOR FLOW MAPG: CPT

## 2022-04-18 PROCEDURE — 93320 DOPPLER ECHO COMPLETE: CPT

## 2022-04-18 PROCEDURE — 25010000002 PERFLUTREN 6.52 MG/ML SUSPENSION: Performed by: EMERGENCY MEDICINE

## 2022-04-18 PROCEDURE — 0 DOBUTAMINE PER 250 MG: Performed by: EMERGENCY MEDICINE

## 2022-04-18 RX ORDER — DOBUTAMINE HYDROCHLORIDE 100 MG/100ML
10-50 INJECTION INTRAVENOUS CONTINUOUS
Status: DISCONTINUED | OUTPATIENT
Start: 2022-04-18 | End: 2022-04-19 | Stop reason: HOSPADM

## 2022-04-18 RX ADMIN — Medication 10 MCG/KG/MIN: at 11:11

## 2022-04-18 RX ADMIN — PERFLUTREN 8.48 MG: 6.52 INJECTION, SUSPENSION INTRAVENOUS at 11:10

## 2022-04-18 RX ADMIN — ATROPINE SULFATE 2 MG: 0.1 INJECTION INTRAVENOUS at 11:57

## 2022-04-20 ENCOUNTER — OFFICE VISIT (OUTPATIENT)
Dept: CARDIAC REHAB | Facility: HOSPITAL | Age: 62
End: 2022-04-20

## 2022-04-20 VITALS — SYSTOLIC BLOOD PRESSURE: 114 MMHG | DIASTOLIC BLOOD PRESSURE: 60 MMHG

## 2022-04-20 DIAGNOSIS — I25.110 CORONARY ARTERY DISEASE INVOLVING NATIVE CORONARY ARTERY OF NATIVE HEART WITH UNSTABLE ANGINA PECTORIS: Primary | ICD-10-CM

## 2022-04-21 LAB
BH CV STRESS BP STAGE 1: NORMAL
BH CV STRESS BP STAGE 2: NORMAL
BH CV STRESS BP STAGE 3: NORMAL
BH CV STRESS BP STAGE 4: NORMAL
BH CV STRESS DOB - ATROPINE STAGE 4: 2
BH CV STRESS DOSE DOBUTAMINE STAGE 1: 10
BH CV STRESS DOSE DOBUTAMINE STAGE 2: 20
BH CV STRESS DOSE DOBUTAMINE STAGE 3: 30
BH CV STRESS DOSE DOBUTAMINE STAGE 4: 40
BH CV STRESS DURATION MIN STAGE 1: 3
BH CV STRESS DURATION MIN STAGE 2: 3
BH CV STRESS DURATION MIN STAGE 3: 3
BH CV STRESS DURATION MIN STAGE 4: 3
BH CV STRESS DURATION SEC STAGE 1: 0
BH CV STRESS DURATION SEC STAGE 2: 0
BH CV STRESS DURATION SEC STAGE 3: 0
BH CV STRESS DURATION SEC STAGE 4: 11
BH CV STRESS HR STAGE 1: 55
BH CV STRESS HR STAGE 2: 65
BH CV STRESS HR STAGE 3: 113
BH CV STRESS HR STAGE 4: 136
BH CV STRESS PROTOCOL 1: NORMAL
BH CV STRESS RECOVERY BP: NORMAL MMHG
BH CV STRESS RECOVERY HR: 93 BPM
BH CV STRESS STAGE 1: 1
BH CV STRESS STAGE 2: 2
BH CV STRESS STAGE 3: 3
BH CV STRESS STAGE 4: 4
MAXIMAL PREDICTED HEART RATE: 160 BPM
PERCENT MAX PREDICTED HR: 85 %
STRESS BASELINE BP: NORMAL MMHG
STRESS BASELINE HR: 56 BPM
STRESS PERCENT HR: 100 %
STRESS POST EXERCISE DUR MIN: 12 MIN
STRESS POST EXERCISE DUR SEC: 11 SEC
STRESS POST PEAK BP: NORMAL MMHG
STRESS POST PEAK HR: 136 BPM
STRESS TARGET HR: 136 BPM

## 2022-04-22 ENCOUNTER — OFFICE VISIT (OUTPATIENT)
Dept: CARDIAC REHAB | Facility: HOSPITAL | Age: 62
End: 2022-04-22

## 2022-04-22 VITALS — DIASTOLIC BLOOD PRESSURE: 60 MMHG | SYSTOLIC BLOOD PRESSURE: 118 MMHG

## 2022-04-22 DIAGNOSIS — I25.110 CORONARY ARTERY DISEASE INVOLVING NATIVE CORONARY ARTERY OF NATIVE HEART WITH UNSTABLE ANGINA PECTORIS: Primary | ICD-10-CM

## 2022-04-25 ENCOUNTER — OFFICE VISIT (OUTPATIENT)
Dept: CARDIAC REHAB | Facility: HOSPITAL | Age: 62
End: 2022-04-25

## 2022-04-25 VITALS — DIASTOLIC BLOOD PRESSURE: 60 MMHG | SYSTOLIC BLOOD PRESSURE: 98 MMHG

## 2022-04-25 DIAGNOSIS — I25.110 CORONARY ARTERY DISEASE INVOLVING NATIVE CORONARY ARTERY OF NATIVE HEART WITH UNSTABLE ANGINA PECTORIS: Primary | ICD-10-CM

## 2022-04-27 ENCOUNTER — OFFICE VISIT (OUTPATIENT)
Dept: CARDIAC REHAB | Facility: HOSPITAL | Age: 62
End: 2022-04-27

## 2022-04-27 VITALS — SYSTOLIC BLOOD PRESSURE: 118 MMHG | DIASTOLIC BLOOD PRESSURE: 60 MMHG

## 2022-04-27 DIAGNOSIS — I25.110 CORONARY ARTERY DISEASE INVOLVING NATIVE CORONARY ARTERY OF NATIVE HEART WITH UNSTABLE ANGINA PECTORIS: Primary | ICD-10-CM

## 2022-04-29 ENCOUNTER — APPOINTMENT (OUTPATIENT)
Dept: CARDIAC REHAB | Facility: HOSPITAL | Age: 62
End: 2022-04-29

## 2022-05-02 ENCOUNTER — OFFICE VISIT (OUTPATIENT)
Dept: CARDIAC REHAB | Facility: HOSPITAL | Age: 62
End: 2022-05-02

## 2022-05-02 VITALS — SYSTOLIC BLOOD PRESSURE: 108 MMHG | DIASTOLIC BLOOD PRESSURE: 56 MMHG

## 2022-05-02 DIAGNOSIS — I25.110 CORONARY ARTERY DISEASE INVOLVING NATIVE CORONARY ARTERY OF NATIVE HEART WITH UNSTABLE ANGINA PECTORIS: Primary | ICD-10-CM

## 2022-05-04 ENCOUNTER — OFFICE VISIT (OUTPATIENT)
Dept: CARDIAC REHAB | Facility: HOSPITAL | Age: 62
End: 2022-05-04

## 2022-05-04 VITALS — SYSTOLIC BLOOD PRESSURE: 102 MMHG | DIASTOLIC BLOOD PRESSURE: 58 MMHG

## 2022-05-04 DIAGNOSIS — I25.110 CORONARY ARTERY DISEASE INVOLVING NATIVE CORONARY ARTERY OF NATIVE HEART WITH UNSTABLE ANGINA PECTORIS: Primary | ICD-10-CM

## 2022-05-06 ENCOUNTER — OFFICE VISIT (OUTPATIENT)
Dept: CARDIAC REHAB | Facility: HOSPITAL | Age: 62
End: 2022-05-06

## 2022-05-06 VITALS — SYSTOLIC BLOOD PRESSURE: 104 MMHG | DIASTOLIC BLOOD PRESSURE: 60 MMHG

## 2022-05-06 DIAGNOSIS — I25.110 CORONARY ARTERY DISEASE INVOLVING NATIVE CORONARY ARTERY OF NATIVE HEART WITH UNSTABLE ANGINA PECTORIS: Primary | ICD-10-CM

## 2022-05-09 ENCOUNTER — OFFICE VISIT (OUTPATIENT)
Dept: CARDIAC REHAB | Facility: HOSPITAL | Age: 62
End: 2022-05-09

## 2022-05-09 VITALS — DIASTOLIC BLOOD PRESSURE: 60 MMHG | SYSTOLIC BLOOD PRESSURE: 116 MMHG | HEART RATE: 63 BPM

## 2022-05-09 DIAGNOSIS — I25.110 CORONARY ARTERY DISEASE INVOLVING NATIVE CORONARY ARTERY OF NATIVE HEART WITH UNSTABLE ANGINA PECTORIS: Primary | ICD-10-CM

## 2022-05-13 ENCOUNTER — OFFICE VISIT (OUTPATIENT)
Dept: CARDIAC REHAB | Facility: HOSPITAL | Age: 62
End: 2022-05-13

## 2022-05-13 VITALS — DIASTOLIC BLOOD PRESSURE: 64 MMHG | SYSTOLIC BLOOD PRESSURE: 100 MMHG

## 2022-05-13 DIAGNOSIS — I25.110 CORONARY ARTERY DISEASE INVOLVING NATIVE CORONARY ARTERY OF NATIVE HEART WITH UNSTABLE ANGINA PECTORIS: Primary | ICD-10-CM

## 2022-05-16 ENCOUNTER — OFFICE VISIT (OUTPATIENT)
Dept: CARDIAC REHAB | Facility: HOSPITAL | Age: 62
End: 2022-05-16

## 2022-05-16 VITALS — SYSTOLIC BLOOD PRESSURE: 92 MMHG | DIASTOLIC BLOOD PRESSURE: 60 MMHG

## 2022-05-16 DIAGNOSIS — I25.110 CORONARY ARTERY DISEASE INVOLVING NATIVE CORONARY ARTERY OF NATIVE HEART WITH UNSTABLE ANGINA PECTORIS: Primary | ICD-10-CM

## 2022-05-18 ENCOUNTER — OFFICE VISIT (OUTPATIENT)
Dept: CARDIAC REHAB | Facility: HOSPITAL | Age: 62
End: 2022-05-18

## 2022-05-18 VITALS — SYSTOLIC BLOOD PRESSURE: 102 MMHG | DIASTOLIC BLOOD PRESSURE: 50 MMHG

## 2022-05-18 DIAGNOSIS — I25.110 CORONARY ARTERY DISEASE INVOLVING NATIVE CORONARY ARTERY OF NATIVE HEART WITH UNSTABLE ANGINA PECTORIS: Primary | ICD-10-CM

## 2022-05-20 ENCOUNTER — OFFICE VISIT (OUTPATIENT)
Dept: CARDIAC REHAB | Facility: HOSPITAL | Age: 62
End: 2022-05-20

## 2022-05-20 VITALS — DIASTOLIC BLOOD PRESSURE: 58 MMHG | SYSTOLIC BLOOD PRESSURE: 118 MMHG

## 2022-05-20 DIAGNOSIS — I25.110 CORONARY ARTERY DISEASE INVOLVING NATIVE CORONARY ARTERY OF NATIVE HEART WITH UNSTABLE ANGINA PECTORIS: Primary | ICD-10-CM

## 2022-05-23 ENCOUNTER — APPOINTMENT (OUTPATIENT)
Dept: CARDIAC REHAB | Facility: HOSPITAL | Age: 62
End: 2022-05-23

## 2022-05-25 ENCOUNTER — OFFICE VISIT (OUTPATIENT)
Dept: CARDIAC REHAB | Facility: HOSPITAL | Age: 62
End: 2022-05-25

## 2022-05-25 VITALS — DIASTOLIC BLOOD PRESSURE: 60 MMHG | SYSTOLIC BLOOD PRESSURE: 96 MMHG

## 2022-05-25 DIAGNOSIS — I25.110 CORONARY ARTERY DISEASE INVOLVING NATIVE CORONARY ARTERY OF NATIVE HEART WITH UNSTABLE ANGINA PECTORIS: Primary | ICD-10-CM

## 2022-05-27 ENCOUNTER — OFFICE VISIT (OUTPATIENT)
Dept: CARDIAC REHAB | Facility: HOSPITAL | Age: 62
End: 2022-05-27

## 2022-05-27 VITALS — DIASTOLIC BLOOD PRESSURE: 60 MMHG | SYSTOLIC BLOOD PRESSURE: 92 MMHG

## 2022-05-27 DIAGNOSIS — I25.110 CORONARY ARTERY DISEASE INVOLVING NATIVE CORONARY ARTERY OF NATIVE HEART WITH UNSTABLE ANGINA PECTORIS: Primary | ICD-10-CM

## 2022-06-03 ENCOUNTER — OFFICE VISIT (OUTPATIENT)
Dept: CARDIAC REHAB | Facility: HOSPITAL | Age: 62
End: 2022-06-03

## 2022-06-03 VITALS — SYSTOLIC BLOOD PRESSURE: 110 MMHG | DIASTOLIC BLOOD PRESSURE: 68 MMHG

## 2022-06-03 DIAGNOSIS — I25.110 CORONARY ARTERY DISEASE INVOLVING NATIVE CORONARY ARTERY OF NATIVE HEART WITH UNSTABLE ANGINA PECTORIS: Primary | ICD-10-CM

## 2022-06-06 ENCOUNTER — OFFICE VISIT (OUTPATIENT)
Dept: CARDIAC REHAB | Facility: HOSPITAL | Age: 62
End: 2022-06-06

## 2022-06-06 VITALS — SYSTOLIC BLOOD PRESSURE: 120 MMHG | DIASTOLIC BLOOD PRESSURE: 58 MMHG

## 2022-06-06 DIAGNOSIS — I25.110 CORONARY ARTERY DISEASE INVOLVING NATIVE CORONARY ARTERY OF NATIVE HEART WITH UNSTABLE ANGINA PECTORIS: Primary | ICD-10-CM

## 2022-06-08 ENCOUNTER — OFFICE VISIT (OUTPATIENT)
Dept: CARDIAC REHAB | Facility: HOSPITAL | Age: 62
End: 2022-06-08

## 2022-06-08 VITALS — DIASTOLIC BLOOD PRESSURE: 66 MMHG | SYSTOLIC BLOOD PRESSURE: 116 MMHG

## 2022-06-08 DIAGNOSIS — I25.110 CORONARY ARTERY DISEASE INVOLVING NATIVE CORONARY ARTERY OF NATIVE HEART WITH UNSTABLE ANGINA PECTORIS: Primary | ICD-10-CM

## 2022-06-10 ENCOUNTER — OFFICE VISIT (OUTPATIENT)
Dept: CARDIAC REHAB | Facility: HOSPITAL | Age: 62
End: 2022-06-10

## 2022-06-10 VITALS — SYSTOLIC BLOOD PRESSURE: 116 MMHG | DIASTOLIC BLOOD PRESSURE: 62 MMHG

## 2022-06-10 DIAGNOSIS — I25.110 CORONARY ARTERY DISEASE INVOLVING NATIVE CORONARY ARTERY OF NATIVE HEART WITH UNSTABLE ANGINA PECTORIS: Primary | ICD-10-CM

## 2022-06-13 ENCOUNTER — OFFICE VISIT (OUTPATIENT)
Dept: CARDIAC REHAB | Facility: HOSPITAL | Age: 62
End: 2022-06-13

## 2022-06-13 VITALS — SYSTOLIC BLOOD PRESSURE: 112 MMHG | DIASTOLIC BLOOD PRESSURE: 70 MMHG

## 2022-06-13 DIAGNOSIS — I25.110 CORONARY ARTERY DISEASE INVOLVING NATIVE CORONARY ARTERY OF NATIVE HEART WITH UNSTABLE ANGINA PECTORIS: Primary | ICD-10-CM

## 2022-06-15 ENCOUNTER — OFFICE VISIT (OUTPATIENT)
Dept: CARDIAC REHAB | Facility: HOSPITAL | Age: 62
End: 2022-06-15

## 2022-06-15 VITALS — SYSTOLIC BLOOD PRESSURE: 98 MMHG | DIASTOLIC BLOOD PRESSURE: 60 MMHG

## 2022-06-15 DIAGNOSIS — I25.110 CORONARY ARTERY DISEASE INVOLVING NATIVE CORONARY ARTERY OF NATIVE HEART WITH UNSTABLE ANGINA PECTORIS: Primary | ICD-10-CM

## 2022-06-17 ENCOUNTER — OFFICE VISIT (OUTPATIENT)
Dept: CARDIAC REHAB | Facility: HOSPITAL | Age: 62
End: 2022-06-17

## 2022-06-17 VITALS — SYSTOLIC BLOOD PRESSURE: 102 MMHG | DIASTOLIC BLOOD PRESSURE: 60 MMHG

## 2022-06-17 DIAGNOSIS — I25.110 CORONARY ARTERY DISEASE INVOLVING NATIVE CORONARY ARTERY OF NATIVE HEART WITH UNSTABLE ANGINA PECTORIS: Primary | ICD-10-CM

## 2022-06-22 ENCOUNTER — OFFICE VISIT (OUTPATIENT)
Dept: CARDIAC REHAB | Facility: HOSPITAL | Age: 62
End: 2022-06-22

## 2022-06-22 VITALS — WEIGHT: 205 LBS | BODY MASS INDEX: 29.41 KG/M2 | SYSTOLIC BLOOD PRESSURE: 100 MMHG | DIASTOLIC BLOOD PRESSURE: 56 MMHG

## 2022-06-22 DIAGNOSIS — I25.110 CORONARY ARTERY DISEASE INVOLVING NATIVE CORONARY ARTERY OF NATIVE HEART WITH UNSTABLE ANGINA PECTORIS: Primary | ICD-10-CM

## 2022-06-24 ENCOUNTER — OFFICE VISIT (OUTPATIENT)
Dept: CARDIAC REHAB | Facility: HOSPITAL | Age: 62
End: 2022-06-24

## 2022-06-24 VITALS — DIASTOLIC BLOOD PRESSURE: 60 MMHG | SYSTOLIC BLOOD PRESSURE: 102 MMHG

## 2022-06-24 DIAGNOSIS — I25.110 CORONARY ARTERY DISEASE INVOLVING NATIVE CORONARY ARTERY OF NATIVE HEART WITH UNSTABLE ANGINA PECTORIS: Primary | ICD-10-CM

## 2022-06-27 ENCOUNTER — OFFICE VISIT (OUTPATIENT)
Dept: CARDIAC REHAB | Facility: HOSPITAL | Age: 62
End: 2022-06-27

## 2022-06-27 VITALS — DIASTOLIC BLOOD PRESSURE: 76 MMHG | SYSTOLIC BLOOD PRESSURE: 102 MMHG

## 2022-06-27 DIAGNOSIS — I25.110 CORONARY ARTERY DISEASE INVOLVING NATIVE CORONARY ARTERY OF NATIVE HEART WITH UNSTABLE ANGINA PECTORIS: Primary | ICD-10-CM

## 2022-06-29 ENCOUNTER — OFFICE VISIT (OUTPATIENT)
Dept: CARDIAC REHAB | Facility: HOSPITAL | Age: 62
End: 2022-06-29

## 2022-06-29 VITALS — SYSTOLIC BLOOD PRESSURE: 114 MMHG | DIASTOLIC BLOOD PRESSURE: 70 MMHG

## 2022-06-29 DIAGNOSIS — I25.110 CORONARY ARTERY DISEASE INVOLVING NATIVE CORONARY ARTERY OF NATIVE HEART WITH UNSTABLE ANGINA PECTORIS: Primary | ICD-10-CM

## 2022-07-01 ENCOUNTER — APPOINTMENT (OUTPATIENT)
Dept: CARDIAC REHAB | Facility: HOSPITAL | Age: 62
End: 2022-07-01

## 2022-07-06 ENCOUNTER — OFFICE VISIT (OUTPATIENT)
Dept: CARDIAC REHAB | Facility: HOSPITAL | Age: 62
End: 2022-07-06

## 2022-07-06 VITALS — SYSTOLIC BLOOD PRESSURE: 116 MMHG | DIASTOLIC BLOOD PRESSURE: 68 MMHG

## 2022-07-06 DIAGNOSIS — I25.110 CORONARY ARTERY DISEASE INVOLVING NATIVE CORONARY ARTERY OF NATIVE HEART WITH UNSTABLE ANGINA PECTORIS: Primary | ICD-10-CM

## 2022-07-08 ENCOUNTER — OFFICE VISIT (OUTPATIENT)
Dept: CARDIAC REHAB | Facility: HOSPITAL | Age: 62
End: 2022-07-08

## 2022-07-08 VITALS — SYSTOLIC BLOOD PRESSURE: 108 MMHG | DIASTOLIC BLOOD PRESSURE: 54 MMHG

## 2022-07-08 DIAGNOSIS — I25.110 CORONARY ARTERY DISEASE INVOLVING NATIVE CORONARY ARTERY OF NATIVE HEART WITH UNSTABLE ANGINA PECTORIS: Primary | ICD-10-CM

## 2022-07-11 ENCOUNTER — OFFICE VISIT (OUTPATIENT)
Dept: CARDIAC REHAB | Facility: HOSPITAL | Age: 62
End: 2022-07-11

## 2022-07-11 ENCOUNTER — OFFICE VISIT (OUTPATIENT)
Dept: CARDIOLOGY | Facility: CLINIC | Age: 62
End: 2022-07-11

## 2022-07-11 VITALS — DIASTOLIC BLOOD PRESSURE: 62 MMHG | SYSTOLIC BLOOD PRESSURE: 116 MMHG

## 2022-07-11 VITALS
SYSTOLIC BLOOD PRESSURE: 130 MMHG | OXYGEN SATURATION: 98 % | WEIGHT: 208 LBS | DIASTOLIC BLOOD PRESSURE: 74 MMHG | BODY MASS INDEX: 29.78 KG/M2 | HEIGHT: 70 IN | HEART RATE: 62 BPM

## 2022-07-11 DIAGNOSIS — I25.110 CORONARY ARTERY DISEASE INVOLVING NATIVE CORONARY ARTERY OF NATIVE HEART WITH UNSTABLE ANGINA PECTORIS: Primary | ICD-10-CM

## 2022-07-11 DIAGNOSIS — E78.2 MIXED HYPERLIPIDEMIA: ICD-10-CM

## 2022-07-11 DIAGNOSIS — Z72.0 TOBACCO ABUSE: ICD-10-CM

## 2022-07-11 DIAGNOSIS — I73.9 PERIPHERAL ARTERY DISEASE: ICD-10-CM

## 2022-07-11 DIAGNOSIS — I10 ESSENTIAL HYPERTENSION: ICD-10-CM

## 2022-07-11 DIAGNOSIS — I25.110 CORONARY ARTERY DISEASE INVOLVING NATIVE CORONARY ARTERY OF NATIVE HEART WITH UNSTABLE ANGINA PECTORIS: ICD-10-CM

## 2022-07-11 DIAGNOSIS — J43.2 CENTRILOBULAR EMPHYSEMA: ICD-10-CM

## 2022-07-11 DIAGNOSIS — I70.213 ATHEROSCLEROSIS OF NATIVE ARTERY OF BOTH LOWER EXTREMITIES WITH INTERMITTENT CLAUDICATION: Primary | ICD-10-CM

## 2022-07-11 PROCEDURE — 99214 OFFICE O/P EST MOD 30 MIN: CPT | Performed by: EMERGENCY MEDICINE

## 2022-07-11 RX ORDER — ATORVASTATIN CALCIUM 40 MG/1
40 TABLET, FILM COATED ORAL DAILY
Qty: 90 TABLET | Refills: 4 | Status: SHIPPED | OUTPATIENT
Start: 2022-07-11

## 2022-07-11 RX ORDER — RANOLAZINE 500 MG/1
500 TABLET, EXTENDED RELEASE ORAL 2 TIMES DAILY
Qty: 180 TABLET | Refills: 3 | Status: ON HOLD | OUTPATIENT
Start: 2022-07-11 | End: 2022-09-08

## 2022-07-11 RX ORDER — FUROSEMIDE 20 MG/1
40 TABLET ORAL 2 TIMES DAILY PRN
Qty: 180 TABLET | Refills: 3 | Status: SHIPPED | OUTPATIENT
Start: 2022-07-11

## 2022-07-11 RX ORDER — METOPROLOL TARTRATE 50 MG/1
50 TABLET, FILM COATED ORAL EVERY 12 HOURS SCHEDULED
Qty: 180 TABLET | Refills: 3 | Status: SHIPPED | OUTPATIENT
Start: 2022-07-11

## 2022-07-11 RX ORDER — CILOSTAZOL 100 MG/1
100 TABLET ORAL 2 TIMES DAILY
Qty: 180 TABLET | Refills: 3 | Status: SHIPPED | OUTPATIENT
Start: 2022-07-11

## 2022-07-11 RX ORDER — LISINOPRIL 2.5 MG/1
2.5 TABLET ORAL
Qty: 90 TABLET | Refills: 3 | Status: SHIPPED | OUTPATIENT
Start: 2022-07-11

## 2022-07-13 ENCOUNTER — OFFICE VISIT (OUTPATIENT)
Dept: CARDIAC REHAB | Facility: HOSPITAL | Age: 62
End: 2022-07-13

## 2022-07-13 VITALS — SYSTOLIC BLOOD PRESSURE: 90 MMHG | DIASTOLIC BLOOD PRESSURE: 56 MMHG

## 2022-07-13 DIAGNOSIS — I25.110 CORONARY ARTERY DISEASE INVOLVING NATIVE CORONARY ARTERY OF NATIVE HEART WITH UNSTABLE ANGINA PECTORIS: Primary | ICD-10-CM

## 2022-07-15 ENCOUNTER — OFFICE VISIT (OUTPATIENT)
Dept: CARDIAC REHAB | Facility: HOSPITAL | Age: 62
End: 2022-07-15

## 2022-07-15 VITALS — DIASTOLIC BLOOD PRESSURE: 60 MMHG | SYSTOLIC BLOOD PRESSURE: 92 MMHG

## 2022-07-15 DIAGNOSIS — I25.110 CORONARY ARTERY DISEASE INVOLVING NATIVE CORONARY ARTERY OF NATIVE HEART WITH UNSTABLE ANGINA PECTORIS: Primary | ICD-10-CM

## 2022-07-19 PROCEDURE — 93000 ELECTROCARDIOGRAM COMPLETE: CPT | Performed by: EMERGENCY MEDICINE

## 2022-07-19 NOTE — PROGRESS NOTES
Subjective:     Encounter Date:07/11/2022      Patient ID: Tree Shaffer is a 61 y.o. male.    Chief Complaint:  History of Present Illness    Patient is a very pleasant 61-year-old male with a history of COPD, hypertension, hyperlipidemia and peripheral vascular disease as well as coronary artery disease status post CABG who presents today for a 2-month follow-up.  Patient says that whenever he exerts himself here lately he will get episodes of tightness in the left side of his chest.    He underwent four-vessel bypass with Dr. Menendez on May 5, 2021.  He had a LIMA to his LAD, saphenous vein graft to the first diagonal, obtuse marginal and PDA.  His current cardiac regimen consist of aspirin 81, Lipitor 40, Pletal, Lasix 40 as needed, lisinopril 2.5 and Lopressor 50 twice daily.  Blood pressure and heart rate are well controlled.    Review of Systems   Constitutional: Negative for diaphoresis, fever and malaise/fatigue.   HENT: Negative for congestion.    Eyes: Negative for vision loss in left eye and vision loss in right eye.   Cardiovascular: Positive for chest pain. Negative for claudication, dyspnea on exertion, irregular heartbeat, leg swelling, orthopnea, palpitations and syncope.   Respiratory: Negative for cough, shortness of breath and wheezing.    Hematologic/Lymphatic: Negative for adenopathy.   Skin: Negative for rash.   Musculoskeletal: Negative for joint pain and joint swelling.   Gastrointestinal: Negative for abdominal pain, diarrhea, nausea and vomiting.   Neurological: Negative for excessive daytime sleepiness, dizziness, focal weakness, light-headedness, numbness and weakness.   Psychiatric/Behavioral: Negative for depression. The patient does not have insomnia.            Current Outpatient Medications:   •  aspirin 81 MG EC tablet, Take 1 tablet by mouth Daily., Disp: 30 tablet, Rfl: 2  •  atorvastatin (Lipitor) 40 MG tablet, Take 1 tablet by mouth Daily., Disp: 90 tablet, Rfl: 4  •   buprenorphine-naloxone (SUBOXONE) 8-2 MG per SL tablet, 3 tablets Daily. Take 3 tablets under tongue once a day . Patient reports that he takes one tablet three times per day, Disp: , Rfl:   •  cilostazol (PLETAL) 100 MG tablet, Take 1 tablet by mouth 2 (Two) Times a Day., Disp: 180 tablet, Rfl: 3  •  furosemide (LASIX) 20 MG tablet, Take 2 tablets by mouth 2 (Two) Times a Day As Needed (EDEMA)., Disp: 180 tablet, Rfl: 3  •  lisinopril (PRINIVIL,ZESTRIL) 2.5 MG tablet, Take 1 tablet by mouth Daily., Disp: 90 tablet, Rfl: 3  •  metoprolol tartrate (LOPRESSOR) 50 MG tablet, Take 1 tablet by mouth Every 12 (Twelve) Hours., Disp: 180 tablet, Rfl: 3  •  pantoprazole (PROTONIX) 40 MG EC tablet, Take 1 tablet by mouth Daily., Disp: 30 tablet, Rfl: 0  •  SYMBICORT 160-4.5 MCG/ACT inhaler, Inhale 2 puffs 2 (Two) Times a Day., Disp: , Rfl: 2  •  ranolazine (Ranexa) 500 MG 12 hr tablet, Take 1 tablet by mouth 2 (Two) Times a Day., Disp: 180 tablet, Rfl: 3       Objective:      Vitals:    07/11/22 1031   BP: 130/74   Pulse: 62   SpO2: 98%     Vitals and nursing note reviewed.   Constitutional:       Appearance: Normal and healthy appearance. Well-developed and not in distress.   Eyes:      Extraocular Movements: Extraocular movements intact.      Pupils: Pupils are equal, round, and reactive to light.   HENT:      Head: Normocephalic and atraumatic.    Mouth/Throat:      Pharynx: Oropharynx is clear.   Neck:      Vascular: JVD normal.      Trachea: Trachea normal.   Pulmonary:      Effort: Pulmonary effort is normal.      Breath sounds: Normal breath sounds. No wheezing. No rhonchi. No rales.   Cardiovascular:      PMI at left midclavicular line. Normal rate. Regular rhythm. Normal S1. Normal S2.      Murmurs: There is a grade 2/6 systolic murmur.      No gallop. No click. No rub.   Pulses:     Dorsalis pedis: 2+ bilaterally.     Posterior tibial: 2+ bilaterally.  Abdominal:      General: Bowel sounds are normal.       Palpations: Abdomen is soft.      Tenderness: There is no abdominal tenderness.   Musculoskeletal: Normal range of motion.      Cervical back: Normal range of motion and neck supple. Skin:     General: Skin is warm and dry.      Capillary Refill: Capillary refill takes less than 2 seconds.   Feet:      Right foot:      Skin integrity: Skin integrity normal.      Left foot:      Skin integrity: Skin integrity normal.   Neurological:      Mental Status: Alert and oriented to person, place and time.      Cranial Nerves: Cranial nerves are intact.      Sensory: Sensation is intact.      Motor: Motor function is intact.      Coordination: Coordination is intact.   Psychiatric:         Speech: Speech normal.         Behavior: Behavior is cooperative.         Lab Review:         ECG 12 Lead    Date/Time: 7/19/2022 5:19 PM  Performed by: Go Baer DO  Authorized by: Go Baer DO   Comparison: compared with previous ECG   Similar to previous ECG  Rhythm: sinus bradycardia  Rate: tachycardic  Conduction: incomplete right bundle branch block  ST Segments: ST segments normal  T Waves: T waves normal  QRS axis: normal  Other: no other findings    Clinical impression: abnormal EKG              Assessment/Plan:     Problem List Items Addressed This Visit        Cardiac and Vasculature    Essential hypertension    Relevant Medications    furosemide (LASIX) 20 MG tablet    lisinopril (PRINIVIL,ZESTRIL) 2.5 MG tablet    metoprolol tartrate (LOPRESSOR) 50 MG tablet    Coronary artery disease involving native coronary artery of native heart with unstable angina pectoris (HCC)    Overview     Added automatically from request for surgery [id number]           Relevant Medications    ranolazine (Ranexa) 500 MG 12 hr tablet    cilostazol (PLETAL) 100 MG tablet    metoprolol tartrate (LOPRESSOR) 50 MG tablet    Atherosclerosis of native artery of both lower extremities with intermittent claudication (HCC) -  Primary    Peripheral artery disease (HCC)    Mixed hyperlipidemia    Relevant Medications    atorvastatin (Lipitor) 40 MG tablet       Pulmonary and Pneumonias    COPD (chronic obstructive pulmonary disease) (HCC)       Tobacco    Tobacco abuse            Recommendations/plans:    Patient underwent stress echocardiogram on April 18 of this year that showed low risk for ischemia.  Recent echocardiogram showed normal systolic function.  Diastolic dysfunction was noted.  Trace mitral regurgitation.    I am going to refill all of his cardiac medications today including the aspirin, Lipitor, Pletal, Lasix, lisinopril and Lopressor.  I am also going to start him on Ranexa 500 mg twice daily to see if this will completely relieved the episodes of chest pain he has been having.    Follow-up with cardiology in 1 year or sooner if necessary.  Counseled on smoking cessation importance.    Go Baer, DO  Interventional cardiology  [facility]  07/11/2022  17:23 CDT

## 2022-07-20 ENCOUNTER — OFFICE VISIT (OUTPATIENT)
Dept: CARDIAC REHAB | Facility: HOSPITAL | Age: 62
End: 2022-07-20

## 2022-07-20 VITALS — DIASTOLIC BLOOD PRESSURE: 60 MMHG | SYSTOLIC BLOOD PRESSURE: 108 MMHG

## 2022-07-20 DIAGNOSIS — I25.110 CORONARY ARTERY DISEASE INVOLVING NATIVE CORONARY ARTERY OF NATIVE HEART WITH UNSTABLE ANGINA PECTORIS: Primary | ICD-10-CM

## 2022-07-22 ENCOUNTER — OFFICE VISIT (OUTPATIENT)
Dept: CARDIAC REHAB | Facility: HOSPITAL | Age: 62
End: 2022-07-22

## 2022-07-22 DIAGNOSIS — I25.110 CORONARY ARTERY DISEASE INVOLVING NATIVE CORONARY ARTERY OF NATIVE HEART WITH UNSTABLE ANGINA PECTORIS: Primary | ICD-10-CM

## 2022-07-25 ENCOUNTER — OFFICE VISIT (OUTPATIENT)
Dept: CARDIAC REHAB | Facility: HOSPITAL | Age: 62
End: 2022-07-25

## 2022-07-25 VITALS — DIASTOLIC BLOOD PRESSURE: 60 MMHG | SYSTOLIC BLOOD PRESSURE: 102 MMHG

## 2022-07-25 DIAGNOSIS — I25.110 CORONARY ARTERY DISEASE INVOLVING NATIVE CORONARY ARTERY OF NATIVE HEART WITH UNSTABLE ANGINA PECTORIS: Primary | ICD-10-CM

## 2022-08-05 ENCOUNTER — OFFICE VISIT (OUTPATIENT)
Dept: CARDIAC REHAB | Facility: HOSPITAL | Age: 62
End: 2022-08-05

## 2022-08-05 VITALS — DIASTOLIC BLOOD PRESSURE: 54 MMHG | SYSTOLIC BLOOD PRESSURE: 108 MMHG

## 2022-08-05 DIAGNOSIS — I25.110 CORONARY ARTERY DISEASE INVOLVING NATIVE CORONARY ARTERY OF NATIVE HEART WITH UNSTABLE ANGINA PECTORIS: Primary | ICD-10-CM

## 2022-08-08 ENCOUNTER — OFFICE VISIT (OUTPATIENT)
Dept: CARDIAC REHAB | Facility: HOSPITAL | Age: 62
End: 2022-08-08

## 2022-08-08 VITALS — SYSTOLIC BLOOD PRESSURE: 138 MMHG | DIASTOLIC BLOOD PRESSURE: 78 MMHG

## 2022-08-08 DIAGNOSIS — I25.110 CORONARY ARTERY DISEASE INVOLVING NATIVE CORONARY ARTERY OF NATIVE HEART WITH UNSTABLE ANGINA PECTORIS: Primary | ICD-10-CM

## 2022-08-10 ENCOUNTER — OFFICE VISIT (OUTPATIENT)
Dept: CARDIAC REHAB | Facility: HOSPITAL | Age: 62
End: 2022-08-10

## 2022-08-10 VITALS — DIASTOLIC BLOOD PRESSURE: 74 MMHG | SYSTOLIC BLOOD PRESSURE: 124 MMHG

## 2022-08-10 DIAGNOSIS — I25.110 CORONARY ARTERY DISEASE INVOLVING NATIVE CORONARY ARTERY OF NATIVE HEART WITH UNSTABLE ANGINA PECTORIS: Primary | ICD-10-CM

## 2022-08-17 ENCOUNTER — OFFICE VISIT (OUTPATIENT)
Dept: CARDIAC REHAB | Facility: HOSPITAL | Age: 62
End: 2022-08-17

## 2022-08-17 VITALS — DIASTOLIC BLOOD PRESSURE: 80 MMHG | SYSTOLIC BLOOD PRESSURE: 116 MMHG

## 2022-08-17 DIAGNOSIS — I25.110 CORONARY ARTERY DISEASE INVOLVING NATIVE CORONARY ARTERY OF NATIVE HEART WITH UNSTABLE ANGINA PECTORIS: Primary | ICD-10-CM

## 2022-08-19 ENCOUNTER — OFFICE VISIT (OUTPATIENT)
Dept: CARDIAC REHAB | Facility: HOSPITAL | Age: 62
End: 2022-08-19

## 2022-08-19 VITALS — SYSTOLIC BLOOD PRESSURE: 112 MMHG | DIASTOLIC BLOOD PRESSURE: 62 MMHG

## 2022-08-19 DIAGNOSIS — I25.110 CORONARY ARTERY DISEASE INVOLVING NATIVE CORONARY ARTERY OF NATIVE HEART WITH UNSTABLE ANGINA PECTORIS: Primary | ICD-10-CM

## 2022-08-22 ENCOUNTER — OFFICE VISIT (OUTPATIENT)
Dept: CARDIAC REHAB | Facility: HOSPITAL | Age: 62
End: 2022-08-22

## 2022-08-22 VITALS — SYSTOLIC BLOOD PRESSURE: 96 MMHG | DIASTOLIC BLOOD PRESSURE: 58 MMHG

## 2022-08-22 DIAGNOSIS — I25.110 CORONARY ARTERY DISEASE INVOLVING NATIVE CORONARY ARTERY OF NATIVE HEART WITH UNSTABLE ANGINA PECTORIS: Primary | ICD-10-CM

## 2022-08-24 ENCOUNTER — OFFICE VISIT (OUTPATIENT)
Dept: CARDIAC REHAB | Facility: HOSPITAL | Age: 62
End: 2022-08-24

## 2022-08-24 VITALS — SYSTOLIC BLOOD PRESSURE: 96 MMHG | DIASTOLIC BLOOD PRESSURE: 56 MMHG

## 2022-08-24 DIAGNOSIS — I25.110 CORONARY ARTERY DISEASE INVOLVING NATIVE CORONARY ARTERY OF NATIVE HEART WITH UNSTABLE ANGINA PECTORIS: Primary | ICD-10-CM

## 2022-08-25 ENCOUNTER — TELEPHONE (OUTPATIENT)
Dept: VASCULAR SURGERY | Facility: CLINIC | Age: 62
End: 2022-08-25

## 2022-08-26 ENCOUNTER — HOSPITAL ENCOUNTER (OUTPATIENT)
Dept: ULTRASOUND IMAGING | Facility: HOSPITAL | Age: 62
Discharge: HOME | End: 2022-08-26

## 2022-08-26 ENCOUNTER — OFFICE VISIT (OUTPATIENT)
Dept: VASCULAR SURGERY | Facility: CLINIC | Age: 62
End: 2022-08-26

## 2022-08-26 ENCOUNTER — OFFICE VISIT (OUTPATIENT)
Dept: CARDIAC REHAB | Facility: HOSPITAL | Age: 62
End: 2022-08-26

## 2022-08-26 VITALS
BODY MASS INDEX: 30.35 KG/M2 | WEIGHT: 212 LBS | SYSTOLIC BLOOD PRESSURE: 140 MMHG | OXYGEN SATURATION: 91 % | HEART RATE: 75 BPM | RESPIRATION RATE: 18 BRPM | HEIGHT: 70 IN | DIASTOLIC BLOOD PRESSURE: 60 MMHG

## 2022-08-26 VITALS — DIASTOLIC BLOOD PRESSURE: 68 MMHG | SYSTOLIC BLOOD PRESSURE: 128 MMHG

## 2022-08-26 DIAGNOSIS — I73.9 PERIPHERAL ARTERIAL DISEASE: ICD-10-CM

## 2022-08-26 DIAGNOSIS — I10 ESSENTIAL HYPERTENSION: ICD-10-CM

## 2022-08-26 DIAGNOSIS — I25.110 CORONARY ARTERY DISEASE INVOLVING NATIVE CORONARY ARTERY OF NATIVE HEART WITH UNSTABLE ANGINA PECTORIS: Primary | ICD-10-CM

## 2022-08-26 DIAGNOSIS — E78.2 MIXED HYPERLIPIDEMIA: ICD-10-CM

## 2022-08-26 DIAGNOSIS — I70.213 ATHEROSCLEROSIS OF NATIVE ARTERY OF BOTH LOWER EXTREMITIES WITH INTERMITTENT CLAUDICATION: Primary | ICD-10-CM

## 2022-08-26 DIAGNOSIS — I65.23 BILATERAL CAROTID ARTERY STENOSIS: ICD-10-CM

## 2022-08-26 PROCEDURE — 93880 EXTRACRANIAL BILAT STUDY: CPT

## 2022-08-26 PROCEDURE — 93923 UPR/LXTR ART STDY 3+ LVLS: CPT | Performed by: SURGERY

## 2022-08-26 PROCEDURE — 93923 UPR/LXTR ART STDY 3+ LVLS: CPT

## 2022-08-26 PROCEDURE — 99214 OFFICE O/P EST MOD 30 MIN: CPT | Performed by: SURGERY

## 2022-08-26 PROCEDURE — 93880 EXTRACRANIAL BILAT STUDY: CPT | Performed by: SURGERY

## 2022-08-26 NOTE — PROGRESS NOTES
08/26/2022      Madhu Andersen, APRN  [address] [place] [address]        Leavitthailey Shaffer  1960    Chief Complaint   Patient presents with   • Peripheral Vascular Disease     6 month follow-up with US.  Pt states that he is has pain and swelling in both legs and he feels tired walking.    • Carotid Artery Disease     6 month follow-up with US. Pt denies any stroke like symptoms.        Dear Madhu Andersen, APRN:    HPI    I had the pleasure of seeing your patient in the office today for follow up.  As you recall, the patient is a 62 y.o. male who we are currently following for carotid disease as well as peripheral arterial disease.  He returns today after having undergone repeat GEOFF/PVR as well as carotid duplex.  I did review both of these studies personally in the office today.  GEOFF value is 0.89 on the right however waveforms are somewhat dampened at the ankle.  Value is 0.84 on the left.  This represents mild to moderate arterial insufficiency bilaterally.  On questioning he reports worsening claudication in the right lower extremity especially at the calf level at short distances which has become lifestyle limiting and stopping him from performing regular daily activities.  Claudication in the left calf is mild.  His GEOFF also shows a decrease in value with exercise down to 0.6 bilaterally.  From a carotid standpoint he remains asymptomatic and carotid duplex done today shows less than 50% stenosis of the bilateral internal carotid arteries.  He otherwise has no new significant complaints today.    Review of Systems   Constitutional: Negative for activity change, appetite change, chills, diaphoresis, fatigue and fever.   HENT: Negative.  Negative for congestion, sneezing, sore throat and trouble swallowing.    Eyes: Negative.  Negative for visual disturbance.   Respiratory: Negative.  Negative for chest tightness and shortness of breath.    Cardiovascular: Positive for leg  "swelling. Negative for chest pain and palpitations.   Gastrointestinal: Negative.  Negative for abdominal distention, abdominal pain, nausea and vomiting.   Endocrine: Negative.    Genitourinary: Negative.    Musculoskeletal:        Worsening claudication in the bilateral calves.  This is worse on the right than the left and has become lifestyle limiting on the right.   Skin: Negative.    Allergic/Immunologic: Negative.    Neurological: Negative.    Hematological: Negative.    Psychiatric/Behavioral: Negative.        /60 (BP Location: Left arm, Patient Position: Sitting, Cuff Size: Adult)   Pulse 75   Resp 18   Ht 177.8 cm (70\")   Wt 96.2 kg (212 lb)   SpO2 91%   BMI 30.42 kg/m²   Physical Exam  Vitals reviewed.   Constitutional:       Appearance: Normal appearance.   HENT:      Head: Normocephalic and atraumatic.      Nose: Nose normal.      Mouth/Throat:      Mouth: Mucous membranes are moist.   Eyes:      Extraocular Movements: Extraocular movements intact.      Pupils: Pupils are equal, round, and reactive to light.   Cardiovascular:      Rate and Rhythm: Normal rate and regular rhythm.      Pulses:           Carotid pulses are 2+ on the right side and 2+ on the left side.       Radial pulses are 2+ on the right side and 2+ on the left side.        Femoral pulses are 2+ on the right side and 2+ on the left side.       Dorsalis pedis pulses are detected w/ Doppler on the right side and detected w/ Doppler on the left side.        Posterior tibial pulses are detected w/ Doppler on the right side and 2+ on the left side.      Comments:  He does have some mild edema of the bilateral lower extremities from ankle to knee and he has some small varicosities in the bilateral lower legs.  Pulmonary:      Effort: Pulmonary effort is normal. No respiratory distress.   Abdominal:      General: There is no distension.      Palpations: Abdomen is soft. There is no mass.      Tenderness: There is no abdominal " tenderness.   Musculoskeletal:         General: Normal range of motion.      Cervical back: Normal range of motion and neck supple.      Right lower leg: Edema present.      Left lower leg: Edema present.   Skin:     General: Skin is warm and dry.      Capillary Refill: Capillary refill takes less than 2 seconds.   Neurological:      General: No focal deficit present.      Mental Status: He is alert and oriented to person, place, and time.   Psychiatric:         Mood and Affect: Mood normal.         Behavior: Behavior normal.         Thought Content: Thought content normal.         Judgment: Judgment normal.         DIAGNOSTIC DATA:    Carotid duplex done today shows less than 50% stenosis of the bilateral internal carotid arteries.        No results found.    Patient Active Problem List   Diagnosis   • Tobacco abuse   • Marijuana use   • Essential hypertension   • Abnormal stress test   • Unstable angina pectoris (HCC)   • Coronary artery disease involving native coronary artery of native heart with unstable angina pectoris (HCC)   • Cervical myelopathy (HCC)   • Atherosclerosis of native artery of both lower extremities with intermittent claudication (HCC)   • GERD (gastroesophageal reflux disease)   • Peripheral artery disease (HCC)   • Anxiety   • Poor dentition   • Chronic neck and back pain   • COPD (chronic obstructive pulmonary disease) (HCC)   • On statin therapy   • Degeneration of intervertebral disc at C4-C5 level   • Hemorrhoids   • History of adenomatous polyp of colon   • Dyspnea on exertion   • Chest pain, atypical   • Mixed hyperlipidemia         ICD-10-CM ICD-9-CM   1. Atherosclerosis of native artery of both lower extremities with intermittent claudication (HCC)  I70.213 440.21   2. Bilateral carotid artery stenosis  I65.23 433.10     433.30   3. Essential hypertension  I10 401.9   4. Mixed hyperlipidemia  E78.2 272.2       Lab Frequency Next Occurrence       PLAN: After thoroughly evaluating  Tree Shaffer, I believe the best course of action is to proceed with aortogram and right lower extremity angiogram with possible intervention.  He returns today after repeat GEOFF/PVR.  Value on the right was 0.89 however this is likely falsely elevated due to calcified vessels as the waveforms are somewhat dampened in biphasic.  Value on the left was 0.84.  Values dropped in both extremities with exercise down to 0.6.  On the right he has nonpalpable pedal pulses but he does have a palpable PT pulse on the left.  Previous CTA of the abdominal aorta with lower extremity runoff did show evidence of a right SFA occlusion with reconstitution of the distal SFA and maintain three-vessel runoff below the knee.  Given the worsening claudication in the right lower extremity and the known PAD I think he would benefit from aortogram and right lower extremity angiogram with possible intervention to improve perfusion. Risks of angiogram were discussed.  These include, but are not limited to, bleeding, infection, vessel damage, nerve damage, embolus, and loss of limb.  The patient understands these risks and wishes to proceed with procedure.  Otherwise from a carotid standpoint carotid duplex today shows less than 50% stenosis of the bilateral internal carotid arteries and he remains asymptomatic.  He will need repeat carotid duplex in about 1 year for ongoing surveillance.  The patient is to continue taking their medications as previously discussed.   I did discuss vascular risk factors as they pertain to the progression of vascular disease including controlling hypertension, and hyperlipidemia.  Blood pressure is somewhat elevated today with a systolic value of 140.  I will have him follow with his primary care physician for evaluation of his current antihypertensive regimen. BMI is >= 30 and <35. (Class 1 Obesity). The following options were offered after discussion;: exercise counseling/recommendations and nutrition  counseling/recommendations. This was all discussed in full with complete understanding.  Thank you for allowing me to participate in the care of your patient.  Please do not hesitate to call with any questions or concerns.  We will keep you aware of any further encounters with Tree Shaffer.      Sincerely Yours,      Leo Biggs MD

## 2022-08-26 NOTE — H&P
HPI    I had the pleasure of seeing your patient in the office today for follow up.  As you recall, the patient is a 62 y.o. male who we are currently following for carotid disease as well as peripheral arterial disease.  He returns today after having undergone repeat GEOFF/PVR as well as carotid duplex.  I did review both of these studies personally in the office today.  GEOFF value is 0.89 on the right however waveforms are somewhat dampened at the ankle.  Value is 0.84 on the left.  This represents mild to moderate arterial insufficiency bilaterally.  On questioning he reports worsening claudication in the right lower extremity especially at the calf level at short distances which has become lifestyle limiting and stopping him from performing regular daily activities.  Claudication in the left calf is mild.  His GEOFF also shows a decrease in value with exercise down to 0.6 bilaterally.  From a carotid standpoint he remains asymptomatic and carotid duplex done today shows less than 50% stenosis of the bilateral internal carotid arteries.  He otherwise has no new significant complaints today.    Review of Systems   Constitutional: Negative for activity change, appetite change, chills, diaphoresis, fatigue and fever.   HENT: Negative.  Negative for congestion, sneezing, sore throat and trouble swallowing.    Eyes: Negative.  Negative for visual disturbance.   Respiratory: Negative.  Negative for chest tightness and shortness of breath.    Cardiovascular: Positive for leg swelling. Negative for chest pain and palpitations.   Gastrointestinal: Negative.  Negative for abdominal distention, abdominal pain, nausea and vomiting.   Endocrine: Negative.    Genitourinary: Negative.    Musculoskeletal:        Worsening claudication in the bilateral calves.  This is worse on the right than the left and has become lifestyle limiting on the right.   Skin: Negative.    Allergic/Immunologic: Negative.    Neurological: Negative.   "  Hematological: Negative.    Psychiatric/Behavioral: Negative.        /60 (BP Location: Left arm, Patient Position: Sitting, Cuff Size: Adult)   Pulse 75   Resp 18   Ht 177.8 cm (70\")   Wt 96.2 kg (212 lb)   SpO2 91%   BMI 30.42 kg/m²   Physical Exam  Vitals reviewed.   Constitutional:       Appearance: Normal appearance.   HENT:      Head: Normocephalic and atraumatic.      Nose: Nose normal.      Mouth/Throat:      Mouth: Mucous membranes are moist.   Eyes:      Extraocular Movements: Extraocular movements intact.      Pupils: Pupils are equal, round, and reactive to light.   Cardiovascular:      Rate and Rhythm: Normal rate and regular rhythm.      Pulses:           Carotid pulses are 2+ on the right side and 2+ on the left side.       Radial pulses are 2+ on the right side and 2+ on the left side.        Femoral pulses are 2+ on the right side and 2+ on the left side.       Dorsalis pedis pulses are detected w/ Doppler on the right side and detected w/ Doppler on the left side.        Posterior tibial pulses are detected w/ Doppler on the right side and 2+ on the left side.      Comments:  He does have some mild edema of the bilateral lower extremities from ankle to knee and he has some small varicosities in the bilateral lower legs.  Pulmonary:      Effort: Pulmonary effort is normal. No respiratory distress.   Abdominal:      General: There is no distension.      Palpations: Abdomen is soft. There is no mass.      Tenderness: There is no abdominal tenderness.   Musculoskeletal:         General: Normal range of motion.      Cervical back: Normal range of motion and neck supple.      Right lower leg: Edema present.      Left lower leg: Edema present.   Skin:     General: Skin is warm and dry.      Capillary Refill: Capillary refill takes less than 2 seconds.   Neurological:      General: No focal deficit present.      Mental Status: He is alert and oriented to person, place, and time.   Psychiatric: "         Mood and Affect: Mood normal.         Behavior: Behavior normal.         Thought Content: Thought content normal.         Judgment: Judgment normal.         DIAGNOSTIC DATA:    Carotid duplex done today shows less than 50% stenosis of the bilateral internal carotid arteries.        No results found.    Patient Active Problem List   Diagnosis   • Tobacco abuse   • Marijuana use   • Essential hypertension   • Abnormal stress test   • Unstable angina pectoris (HCC)   • Coronary artery disease involving native coronary artery of native heart with unstable angina pectoris (HCC)   • Cervical myelopathy (HCC)   • Atherosclerosis of native artery of both lower extremities with intermittent claudication (HCC)   • GERD (gastroesophageal reflux disease)   • Peripheral artery disease (HCC)   • Anxiety   • Poor dentition   • Chronic neck and back pain   • COPD (chronic obstructive pulmonary disease) (HCC)   • On statin therapy   • Degeneration of intervertebral disc at C4-C5 level   • Hemorrhoids   • History of adenomatous polyp of colon   • Dyspnea on exertion   • Chest pain, atypical   • Mixed hyperlipidemia         ICD-10-CM ICD-9-CM   1. Atherosclerosis of native artery of both lower extremities with intermittent claudication (HCC)  I70.213 440.21   2. Bilateral carotid artery stenosis  I65.23 433.10     433.30   3. Essential hypertension  I10 401.9   4. Mixed hyperlipidemia  E78.2 272.2       Lab Frequency Next Occurrence       PLAN: After thoroughly evaluating Tree Shaffer, I believe the best course of action is to proceed with aortogram and right lower extremity angiogram with possible intervention.  He returns today after repeat GEOFF/PVR.  Value on the right was 0.89 however this is likely falsely elevated due to calcified vessels as the waveforms are somewhat dampened in biphasic.  Value on the left was 0.84.  Values dropped in both extremities with exercise down to 0.6.  On the right he has nonpalpable  pedal pulses but he does have a palpable PT pulse on the left.  Previous CTA of the abdominal aorta with lower extremity runoff did show evidence of a right SFA occlusion with reconstitution of the distal SFA and maintain three-vessel runoff below the knee.  Given the worsening claudication in the right lower extremity and the known PAD I think he would benefit from aortogram and right lower extremity angiogram with possible intervention to improve perfusion. Risks of angiogram were discussed.  These include, but are not limited to, bleeding, infection, vessel damage, nerve damage, embolus, and loss of limb.  The patient understands these risks and wishes to proceed with procedure.  Otherwise from a carotid standpoint carotid duplex today shows less than 50% stenosis of the bilateral internal carotid arteries and he remains asymptomatic.  He will need repeat carotid duplex in about 1 year for ongoing surveillance.  The patient is to continue taking their medications as previously discussed.   I did discuss vascular risk factors as they pertain to the progression of vascular disease including controlling hypertension, and hyperlipidemia.  Blood pressure is somewhat elevated today with a systolic value of 140.  I will have him follow with his primary care physician for evaluation of his current antihypertensive regimen. BMI is >= 30 and <35. (Class 1 Obesity). The following options were offered after discussion;: exercise counseling/recommendations and nutrition counseling/recommendations. This was all discussed in full with complete understanding.

## 2022-08-29 ENCOUNTER — OFFICE VISIT (OUTPATIENT)
Dept: CARDIAC REHAB | Facility: HOSPITAL | Age: 62
End: 2022-08-29

## 2022-08-29 VITALS — DIASTOLIC BLOOD PRESSURE: 66 MMHG | SYSTOLIC BLOOD PRESSURE: 112 MMHG

## 2022-08-29 DIAGNOSIS — I25.110 CORONARY ARTERY DISEASE INVOLVING NATIVE CORONARY ARTERY OF NATIVE HEART WITH UNSTABLE ANGINA PECTORIS: Primary | ICD-10-CM

## 2022-08-31 ENCOUNTER — TELEPHONE (OUTPATIENT)
Dept: VASCULAR SURGERY | Facility: CLINIC | Age: 62
End: 2022-08-31

## 2022-08-31 NOTE — TELEPHONE ENCOUNTER
SPOKE WITH PATIENT REGARDING SURGERY. PT WAS INFORMED OF PRE WORK ON 09/07 1115 . PT WAS ALSO INFORMED OF SURGERY ON 09/08 0800 . PT WAS INFORMED OF COVID TEST AND VISITATION. PT STATED UNDERSTANDING OF INSTRUCTIONS AND ALL INFORMATION.

## 2022-09-07 ENCOUNTER — APPOINTMENT (OUTPATIENT)
Dept: CARDIAC REHAB | Facility: HOSPITAL | Age: 62
End: 2022-09-07

## 2022-09-07 ENCOUNTER — PRE-ADMISSION TESTING (OUTPATIENT)
Dept: PREADMISSION TESTING | Facility: HOSPITAL | Age: 62
End: 2022-09-07

## 2022-09-07 VITALS
DIASTOLIC BLOOD PRESSURE: 81 MMHG | HEIGHT: 69 IN | RESPIRATION RATE: 18 BRPM | BODY MASS INDEX: 30.56 KG/M2 | WEIGHT: 206.35 LBS | HEART RATE: 65 BPM | SYSTOLIC BLOOD PRESSURE: 155 MMHG | OXYGEN SATURATION: 98 %

## 2022-09-07 DIAGNOSIS — I70.213 ATHEROSCLEROSIS OF NATIVE ARTERY OF BOTH LOWER EXTREMITIES WITH INTERMITTENT CLAUDICATION: ICD-10-CM

## 2022-09-07 LAB
ANION GAP SERPL CALCULATED.3IONS-SCNC: 5 MMOL/L (ref 5–15)
BASOPHILS # BLD AUTO: 0.04 10*3/MM3 (ref 0–0.2)
BASOPHILS NFR BLD AUTO: 0.7 % (ref 0–1.5)
BUN SERPL-MCNC: 12 MG/DL (ref 8–23)
BUN/CREAT SERPL: 15.8 (ref 7–25)
CALCIUM SPEC-SCNC: 9.7 MG/DL (ref 8.6–10.5)
CHLORIDE SERPL-SCNC: 98 MMOL/L (ref 98–107)
CO2 SERPL-SCNC: 36 MMOL/L (ref 22–29)
CREAT SERPL-MCNC: 0.76 MG/DL (ref 0.76–1.27)
DEPRECATED RDW RBC AUTO: 42.1 FL (ref 37–54)
EGFRCR SERPLBLD CKD-EPI 2021: 102.3 ML/MIN/1.73
EOSINOPHIL # BLD AUTO: 0.45 10*3/MM3 (ref 0–0.4)
EOSINOPHIL NFR BLD AUTO: 7.3 % (ref 0.3–6.2)
ERYTHROCYTE [DISTWIDTH] IN BLOOD BY AUTOMATED COUNT: 12 % (ref 12.3–15.4)
GLUCOSE SERPL-MCNC: 163 MG/DL (ref 65–99)
HCT VFR BLD AUTO: 45.9 % (ref 37.5–51)
HGB BLD-MCNC: 14.1 G/DL (ref 13–17.7)
IMM GRANULOCYTES # BLD AUTO: 0.01 10*3/MM3 (ref 0–0.05)
IMM GRANULOCYTES NFR BLD AUTO: 0.2 % (ref 0–0.5)
LYMPHOCYTES # BLD AUTO: 2.26 10*3/MM3 (ref 0.7–3.1)
LYMPHOCYTES NFR BLD AUTO: 36.8 % (ref 19.6–45.3)
MCH RBC QN AUTO: 29.1 PG (ref 26.6–33)
MCHC RBC AUTO-ENTMCNC: 30.7 G/DL (ref 31.5–35.7)
MCV RBC AUTO: 94.6 FL (ref 79–97)
MONOCYTES # BLD AUTO: 0.61 10*3/MM3 (ref 0.1–0.9)
MONOCYTES NFR BLD AUTO: 9.9 % (ref 5–12)
NEUTROPHILS NFR BLD AUTO: 2.77 10*3/MM3 (ref 1.7–7)
NEUTROPHILS NFR BLD AUTO: 45.1 % (ref 42.7–76)
NRBC BLD AUTO-RTO: 0 /100 WBC (ref 0–0.2)
PLATELET # BLD AUTO: 348 10*3/MM3 (ref 140–450)
PMV BLD AUTO: 11.3 FL (ref 6–12)
POTASSIUM SERPL-SCNC: 4.5 MMOL/L (ref 3.5–5.2)
QT INTERVAL: 456 MS
QTC INTERVAL: 451 MS
RBC # BLD AUTO: 4.85 10*6/MM3 (ref 4.14–5.8)
SODIUM SERPL-SCNC: 139 MMOL/L (ref 136–145)
WBC NRBC COR # BLD: 6.14 10*3/MM3 (ref 3.4–10.8)

## 2022-09-07 PROCEDURE — 93010 ELECTROCARDIOGRAM REPORT: CPT | Performed by: HOSPITALIST

## 2022-09-07 PROCEDURE — 85025 COMPLETE CBC W/AUTO DIFF WBC: CPT

## 2022-09-07 PROCEDURE — 93005 ELECTROCARDIOGRAM TRACING: CPT

## 2022-09-07 PROCEDURE — 36415 COLL VENOUS BLD VENIPUNCTURE: CPT

## 2022-09-07 PROCEDURE — 80048 BASIC METABOLIC PNL TOTAL CA: CPT

## 2022-09-07 NOTE — DISCHARGE INSTRUCTIONS
Before you come to the hospital        Arrival time: AS DIRECTED BY OFFICE     YOU MAY TAKE THE FOLLOWING MEDICATION(S) THE MORNING OF SURGERY WITH A SIP OF WATER: metoprolol            ALL OTHER HOME MEDICATION CHECK WITH YOUR PHYSICIAN (especially if you are taking diabetes medicines or blood thinners)    Do not take any Erectile Dysfunction medications (EX: CIALIS, VIAGRA) 24 hours prior to surgery.    DO NOT TAKE WITHIN 24 HOURS OF ANESTHESIA: Lisinopril       If you were given and instructed to use a germ- killing soap, use as directed the night before surgery and again the morning of surgery or as directed by your surgeon.    (See attached information for How to Use Chlorhexidine for Bathing if applicable.)            Eating and drinking restrictions prior to scheduled arrival time    2 Hours before arrival time STOP   Drinking Clear liquids (water, apple juice-no pulp)     6 Hours before arrival time STOP   Milk or drinks that contain milk, full liquids    6 Hours before arrival time STOP   Light meals or foods, such as toast or cereal    8 Hours before arrival time STOP   Heavy foods, such as meat, fried foods, or fatty foods    (It is extremely important that you follow these guidelines to prevent delay or cancelation of your procedure)     Clear Liquids  Water and flavored water                                                                      Clear Fruit juices, such as cranberry juice and apple juice.  Black coffee (NO cream of any kind, including powdered).  Plain tea  Clear bouillon or broth.  Flavored gelatin.  Soda.  Gatorade or Powerade.  Full liquid examples  Juices that have pulp.  Frozen ice pops that contain fruit pieces.  Coffee with creamer  Milk.  Yogurt.                MANAGING PAIN AFTER SURGERY    We know you are probably wondering what your pain will be like after surgery.  Following surgery it is unrealistic to expect you will not have pain.   Pain is how our bodies let us know that  something is wrong or cautions us to be careful.  That said, our goal is to make your pain tolerable.    Methods we may use to treat your pain include (oral or IV medications, PCAs, epidurals, nerve blocks, etc.)   While some procedures require IV pain medications for a short time after surgery, transitioning to pain medications by mouth allows for better management of pain.   Your nurse will encourage you to take oral pain medications whenever possible.  IV medications work almost immediately, but only last a short while.  Taking medications by mouth allows for a more constant level of medication in your blood stream for a longer period of time.      Once your pain is out of control it is harder to get back under control.  It is important you are aware when your next dose of pain medication is due.  If you are admitted, your nurse may write the time of your next dose on the white board in your room to help you remember.      We are interested in your pain and encourage you to inform us about aggravating factors during your visit.   Many times a simple repositioning every few hours can make a big difference.    If your physician says it is okay, do not let your pain prevent you from getting out of bed. Be sure to call your nurse for assistance prior to getting up so you do not fall.      Before surgery, please decide your tolerable pain goal.  These faces help describe the pain ratings we use on a 0-10 scale.   Be prepared to tell us your goal and whether or not you take pain or anxiety medications at home.          Preparing for Surgery  Preparing for surgery is an important part of your care. It can make things go more smoothly and help you avoid complications. The steps leading up to surgery may vary among hospitals. Follow all instructions given to you by your health care providers. Ask questions if you do not understand something. Talk about any concerns that you have.  Here are some questions to consider  asking before your surgery:  If my surgery is not an emergency (is elective), when would be the best time to have the surgery?  What arrangements do I need to make for work, home, or school?  What will my recovery be like? How long will it be before I can return to normal activities?  Will I need to prepare my home? Will I need to arrange care for me or my children?  Should I expect to have pain after surgery? What are my pain management options? Are there nonmedical options that I can try for pain?  Tell a health care provider about:  Any allergies you have.  All medicines you are taking, including vitamins, herbs, eye drops, creams, and over-the-counter medicines.  Any problems you or family members have had with anesthetic medicines.  Any blood disorders you have.  Any surgeries you have had.  Any medical conditions you have.  Whether you are pregnant or may be pregnant.  What are the risks?  The risks and complications of surgery depend on the specific procedure that you have. Discuss all the risks with your health care providers before your surgery. Ask about common surgical complications, which may include:  Infection.  Bleeding or a need for blood replacement (transfusion).  Allergic reactions to medicines.  Damage to surrounding nerves, tissues, or structures.  A blood clot.  Scarring.  Failure of the surgery to correct the problem.  Follow these instructions before the procedure:  Several days or weeks before your procedure  You may have a physical exam by your primary health care provider to make sure it is safe for you to have surgery.  You may have testing. This may include a chest X-ray, blood and urine tests, electrocardiogram (ECG), or other testing.  Ask your health care provider about:  Changing or stopping your regular medicines. This is especially important if you are taking diabetes medicines or blood thinners.  Taking medicines such as aspirin and ibuprofen. These medicines can thin your blood.  Do not take these medicines unless your health care provider tells you to take them.  Taking over-the-counter medicines, vitamins, herbs, and supplements.  Do not use any products that contain nicotine or tobacco, such as cigarettes and e-cigarettes. If you need help quitting, ask your health care provider.  Avoid alcohol.  Ask your health care provider if there are exercises you can do to prepare for surgery.  Eat a healthy diet.   Plan to have someone take you home from the hospital or clinic.  Plan to have a responsible adult care for you for at least 24 hours after you leave the hospital or clinic. This is important.  The day before your procedure  You may be given antibiotic medicine to take by mouth to help prevent infection. Take it as told by your health care provider.  You may be asked to shower with a germ-killing soap.  Follow instructions from your health care provider about eating and drinking restrictions. This includes gum, mints and hard candy.  Pack comfortable clothes according to your procedure.   The day of your procedure  You may need to take another shower with a germ-killing soap before you leave home in the morning.  With a small sip of water, take only the medicines that you are told to take.  Remove all jewelry including rings.   Leave anything you consider valuable at home except hearing aids if needed.  Do not wear any makeup, nail polish, powder, deodorant, lotion, hair accessories, or anything on your skin or body except your clothes.  If you will be staying in the hospital, bring a case to hold your glasses, contacts, or dentures. You may also want to bring your robe and non-skid footwear.  If you wear oxygen at home, bring it with you the day of surgery.  If instructed by your health care provider, bring your sleep apnea device with you on the day of your surgery (if this applies to you).  You may want to leave your suitcase and sleep apnea device in the car until after surgery.    Arrive at the hospital as scheduled.  Bring a friend or family member with you who can help to answer questions and be present while you meet with your health care provider.  At the hospital  When you arrive at the hospital:  Go to registration located at the main entrance of the hospital. You will be registered and given a beeper and a sticker sheet. Take the stickers to the Outpatient nurses desk and place in the black tray. This is to notify staff that you have arrived. Then return to the lobby to wait.   When your beeper lights up and vibrates proceed through the double doors, under the stairs, and a member of the Outpatient Surgery staff will escort you to your preoperative room.  You may have to wear compression sleeves. These help to prevent blood clots and reduce swelling in your legs.  An IV may be inserted into one of your veins.              In the operating room, you may be given one or more of the following:        A medicine to help you relax (sedative).        A medicine to numb the area (local anesthetic).        A medicine to make you fall asleep (general anesthetic).        A medicine that is injected into an area of your body to numb everything below the                      injection site (regional anesthetic).  You may be given an antibiotic through your IV to help prevent infection.  Your surgical site will be marked or identified.    Contact a health care provider if you:  Develop a fever of more than 100.4°F (38°C) or other feelings of illness during the 48 hours before your surgery.  Have symptoms that get worse.  Have questions or concerns about your surgery.  Summary  Preparing for surgery can make the procedure go more smoothly and lower your risk of complications.  Before surgery, make a list of questions and concerns to discuss with your surgeon. Ask about the risks and possible complications.  In the days or weeks before your surgery, follow all instructions from your health care  provider. You may need to stop smoking, avoid alcohol, follow eating restrictions, and change or stop your regular medicines.  Contact your surgeon if you develop a fever or other signs of illness during the few days before your surgery.  This information is not intended to replace advice given to you by your health care provider. Make sure you discuss any questions you have with your health care provider.  Document Revised: 12/21/2018 Document Reviewed: 10/23/2018  Elsevier Patient Education © 2021 [employer]

## 2022-09-08 ENCOUNTER — HOSPITAL ENCOUNTER (OUTPATIENT)
Facility: HOSPITAL | Age: 62
Setting detail: HOSPITAL OUTPATIENT SURGERY
Discharge: HOME | End: 2022-09-08
Attending: SURGERY | Admitting: SURGERY

## 2022-09-08 VITALS
TEMPERATURE: 97.9 F | OXYGEN SATURATION: 94 % | DIASTOLIC BLOOD PRESSURE: 77 MMHG | HEART RATE: 69 BPM | RESPIRATION RATE: 16 BRPM | SYSTOLIC BLOOD PRESSURE: 140 MMHG

## 2022-09-08 DIAGNOSIS — I70.213 ATHEROSCLEROSIS OF NATIVE ARTERY OF BOTH LOWER EXTREMITIES WITH INTERMITTENT CLAUDICATION: ICD-10-CM

## 2022-09-08 LAB
ABO GROUP BLD: NORMAL
BLD GP AB SCN SERPL QL: NEGATIVE
RH BLD: POSITIVE
T&S EXPIRATION DATE: NORMAL

## 2022-09-08 PROCEDURE — 86850 RBC ANTIBODY SCREEN: CPT | Performed by: SURGERY

## 2022-09-08 PROCEDURE — 86900 BLOOD TYPING SEROLOGIC ABO: CPT | Performed by: SURGERY

## 2022-09-08 PROCEDURE — 86901 BLOOD TYPING SEROLOGIC RH(D): CPT | Performed by: SURGERY

## 2022-09-08 RX ORDER — CLOPIDOGREL BISULFATE 75 MG/1
75 TABLET ORAL DAILY
COMMUNITY

## 2022-09-08 RX ORDER — LIDOCAINE HYDROCHLORIDE 10 MG/ML
0.5 INJECTION, SOLUTION EPIDURAL; INFILTRATION; INTRACAUDAL; PERINEURAL ONCE AS NEEDED
Status: DISCONTINUED | OUTPATIENT
Start: 2022-09-08 | End: 2022-09-08 | Stop reason: HOSPADM

## 2022-09-08 RX ORDER — SODIUM CHLORIDE, SODIUM LACTATE, POTASSIUM CHLORIDE, CALCIUM CHLORIDE 600; 310; 30; 20 MG/100ML; MG/100ML; MG/100ML; MG/100ML
1000 INJECTION, SOLUTION INTRAVENOUS CONTINUOUS
Status: DISCONTINUED | OUTPATIENT
Start: 2022-09-08 | End: 2022-09-08 | Stop reason: HOSPADM

## 2022-09-08 RX ORDER — SODIUM CHLORIDE 0.9 % (FLUSH) 0.9 %
3 SYRINGE (ML) INJECTION AS NEEDED
Status: DISCONTINUED | OUTPATIENT
Start: 2022-09-08 | End: 2022-09-08 | Stop reason: HOSPADM

## 2022-09-08 RX ORDER — CLINDAMYCIN PHOSPHATE 900 MG/50ML
900 INJECTION INTRAVENOUS ONCE
Status: DISCONTINUED | OUTPATIENT
Start: 2022-09-08 | End: 2022-09-08 | Stop reason: HOSPADM

## 2022-09-08 RX ADMIN — SODIUM CHLORIDE, POTASSIUM CHLORIDE, SODIUM LACTATE AND CALCIUM CHLORIDE 1000 ML: 600; 310; 30; 20 INJECTION, SOLUTION INTRAVENOUS at 08:32

## 2022-09-09 ENCOUNTER — OFFICE VISIT (OUTPATIENT)
Dept: CARDIAC REHAB | Facility: HOSPITAL | Age: 62
End: 2022-09-09

## 2022-09-09 VITALS — DIASTOLIC BLOOD PRESSURE: 80 MMHG | SYSTOLIC BLOOD PRESSURE: 128 MMHG

## 2022-09-09 DIAGNOSIS — I25.110 CORONARY ARTERY DISEASE INVOLVING NATIVE CORONARY ARTERY OF NATIVE HEART WITH UNSTABLE ANGINA PECTORIS: Primary | ICD-10-CM

## 2022-09-11 ENCOUNTER — PREP FOR SURGERY (OUTPATIENT)
Dept: OTHER | Facility: HOSPITAL | Age: 62
End: 2022-09-11

## 2022-09-11 DIAGNOSIS — I70.211 ATHEROSCLEROSIS OF NATIVE ARTERY OF RIGHT LOWER EXTREMITY WITH INTERMITTENT CLAUDICATION: Primary | ICD-10-CM

## 2022-09-11 RX ORDER — CLINDAMYCIN PHOSPHATE 900 MG/50ML
900 INJECTION INTRAVENOUS ONCE
Status: CANCELLED | OUTPATIENT
Start: 2022-09-11 | End: 2022-09-11

## 2022-09-12 ENCOUNTER — TELEPHONE (OUTPATIENT)
Dept: VASCULAR SURGERY | Facility: CLINIC | Age: 62
End: 2022-09-12

## 2022-09-12 ENCOUNTER — OFFICE VISIT (OUTPATIENT)
Dept: CARDIAC REHAB | Facility: HOSPITAL | Age: 62
End: 2022-09-12

## 2022-09-12 VITALS — SYSTOLIC BLOOD PRESSURE: 110 MMHG | DIASTOLIC BLOOD PRESSURE: 60 MMHG

## 2022-09-12 DIAGNOSIS — I25.110 CORONARY ARTERY DISEASE INVOLVING NATIVE CORONARY ARTERY OF NATIVE HEART WITH UNSTABLE ANGINA PECTORIS: Primary | ICD-10-CM

## 2022-09-12 NOTE — TELEPHONE ENCOUNTER
Caller: Tory Shaffer    Relationship to patient: Emergency Contact    Best call back number:  [phone]    Patient is needing: MR. SHAFFER WOULD LIKE A CALL BACK TO SCHEDULE PAT FOR UPCOMING PROCEDURE WITH DR. SANTORO. REQUESTS TO BE SCHEDULED AFTER September 17.

## 2022-09-14 ENCOUNTER — OFFICE VISIT (OUTPATIENT)
Dept: CARDIAC REHAB | Facility: HOSPITAL | Age: 62
End: 2022-09-14

## 2022-09-14 VITALS — DIASTOLIC BLOOD PRESSURE: 54 MMHG | SYSTOLIC BLOOD PRESSURE: 106 MMHG

## 2022-09-14 DIAGNOSIS — I25.110 CORONARY ARTERY DISEASE INVOLVING NATIVE CORONARY ARTERY OF NATIVE HEART WITH UNSTABLE ANGINA PECTORIS: Primary | ICD-10-CM

## 2022-09-19 ENCOUNTER — OFFICE VISIT (OUTPATIENT)
Dept: CARDIAC REHAB | Facility: HOSPITAL | Age: 62
End: 2022-09-19

## 2022-09-19 VITALS — SYSTOLIC BLOOD PRESSURE: 98 MMHG | DIASTOLIC BLOOD PRESSURE: 56 MMHG

## 2022-09-19 DIAGNOSIS — I25.110 CORONARY ARTERY DISEASE INVOLVING NATIVE CORONARY ARTERY OF NATIVE HEART WITH UNSTABLE ANGINA PECTORIS: Primary | ICD-10-CM

## 2022-09-20 ENCOUNTER — TELEPHONE (OUTPATIENT)
Dept: VASCULAR SURGERY | Facility: CLINIC | Age: 62
End: 2022-09-20

## 2022-09-20 NOTE — TELEPHONE ENCOUNTER
SPOKE WITH PATIENT REGARDING SURGERY. PT WAS INFORMED OF PRE WORK NOT NEEDED. PT WAS ALSO INFORMED OF SURGERY ON 09/29 AT 0700. PT WAS INFORMED OF COVID TEST AND VISITATION. PT STATED UNDERSTANDING OF INSTRUCTIONS AND ALL INFORMATION.

## 2022-09-21 ENCOUNTER — OFFICE VISIT (OUTPATIENT)
Dept: CARDIAC REHAB | Facility: HOSPITAL | Age: 62
End: 2022-09-21

## 2022-09-21 VITALS — DIASTOLIC BLOOD PRESSURE: 50 MMHG | SYSTOLIC BLOOD PRESSURE: 94 MMHG

## 2022-09-21 DIAGNOSIS — I25.110 CORONARY ARTERY DISEASE INVOLVING NATIVE CORONARY ARTERY OF NATIVE HEART WITH UNSTABLE ANGINA PECTORIS: Primary | ICD-10-CM

## 2022-09-23 ENCOUNTER — OFFICE VISIT (OUTPATIENT)
Dept: CARDIAC REHAB | Facility: HOSPITAL | Age: 62
End: 2022-09-23

## 2022-09-23 VITALS — DIASTOLIC BLOOD PRESSURE: 64 MMHG | SYSTOLIC BLOOD PRESSURE: 118 MMHG

## 2022-09-23 DIAGNOSIS — I25.110 CORONARY ARTERY DISEASE INVOLVING NATIVE CORONARY ARTERY OF NATIVE HEART WITH UNSTABLE ANGINA PECTORIS: Primary | ICD-10-CM

## 2022-09-28 ENCOUNTER — TELEPHONE (OUTPATIENT)
Dept: VASCULAR SURGERY | Facility: CLINIC | Age: 62
End: 2022-09-28

## 2022-09-29 ENCOUNTER — HOSPITAL ENCOUNTER (OUTPATIENT)
Facility: HOSPITAL | Age: 62
Setting detail: HOSPITAL OUTPATIENT SURGERY
Discharge: HOME | End: 2022-09-29
Attending: SURGERY | Admitting: SURGERY

## 2022-09-29 ENCOUNTER — ANESTHESIA EVENT (OUTPATIENT)
Dept: PERIOP | Facility: HOSPITAL | Age: 62
End: 2022-09-29

## 2022-09-29 ENCOUNTER — ANESTHESIA (OUTPATIENT)
Dept: PERIOP | Facility: HOSPITAL | Age: 62
End: 2022-09-29

## 2022-09-29 ENCOUNTER — APPOINTMENT (OUTPATIENT)
Dept: INTERVENTIONAL RADIOLOGY/VASCULAR | Facility: HOSPITAL | Age: 62
End: 2022-09-29

## 2022-09-29 VITALS
SYSTOLIC BLOOD PRESSURE: 142 MMHG | DIASTOLIC BLOOD PRESSURE: 95 MMHG | HEART RATE: 63 BPM | RESPIRATION RATE: 16 BRPM | TEMPERATURE: 97.1 F | OXYGEN SATURATION: 97 %

## 2022-09-29 DIAGNOSIS — I70.211 ATHEROSCLEROSIS OF NATIVE ARTERY OF RIGHT LOWER EXTREMITY WITH INTERMITTENT CLAUDICATION: ICD-10-CM

## 2022-09-29 PROCEDURE — 75625 CONTRAST EXAM ABDOMINL AORTA: CPT

## 2022-09-29 PROCEDURE — 75625 CONTRAST EXAM ABDOMINL AORTA: CPT | Performed by: SURGERY

## 2022-09-29 PROCEDURE — C2623 CATH, TRANSLUMIN, DRUG-COAT: HCPCS | Performed by: SURGERY

## 2022-09-29 PROCEDURE — 25010000002 HEPARIN (PORCINE) PER 1000 UNITS: Performed by: SURGERY

## 2022-09-29 PROCEDURE — C1884 EMBOLIZATION PROTECT SYST: HCPCS | Performed by: SURGERY

## 2022-09-29 PROCEDURE — 25010000002 HEPARIN (PORCINE) PER 1000 UNITS

## 2022-09-29 PROCEDURE — C1894 INTRO/SHEATH, NON-LASER: HCPCS | Performed by: SURGERY

## 2022-09-29 PROCEDURE — 86901 BLOOD TYPING SEROLOGIC RH(D): CPT | Performed by: SURGERY

## 2022-09-29 PROCEDURE — C1769 GUIDE WIRE: HCPCS | Performed by: SURGERY

## 2022-09-29 PROCEDURE — C1760 CLOSURE DEV, VASC: HCPCS | Performed by: SURGERY

## 2022-09-29 PROCEDURE — 75710 ARTERY X-RAYS ARM/LEG: CPT

## 2022-09-29 PROCEDURE — C1714 CATH, TRANS ATHERECTOMY, DIR: HCPCS | Performed by: SURGERY

## 2022-09-29 PROCEDURE — 25010000002 PROPOFOL 1000 MG/100ML EMULSION

## 2022-09-29 PROCEDURE — C1887 CATHETER, GUIDING: HCPCS | Performed by: SURGERY

## 2022-09-29 PROCEDURE — 0 LIDOCAINE 1 % SOLUTION: Performed by: SURGERY

## 2022-09-29 PROCEDURE — 25010000002 MIDAZOLAM PER 1 MG: Performed by: ANESTHESIOLOGY

## 2022-09-29 PROCEDURE — 76000 FLUOROSCOPY <1 HR PHYS/QHP: CPT | Performed by: SURGERY

## 2022-09-29 PROCEDURE — 86900 BLOOD TYPING SEROLOGIC ABO: CPT | Performed by: SURGERY

## 2022-09-29 PROCEDURE — 37225 PR REVSC OPN/PRQ FEM/POP W/ATHRC/ANGIOP SM VSL: CPT | Performed by: SURGERY

## 2022-09-29 PROCEDURE — 75710 ARTERY X-RAYS ARM/LEG: CPT | Performed by: SURGERY

## 2022-09-29 PROCEDURE — 86850 RBC ANTIBODY SCREEN: CPT | Performed by: SURGERY

## 2022-09-29 PROCEDURE — 25010000002 IOPAMIDOL 61 % SOLUTION: Performed by: SURGERY

## 2022-09-29 PROCEDURE — S0260 H&P FOR SURGERY: HCPCS | Performed by: SURGERY

## 2022-09-29 PROCEDURE — 76000 FLUOROSCOPY <1 HR PHYS/QHP: CPT

## 2022-09-29 RX ORDER — FLUMAZENIL 0.1 MG/ML
0.2 INJECTION INTRAVENOUS AS NEEDED
Status: DISCONTINUED | OUTPATIENT
Start: 2022-09-29 | End: 2022-09-29 | Stop reason: HOSPADM

## 2022-09-29 RX ORDER — ONDANSETRON 2 MG/ML
INJECTION INTRAMUSCULAR; INTRAVENOUS AS NEEDED
Status: DISCONTINUED | OUTPATIENT
Start: 2022-09-29 | End: 2022-09-29

## 2022-09-29 RX ORDER — DROPERIDOL 2.5 MG/ML
0.62 INJECTION, SOLUTION INTRAMUSCULAR; INTRAVENOUS ONCE AS NEEDED
Status: DISCONTINUED | OUTPATIENT
Start: 2022-09-29 | End: 2022-09-29 | Stop reason: HOSPADM

## 2022-09-29 RX ORDER — SODIUM CHLORIDE, SODIUM LACTATE, POTASSIUM CHLORIDE, CALCIUM CHLORIDE 600; 310; 30; 20 MG/100ML; MG/100ML; MG/100ML; MG/100ML
9 INJECTION, SOLUTION INTRAVENOUS CONTINUOUS
Status: DISCONTINUED | OUTPATIENT
Start: 2022-09-29 | End: 2022-09-29 | Stop reason: HOSPADM

## 2022-09-29 RX ORDER — CLINDAMYCIN PHOSPHATE 900 MG/50ML
900 INJECTION INTRAVENOUS ONCE
Status: COMPLETED | OUTPATIENT
Start: 2022-09-29 | End: 2022-09-29

## 2022-09-29 RX ORDER — ASPIRIN 81 MG/1
81 TABLET ORAL ONCE
Status: COMPLETED | OUTPATIENT
Start: 2022-09-29 | End: 2022-09-29

## 2022-09-29 RX ORDER — IBUPROFEN 600 MG/1
600 TABLET ORAL ONCE AS NEEDED
Status: DISCONTINUED | OUTPATIENT
Start: 2022-09-29 | End: 2022-09-29 | Stop reason: HOSPADM

## 2022-09-29 RX ORDER — ONDANSETRON 2 MG/ML
4 INJECTION INTRAMUSCULAR; INTRAVENOUS ONCE AS NEEDED
Status: DISCONTINUED | OUTPATIENT
Start: 2022-09-29 | End: 2022-09-29 | Stop reason: HOSPADM

## 2022-09-29 RX ORDER — PROPOFOL 10 MG/ML
INJECTION, EMULSION INTRAVENOUS AS NEEDED
Status: DISCONTINUED | OUTPATIENT
Start: 2022-09-29 | End: 2022-09-29 | Stop reason: SURG

## 2022-09-29 RX ORDER — MIDAZOLAM HYDROCHLORIDE 1 MG/ML
1 INJECTION INTRAMUSCULAR; INTRAVENOUS
Status: COMPLETED | OUTPATIENT
Start: 2022-09-29 | End: 2022-09-29

## 2022-09-29 RX ORDER — LIDOCAINE HYDROCHLORIDE 10 MG/ML
0.5 INJECTION, SOLUTION EPIDURAL; INFILTRATION; INTRACAUDAL; PERINEURAL ONCE AS NEEDED
Status: DISCONTINUED | OUTPATIENT
Start: 2022-09-29 | End: 2022-09-29 | Stop reason: HOSPADM

## 2022-09-29 RX ORDER — FENTANYL CITRATE 50 UG/ML
25 INJECTION, SOLUTION INTRAMUSCULAR; INTRAVENOUS
Status: DISCONTINUED | OUTPATIENT
Start: 2022-09-29 | End: 2022-09-29 | Stop reason: HOSPADM

## 2022-09-29 RX ORDER — OXYCODONE AND ACETAMINOPHEN 7.5; 325 MG/1; MG/1
2 TABLET ORAL EVERY 4 HOURS PRN
Status: DISCONTINUED | OUTPATIENT
Start: 2022-09-29 | End: 2022-09-29 | Stop reason: HOSPADM

## 2022-09-29 RX ORDER — SODIUM CHLORIDE 0.9 % (FLUSH) 0.9 %
10 SYRINGE (ML) INJECTION EVERY 12 HOURS SCHEDULED
Status: DISCONTINUED | OUTPATIENT
Start: 2022-09-29 | End: 2022-09-29 | Stop reason: HOSPADM

## 2022-09-29 RX ORDER — SODIUM CHLORIDE 0.9 % (FLUSH) 0.9 %
3 SYRINGE (ML) INJECTION AS NEEDED
Status: DISCONTINUED | OUTPATIENT
Start: 2022-09-29 | End: 2022-09-29 | Stop reason: HOSPADM

## 2022-09-29 RX ORDER — LABETALOL HYDROCHLORIDE 5 MG/ML
5 INJECTION, SOLUTION INTRAVENOUS
Status: DISCONTINUED | OUTPATIENT
Start: 2022-09-29 | End: 2022-09-29 | Stop reason: HOSPADM

## 2022-09-29 RX ORDER — LIDOCAINE HYDROCHLORIDE 10 MG/ML
INJECTION, SOLUTION INFILTRATION; PERINEURAL AS NEEDED
Status: DISCONTINUED | OUTPATIENT
Start: 2022-09-29 | End: 2022-09-29 | Stop reason: HOSPADM

## 2022-09-29 RX ORDER — OXYCODONE AND ACETAMINOPHEN 10; 325 MG/1; MG/1
1 TABLET ORAL ONCE AS NEEDED
Status: DISCONTINUED | OUTPATIENT
Start: 2022-09-29 | End: 2022-09-29 | Stop reason: HOSPADM

## 2022-09-29 RX ORDER — SODIUM CHLORIDE, SODIUM LACTATE, POTASSIUM CHLORIDE, CALCIUM CHLORIDE 600; 310; 30; 20 MG/100ML; MG/100ML; MG/100ML; MG/100ML
1000 INJECTION, SOLUTION INTRAVENOUS CONTINUOUS
Status: DISCONTINUED | OUTPATIENT
Start: 2022-09-29 | End: 2022-09-29 | Stop reason: HOSPADM

## 2022-09-29 RX ORDER — NALOXONE HCL 0.4 MG/ML
0.4 VIAL (ML) INJECTION AS NEEDED
Status: DISCONTINUED | OUTPATIENT
Start: 2022-09-29 | End: 2022-09-29 | Stop reason: HOSPADM

## 2022-09-29 RX ORDER — LIDOCAINE HYDROCHLORIDE 20 MG/ML
INJECTION, SOLUTION EPIDURAL; INFILTRATION; INTRACAUDAL; PERINEURAL AS NEEDED
Status: DISCONTINUED | OUTPATIENT
Start: 2022-09-29 | End: 2022-09-29 | Stop reason: SURG

## 2022-09-29 RX ORDER — SODIUM CHLORIDE 0.9 % (FLUSH) 0.9 %
10 SYRINGE (ML) INJECTION AS NEEDED
Status: DISCONTINUED | OUTPATIENT
Start: 2022-09-29 | End: 2022-09-29 | Stop reason: HOSPADM

## 2022-09-29 RX ORDER — CLOPIDOGREL BISULFATE 75 MG/1
75 TABLET ORAL ONCE
Status: COMPLETED | OUTPATIENT
Start: 2022-09-29 | End: 2022-09-29

## 2022-09-29 RX ORDER — HEPARIN SODIUM 1000 [USP'U]/ML
INJECTION, SOLUTION INTRAVENOUS; SUBCUTANEOUS AS NEEDED
Status: DISCONTINUED | OUTPATIENT
Start: 2022-09-29 | End: 2022-09-29 | Stop reason: SURG

## 2022-09-29 RX ORDER — DEXTROSE MONOHYDRATE 25 G/50ML
12.5 INJECTION, SOLUTION INTRAVENOUS AS NEEDED
Status: DISCONTINUED | OUTPATIENT
Start: 2022-09-29 | End: 2022-09-29 | Stop reason: HOSPADM

## 2022-09-29 RX ADMIN — SODIUM CHLORIDE, POTASSIUM CHLORIDE, SODIUM LACTATE AND CALCIUM CHLORIDE 1000 ML: 600; 310; 30; 20 INJECTION, SOLUTION INTRAVENOUS at 07:59

## 2022-09-29 RX ADMIN — CLOPIDOGREL BISULFATE 75 MG: 75 TABLET ORAL at 11:09

## 2022-09-29 RX ADMIN — LIDOCAINE HYDROCHLORIDE 100 MG: 20 INJECTION, SOLUTION EPIDURAL; INFILTRATION; INTRACAUDAL; PERINEURAL at 09:32

## 2022-09-29 RX ADMIN — ASPIRIN 81 MG: 81 TABLET, COATED ORAL at 11:09

## 2022-09-29 RX ADMIN — PROPOFOL 80 MCG/KG/MIN: 10 INJECTION, EMULSION INTRAVENOUS at 09:33

## 2022-09-29 RX ADMIN — PROPOFOL 50 MG: 10 INJECTION, EMULSION INTRAVENOUS at 09:32

## 2022-09-29 RX ADMIN — HEPARIN SODIUM 1000 UNITS: 1000 INJECTION, SOLUTION INTRAVENOUS; SUBCUTANEOUS at 09:54

## 2022-09-29 RX ADMIN — MIDAZOLAM 1 MG: 1 INJECTION INTRAMUSCULAR; INTRAVENOUS at 09:25

## 2022-09-29 RX ADMIN — MIDAZOLAM 1 MG: 1 INJECTION INTRAMUSCULAR; INTRAVENOUS at 09:00

## 2022-09-29 RX ADMIN — CLINDAMYCIN IN 5 PERCENT DEXTROSE 900 MG: 18 INJECTION, SOLUTION INTRAVENOUS at 09:34

## 2022-09-29 RX ADMIN — HEPARIN SODIUM 4000 UNITS: 1000 INJECTION, SOLUTION INTRAVENOUS; SUBCUTANEOUS at 09:58

## 2022-09-29 NOTE — ANESTHESIA POSTPROCEDURE EVALUATION
Patient: Tree Shaffer    Procedure Summary     Date: 09/29/22 Room / Location: [facility] OR  /  PAD HYBRID OR 12    Anesthesia Start: 0928 Anesthesia Stop: 1053    Procedure: AORTAGRAM, RIGHT LOWER EXTREMITY ANGIOGRAM, HAWK ATHRECTOMY, BALLOON ANGIOPLASTY, MYNX CLOSURE (Right Groin) Diagnosis:       Atherosclerosis of native artery of right lower extremity with intermittent claudication (HCC)      (Atherosclerosis of native artery of right lower extremity with intermittent claudication (HCC) [I70.211])    Surgeons: Leo Biggs MD Provider: Alex Krause CRNA    Anesthesia Type: MAC ASA Status: 3          Anesthesia Type: MAC    Vitals  Vitals Value Taken Time   /73 09/29/22 1135   Temp 97.1 °F (36.2 °C) 09/29/22 1135   Pulse 63 09/29/22 1135   Resp 14 09/29/22 1135   SpO2 95 % 09/29/22 1135           Post Anesthesia Care and Evaluation    Patient location during evaluation: PACU  Patient participation: complete - patient participated  Level of consciousness: awake and alert  Pain management: adequate    Airway patency: patent  Anesthetic complications: No anesthetic complications    Cardiovascular status: acceptable  Respiratory status: acceptable  Hydration status: acceptable    Comments: Blood pressure 143/96, pulse 66, temperature 97.1 °F (36.2 °C), temperature source Temporal, resp. rate 16, SpO2 97 %.    Pt discharged from PACU based on kimberlee score >8

## 2022-09-29 NOTE — ANESTHESIA PREPROCEDURE EVALUATION
Anesthesia Evaluation     Patient summary reviewed   no history of anesthetic complications:  NPO Solid Status: > 8 hours             Airway   Mallampati: II  Dental      Pulmonary    (+) a smoker Former, COPD,   (-) asthma, sleep apnea  Cardiovascular   Exercise tolerance: good (4-7 METS)    ECG reviewed    (+) hypertension, CAD, CABG (2021), PVD, hyperlipidemia,   (-) pacemaker, past MI, angina, cardiac stents      Neuro/Psych  (-) seizures, TIA, CVA  GI/Hepatic/Renal/Endo    (+) obesity,  GERD,    (-) liver disease, no renal disease, diabetes    Musculoskeletal     Abdominal    Substance History      OB/GYN          Other                        Anesthesia Plan    ASA 3     MAC     intravenous induction     Anesthetic plan, risks, benefits, and alternatives have been provided, discussed and informed consent has been obtained with: patient.        CODE STATUS:

## 2022-10-12 ENCOUNTER — APPOINTMENT (OUTPATIENT)
Dept: CARDIAC REHAB | Facility: HOSPITAL | Age: 62
End: 2022-10-12

## 2022-10-14 ENCOUNTER — TELEPHONE (OUTPATIENT)
Dept: VASCULAR SURGERY | Facility: CLINIC | Age: 62
End: 2022-10-14

## 2022-10-14 ENCOUNTER — APPOINTMENT (OUTPATIENT)
Dept: CARDIAC REHAB | Facility: HOSPITAL | Age: 62
End: 2022-10-14

## 2022-10-14 NOTE — TELEPHONE ENCOUNTER
UNABLE TO REACH PATIENT TO CONFIRM 10/17/22 APPOINTMENT WITH [facility]. LEFT VM WITH TIME AND PHONE NUMBER.     *WIFE CALLED BACK AND STATED THAT SHE ISNT SURE IF PATIENT CAN MAKE IT BECAUSE THEIR DAUGHTER IS GETTING INDUCED THAT MORNING. WILL CALL AND LET US KNOW Monday I HE CAN MAKE IT.

## 2022-10-17 ENCOUNTER — APPOINTMENT (OUTPATIENT)
Dept: CARDIAC REHAB | Facility: HOSPITAL | Age: 62
End: 2022-10-17

## 2022-10-17 ENCOUNTER — OFFICE VISIT (OUTPATIENT)
Dept: VASCULAR SURGERY | Facility: CLINIC | Age: 62
End: 2022-10-17

## 2022-10-17 VITALS
WEIGHT: 215.8 LBS | HEIGHT: 70 IN | HEART RATE: 64 BPM | BODY MASS INDEX: 30.9 KG/M2 | SYSTOLIC BLOOD PRESSURE: 132 MMHG | DIASTOLIC BLOOD PRESSURE: 80 MMHG | OXYGEN SATURATION: 97 %

## 2022-10-17 DIAGNOSIS — I10 ESSENTIAL HYPERTENSION: ICD-10-CM

## 2022-10-17 DIAGNOSIS — I73.9 PERIPHERAL ARTERIAL DISEASE WITH HISTORY OF REVASCULARIZATION: Primary | ICD-10-CM

## 2022-10-17 DIAGNOSIS — E78.2 MIXED HYPERLIPIDEMIA: ICD-10-CM

## 2022-10-17 DIAGNOSIS — I65.23 BILATERAL CAROTID ARTERY STENOSIS: ICD-10-CM

## 2022-10-17 DIAGNOSIS — Z98.890 PERIPHERAL ARTERIAL DISEASE WITH HISTORY OF REVASCULARIZATION: Primary | ICD-10-CM

## 2022-10-17 PROCEDURE — 99214 OFFICE O/P EST MOD 30 MIN: CPT | Performed by: SURGERY

## 2022-10-18 NOTE — PROGRESS NOTES
10/17/2022      Madhu Andersen, APRN  [address] [place] [address]        Tree Shaffer  1960    Chief Complaint   Patient presents with   • Post-op     Patient is here for a 2 week post op. Denies stroke symptoms and pain/swelling.        Dear Madhu Andersen, APRN:    HPI     I had the pleasure of seeing your patient in the office today for follow up.  As you recall, the patient is a 62 y.o. male who we are currently following for peripheral arterial disease and lifestyle limiting claudication of the right lower extremity.  He returns today after recent aortogram and right lower extremity angiogram with intervention.  He tolerated the procedure well.  His claudication in the right leg has resolved post procedure.  He does have palpable pulses today in the right leg at the DP and PT.  He otherwise is doing well.  He continues to have some mild claudication in the left calf but this is not lifestyle limiting.  He otherwise denies any significant further complaints.  He is currently on aspirin, Plavix, and Pletal.  He is also on a statin.      Review of Systems   Constitutional: Negative for activity change, appetite change, chills, diaphoresis, fatigue and fever.   HENT: Negative.  Negative for congestion, sneezing, sore throat and trouble swallowing.    Eyes: Negative.  Negative for visual disturbance.   Respiratory: Negative.  Negative for chest tightness and shortness of breath.    Cardiovascular: Positive for leg swelling. Negative for chest pain and palpitations.   Gastrointestinal: Negative.  Negative for abdominal distention, abdominal pain, nausea and vomiting.   Endocrine: Negative.    Genitourinary: Negative.    Musculoskeletal:        Right calf claudication is completely resolved after recent angiogram.  He has some mild claudication in the left calf at distance.   Skin: Negative.    Allergic/Immunologic: Negative.    Neurological: Negative.    Hematological:  "Negative.    Psychiatric/Behavioral: Negative.        /80   Pulse 64   Ht 177.8 cm (70\")   Wt 97.9 kg (215 lb 12.8 oz)   SpO2 97%   BMI 30.96 kg/m²   Physical Exam  Vitals reviewed.   Constitutional:       Appearance: Normal appearance.   HENT:      Head: Normocephalic and atraumatic.      Nose: Nose normal.      Mouth/Throat:      Mouth: Mucous membranes are moist.   Eyes:      Extraocular Movements: Extraocular movements intact.      Pupils: Pupils are equal, round, and reactive to light.   Cardiovascular:      Rate and Rhythm: Normal rate and regular rhythm.      Pulses:           Carotid pulses are 2+ on the right side and 2+ on the left side.       Radial pulses are 2+ on the right side and 2+ on the left side.        Femoral pulses are 2+ on the right side and 2+ on the left side.       Dorsalis pedis pulses are 2+ on the right side and detected w/ Doppler on the left side.        Posterior tibial pulses are 2+ on the right side and 2+ on the left side.      Comments:  He does have some mild edema of the bilateral lower extremities from ankle to knee and he has some small varicosities in the bilateral lower legs.    Today he has palpable DP and PT pulses in the right lower extremity.  He continues with a palpable PT pulse on the left.  Left groin access site from recent angiogram is well-healed and soft with no hematoma or infection.  Pulmonary:      Effort: Pulmonary effort is normal. No respiratory distress.   Abdominal:      General: There is no distension.      Palpations: Abdomen is soft. There is no mass.      Tenderness: There is no abdominal tenderness.   Musculoskeletal:         General: Normal range of motion.      Cervical back: Normal range of motion and neck supple.      Right lower leg: Edema present.      Left lower leg: Edema present.   Skin:     General: Skin is warm and dry.      Capillary Refill: Capillary refill takes less than 2 seconds.   Neurological:      General: No focal " deficit present.      Mental Status: He is alert and oriented to person, place, and time.   Psychiatric:         Mood and Affect: Mood normal.         Behavior: Behavior normal.         Thought Content: Thought content normal.         Judgment: Judgment normal.         DIAGNOSTIC DATA:    IR Angiogram Extremity, FL C Arm During Surgery    Result Date: 9/29/2022  Narrative: Performed by Dr. Biggs. Please see procedure note. This report was finalized on  by Dr. Leo Biggs MD.      Patient Active Problem List   Diagnosis   • Tobacco abuse   • Marijuana use   • Essential hypertension   • Abnormal stress test   • Unstable angina pectoris (HCC)   • Coronary artery disease involving native coronary artery of native heart with unstable angina pectoris (HCC)   • Cervical myelopathy (HCC)   • Atherosclerosis of native artery of both lower extremities with intermittent claudication (HCC)   • GERD (gastroesophageal reflux disease)   • Peripheral artery disease (HCC)   • Anxiety   • Poor dentition   • Chronic neck and back pain   • COPD (chronic obstructive pulmonary disease) (HCC)   • On statin therapy   • Degeneration of intervertebral disc at C4-C5 level   • Hemorrhoids   • History of adenomatous polyp of colon   • Dyspnea on exertion   • Chest pain, atypical   • Mixed hyperlipidemia   • Atherosclerosis of native artery of right lower extremity with intermittent claudication (HCC)         ICD-10-CM ICD-9-CM   1. Peripheral arterial disease with history of revascularization (HCC)  I73.9 443.9    Z98.890 V45.89   2. Bilateral carotid artery stenosis  I65.23 433.10     433.30   3. Essential hypertension  I10 401.9   4. Mixed hyperlipidemia  E78.2 272.2       Lab Frequency Next Occurrence   Follow Anesthesia Guidelines / Protocol Once 08/26/2022   Obtain Informed Consent Once 08/31/2022   Provide NPO Instructions to Patient Once 08/31/2022   Chlorhexidine Skin Prep Once 08/31/2022   Follow Anesthesia Guidelines / Protocol  Once 09/11/2022   Obtain Informed Consent Once 09/16/2022   Provide NPO Instructions to Patient Once 09/16/2022   Chlorhexidine Skin Prep Once 09/16/2022   US Ankle / Brachial Indices Extremity Complete Once 01/17/2023   US Carotid Bilateral Once 01/17/2023       PLAN: After thoroughly evaluating Tree Shaffer, I believe the best course of action is to remain conservative from a vascular standpoint.  He has done very well after his recent aortogram and right lower extremity angiogram with intervention.  His claudication in that right calf is completely resolved.  He continues to have some mild claudication in the left calf but this is not lifestyle limiting.  As such plan will be to see him back here in the office in 3 months with a repeat GEOFF/PVR for ongoing surveillance.  He also has a known history of carotid stenosis that is asymptomatic when he returns in 3 months we will do a repeat carotid duplex for ongoing surveillance of this as well.  The patient is to continue taking their medications as previously discussed.   I did discuss vascular risk factors as they pertain to the progression of vascular disease including controlling pretension, and hyperlipidemia.  These factors are well controlled on his current regimen. BMI is >= 30 and <35. (Class 1 Obesity). The following options were offered after discussion;: exercise counseling/recommendations and nutrition counseling/recommendations.  This was all discussed in full with complete understanding.  Thank you for allowing me to participate in the care of your patient.  Please do not hesitate to call with any questions or concerns.  We will keep you aware of any further encounters with Tree Shaffer.      Sincerely Yours,      Leo Biggs MD

## 2022-10-19 ENCOUNTER — APPOINTMENT (OUTPATIENT)
Dept: CARDIAC REHAB | Facility: HOSPITAL | Age: 62
End: 2022-10-19

## 2022-10-21 ENCOUNTER — APPOINTMENT (OUTPATIENT)
Dept: CARDIAC REHAB | Facility: HOSPITAL | Age: 62
End: 2022-10-21

## 2022-10-26 ENCOUNTER — APPOINTMENT (OUTPATIENT)
Dept: CARDIAC REHAB | Facility: HOSPITAL | Age: 62
End: 2022-10-26

## 2022-11-02 ENCOUNTER — OFFICE VISIT (OUTPATIENT)
Dept: CARDIAC REHAB | Facility: HOSPITAL | Age: 62
End: 2022-11-02

## 2022-11-02 VITALS — SYSTOLIC BLOOD PRESSURE: 98 MMHG | DIASTOLIC BLOOD PRESSURE: 60 MMHG

## 2022-11-02 DIAGNOSIS — I25.110 CORONARY ARTERY DISEASE INVOLVING NATIVE CORONARY ARTERY OF NATIVE HEART WITH UNSTABLE ANGINA PECTORIS: Primary | ICD-10-CM

## 2022-11-04 ENCOUNTER — OFFICE VISIT (OUTPATIENT)
Dept: CARDIAC REHAB | Facility: HOSPITAL | Age: 62
End: 2022-11-04

## 2022-11-04 VITALS — SYSTOLIC BLOOD PRESSURE: 115 MMHG | DIASTOLIC BLOOD PRESSURE: 60 MMHG

## 2022-11-04 DIAGNOSIS — I25.110 CORONARY ARTERY DISEASE INVOLVING NATIVE CORONARY ARTERY OF NATIVE HEART WITH UNSTABLE ANGINA PECTORIS: Primary | ICD-10-CM

## 2022-11-07 ENCOUNTER — OFFICE VISIT (OUTPATIENT)
Dept: CARDIAC REHAB | Facility: HOSPITAL | Age: 62
End: 2022-11-07

## 2022-11-07 VITALS — SYSTOLIC BLOOD PRESSURE: 106 MMHG | DIASTOLIC BLOOD PRESSURE: 64 MMHG

## 2022-11-07 DIAGNOSIS — I25.110 CORONARY ARTERY DISEASE INVOLVING NATIVE CORONARY ARTERY OF NATIVE HEART WITH UNSTABLE ANGINA PECTORIS: Primary | ICD-10-CM

## 2022-11-11 ENCOUNTER — OFFICE VISIT (OUTPATIENT)
Dept: CARDIAC REHAB | Facility: HOSPITAL | Age: 62
End: 2022-11-11

## 2022-11-11 VITALS — DIASTOLIC BLOOD PRESSURE: 52 MMHG | SYSTOLIC BLOOD PRESSURE: 120 MMHG

## 2022-11-11 DIAGNOSIS — I25.110 CORONARY ARTERY DISEASE INVOLVING NATIVE CORONARY ARTERY OF NATIVE HEART WITH UNSTABLE ANGINA PECTORIS: Primary | ICD-10-CM

## 2022-11-14 ENCOUNTER — OFFICE VISIT (OUTPATIENT)
Dept: CARDIAC REHAB | Facility: HOSPITAL | Age: 62
End: 2022-11-14

## 2022-11-14 VITALS — DIASTOLIC BLOOD PRESSURE: 70 MMHG | SYSTOLIC BLOOD PRESSURE: 120 MMHG

## 2022-11-14 DIAGNOSIS — I25.110 CORONARY ARTERY DISEASE INVOLVING NATIVE CORONARY ARTERY OF NATIVE HEART WITH UNSTABLE ANGINA PECTORIS: Primary | ICD-10-CM

## 2022-11-16 ENCOUNTER — OFFICE VISIT (OUTPATIENT)
Dept: CARDIAC REHAB | Facility: HOSPITAL | Age: 62
End: 2022-11-16

## 2022-11-16 VITALS — BODY MASS INDEX: 30.56 KG/M2 | DIASTOLIC BLOOD PRESSURE: 70 MMHG | WEIGHT: 213 LBS | SYSTOLIC BLOOD PRESSURE: 108 MMHG

## 2022-11-16 DIAGNOSIS — I25.110 CORONARY ARTERY DISEASE INVOLVING NATIVE CORONARY ARTERY OF NATIVE HEART WITH UNSTABLE ANGINA PECTORIS: Primary | ICD-10-CM

## 2022-11-18 ENCOUNTER — OFFICE VISIT (OUTPATIENT)
Dept: CARDIAC REHAB | Facility: HOSPITAL | Age: 62
End: 2022-11-18

## 2022-11-18 VITALS — SYSTOLIC BLOOD PRESSURE: 108 MMHG | DIASTOLIC BLOOD PRESSURE: 54 MMHG

## 2022-11-18 DIAGNOSIS — I25.110 CORONARY ARTERY DISEASE INVOLVING NATIVE CORONARY ARTERY OF NATIVE HEART WITH UNSTABLE ANGINA PECTORIS: Primary | ICD-10-CM

## 2022-11-21 ENCOUNTER — OFFICE VISIT (OUTPATIENT)
Dept: CARDIAC REHAB | Facility: HOSPITAL | Age: 62
End: 2022-11-21

## 2022-11-21 DIAGNOSIS — I25.110 CORONARY ARTERY DISEASE INVOLVING NATIVE CORONARY ARTERY OF NATIVE HEART WITH UNSTABLE ANGINA PECTORIS: Primary | ICD-10-CM

## 2022-11-23 ENCOUNTER — OFFICE VISIT (OUTPATIENT)
Dept: CARDIAC REHAB | Facility: HOSPITAL | Age: 62
End: 2022-11-23

## 2022-11-23 VITALS — DIASTOLIC BLOOD PRESSURE: 58 MMHG | SYSTOLIC BLOOD PRESSURE: 98 MMHG

## 2022-11-23 DIAGNOSIS — I25.110 CORONARY ARTERY DISEASE INVOLVING NATIVE CORONARY ARTERY OF NATIVE HEART WITH UNSTABLE ANGINA PECTORIS: Primary | ICD-10-CM

## 2022-11-25 ENCOUNTER — OFFICE VISIT (OUTPATIENT)
Dept: CARDIAC REHAB | Facility: HOSPITAL | Age: 62
End: 2022-11-25

## 2022-11-25 DIAGNOSIS — I25.110 CORONARY ARTERY DISEASE INVOLVING NATIVE CORONARY ARTERY OF NATIVE HEART WITH UNSTABLE ANGINA PECTORIS: Primary | ICD-10-CM

## 2022-12-05 ENCOUNTER — OFFICE VISIT (OUTPATIENT)
Dept: CARDIAC REHAB | Facility: HOSPITAL | Age: 62
End: 2022-12-05

## 2022-12-05 DIAGNOSIS — I25.110 CORONARY ARTERY DISEASE INVOLVING NATIVE CORONARY ARTERY OF NATIVE HEART WITH UNSTABLE ANGINA PECTORIS: Primary | ICD-10-CM

## 2022-12-09 ENCOUNTER — OFFICE VISIT (OUTPATIENT)
Dept: CARDIAC REHAB | Facility: HOSPITAL | Age: 62
End: 2022-12-09

## 2022-12-09 DIAGNOSIS — I25.110 CORONARY ARTERY DISEASE INVOLVING NATIVE CORONARY ARTERY OF NATIVE HEART WITH UNSTABLE ANGINA PECTORIS: Primary | ICD-10-CM

## 2022-12-12 ENCOUNTER — OFFICE VISIT (OUTPATIENT)
Dept: CARDIAC REHAB | Facility: HOSPITAL | Age: 62
End: 2022-12-12

## 2022-12-12 VITALS — SYSTOLIC BLOOD PRESSURE: 98 MMHG | DIASTOLIC BLOOD PRESSURE: 60 MMHG

## 2022-12-12 DIAGNOSIS — I25.110 CORONARY ARTERY DISEASE INVOLVING NATIVE CORONARY ARTERY OF NATIVE HEART WITH UNSTABLE ANGINA PECTORIS: Primary | ICD-10-CM

## 2022-12-14 ENCOUNTER — OFFICE VISIT (OUTPATIENT)
Dept: CARDIAC REHAB | Facility: HOSPITAL | Age: 62
End: 2022-12-14

## 2022-12-14 VITALS — SYSTOLIC BLOOD PRESSURE: 102 MMHG | DIASTOLIC BLOOD PRESSURE: 60 MMHG

## 2022-12-14 DIAGNOSIS — I25.110 CORONARY ARTERY DISEASE INVOLVING NATIVE CORONARY ARTERY OF NATIVE HEART WITH UNSTABLE ANGINA PECTORIS: Primary | ICD-10-CM

## 2022-12-16 ENCOUNTER — OFFICE VISIT (OUTPATIENT)
Dept: CARDIAC REHAB | Facility: HOSPITAL | Age: 62
End: 2022-12-16

## 2022-12-16 VITALS — SYSTOLIC BLOOD PRESSURE: 100 MMHG | DIASTOLIC BLOOD PRESSURE: 56 MMHG

## 2022-12-16 DIAGNOSIS — I25.110 CORONARY ARTERY DISEASE INVOLVING NATIVE CORONARY ARTERY OF NATIVE HEART WITH UNSTABLE ANGINA PECTORIS: Primary | ICD-10-CM

## 2022-12-21 ENCOUNTER — OFFICE VISIT (OUTPATIENT)
Dept: CARDIAC REHAB | Facility: HOSPITAL | Age: 62
End: 2022-12-21

## 2022-12-21 VITALS — SYSTOLIC BLOOD PRESSURE: 106 MMHG | DIASTOLIC BLOOD PRESSURE: 62 MMHG

## 2022-12-21 DIAGNOSIS — I25.110 CORONARY ARTERY DISEASE INVOLVING NATIVE CORONARY ARTERY OF NATIVE HEART WITH UNSTABLE ANGINA PECTORIS: Primary | ICD-10-CM

## 2022-12-23 ENCOUNTER — APPOINTMENT (OUTPATIENT)
Dept: CARDIAC REHAB | Facility: HOSPITAL | Age: 62
End: 2022-12-23

## 2022-12-28 ENCOUNTER — OFFICE VISIT (OUTPATIENT)
Dept: CARDIAC REHAB | Facility: HOSPITAL | Age: 62
End: 2022-12-28

## 2022-12-28 VITALS — SYSTOLIC BLOOD PRESSURE: 124 MMHG | DIASTOLIC BLOOD PRESSURE: 76 MMHG

## 2022-12-28 DIAGNOSIS — I25.110 CORONARY ARTERY DISEASE INVOLVING NATIVE CORONARY ARTERY OF NATIVE HEART WITH UNSTABLE ANGINA PECTORIS: Primary | ICD-10-CM

## 2022-12-30 ENCOUNTER — OFFICE VISIT (OUTPATIENT)
Dept: CARDIAC REHAB | Facility: HOSPITAL | Age: 62
End: 2022-12-30

## 2022-12-30 VITALS — DIASTOLIC BLOOD PRESSURE: 66 MMHG | SYSTOLIC BLOOD PRESSURE: 116 MMHG

## 2022-12-30 DIAGNOSIS — I25.110 CORONARY ARTERY DISEASE INVOLVING NATIVE CORONARY ARTERY OF NATIVE HEART WITH UNSTABLE ANGINA PECTORIS: Primary | ICD-10-CM

## 2023-01-03 DIAGNOSIS — I25.110 CORONARY ARTERY DISEASE INVOLVING NATIVE CORONARY ARTERY OF NATIVE HEART WITH UNSTABLE ANGINA PECTORIS: Primary | ICD-10-CM

## 2023-01-04 ENCOUNTER — OFFICE VISIT (OUTPATIENT)
Dept: CARDIAC REHAB | Facility: HOSPITAL | Age: 63
End: 2023-01-04

## 2023-01-04 VITALS — DIASTOLIC BLOOD PRESSURE: 68 MMHG | SYSTOLIC BLOOD PRESSURE: 102 MMHG

## 2023-01-04 DIAGNOSIS — I25.110 CORONARY ARTERY DISEASE INVOLVING NATIVE CORONARY ARTERY OF NATIVE HEART WITH UNSTABLE ANGINA PECTORIS: Primary | ICD-10-CM

## 2023-01-06 ENCOUNTER — OFFICE VISIT (OUTPATIENT)
Dept: CARDIAC REHAB | Facility: HOSPITAL | Age: 63
End: 2023-01-06

## 2023-01-06 VITALS — DIASTOLIC BLOOD PRESSURE: 64 MMHG | SYSTOLIC BLOOD PRESSURE: 108 MMHG

## 2023-01-06 DIAGNOSIS — I25.110 CORONARY ARTERY DISEASE INVOLVING NATIVE CORONARY ARTERY OF NATIVE HEART WITH UNSTABLE ANGINA PECTORIS: Primary | ICD-10-CM

## 2023-01-09 ENCOUNTER — OFFICE VISIT (OUTPATIENT)
Dept: CARDIAC REHAB | Facility: HOSPITAL | Age: 63
End: 2023-01-09

## 2023-01-09 VITALS — DIASTOLIC BLOOD PRESSURE: 50 MMHG | SYSTOLIC BLOOD PRESSURE: 94 MMHG

## 2023-01-09 DIAGNOSIS — I25.110 CORONARY ARTERY DISEASE INVOLVING NATIVE CORONARY ARTERY OF NATIVE HEART WITH UNSTABLE ANGINA PECTORIS: Primary | ICD-10-CM

## 2023-01-13 ENCOUNTER — OFFICE VISIT (OUTPATIENT)
Dept: CARDIAC REHAB | Facility: HOSPITAL | Age: 63
End: 2023-01-13

## 2023-01-13 VITALS — DIASTOLIC BLOOD PRESSURE: 78 MMHG | SYSTOLIC BLOOD PRESSURE: 118 MMHG

## 2023-01-13 DIAGNOSIS — I25.110 CORONARY ARTERY DISEASE INVOLVING NATIVE CORONARY ARTERY OF NATIVE HEART WITH UNSTABLE ANGINA PECTORIS: Primary | ICD-10-CM

## 2023-01-16 ENCOUNTER — OFFICE VISIT (OUTPATIENT)
Dept: CARDIAC REHAB | Facility: HOSPITAL | Age: 63
End: 2023-01-16

## 2023-01-16 VITALS — DIASTOLIC BLOOD PRESSURE: 60 MMHG | SYSTOLIC BLOOD PRESSURE: 102 MMHG

## 2023-01-16 DIAGNOSIS — I25.110 CORONARY ARTERY DISEASE INVOLVING NATIVE CORONARY ARTERY OF NATIVE HEART WITH UNSTABLE ANGINA PECTORIS: Primary | ICD-10-CM

## 2023-01-18 ENCOUNTER — OFFICE VISIT (OUTPATIENT)
Dept: CARDIAC REHAB | Facility: HOSPITAL | Age: 63
End: 2023-01-18

## 2023-01-18 ENCOUNTER — TELEPHONE (OUTPATIENT)
Dept: VASCULAR SURGERY | Facility: CLINIC | Age: 63
End: 2023-01-18
Payer: MEDICARE

## 2023-01-18 VITALS — DIASTOLIC BLOOD PRESSURE: 74 MMHG | SYSTOLIC BLOOD PRESSURE: 118 MMHG

## 2023-01-18 DIAGNOSIS — I25.110 CORONARY ARTERY DISEASE INVOLVING NATIVE CORONARY ARTERY OF NATIVE HEART WITH UNSTABLE ANGINA PECTORIS: Primary | ICD-10-CM

## 2023-01-20 ENCOUNTER — APPOINTMENT (OUTPATIENT)
Dept: ULTRASOUND IMAGING | Facility: HOSPITAL | Age: 63
End: 2023-01-20

## 2023-01-20 ENCOUNTER — OFFICE VISIT (OUTPATIENT)
Dept: CARDIAC REHAB | Facility: HOSPITAL | Age: 63
End: 2023-01-20

## 2023-01-20 DIAGNOSIS — I25.110 CORONARY ARTERY DISEASE INVOLVING NATIVE CORONARY ARTERY OF NATIVE HEART WITH UNSTABLE ANGINA PECTORIS: Primary | ICD-10-CM

## 2023-01-23 ENCOUNTER — OFFICE VISIT (OUTPATIENT)
Dept: CARDIAC REHAB | Facility: HOSPITAL | Age: 63
End: 2023-01-23

## 2023-01-23 VITALS — SYSTOLIC BLOOD PRESSURE: 134 MMHG | DIASTOLIC BLOOD PRESSURE: 60 MMHG

## 2023-01-23 DIAGNOSIS — I25.110 CORONARY ARTERY DISEASE INVOLVING NATIVE CORONARY ARTERY OF NATIVE HEART WITH UNSTABLE ANGINA PECTORIS: Primary | ICD-10-CM

## 2023-01-25 ENCOUNTER — OFFICE VISIT (OUTPATIENT)
Dept: CARDIAC REHAB | Facility: HOSPITAL | Age: 63
End: 2023-01-25

## 2023-01-25 DIAGNOSIS — I25.110 CORONARY ARTERY DISEASE INVOLVING NATIVE CORONARY ARTERY OF NATIVE HEART WITH UNSTABLE ANGINA PECTORIS: Primary | ICD-10-CM

## 2023-01-27 ENCOUNTER — OFFICE VISIT (OUTPATIENT)
Dept: CARDIAC REHAB | Facility: HOSPITAL | Age: 63
End: 2023-01-27

## 2023-01-27 DIAGNOSIS — I25.110 CORONARY ARTERY DISEASE INVOLVING NATIVE CORONARY ARTERY OF NATIVE HEART WITH UNSTABLE ANGINA PECTORIS: Primary | ICD-10-CM

## 2023-01-30 ENCOUNTER — OFFICE VISIT (OUTPATIENT)
Dept: CARDIAC REHAB | Facility: HOSPITAL | Age: 63
End: 2023-01-30

## 2023-01-30 VITALS — DIASTOLIC BLOOD PRESSURE: 76 MMHG | SYSTOLIC BLOOD PRESSURE: 138 MMHG

## 2023-01-30 DIAGNOSIS — I25.110 CORONARY ARTERY DISEASE INVOLVING NATIVE CORONARY ARTERY OF NATIVE HEART WITH UNSTABLE ANGINA PECTORIS: Primary | ICD-10-CM

## 2023-02-01 ENCOUNTER — APPOINTMENT (OUTPATIENT)
Dept: CARDIAC REHAB | Facility: HOSPITAL | Age: 63
End: 2023-02-01

## 2023-02-06 ENCOUNTER — OFFICE VISIT (OUTPATIENT)
Dept: CARDIAC REHAB | Facility: HOSPITAL | Age: 63
End: 2023-02-06

## 2023-02-06 VITALS — DIASTOLIC BLOOD PRESSURE: 82 MMHG | BODY MASS INDEX: 30.99 KG/M2 | SYSTOLIC BLOOD PRESSURE: 138 MMHG | WEIGHT: 216 LBS

## 2023-02-06 DIAGNOSIS — I25.110 CORONARY ARTERY DISEASE INVOLVING NATIVE CORONARY ARTERY OF NATIVE HEART WITH UNSTABLE ANGINA PECTORIS: Primary | ICD-10-CM

## 2023-02-08 ENCOUNTER — OFFICE VISIT (OUTPATIENT)
Dept: CARDIAC REHAB | Facility: HOSPITAL | Age: 63
End: 2023-02-08

## 2023-02-08 VITALS — DIASTOLIC BLOOD PRESSURE: 74 MMHG | SYSTOLIC BLOOD PRESSURE: 116 MMHG

## 2023-02-08 DIAGNOSIS — I25.110 CORONARY ARTERY DISEASE INVOLVING NATIVE CORONARY ARTERY OF NATIVE HEART WITH UNSTABLE ANGINA PECTORIS: Primary | ICD-10-CM

## 2023-02-10 ENCOUNTER — OFFICE VISIT (OUTPATIENT)
Dept: CARDIAC REHAB | Facility: HOSPITAL | Age: 63
End: 2023-02-10

## 2023-02-10 DIAGNOSIS — I25.110 CORONARY ARTERY DISEASE INVOLVING NATIVE CORONARY ARTERY OF NATIVE HEART WITH UNSTABLE ANGINA PECTORIS: Primary | ICD-10-CM

## 2023-02-13 ENCOUNTER — HOSPITAL ENCOUNTER (OUTPATIENT)
Dept: ULTRASOUND IMAGING | Facility: HOSPITAL | Age: 63
Discharge: HOME | End: 2023-02-13
Payer: MEDICARE

## 2023-02-13 ENCOUNTER — OFFICE VISIT (OUTPATIENT)
Dept: VASCULAR SURGERY | Facility: CLINIC | Age: 63
End: 2023-02-13
Payer: MEDICARE

## 2023-02-13 ENCOUNTER — APPOINTMENT (OUTPATIENT)
Dept: CARDIAC REHAB | Facility: HOSPITAL | Age: 63
End: 2023-02-13

## 2023-02-13 VITALS
SYSTOLIC BLOOD PRESSURE: 142 MMHG | HEIGHT: 70 IN | WEIGHT: 215 LBS | OXYGEN SATURATION: 97 % | HEART RATE: 82 BPM | BODY MASS INDEX: 30.78 KG/M2 | DIASTOLIC BLOOD PRESSURE: 82 MMHG

## 2023-02-13 DIAGNOSIS — I10 ESSENTIAL HYPERTENSION: ICD-10-CM

## 2023-02-13 DIAGNOSIS — I65.23 BILATERAL CAROTID ARTERY STENOSIS: ICD-10-CM

## 2023-02-13 DIAGNOSIS — E78.2 MIXED HYPERLIPIDEMIA: ICD-10-CM

## 2023-02-13 DIAGNOSIS — I73.9 PERIPHERAL ARTERIAL DISEASE WITH HISTORY OF REVASCULARIZATION: ICD-10-CM

## 2023-02-13 DIAGNOSIS — Z98.890 PERIPHERAL ARTERIAL DISEASE WITH HISTORY OF REVASCULARIZATION: Primary | ICD-10-CM

## 2023-02-13 DIAGNOSIS — I73.9 PERIPHERAL ARTERIAL DISEASE WITH HISTORY OF REVASCULARIZATION: Primary | ICD-10-CM

## 2023-02-13 DIAGNOSIS — Z98.890 PERIPHERAL ARTERIAL DISEASE WITH HISTORY OF REVASCULARIZATION: ICD-10-CM

## 2023-02-13 PROCEDURE — 93880 EXTRACRANIAL BILAT STUDY: CPT

## 2023-02-13 PROCEDURE — 93880 EXTRACRANIAL BILAT STUDY: CPT | Performed by: SURGERY

## 2023-02-13 PROCEDURE — 93923 UPR/LXTR ART STDY 3+ LVLS: CPT

## 2023-02-13 PROCEDURE — 99214 OFFICE O/P EST MOD 30 MIN: CPT | Performed by: SURGERY

## 2023-02-13 PROCEDURE — 93923 UPR/LXTR ART STDY 3+ LVLS: CPT | Performed by: SURGERY

## 2023-02-13 NOTE — PROGRESS NOTES
02/13/2023      Madhu Andersen, APRN  [address] [place] [address]        Leavitt Andrewnirmal Shaffer  1960    Chief Complaint   Patient presents with   • Follow-up     3 month follow up w/ testing. Last seen 10/17/22. States that  legs bother him a little, but overall hes okay.        Dear Madhu Andersen, APRN:    HPI     I had the pleasure of seeing your patient in the office today for follow up.  As you recall, the patient is a 62 y.o. male who we are currently following for peripheral arterial disease and lifestyle limiting claudication of the right lower extremity.  He previously underwent right lower extremity angiogram with intervention which she tolerated well.  He returns today after having undergone repeat GEOFF/PVR as well as carotid duplex both of which I reviewed in the office today.  GEOFF values are 0.87 on the right, and 0.85 on the left.  Waveforms are triphasic and maintained.  This represents mild arterial insufficiency of the bilateral lower extremities.  Carotid duplex shows less than 50% stenosis of the bilateral internal carotid arteries.  He remains asymptomatic from a carotid standpoint with no TIA or strokelike symptoms. He does have palpable pulses today in the right leg at the DP and PT.  He otherwise is doing well.  He continues to have some mild claudication in the left calf but this is not lifestyle limiting.  He otherwise denies any significant further complaints.  He is currently on aspirin, Plavix, and Pletal.  He is also on a statin.      Review of Systems   Constitutional: Negative for activity change, appetite change, chills, diaphoresis, fatigue and fever.   HENT: Negative.  Negative for congestion, sneezing, sore throat and trouble swallowing.    Eyes: Negative.  Negative for visual disturbance.   Respiratory: Negative.  Negative for chest tightness and shortness of breath.    Cardiovascular: Negative for chest pain and palpitations.   Gastrointestinal:  "Negative.  Negative for abdominal distention, abdominal pain, nausea and vomiting.   Endocrine: Negative.    Genitourinary: Negative.    Skin: Negative.    Allergic/Immunologic: Negative.    Neurological: Negative.    Hematological: Negative.    Psychiatric/Behavioral: Negative.        /82   Pulse 82   Ht 177.8 cm (70\")   Wt 97.5 kg (215 lb)   SpO2 97%   BMI 30.85 kg/m²   Physical Exam  Vitals reviewed.   Constitutional:       Appearance: Normal appearance.   HENT:      Head: Normocephalic and atraumatic.      Nose: Nose normal.      Mouth/Throat:      Mouth: Mucous membranes are moist.   Eyes:      Extraocular Movements: Extraocular movements intact.      Pupils: Pupils are equal, round, and reactive to light.   Cardiovascular:      Rate and Rhythm: Normal rate and regular rhythm.      Pulses:           Carotid pulses are 2+ on the right side and 2+ on the left side.       Radial pulses are 2+ on the right side and 2+ on the left side.        Femoral pulses are 2+ on the right side and 2+ on the left side.       Dorsalis pedis pulses are 2+ on the right side and detected w/ Doppler on the left side.        Posterior tibial pulses are 2+ on the right side and 2+ on the left side.      Comments:  He does have some mild edema of the bilateral lower extremities from ankle to knee and he has some small varicosities in the bilateral lower legs.    Today he has palpable DP and PT pulses in the right lower extremity.  He continues with a palpable PT pulse on the left.  Pulmonary:      Effort: Pulmonary effort is normal. No respiratory distress.   Abdominal:      General: There is no distension.      Palpations: Abdomen is soft. There is no mass.      Tenderness: There is no abdominal tenderness.   Musculoskeletal:         General: Normal range of motion.      Cervical back: Normal range of motion and neck supple.      Right lower leg: Edema present.      Left lower leg: Edema present.   Skin:     General: Skin " is warm and dry.      Capillary Refill: Capillary refill takes less than 2 seconds.   Neurological:      General: No focal deficit present.      Mental Status: He is alert and oriented to person, place, and time.   Psychiatric:         Mood and Affect: Mood normal.         Behavior: Behavior normal.         Thought Content: Thought content normal.         Judgment: Judgment normal.         DIAGNOSTIC DATA:        No results found.    Patient Active Problem List   Diagnosis   • Tobacco abuse   • Marijuana use   • Essential hypertension   • Abnormal stress test   • Unstable angina pectoris (HCC)   • Coronary artery disease involving native coronary artery of native heart with unstable angina pectoris (HCC)   • Cervical myelopathy (HCC)   • Atherosclerosis of native artery of both lower extremities with intermittent claudication ([facility])   • GERD (gastroesophageal reflux disease)   • Peripheral artery disease ([facility])   • Anxiety   • Poor dentition   • Chronic neck and back pain   • COPD (chronic obstructive pulmonary disease) ([facility])   • On statin therapy   • Degeneration of intervertebral disc at C4-C5 level   • Hemorrhoids   • History of adenomatous polyp of colon   • Dyspnea on exertion   • Chest pain, atypical   • Mixed hyperlipidemia   • Atherosclerosis of native artery of right lower extremity with intermittent claudication ([facility])         ICD-10-CM ICD-9-CM   1. Peripheral arterial disease with history of revascularization ([facility])  I73.9 443.9    Z98.890 V45.89   2. Bilateral carotid artery stenosis  I65.23 433.10     433.30   3. Essential hypertension  I10 401.9   4. Mixed hyperlipidemia  E78.2 272.2       Lab Frequency Next Occurrence   Follow Anesthesia Guidelines / Protocol Once 08/26/2022   Obtain Informed Consent Once 08/31/2022   Provide NPO Instructions to Patient Once 08/31/2022   Chlorhexidine Skin Prep Once 08/31/2022   Follow Anesthesia Guidelines / Protocol Once 09/11/2022   Obtain Informed Consent Once  09/16/2022   Provide NPO Instructions to Patient Once 09/16/2022   Chlorhexidine Skin Prep Once 09/16/2022   US Ankle / Brachial Indices Extremity Complete Once 01/17/2023   US Carotid Bilateral Once 01/17/2023       PLAN: After thoroughly evaluating Tree Shaffer, I believe the best course of action is to remain conservative from a vascular standpoint.  He continues to do well from a peripheral arterial standpoint with no significant claudication.  GEOFF/PVR done today shows mild arterial insufficiency in the bilateral lower extremities and he has palpable pulses in the bilateral feet.  As such plan will be to see him back here in the office in 6 months with a repeat GEOFF/PVR for ongoing surveillance.  He also had carotid duplex done today which shows less than 50% stenosis of the bilateral internal carotid arteries.  We will continue surveillance and he will have a repeat carotid duplex when he returns in 6 months.  The patient is to continue taking their medications as previously discussed.   I did discuss vascular risk factors as they pertain to the progression of vascular disease including controlling pretension, and hyperlipidemia.  Blood pressure is slightly elevated today at 142/82.  I will have him follow with his primary care physician for further evaluation of his antihypertensive regimen. BMI is >= 30 and <35. (Class 1 Obesity). The following options were offered after discussion;: exercise counseling/recommendations and nutrition counseling/recommendations.  This was all discussed in full with complete understanding. Thank you for allowing me to participate in the care of your patient.  Please do not hesitate to call with any questions or concerns.  We will keep you aware of any further encounters with Tree Shaffer.      Sincerely Yours,      Leo Biggs MD

## 2023-02-15 ENCOUNTER — OFFICE VISIT (OUTPATIENT)
Dept: CARDIAC REHAB | Facility: HOSPITAL | Age: 63
End: 2023-02-15

## 2023-02-15 VITALS — SYSTOLIC BLOOD PRESSURE: 118 MMHG | DIASTOLIC BLOOD PRESSURE: 60 MMHG

## 2023-02-15 DIAGNOSIS — I25.110 CORONARY ARTERY DISEASE INVOLVING NATIVE CORONARY ARTERY OF NATIVE HEART WITH UNSTABLE ANGINA PECTORIS: Primary | ICD-10-CM

## 2023-02-17 ENCOUNTER — OFFICE VISIT (OUTPATIENT)
Dept: CARDIAC REHAB | Facility: HOSPITAL | Age: 63
End: 2023-02-17

## 2023-02-17 DIAGNOSIS — I25.110 CORONARY ARTERY DISEASE INVOLVING NATIVE CORONARY ARTERY OF NATIVE HEART WITH UNSTABLE ANGINA PECTORIS: Primary | ICD-10-CM

## 2023-02-20 ENCOUNTER — OFFICE VISIT (OUTPATIENT)
Dept: CARDIAC REHAB | Facility: HOSPITAL | Age: 63
End: 2023-02-20

## 2023-02-20 VITALS — DIASTOLIC BLOOD PRESSURE: 70 MMHG | SYSTOLIC BLOOD PRESSURE: 122 MMHG

## 2023-02-20 DIAGNOSIS — I25.110 CORONARY ARTERY DISEASE INVOLVING NATIVE CORONARY ARTERY OF NATIVE HEART WITH UNSTABLE ANGINA PECTORIS: Primary | ICD-10-CM

## 2023-02-22 ENCOUNTER — OFFICE VISIT (OUTPATIENT)
Dept: CARDIAC REHAB | Facility: HOSPITAL | Age: 63
End: 2023-02-22

## 2023-02-22 VITALS — SYSTOLIC BLOOD PRESSURE: 106 MMHG | DIASTOLIC BLOOD PRESSURE: 86 MMHG

## 2023-02-22 DIAGNOSIS — I25.110 CORONARY ARTERY DISEASE INVOLVING NATIVE CORONARY ARTERY OF NATIVE HEART WITH UNSTABLE ANGINA PECTORIS: Primary | ICD-10-CM

## 2023-03-01 ENCOUNTER — OFFICE VISIT (OUTPATIENT)
Dept: CARDIAC REHAB | Facility: HOSPITAL | Age: 63
End: 2023-03-01

## 2023-03-01 VITALS — SYSTOLIC BLOOD PRESSURE: 110 MMHG | DIASTOLIC BLOOD PRESSURE: 64 MMHG

## 2023-03-01 DIAGNOSIS — I25.110 CORONARY ARTERY DISEASE INVOLVING NATIVE CORONARY ARTERY OF NATIVE HEART WITH UNSTABLE ANGINA PECTORIS: Primary | ICD-10-CM

## 2023-03-10 ENCOUNTER — OFFICE VISIT (OUTPATIENT)
Dept: CARDIAC REHAB | Facility: HOSPITAL | Age: 63
End: 2023-03-10

## 2023-03-10 VITALS — DIASTOLIC BLOOD PRESSURE: 78 MMHG | SYSTOLIC BLOOD PRESSURE: 122 MMHG

## 2023-03-10 DIAGNOSIS — I25.110 CORONARY ARTERY DISEASE INVOLVING NATIVE CORONARY ARTERY OF NATIVE HEART WITH UNSTABLE ANGINA PECTORIS: Primary | ICD-10-CM

## 2023-03-15 ENCOUNTER — OFFICE VISIT (OUTPATIENT)
Dept: CARDIAC REHAB | Facility: HOSPITAL | Age: 63
End: 2023-03-15

## 2023-03-15 VITALS — DIASTOLIC BLOOD PRESSURE: 56 MMHG | SYSTOLIC BLOOD PRESSURE: 102 MMHG

## 2023-03-15 DIAGNOSIS — I25.110 CORONARY ARTERY DISEASE INVOLVING NATIVE CORONARY ARTERY OF NATIVE HEART WITH UNSTABLE ANGINA PECTORIS: Primary | ICD-10-CM

## 2023-03-17 ENCOUNTER — OFFICE VISIT (OUTPATIENT)
Dept: CARDIAC REHAB | Facility: HOSPITAL | Age: 63
End: 2023-03-17

## 2023-03-17 VITALS — DIASTOLIC BLOOD PRESSURE: 68 MMHG | SYSTOLIC BLOOD PRESSURE: 118 MMHG

## 2023-03-17 DIAGNOSIS — I25.110 CORONARY ARTERY DISEASE INVOLVING NATIVE CORONARY ARTERY OF NATIVE HEART WITH UNSTABLE ANGINA PECTORIS: Primary | ICD-10-CM

## 2023-03-20 ENCOUNTER — OFFICE VISIT (OUTPATIENT)
Dept: CARDIAC REHAB | Facility: HOSPITAL | Age: 63
End: 2023-03-20

## 2023-03-20 VITALS — SYSTOLIC BLOOD PRESSURE: 136 MMHG | DIASTOLIC BLOOD PRESSURE: 82 MMHG

## 2023-03-20 DIAGNOSIS — I25.110 CORONARY ARTERY DISEASE INVOLVING NATIVE CORONARY ARTERY OF NATIVE HEART WITH UNSTABLE ANGINA PECTORIS: Primary | ICD-10-CM

## 2023-03-22 ENCOUNTER — OFFICE VISIT (OUTPATIENT)
Dept: CARDIAC REHAB | Facility: HOSPITAL | Age: 63
End: 2023-03-22

## 2023-03-22 VITALS — DIASTOLIC BLOOD PRESSURE: 64 MMHG | SYSTOLIC BLOOD PRESSURE: 126 MMHG

## 2023-03-22 DIAGNOSIS — I25.110 CORONARY ARTERY DISEASE INVOLVING NATIVE CORONARY ARTERY OF NATIVE HEART WITH UNSTABLE ANGINA PECTORIS: Primary | ICD-10-CM

## 2023-03-24 ENCOUNTER — OFFICE VISIT (OUTPATIENT)
Dept: CARDIAC REHAB | Facility: HOSPITAL | Age: 63
End: 2023-03-24

## 2023-03-24 VITALS — DIASTOLIC BLOOD PRESSURE: 76 MMHG | SYSTOLIC BLOOD PRESSURE: 124 MMHG

## 2023-03-24 DIAGNOSIS — I25.110 CORONARY ARTERY DISEASE INVOLVING NATIVE CORONARY ARTERY OF NATIVE HEART WITH UNSTABLE ANGINA PECTORIS: Primary | ICD-10-CM

## 2023-03-27 ENCOUNTER — OFFICE VISIT (OUTPATIENT)
Dept: CARDIAC REHAB | Facility: HOSPITAL | Age: 63
End: 2023-03-27

## 2023-03-27 VITALS — SYSTOLIC BLOOD PRESSURE: 132 MMHG | DIASTOLIC BLOOD PRESSURE: 76 MMHG

## 2023-03-27 DIAGNOSIS — I25.110 CORONARY ARTERY DISEASE INVOLVING NATIVE CORONARY ARTERY OF NATIVE HEART WITH UNSTABLE ANGINA PECTORIS: Primary | ICD-10-CM

## 2023-03-29 ENCOUNTER — OFFICE VISIT (OUTPATIENT)
Dept: CARDIAC REHAB | Facility: HOSPITAL | Age: 63
End: 2023-03-29

## 2023-03-29 DIAGNOSIS — I25.110 CORONARY ARTERY DISEASE INVOLVING NATIVE CORONARY ARTERY OF NATIVE HEART WITH UNSTABLE ANGINA PECTORIS: Primary | ICD-10-CM

## 2023-03-31 ENCOUNTER — OFFICE VISIT (OUTPATIENT)
Dept: CARDIAC REHAB | Facility: HOSPITAL | Age: 63
End: 2023-03-31

## 2023-03-31 VITALS — SYSTOLIC BLOOD PRESSURE: 120 MMHG | DIASTOLIC BLOOD PRESSURE: 70 MMHG

## 2023-03-31 DIAGNOSIS — I25.110 CORONARY ARTERY DISEASE INVOLVING NATIVE CORONARY ARTERY OF NATIVE HEART WITH UNSTABLE ANGINA PECTORIS: Primary | ICD-10-CM

## 2023-04-03 ENCOUNTER — APPOINTMENT (OUTPATIENT)
Dept: CARDIAC REHAB | Facility: HOSPITAL | Age: 63
End: 2023-04-03

## 2023-04-05 ENCOUNTER — OFFICE VISIT (OUTPATIENT)
Dept: CARDIAC REHAB | Facility: HOSPITAL | Age: 63
End: 2023-04-05

## 2023-04-05 VITALS — SYSTOLIC BLOOD PRESSURE: 136 MMHG | DIASTOLIC BLOOD PRESSURE: 74 MMHG

## 2023-04-05 DIAGNOSIS — I25.110 CORONARY ARTERY DISEASE INVOLVING NATIVE CORONARY ARTERY OF NATIVE HEART WITH UNSTABLE ANGINA PECTORIS: Primary | ICD-10-CM

## 2023-04-07 ENCOUNTER — OFFICE VISIT (OUTPATIENT)
Dept: CARDIAC REHAB | Facility: HOSPITAL | Age: 63
End: 2023-04-07

## 2023-04-07 VITALS — SYSTOLIC BLOOD PRESSURE: 126 MMHG | DIASTOLIC BLOOD PRESSURE: 68 MMHG

## 2023-04-07 DIAGNOSIS — I25.110 CORONARY ARTERY DISEASE INVOLVING NATIVE CORONARY ARTERY OF NATIVE HEART WITH UNSTABLE ANGINA PECTORIS: Primary | ICD-10-CM

## 2023-04-12 ENCOUNTER — OFFICE VISIT (OUTPATIENT)
Dept: CARDIAC REHAB | Facility: HOSPITAL | Age: 63
End: 2023-04-12

## 2023-04-12 VITALS — DIASTOLIC BLOOD PRESSURE: 70 MMHG | SYSTOLIC BLOOD PRESSURE: 118 MMHG

## 2023-04-12 DIAGNOSIS — I25.110 CORONARY ARTERY DISEASE INVOLVING NATIVE CORONARY ARTERY OF NATIVE HEART WITH UNSTABLE ANGINA PECTORIS: Primary | ICD-10-CM

## 2023-04-17 ENCOUNTER — OFFICE VISIT (OUTPATIENT)
Dept: CARDIAC REHAB | Facility: HOSPITAL | Age: 63
End: 2023-04-17

## 2023-04-17 VITALS — SYSTOLIC BLOOD PRESSURE: 114 MMHG | DIASTOLIC BLOOD PRESSURE: 68 MMHG

## 2023-04-17 DIAGNOSIS — I25.110 CORONARY ARTERY DISEASE INVOLVING NATIVE CORONARY ARTERY OF NATIVE HEART WITH UNSTABLE ANGINA PECTORIS: Primary | ICD-10-CM

## 2023-04-21 ENCOUNTER — OFFICE VISIT (OUTPATIENT)
Dept: CARDIAC REHAB | Facility: HOSPITAL | Age: 63
End: 2023-04-21

## 2023-04-21 VITALS — DIASTOLIC BLOOD PRESSURE: 60 MMHG | SYSTOLIC BLOOD PRESSURE: 108 MMHG

## 2023-04-21 DIAGNOSIS — I25.110 CORONARY ARTERY DISEASE INVOLVING NATIVE CORONARY ARTERY OF NATIVE HEART WITH UNSTABLE ANGINA PECTORIS: Primary | ICD-10-CM

## 2023-04-24 ENCOUNTER — OFFICE VISIT (OUTPATIENT)
Dept: CARDIAC REHAB | Facility: HOSPITAL | Age: 63
End: 2023-04-24

## 2023-04-24 VITALS — SYSTOLIC BLOOD PRESSURE: 128 MMHG | DIASTOLIC BLOOD PRESSURE: 72 MMHG

## 2023-04-24 DIAGNOSIS — I25.110 CORONARY ARTERY DISEASE INVOLVING NATIVE CORONARY ARTERY OF NATIVE HEART WITH UNSTABLE ANGINA PECTORIS: Primary | ICD-10-CM

## 2023-04-28 ENCOUNTER — OFFICE VISIT (OUTPATIENT)
Dept: CARDIAC REHAB | Facility: HOSPITAL | Age: 63
End: 2023-04-28

## 2023-04-28 VITALS — DIASTOLIC BLOOD PRESSURE: 80 MMHG | SYSTOLIC BLOOD PRESSURE: 138 MMHG

## 2023-04-28 DIAGNOSIS — I25.110 CORONARY ARTERY DISEASE INVOLVING NATIVE CORONARY ARTERY OF NATIVE HEART WITH UNSTABLE ANGINA PECTORIS: Primary | ICD-10-CM

## 2023-05-01 ENCOUNTER — OFFICE VISIT (OUTPATIENT)
Dept: CARDIAC REHAB | Facility: HOSPITAL | Age: 63
End: 2023-05-01

## 2023-05-01 VITALS — SYSTOLIC BLOOD PRESSURE: 124 MMHG | DIASTOLIC BLOOD PRESSURE: 64 MMHG

## 2023-05-01 DIAGNOSIS — I25.110 CORONARY ARTERY DISEASE INVOLVING NATIVE CORONARY ARTERY OF NATIVE HEART WITH UNSTABLE ANGINA PECTORIS: Primary | ICD-10-CM

## 2023-05-03 ENCOUNTER — OFFICE VISIT (OUTPATIENT)
Dept: CARDIAC REHAB | Facility: HOSPITAL | Age: 63
End: 2023-05-03

## 2023-05-03 VITALS — SYSTOLIC BLOOD PRESSURE: 140 MMHG | DIASTOLIC BLOOD PRESSURE: 70 MMHG

## 2023-05-03 DIAGNOSIS — I25.110 CORONARY ARTERY DISEASE INVOLVING NATIVE CORONARY ARTERY OF NATIVE HEART WITH UNSTABLE ANGINA PECTORIS: Primary | ICD-10-CM

## 2023-05-05 ENCOUNTER — OFFICE VISIT (OUTPATIENT)
Dept: CARDIAC REHAB | Facility: HOSPITAL | Age: 63
End: 2023-05-05

## 2023-05-05 VITALS — DIASTOLIC BLOOD PRESSURE: 66 MMHG | SYSTOLIC BLOOD PRESSURE: 112 MMHG

## 2023-05-05 DIAGNOSIS — I25.110 CORONARY ARTERY DISEASE INVOLVING NATIVE CORONARY ARTERY OF NATIVE HEART WITH UNSTABLE ANGINA PECTORIS: Primary | ICD-10-CM

## 2023-05-08 ENCOUNTER — OFFICE VISIT (OUTPATIENT)
Dept: CARDIAC REHAB | Facility: HOSPITAL | Age: 63
End: 2023-05-08

## 2023-05-08 VITALS — SYSTOLIC BLOOD PRESSURE: 118 MMHG | DIASTOLIC BLOOD PRESSURE: 62 MMHG

## 2023-05-08 DIAGNOSIS — I25.110 CORONARY ARTERY DISEASE INVOLVING NATIVE CORONARY ARTERY OF NATIVE HEART WITH UNSTABLE ANGINA PECTORIS: Primary | ICD-10-CM

## 2023-05-12 ENCOUNTER — APPOINTMENT (OUTPATIENT)
Dept: CARDIAC REHAB | Facility: HOSPITAL | Age: 63
End: 2023-05-12

## 2023-05-12 ENCOUNTER — HOSPITAL ENCOUNTER (OUTPATIENT)
Dept: GENERAL RADIOLOGY | Facility: HOSPITAL | Age: 63
Discharge: HOME | End: 2023-05-12
Payer: MEDICARE

## 2023-05-12 PROCEDURE — 74018 RADEX ABDOMEN 1 VIEW: CPT

## 2023-08-03 NOTE — PROGRESS NOTES
----- Message from Reji Mendoza MD sent at 3/28/2017  8:53 AM CDT -----  Please notify patient do not see results is their paper copy available  ?   Bacterial overgrowth shows mild elevation. This may be more related to patient's severe gastroparesis. However it would not be unreasonable to give her a course of Xifaxan 550 mg p.o. t.i.d. for 10 days.   Called [facility] pharmacy benefit plan and spoke with Ann.  PA questions were answered and Ann contacted a clinical pharmacist for review.  The patient has not been on recommended alternatives, dicyclomine and loperamide per chart review but has been successfully treated with Xifaxan previously.  The medication was approved through the end of the benefit year, which will end for the patient on 12/31/17.  Ann advises to contact the pharmacy and have them re-submit the claim.  RN writer will do so and Ann will fax us the approval.    Updated Elina with [facility].    Notified the patient.  She is thankful.  She denies needing anything further at this time but will contact us should anything arise.   Noted. Spoke with pt and conveyed results/recommendations per Dr. Mendoza. Has been on this med in past.  She asks for me to send now as she is at [facility] presently.  Did inform her insurance may require a PA.   She will ask at pharmacy while she is there today if able to .    I am calling pharmacy at this time to inquire on status. Was informed not covered under plan so will need another PA completed. They will send us PA fax.   Phase III visit   Report printed from EPIC and placed on desk for your review. It is scanned in under the lab as well.   Please review/advise when able.  Thank you kindly.   initiation of breastfeeding/breast milk feeding

## 2023-08-09 ENCOUNTER — TELEPHONE (OUTPATIENT)
Dept: VASCULAR SURGERY | Facility: CLINIC | Age: 63
End: 2023-08-09
Payer: MEDICARE

## 2023-08-09 NOTE — TELEPHONE ENCOUNTER
UNABLE TO LEAVE VM INFORMING PATIENT OF CANCELLED APPOINTMENT WITH MAUDE ON 8/14/23. IF PATIENT RETURNS CALL PLEASE ASK FOR HIM TO KEEP TESTING @ 7:30 8/18/23.

## 2023-08-17 DIAGNOSIS — I73.9 PERIPHERAL ARTERIAL DISEASE WITH HISTORY OF REVASCULARIZATION: ICD-10-CM

## 2023-08-17 DIAGNOSIS — Z98.890 PERIPHERAL ARTERIAL DISEASE WITH HISTORY OF REVASCULARIZATION: ICD-10-CM

## 2023-08-17 DIAGNOSIS — I65.23 BILATERAL CAROTID ARTERY STENOSIS: Primary | ICD-10-CM

## 2023-08-18 ENCOUNTER — HOSPITAL ENCOUNTER (OUTPATIENT)
Dept: ULTRASOUND IMAGING | Facility: HOSPITAL | Age: 63
Discharge: HOME | End: 2023-08-18
Payer: MEDICARE

## 2024-01-16 ENCOUNTER — TELEPHONE (OUTPATIENT)
Dept: VASCULAR SURGERY | Facility: CLINIC | Age: 64
End: 2024-01-16
Payer: MEDICARE

## 2024-01-16 NOTE — TELEPHONE ENCOUNTER
CALLED PREVIOUS CUMBERS PATIENT TO ASK ABOUT GETTING SCHEDULED WITH DIFFERENT PROVIDER. NO ANSWER OR VM SET UP. LETTER MAILED TODAY.

## 2024-02-05 RX ORDER — FUROSEMIDE 20 MG/1
40 TABLET ORAL 2 TIMES DAILY
Qty: 180 TABLET | Refills: 3 | Status: SHIPPED | OUTPATIENT
Start: 2024-02-05

## 2024-04-03 RX ORDER — CILOSTAZOL 100 MG/1
100 TABLET ORAL 2 TIMES DAILY
OUTPATIENT
Start: 2024-04-03

## 2024-06-25 RX ORDER — CILOSTAZOL 100 MG/1
100 TABLET ORAL 2 TIMES DAILY
Qty: 180 TABLET | Refills: 3 | Status: SHIPPED | OUTPATIENT
Start: 2024-06-25

## 2024-07-05 RX ORDER — CLOPIDOGREL BISULFATE 75 MG/1
75 TABLET ORAL DAILY
Qty: 90 TABLET | Refills: 3 | Status: SHIPPED | OUTPATIENT
Start: 2024-07-05

## 2024-07-05 RX ORDER — ASPIRIN 81 MG/1
81 TABLET, COATED ORAL DAILY
Qty: 90 TABLET | Refills: 3 | Status: SHIPPED | OUTPATIENT
Start: 2024-07-05

## 2024-07-05 RX ORDER — LISINOPRIL 2.5 MG/1
2.5 TABLET ORAL DAILY
Qty: 90 TABLET | Refills: 3 | Status: SHIPPED | OUTPATIENT
Start: 2024-07-05

## 2024-07-05 RX ORDER — METOPROLOL TARTRATE 50 MG/1
50 TABLET, FILM COATED ORAL EVERY 12 HOURS SCHEDULED
Qty: 180 TABLET | Refills: 3 | Status: SHIPPED | OUTPATIENT
Start: 2024-07-05

## 2024-07-16 RX ORDER — ATORVASTATIN CALCIUM 40 MG/1
40 TABLET, FILM COATED ORAL DAILY
Qty: 30 TABLET | Refills: 0 | Status: SHIPPED | OUTPATIENT
Start: 2024-07-16

## 2024-08-13 ENCOUNTER — OFFICE VISIT (OUTPATIENT)
Dept: CARDIOLOGY | Facility: CLINIC | Age: 64
End: 2024-08-13
Payer: MEDICARE

## 2024-08-13 VITALS
HEIGHT: 70 IN | DIASTOLIC BLOOD PRESSURE: 82 MMHG | HEART RATE: 71 BPM | SYSTOLIC BLOOD PRESSURE: 140 MMHG | OXYGEN SATURATION: 99 % | BODY MASS INDEX: 30.64 KG/M2 | WEIGHT: 214 LBS

## 2024-08-13 DIAGNOSIS — I25.110 CORONARY ARTERY DISEASE INVOLVING NATIVE CORONARY ARTERY OF NATIVE HEART WITH UNSTABLE ANGINA PECTORIS: ICD-10-CM

## 2024-08-13 DIAGNOSIS — Z72.0 TOBACCO ABUSE: ICD-10-CM

## 2024-08-13 DIAGNOSIS — I10 ESSENTIAL HYPERTENSION: ICD-10-CM

## 2024-08-13 DIAGNOSIS — J43.9 PULMONARY EMPHYSEMA, UNSPECIFIED EMPHYSEMA TYPE: Primary | ICD-10-CM

## 2024-08-13 DIAGNOSIS — I73.9 PERIPHERAL ARTERY DISEASE: ICD-10-CM

## 2024-08-13 DIAGNOSIS — I20.89 STABLE ANGINA PECTORIS: ICD-10-CM

## 2024-08-13 DIAGNOSIS — E78.2 MIXED HYPERLIPIDEMIA: ICD-10-CM

## 2024-08-13 DIAGNOSIS — I70.213 ATHEROSCLEROSIS OF NATIVE ARTERY OF BOTH LOWER EXTREMITIES WITH INTERMITTENT CLAUDICATION: ICD-10-CM

## 2024-08-13 DIAGNOSIS — R06.09 DYSPNEA ON EXERTION: ICD-10-CM

## 2024-08-13 RX ORDER — LISINOPRIL 2.5 MG/1
2.5 TABLET ORAL DAILY
Qty: 90 TABLET | Refills: 3 | Status: SHIPPED | OUTPATIENT
Start: 2024-08-13

## 2024-08-13 RX ORDER — ATORVASTATIN CALCIUM 40 MG/1
40 TABLET, FILM COATED ORAL DAILY
Qty: 90 TABLET | Refills: 3 | Status: SHIPPED | OUTPATIENT
Start: 2024-08-13

## 2024-08-13 RX ORDER — ASPIRIN 81 MG/1
81 TABLET ORAL DAILY
Qty: 90 TABLET | Refills: 3 | Status: SHIPPED | OUTPATIENT
Start: 2024-08-13

## 2024-08-13 RX ORDER — METOPROLOL TARTRATE 50 MG/1
50 TABLET, FILM COATED ORAL EVERY 12 HOURS SCHEDULED
Qty: 180 TABLET | Refills: 3 | Status: SHIPPED | OUTPATIENT
Start: 2024-08-13

## 2024-08-13 RX ORDER — CLOPIDOGREL BISULFATE 75 MG/1
75 TABLET ORAL DAILY
Qty: 90 TABLET | Refills: 3 | Status: SHIPPED | OUTPATIENT
Start: 2024-08-13

## 2024-08-13 RX ORDER — CILOSTAZOL 100 MG/1
100 TABLET ORAL 2 TIMES DAILY
Qty: 180 TABLET | Refills: 3 | Status: SHIPPED | OUTPATIENT
Start: 2024-08-13

## 2024-08-13 NOTE — PROGRESS NOTES
Subjective:     Encounter Date:08/13/2024      Patient ID: Tree Shaffer is a 64 y.o. male.    Chief Complaint:  History of Present Illness  History of Present Illness  The patient presents for evaluation of multiple medical concerns.    Over the past year, he has been in good health. However, during the summer, he spent most of his time indoors, often spending time outdoors and mowing his lawn due to the heat. He tries to limit his exposure to hot weather to prevent a heat stroke. He occasionally experiences minor discomfort, but denies any fluttering. If he turns quickly, he experiences a cramping sensation, although he is unsure if it is muscular or gas-related. He acknowledges fluid retention in both legs, stomach, and at the site of a vein procedure in 2022. He underwent four heart bypasses on 05/10/2021, during which the back of his heart was not functioning properly. Stents were unable to be placed due to blockage. He has been mindful of his diet, including reducing his salt intake, since his heart surgery. He occasionally experiences chest pressure, but it is not as severe as it was four years ago. He suspects it may be due to insufficient exercise. He has been staying indoors for the past two months due to excessive sweating and occasional shortness of breath. He does not currently see a pulmonologist. He is on Symbicort for his COPD. He coughs frequently, which he suspects may be due to a hole in his esophagus. He has noticed increased bruising, which he suspects may be due to his medication. He has not had a stent placed and has not been hospitalized since his surgery. He is currently on Plavix, aspirin, Lipitor, Pletal, and Lasix 80 mg twice daily, which effectively controls his fluid levels. He notes that his leg swelling increases in the afternoon, particularly in heat, walking, or working out. He also experiences stomach pain and sharp pain, which he suspects may be gas or a twitching  muscle. He occasionally experiences leg pain when walking. He has been checked twice for a blood clot, which was negative. He has a knot in his right leg, which he barely feels in his left leg. He can feel a knot in his calf when pressure is applied. He had surgery on his right knee to remove a bursa from his kneecap and clean it out. His right leg is more painful than his left, and he has vein stripping.    SOCIAL HISTORY  He smoked 2 pack of cigarettes a day for 20 years.    FAMILY HISTORY  His mother used to get blood clots a lot, but she had varicose veins.        Review of Systems   Constitutional: Positive for malaise/fatigue. Negative for diaphoresis and fever.   HENT:  Negative for congestion.    Eyes:  Negative for vision loss in left eye and vision loss in right eye.   Cardiovascular:  Positive for chest pain. Negative for claudication, dyspnea on exertion, irregular heartbeat, leg swelling, orthopnea, palpitations and syncope.   Respiratory:  Positive for shortness of breath. Negative for cough and wheezing.    Hematologic/Lymphatic: Negative for adenopathy.   Skin:  Negative for rash.   Musculoskeletal:  Negative for joint pain and joint swelling.   Gastrointestinal:  Negative for abdominal pain, diarrhea, nausea and vomiting.   Neurological:  Negative for excessive daytime sleepiness, dizziness, focal weakness, light-headedness, numbness and weakness.   Psychiatric/Behavioral:  Negative for depression. The patient does not have insomnia.            Current Outpatient Medications:     aspirin (Aspirin Low Dose) 81 MG EC tablet, Take 1 tablet by mouth Daily., Disp: 90 tablet, Rfl: 3    atorvastatin (LIPITOR) 40 MG tablet, Take 1 tablet by mouth Daily., Disp: 90 tablet, Rfl: 3    cilostazol (PLETAL) 100 MG tablet, Take 1 tablet by mouth 2 (Two) Times a Day., Disp: 180 tablet, Rfl: 3    clopidogrel (PLAVIX) 75 MG tablet, Take 1 tablet by mouth Daily., Disp: 90 tablet, Rfl: 3    lisinopril (PRINIVIL,ZESTRIL)  2.5 MG tablet, Take 1 tablet by mouth Daily., Disp: 90 tablet, Rfl: 3    metoprolol tartrate (LOPRESSOR) 50 MG tablet, Take 1 tablet by mouth Every 12 (Twelve) Hours., Disp: 180 tablet, Rfl: 3    cyclobenzaprine (FLEXERIL) 5 MG tablet, Take 1 tablet by mouth 3 (Three) Times a Day As Needed for Muscle Spasms., Disp: 15 tablet, Rfl: 0    furosemide (LASIX) 20 MG tablet, Take 2 tablets by mouth 2 (Two) Times a Day., Disp: 180 tablet, Rfl: 3    furosemide (LASIX) 40 MG tablet, , Disp: , Rfl:     pantoprazole (PROTONIX) 40 MG EC tablet, Take 1 tablet by mouth Daily., Disp: 30 tablet, Rfl: 0    simethicone (Gas-X) 80 MG chewable tablet, Chew 1 tablet Every 6 (Six) Hours As Needed for Flatulence., Disp: 20 tablet, Rfl: 0    SYMBICORT 160-4.5 MCG/ACT inhaler, Inhale 2 puffs 2 (Two) Times a Day., Disp: , Rfl: 2       Objective:      Vitals:    08/13/24 1112   BP: 140/82   Pulse: 71   SpO2: 99%     Vitals and nursing note reviewed.   Constitutional:       Appearance: Normal and healthy appearance. Well-developed and not in distress.   Eyes:      Extraocular Movements: Extraocular movements intact.      Pupils: Pupils are equal, round, and reactive to light.   HENT:      Head: Normocephalic and atraumatic.    Mouth/Throat:      Pharynx: Oropharynx is clear.   Neck:      Vascular: JVD normal.      Trachea: Trachea normal.   Pulmonary:      Effort: Pulmonary effort is normal.      Breath sounds: Normal breath sounds. No wheezing. No rhonchi. No rales.   Cardiovascular:      PMI at left midclavicular line. Normal rate. Regular rhythm. Normal S1. Normal S2.       Murmurs: There is a grade 2/6 systolic murmur.      No gallop.  No click. No rub.   Pulses:     Dorsalis pedis: 2+ bilaterally.     Posterior tibial: 2+ bilaterally.  Abdominal:      General: Bowel sounds are normal.      Palpations: Abdomen is soft.      Tenderness: There is no abdominal tenderness.   Musculoskeletal: Normal range of motion.      Cervical back: Normal  range of motion and neck supple. Skin:     General: Skin is warm and dry.      Capillary Refill: Capillary refill takes less than 2 seconds.   Feet:      Right foot:      Skin integrity: Skin integrity normal.      Left foot:      Skin integrity: Skin integrity normal.   Neurological:      Mental Status: Alert and oriented to person, place and time.      Sensory: Sensation is intact.      Motor: Motor function is intact.      Coordination: Coordination is intact.   Psychiatric:         Speech: Speech normal.         Behavior: Behavior is cooperative.       Physical Exam      Lab Review:         ECG 12 Lead    Date/Time: 8/13/2024 4:04 PM  Performed by: Go Baer DO    Authorized by: Go Baer DO  Comparison: compared with previous ECG   Similar to previous ECG  Rhythm: sinus rhythm  Rate: normal  Conduction: incomplete right bundle branch block  QRS axis: normal    Clinical impression: abnormal EKG        Results      Assessment/Plan:     Problem List Items Addressed This Visit       Stable angina pectoris    Relevant Medications    metoprolol tartrate (LOPRESSOR) 50 MG tablet    clopidogrel (PLAVIX) 75 MG tablet    cilostazol (PLETAL) 100 MG tablet    Other Relevant Orders    Stress Test With Myocardial Perfusion (1 Day)    Tobacco abuse    Essential hypertension    Relevant Medications    metoprolol tartrate (LOPRESSOR) 50 MG tablet    lisinopril (PRINIVIL,ZESTRIL) 2.5 MG tablet    Coronary artery disease involving native coronary artery of native heart with unstable angina pectoris    Overview     Added automatically from request for surgery [id number]         Relevant Medications    metoprolol tartrate (LOPRESSOR) 50 MG tablet    clopidogrel (PLAVIX) 75 MG tablet    cilostazol (PLETAL) 100 MG tablet    Atherosclerosis of native artery of both lower extremities with intermittent claudication    Peripheral artery disease    COPD (chronic obstructive pulmonary disease) - Primary     Relevant Orders    Ambulatory Referral to Pulmonology    Dyspnea on exertion    Mixed hyperlipidemia    Relevant Medications    atorvastatin (LIPITOR) 40 MG tablet     Assessment & Plan  1. Stable angina.  His EKG results are satisfactory. A nuclear stress test will be performed to ensure the bypasses are functioning properly. The results will be communicated to him once available. He is advised to continue Plavix, Pletal, Lipitor, and aspirin. A refill for Lasix will be provided.    2. COPD.  A referral to a pulmonologist will be made.    Follow-up  A follow-up visit is scheduled for 1 year from now.      Recommendations/plans:    Transcribed from ambient dictation for Go Baer DO by Go Baer DO.  08/13/24   16:04 CDT    Patient or patient representative verbalized consent for the use of Ambient Listening during the visit with  Go Baer DO for chart documentation. 8/13/2024  16:05 CDT

## 2024-09-04 ENCOUNTER — OFFICE VISIT (OUTPATIENT)
Dept: PULMONOLOGY | Facility: CLINIC | Age: 64
End: 2024-09-04
Payer: MEDICARE

## 2024-09-04 VITALS
BODY MASS INDEX: 30.64 KG/M2 | OXYGEN SATURATION: 98 % | DIASTOLIC BLOOD PRESSURE: 80 MMHG | HEART RATE: 81 BPM | HEIGHT: 70 IN | SYSTOLIC BLOOD PRESSURE: 124 MMHG | WEIGHT: 214 LBS

## 2024-09-04 DIAGNOSIS — R06.09 CHRONIC DYSPNEA: ICD-10-CM

## 2024-09-04 DIAGNOSIS — Z87.891 FORMER SMOKER: Primary | ICD-10-CM

## 2024-09-04 DIAGNOSIS — R05.3 CHRONIC COUGH: ICD-10-CM

## 2024-09-04 DIAGNOSIS — J30.2 SEASONAL ALLERGIES: ICD-10-CM

## 2024-09-04 DIAGNOSIS — K21.9 GASTROESOPHAGEAL REFLUX DISEASE WITHOUT ESOPHAGITIS: ICD-10-CM

## 2024-09-04 DIAGNOSIS — J44.9 CHRONIC OBSTRUCTIVE PULMONARY DISEASE, UNSPECIFIED COPD TYPE: ICD-10-CM

## 2024-09-04 DIAGNOSIS — Z87.891 PERSONAL HISTORY OF TOBACCO USE, PRESENTING HAZARDS TO HEALTH: ICD-10-CM

## 2024-09-04 PROCEDURE — 1159F MED LIST DOCD IN RCRD: CPT | Performed by: INTERNAL MEDICINE

## 2024-09-04 PROCEDURE — 1160F RVW MEDS BY RX/DR IN RCRD: CPT | Performed by: INTERNAL MEDICINE

## 2024-09-04 PROCEDURE — 3079F DIAST BP 80-89 MM HG: CPT | Performed by: INTERNAL MEDICINE

## 2024-09-04 PROCEDURE — 3074F SYST BP LT 130 MM HG: CPT | Performed by: INTERNAL MEDICINE

## 2024-09-04 PROCEDURE — 99204 OFFICE O/P NEW MOD 45 MIN: CPT | Performed by: INTERNAL MEDICINE

## 2024-09-04 RX ORDER — ALBUTEROL SULFATE 90 UG/1
2 AEROSOL, METERED RESPIRATORY (INHALATION) EVERY 4 HOURS PRN
Qty: 6.7 G | Refills: 5 | Status: SHIPPED | OUTPATIENT
Start: 2024-09-04

## 2024-09-04 RX ORDER — FLUTICASONE PROPIONATE 50 MCG
2 SPRAY, SUSPENSION (ML) NASAL DAILY
Qty: 15.8 ML | Refills: 5 | Status: SHIPPED | OUTPATIENT
Start: 2024-09-04

## 2024-09-04 NOTE — LETTER
2024     Go Baer DO  [address]  [place] [address]    Patient: Tree Shaffer   YOB: 1960   Date of Visit: 2024     Dear Dr. Keyur DO:    Thank you for referring Tree Shaffer to me for evaluation. Below are the relevant portions of my assessment and plan of care.    If you have questions, please do not hesitate to call me. I look forward to following Tree along with you.         Sincerely,        Brently Jalil Baer DO        CC: ELIO Hubbard      Progress Notes:    Clinic Note - New Patient            NAME: Tree Shaffer  MRN: [id number]  AGE: 64 y.o.            : 1960  PRIMARY CARE PROVIDER: Madhu Andersen APRN               Reason for Visit:  Tree Shaffer presents to clinic today as a new patient for the evaluation of COPD.    History of Present Illness:  Mr. Shaffer is a 64-year-old male who presents to clinic today as a new patient for evaluation of COPD.  Past medical history includes known COPD, former smoker, GERD, CAD status post bypass, hypertension, hyperlipidemia.    Patient was referred by his cardiologist for evaluation of COPD.  Patient states he was diagnosed with COPD over 20 years ago by Dr. Burr.  However he has not been following with a pulmonologist for some time.  Currently his COPD is managed with Symbicort and as needed albuterol.  He admits that he has been out of his inhalers for over 3 months.  When he switched insurance his current insurance no longer covered Symbicort.  The patient complains of chronic dyspnea, cough, wheezing.  Notes that his dyspnea may have been worsening since running out of his inhaler.  He does require more strenuous exercise to become short of breath.  States he is able to perform his ADLs.  He is able to go up and down flights of stairs, though if he goes up many flights he will become dyspneic.  Notes a daily cough which is productive of white  phlegm.  Occasional wheezing.    Patient is a former smoker who quit in 2021 after his bypass.  He is approximate 80-pack-year history.  He also admits to occasional marijuana vaping.  He was diagnosed with COPD many years ago.  He has never required supplemental oxygen at home.  Denies any personal or family history of lung cancer.  States that his mother was diagnosed with VTE, denies any personal history of VTE.    Review of Systems:  A full 12-point review of systems was performed and was negative except as documented in the HPI.               Social History:  Smoking: Quit in 2021, 80-pack-year history.  Currently does use marijuana vaping  Occupation: [employer]  No history of exposure to industrial dusts, fumes, or asbestos.  Pets: Dogs and cats, no allergies  Recent travel: None recent            Physical Exam:  General: No acute distress, cooperative.  Head: Atraumatic, normocephalic, normal inspection.  Eyes: EOMI, normal inspection.  ENT: Mucous membranes moist, normal inspection. No thrush.  Heart: RRR, no murmur  Lungs: Clear to auscultation bilaterally, good air movement  MSK: No edema or clubbing  GI/abdominal: Soft, nontender.  Neurologic: Alert, oriented.  Cranial nerves II through XII grossly intact.      Imaging Review:  I personally reviewed the chest x-rays done in 2021:  Patient had bypass in 2021, chest x-rays showed postop changes with chest tubes.  In June, a month after surgery, chest x-ray showed resolution of the small pneumothorax and no acute findings.      Pulmonary Functions Testing Results:              Problem List:  Problem List Items Addressed This Visit          Gastrointestinal Abdominal     GERD (gastroesophageal reflux disease)    Relevant Orders    Alpha - 1 - Antitrypsin       Pulmonary and Pneumonias    COPD (chronic obstructive pulmonary disease)    Relevant Medications    Fluticasone-Umeclidin-Vilant (TRELEGY) 100-62.5-25 MCG/ACT inhaler    tiotropium bromide-olodaterol  (STIOLTO RESPIMAT) 2.5-2.5 MCG/ACT aerosol solution inhaler    albuterol sulfate  (90 Base) MCG/ACT inhaler    fluticasone (FLONASE) 50 MCG/ACT nasal spray    Other Relevant Orders    CT chest low dose wo    Alpha - 1 - Antitrypsin    Spirometry with Diffusion Capacity & Lung Volumes     Other Visit Diagnoses       Former smoker    -  Primary    Relevant Orders    CT chest low dose wo    Alpha - 1 - Antitrypsin    Spirometry with Diffusion Capacity & Lung Volumes    Chronic cough        Relevant Orders    CT chest low dose wo    Alpha - 1 - Antitrypsin    Spirometry with Diffusion Capacity & Lung Volumes    Chronic dyspnea        Relevant Orders    CT chest low dose wo    Alpha - 1 - Antitrypsin    Seasonal allergies        Relevant Orders    Alpha - 1 - Antitrypsin    Personal history of tobacco use, presenting hazards to health        Relevant Orders    CT chest low dose wo    Alpha - 1 - Antitrypsin              Pulmonary Assessment:  Patient is a 64-year-old male who presents to clinic today as a new patient for evaluation of COPD.  Diagnosis COPD over 20 years ago.  Currently untreated due to insurance reasons.  I would like to start him on a LABA/LAMA, however unsure of which 1 will be covered by his insurance.  In the meantime we will give him a sample of Trelegy 100 and send a prescription for Stiolto.  I instructed the patient and his wife that his insurance does not cover Stiolto to let the clinic know and we will try different LABA/LAMA.  I informed the patient and his wife if the pharmacy is able to fill the Stiolto, he can finish the new Trelegy sample prior to starting the Stiolto.  They voiced understanding.  Refill his as needed albuterol.  He qualifies for low-dose CT.  Alpha-1 antitrypsin screen.  Clinic PFTs on return.  Flonase for symptomatic relief.  RSV vaccine prescription.      Plan:   -Trelegy 100 sample while we await LABA/LAMA approval at his pharmacy.  Sent a prescription for  Stiolto.  Will also refill his as needed albuterol  -LDCT screening  -Alpha-1 antitrypsin screen  -Clinic PFTs on return  -Flonase for nasal congestion  -Up-to-date on pneumonia vaccine, will send prescription for RSV.  He will obtain flu vaccine once it is available.  Not interested in COVID-vaccine           Follow Up:  6 weeks                    Past Medical History:   Past Medical History:   Diagnosis Date   • Anxiety    • Arthritis    • Back pain    • Bloating    • Bronchitis    • COPD (chronic obstructive pulmonary disease)    • Coronary artery disease    • DDD (degenerative disc disease), cervical    • Gallstones    • GERD (gastroesophageal reflux disease)    • Hemorrhoid    • Hypercholesteremia    • Hypertension    • IBS (irritable bowel syndrome)    • Muscle weakness (generalized)    • Neck pain    • PVD (peripheral vascular disease)    • Stress headache    • Vision loss          Past Surgical History:   Past Surgical History:   Procedure Laterality Date   • AORTAGRAM Right 09/29/2022    Procedure: AORTAGRAM, RIGHT LOWER EXTREMITY ANGIOGRAM, HAWK ATHRECTOMY, BALLOON ANGIOPLASTY, MYNX CLOSURE;  Surgeon: Leo Biggs MD;  Location:  [facility] HYBRID OR 12;  Service: Vascular;  Laterality: Right;   • CARDIAC CATHETERIZATION Right 05/07/2021    Procedure: Left Heart Cath R radial US guided w poss intervention;  Surgeon: Go Baer DO;  Location:  [facility] CATH INVASIVE LOCATION;  Service: Cardiology;  Laterality: Right;   • CHOLECYSTECTOMY     • CHOLECYSTECTOMY WITH INTRAOPERATIVE CHOLANGIOGRAM N/A 09/10/2019    Procedure: LAPAROSCOPIC CHOLECYSTECTOMY WITH INTRAOPERATIVE, CHOLANGIOGRAM;  Surgeon: Leon Pulido MD;  Location:  [facility] OR;  Service: General   • CORONARY ARTERY BYPASS GRAFT N/A 05/10/2021    Procedure: CORONARY ARTERY BYPASS GRAFTING x 4, lima, endoscopic vein harvest right leg, debra;  Surgeon: Alex Menendez MD;  Location:  [facility] OR;  Service: Cardiothoracic;  Laterality:  N/A;   • CYST REMOVAL      BACK    • HYDROCELECTOMY Left    • KNEE SURGERY Left    • NECK SURGERY           Family History:   Family History   Problem Relation Age of Onset   • COPD Mother    • Cancer Mother         femal type   • Neuromuscular disorder Father         Lizzy Gehrig's Disease         Medications:   Prior to Admission medications    Medication Sig Start Date End Date Taking? Authorizing Provider   aspirin (Aspirin Low Dose) 81 MG EC tablet Take 1 tablet by mouth Daily. 8/13/24   Go Baer DO   atorvastatin (LIPITOR) 40 MG tablet Take 1 tablet by mouth Daily. 8/13/24   Go Baer DO   cilostazol (PLETAL) 100 MG tablet Take 1 tablet by mouth 2 (Two) Times a Day. 8/13/24   Go Baer DO   clopidogrel (PLAVIX) 75 MG tablet Take 1 tablet by mouth Daily. 8/13/24   Go Baer DO   cyclobenzaprine (FLEXERIL) 5 MG tablet Take 1 tablet by mouth 3 (Three) Times a Day As Needed for Muscle Spasms. 7/7/23   Yarelis Christie APRN   furosemide (LASIX) 20 MG tablet Take 2 tablets by mouth 2 (Two) Times a Day. 2/5/24   Go Baer DO   furosemide (LASIX) 40 MG tablet  4/18/23   Clarence Kaur MD   lisinopril (PRINIVIL,ZESTRIL) 2.5 MG tablet Take 1 tablet by mouth Daily. 8/13/24   Go Baer DO   metoprolol tartrate (LOPRESSOR) 50 MG tablet Take 1 tablet by mouth Every 12 (Twelve) Hours. 8/13/24   Go Baer DO   pantoprazole (PROTONIX) 40 MG EC tablet Take 1 tablet by mouth Daily. 5/15/21   Stefan Sibley MD   simethicone (Gas-X) 80 MG chewable tablet Chew 1 tablet Every 6 (Six) Hours As Needed for Flatulence. 5/12/23   Yarelis Christie APRN   SYMBICORT 160-4.5 MCG/ACT inhaler Inhale 2 puffs 2 (Two) Times a Day. 6/8/19   Clarence Kaur MD         Allergies:   Codeine and Penicillins      Results Review:             There were no vitals taken for this visit.               Inessa Baer,  DO  Pulmonary/Critical Care  9/4/2024  10:34 CDT

## 2024-09-04 NOTE — PROGRESS NOTES
Clinic Note - New Patient            NAME: Tree Shaffer  MRN: [id number]  AGE: 64 y.o.            : 1960  PRIMARY CARE PROVIDER: Madhu Andersen APRN               Reason for Visit:  Tree Shaffer presents to clinic today as a new patient for the evaluation of COPD.    History of Present Illness:  Mr. Shaffer is a 64-year-old male who presents to clinic today as a new patient for evaluation of COPD.  Past medical history includes known COPD, former smoker, GERD, CAD status post bypass, hypertension, hyperlipidemia.    Patient was referred by his cardiologist for evaluation of COPD.  Patient states he was diagnosed with COPD over 20 years ago by Dr. Burr.  However he has not been following with a pulmonologist for some time.  Currently his COPD is managed with Symbicort and as needed albuterol.  He admits that he has been out of his inhalers for over 3 months.  When he switched insurance his current insurance no longer covered Symbicort.  The patient complains of chronic dyspnea, cough, wheezing.  Notes that his dyspnea may have been worsening since running out of his inhaler.  He does require more strenuous exercise to become short of breath.  States he is able to perform his ADLs.  He is able to go up and down flights of stairs, though if he goes up many flights he will become dyspneic.  Notes a daily cough which is productive of white phlegm.  Occasional wheezing.    Patient is a former smoker who quit in  after his bypass.  He is approximate 80-pack-year history.  He also admits to occasional marijuana vaping.  He was diagnosed with COPD many years ago.  He has never required supplemental oxygen at home.  Denies any personal or family history of lung cancer.  States that his mother was diagnosed with VTE, denies any personal history of VTE.    Review of Systems:  A full 12-point review of systems was performed and was negative except as documented in the HPI.               Social  History:  Smoking: Quit in 2021, 80-pack-year history.  Currently does use marijuana vaping  Occupation: [employer]  No history of exposure to industrial dusts, fumes, or asbestos.  Pets: Dogs and cats, no allergies  Recent travel: None recent            Physical Exam:  General: No acute distress, cooperative.  Head: Atraumatic, normocephalic, normal inspection.  Eyes: EOMI, normal inspection.  ENT: Mucous membranes moist, normal inspection. No thrush.  Heart: RRR, no murmur  Lungs: Clear to auscultation bilaterally, good air movement  MSK: No edema or clubbing  GI/abdominal: Soft, nontender.  Neurologic: Alert, oriented.  Cranial nerves II through XII grossly intact.      Imaging Review:  I personally reviewed the chest x-rays done in 2021:  Patient had bypass in 2021, chest x-rays showed postop changes with chest tubes.  In June, a month after surgery, chest x-ray showed resolution of the small pneumothorax and no acute findings.      Pulmonary Functions Testing Results:              Problem List:  Problem List Items Addressed This Visit          Gastrointestinal Abdominal     GERD (gastroesophageal reflux disease)    Relevant Orders    Alpha - 1 - Antitrypsin       Pulmonary and Pneumonias    COPD (chronic obstructive pulmonary disease)    Relevant Medications    Fluticasone-Umeclidin-Vilant (TRELEGY) 100-62.5-25 MCG/ACT inhaler    tiotropium bromide-olodaterol (STIOLTO RESPIMAT) 2.5-2.5 MCG/ACT aerosol solution inhaler    albuterol sulfate  (90 Base) MCG/ACT inhaler    fluticasone (FLONASE) 50 MCG/ACT nasal spray    Other Relevant Orders    CT chest low dose wo    Alpha - 1 - Antitrypsin    Spirometry with Diffusion Capacity & Lung Volumes     Other Visit Diagnoses       Former smoker    -  Primary    Relevant Orders    CT chest low dose wo    Alpha - 1 - Antitrypsin    Spirometry with Diffusion Capacity & Lung Volumes    Chronic cough        Relevant Orders    CT chest low dose wo    Alpha - 1 -  Antitrypsin    Spirometry with Diffusion Capacity & Lung Volumes    Chronic dyspnea        Relevant Orders    CT chest low dose wo    Alpha - 1 - Antitrypsin    Seasonal allergies        Relevant Orders    Alpha - 1 - Antitrypsin    Personal history of tobacco use, presenting hazards to health        Relevant Orders    CT chest low dose wo    Alpha - 1 - Antitrypsin              Pulmonary Assessment:  Patient is a 64-year-old male who presents to clinic today as a new patient for evaluation of COPD.  Diagnosis COPD over 20 years ago.  Currently untreated due to insurance reasons.  I would like to start him on a LABA/LAMA, however unsure of which 1 will be covered by his insurance.  In the meantime we will give him a sample of Trelegy 100 and send a prescription for Stiolto.  I instructed the patient and his wife that his insurance does not cover Stiolto to let the clinic know and we will try different LABA/LAMA.  I informed the patient and his wife if the pharmacy is able to fill the Stiolto, he can finish the new Trelegy sample prior to starting the Stiolto.  They voiced understanding.  Refill his as needed albuterol.  He qualifies for low-dose CT.  Alpha-1 antitrypsin screen.  Clinic PFTs on return.  Flonase for symptomatic relief.  RSV vaccine prescription.      Plan:   -Trelegy 100 sample while we await LABA/LAMA approval at his pharmacy.  Sent a prescription for Stiolto.  Will also refill his as needed albuterol  -LDCT screening  -Alpha-1 antitrypsin screen  -Clinic PFTs on return  -Flonase for nasal congestion  -Up-to-date on pneumonia vaccine, will send prescription for RSV.  He will obtain flu vaccine once it is available.  Not interested in COVID-vaccine           Follow Up:  6 weeks                    Past Medical History:   Past Medical History:   Diagnosis Date    Anxiety     Arthritis     Back pain     Bloating     Bronchitis     COPD (chronic obstructive pulmonary disease)     Coronary artery disease      DDD (degenerative disc disease), cervical     Gallstones     GERD (gastroesophageal reflux disease)     Hemorrhoid     Hypercholesteremia     Hypertension     IBS (irritable bowel syndrome)     Muscle weakness (generalized)     Neck pain     PVD (peripheral vascular disease)     Stress headache     Vision loss          Past Surgical History:   Past Surgical History:   Procedure Laterality Date    AORTAGRAM Right 09/29/2022    Procedure: AORTAGRAM, RIGHT LOWER EXTREMITY ANGIOGRAM, HAWK ATHRECTOMY, BALLOON ANGIOPLASTY, MYNX CLOSURE;  Surgeon: Leo Biggs MD;  Location:  [facility];  Service: Vascular;  Laterality: Right;    CARDIAC CATHETERIZATION Right 05/07/2021    Procedure: Left Heart Cath R radial US guided w poss intervention;  Surgeon: Go Baer DO;  Location:  [facility];  Service: Cardiology;  Laterality: Right;    CHOLECYSTECTOMY      CHOLECYSTECTOMY WITH INTRAOPERATIVE CHOLANGIOGRAM N/A 09/10/2019    Procedure: LAPAROSCOPIC CHOLECYSTECTOMY WITH INTRAOPERATIVE, CHOLANGIOGRAM;  Surgeon: Leon Pulido MD;  Location:  [facility] OR;  Service: General    CORONARY ARTERY BYPASS GRAFT N/A 05/10/2021    Procedure: CORONARY ARTERY BYPASS GRAFTING x 4, lima, endoscopic vein harvest right leg, debra;  Surgeon: Alex Menendez MD;  Location:  [facility] OR;  Service: Cardiothoracic;  Laterality: N/A;    CYST REMOVAL      BACK     HYDROCELECTOMY Left     KNEE SURGERY Left     NECK SURGERY           Family History:   Family History   Problem Relation Age of Onset    COPD Mother     Cancer Mother         femal type    Neuromuscular disorder Father         Lizzy Gehrig's Disease         Medications:   Prior to Admission medications    Medication Sig Start Date End Date Taking? Authorizing Provider   aspirin (Aspirin Low Dose) 81 MG EC tablet Take 1 tablet by mouth Daily. 8/13/24   Go Baer DO   atorvastatin (LIPITOR) 40 MG tablet Take 1 tablet by mouth  Daily. 8/13/24   Go Baer DO   cilostazol (PLETAL) 100 MG tablet Take 1 tablet by mouth 2 (Two) Times a Day. 8/13/24   Go Baer DO   clopidogrel (PLAVIX) 75 MG tablet Take 1 tablet by mouth Daily. 8/13/24   Go Baer DO   cyclobenzaprine (FLEXERIL) 5 MG tablet Take 1 tablet by mouth 3 (Three) Times a Day As Needed for Muscle Spasms. 7/7/23   Yarelis Christie APRN   furosemide (LASIX) 20 MG tablet Take 2 tablets by mouth 2 (Two) Times a Day. 2/5/24   Go Baer DO   furosemide (LASIX) 40 MG tablet  4/18/23   ProviderClarence MD   lisinopril (PRINIVIL,ZESTRIL) 2.5 MG tablet Take 1 tablet by mouth Daily. 8/13/24   Go Baer DO   metoprolol tartrate (LOPRESSOR) 50 MG tablet Take 1 tablet by mouth Every 12 (Twelve) Hours. 8/13/24   Go Baer DO   pantoprazole (PROTONIX) 40 MG EC tablet Take 1 tablet by mouth Daily. 5/15/21   Stefan Sibley MD   simethicone (Gas-X) 80 MG chewable tablet Chew 1 tablet Every 6 (Six) Hours As Needed for Flatulence. 5/12/23   Yarelis Christie APRN   SYMBICORT 160-4.5 MCG/ACT inhaler Inhale 2 puffs 2 (Two) Times a Day. 6/8/19   ProviderClarence MD         Allergies:   Codeine and Penicillins      Results Review:             There were no vitals taken for this visit.               Inessa Baer,   Pulmonary/Critical Care  9/4/2024  10:34 CDT

## 2024-09-12 ENCOUNTER — PATIENT ROUNDING (BHMG ONLY) (OUTPATIENT)
Dept: PULMONOLOGY | Facility: CLINIC | Age: 64
End: 2024-09-12
Payer: MEDICARE

## 2024-09-12 DIAGNOSIS — I10 ESSENTIAL (PRIMARY) HYPERTENSION: ICD-10-CM

## 2024-09-12 RX ORDER — FUROSEMIDE 40 MG
40 TABLET ORAL 2 TIMES DAILY
OUTPATIENT
Start: 2024-09-12

## 2024-09-12 NOTE — PROGRESS NOTES
September 12, 2024    Hello, may I speak with Tree Shaffer?    My name is Farrah Carrillo    I am  with [facility]  [address]  [facility] [address] [phone].    Before we get started may I verify your date of birth? 1960    I am calling to officially welcome you to our practice and ask about your recent visit. Is this a good time to talk? yes    Tell me about your visit with us. What things went well?  It was great.       We're always looking for ways to make our patients' experiences even better. Do you have recommendations on ways we may improve?  no    Overall were you satisfied with your first visit to our practice? yes       I appreciate you taking the time to speak with me today. Is there anything else I can do for you? no      Thank you, and have a great day.

## 2024-09-16 ENCOUNTER — HOSPITAL ENCOUNTER (OUTPATIENT)
Dept: CT IMAGING | Facility: HOSPITAL | Age: 64
Discharge: HOME | End: 2024-09-16
Admitting: INTERNAL MEDICINE
Payer: MEDICARE

## 2024-09-16 ENCOUNTER — TELEPHONE (OUTPATIENT)
Dept: PULMONOLOGY | Facility: CLINIC | Age: 64
End: 2024-09-16
Payer: MEDICARE

## 2024-09-16 DIAGNOSIS — Z87.891 PERSONAL HISTORY OF TOBACCO USE, PRESENTING HAZARDS TO HEALTH: ICD-10-CM

## 2024-09-16 DIAGNOSIS — R05.3 CHRONIC COUGH: ICD-10-CM

## 2024-09-16 DIAGNOSIS — R06.09 CHRONIC DYSPNEA: ICD-10-CM

## 2024-09-16 DIAGNOSIS — J30.2 SEASONAL ALLERGIES: ICD-10-CM

## 2024-09-16 DIAGNOSIS — J44.9 CHRONIC OBSTRUCTIVE PULMONARY DISEASE, UNSPECIFIED COPD TYPE: ICD-10-CM

## 2024-09-16 DIAGNOSIS — K21.9 GASTROESOPHAGEAL REFLUX DISEASE WITHOUT ESOPHAGITIS: ICD-10-CM

## 2024-09-16 DIAGNOSIS — Z87.891 FORMER SMOKER: ICD-10-CM

## 2024-09-16 PROCEDURE — 71271 CT THORAX LUNG CANCER SCR C-: CPT

## 2024-09-19 ENCOUNTER — HOSPITAL ENCOUNTER (OUTPATIENT)
Dept: CARDIOLOGY | Facility: HOSPITAL | Age: 64
Discharge: HOME | End: 2024-09-19
Payer: MEDICARE

## 2024-09-19 VITALS
HEIGHT: 70 IN | BODY MASS INDEX: 30.64 KG/M2 | SYSTOLIC BLOOD PRESSURE: 107 MMHG | DIASTOLIC BLOOD PRESSURE: 67 MMHG | HEART RATE: 60 BPM | WEIGHT: 214 LBS

## 2024-09-19 DIAGNOSIS — I20.89 STABLE ANGINA PECTORIS: ICD-10-CM

## 2024-09-19 LAB
BH CV REST NUCLEAR ISOTOPE DOSE: 10 MCI
BH CV STRESS BP STAGE 1: NORMAL
BH CV STRESS COMMENTS STAGE 1: NORMAL
BH CV STRESS DOSE REGADENOSON STAGE 1: 0.4
BH CV STRESS DURATION MIN STAGE 1: 0
BH CV STRESS DURATION SEC STAGE 1: 10
BH CV STRESS HR STAGE 1: 84
BH CV STRESS NUCLEAR ISOTOPE DOSE: 34.8 MCI
BH CV STRESS PROTOCOL 1: NORMAL
BH CV STRESS RECOVERY BP: NORMAL MMHG
BH CV STRESS RECOVERY HR: 77 BPM
BH CV STRESS STAGE 1: 1
MAXIMAL PREDICTED HEART RATE: 157 BPM
PERCENT MAX PREDICTED HR: 53.5 %
STRESS BASELINE BP: NORMAL MMHG
STRESS BASELINE HR: 60 BPM
STRESS PERCENT HR: 63 %
STRESS POST EXERCISE DUR SEC: 10 SEC
STRESS POST PEAK BP: NORMAL MMHG
STRESS POST PEAK HR: 84 BPM
STRESS TARGET HR: 133 BPM

## 2024-09-19 PROCEDURE — 93017 CV STRESS TEST TRACING ONLY: CPT

## 2024-09-19 PROCEDURE — 0 TECHNETIUM TETROFOSMIN KIT: Performed by: EMERGENCY MEDICINE

## 2024-09-19 PROCEDURE — A9502 TC99M TETROFOSMIN: HCPCS | Performed by: EMERGENCY MEDICINE

## 2024-09-19 PROCEDURE — 78452 HT MUSCLE IMAGE SPECT MULT: CPT

## 2024-09-19 PROCEDURE — 25010000002 REGADENOSON 0.4 MG/5ML SOLUTION: Performed by: EMERGENCY MEDICINE

## 2024-09-19 RX ORDER — REGADENOSON 0.08 MG/ML
0.4 INJECTION, SOLUTION INTRAVENOUS ONCE
Status: COMPLETED | OUTPATIENT
Start: 2024-09-19 | End: 2024-09-19

## 2024-09-19 RX ADMIN — REGADENOSON 0.4 MG: 0.08 INJECTION, SOLUTION INTRAVENOUS at 12:28

## 2024-09-19 RX ADMIN — TETROFOSMIN 1 DOSE: 1.38 INJECTION, POWDER, LYOPHILIZED, FOR SOLUTION INTRAVENOUS at 10:55

## 2024-09-19 RX ADMIN — TETROFOSMIN 1 DOSE: 1.38 INJECTION, POWDER, LYOPHILIZED, FOR SOLUTION INTRAVENOUS at 12:30

## 2024-09-20 DIAGNOSIS — R94.39 ABNORMAL NUCLEAR STRESS TEST: ICD-10-CM

## 2024-09-20 DIAGNOSIS — I25.702 CORONARY ARTERY DISEASE INVOLVING CORONARY BYPASS GRAFT OF NATIVE HEART WITH REFRACTORY ANGINA PECTORIS: Primary | ICD-10-CM

## 2024-09-25 ENCOUNTER — HOSPITAL ENCOUNTER (OUTPATIENT)
Dept: CARDIOLOGY | Facility: HOSPITAL | Age: 64
Setting detail: HOSPITAL OUTPATIENT SURGERY
Discharge: HOME | End: 2024-09-25
Admitting: EMERGENCY MEDICINE
Payer: MEDICARE

## 2024-09-25 LAB
ANION GAP SERPL CALCULATED.3IONS-SCNC: 12 MMOL/L (ref 5–15)
BUN SERPL-MCNC: 16 MG/DL (ref 8–23)
BUN/CREAT SERPL: 19.5 (ref 7–25)
CALCIUM SPEC-SCNC: 9.6 MG/DL (ref 8.6–10.5)
CHLORIDE SERPL-SCNC: 101 MMOL/L (ref 98–107)
CO2 SERPL-SCNC: 27 MMOL/L (ref 22–29)
CREAT SERPL-MCNC: 0.82 MG/DL (ref 0.76–1.27)
DEPRECATED RDW RBC AUTO: 40.7 FL (ref 37–54)
EGFRCR SERPLBLD CKD-EPI 2021: 98.7 ML/MIN/1.73
ERYTHROCYTE [DISTWIDTH] IN BLOOD BY AUTOMATED COUNT: 12.4 % (ref 12.3–15.4)
GLUCOSE SERPL-MCNC: 151 MG/DL (ref 65–99)
HCT VFR BLD AUTO: 49.1 % (ref 37.5–51)
HGB BLD-MCNC: 16.1 G/DL (ref 13–17.7)
MCH RBC QN AUTO: 29.7 PG (ref 26.6–33)
MCHC RBC AUTO-ENTMCNC: 32.8 G/DL (ref 31.5–35.7)
MCV RBC AUTO: 90.6 FL (ref 79–97)
PLATELET # BLD AUTO: 333 10*3/MM3 (ref 140–450)
PMV BLD AUTO: 11.5 FL (ref 6–12)
POTASSIUM SERPL-SCNC: 4.3 MMOL/L (ref 3.5–5.2)
RBC # BLD AUTO: 5.42 10*6/MM3 (ref 4.14–5.8)
SODIUM SERPL-SCNC: 140 MMOL/L (ref 136–145)
WBC NRBC COR # BLD AUTO: 7.65 10*3/MM3 (ref 3.4–10.8)

## 2024-09-25 PROCEDURE — 85027 COMPLETE CBC AUTOMATED: CPT | Performed by: EMERGENCY MEDICINE

## 2024-09-25 PROCEDURE — 80048 BASIC METABOLIC PNL TOTAL CA: CPT | Performed by: EMERGENCY MEDICINE

## 2024-09-27 ENCOUNTER — APPOINTMENT (OUTPATIENT)
Dept: CT IMAGING | Facility: HOSPITAL | Age: 64
End: 2024-09-27
Payer: MEDICARE

## 2024-09-27 ENCOUNTER — TELEPHONE (OUTPATIENT)
Dept: CARDIOLOGY | Facility: CLINIC | Age: 64
End: 2024-09-27

## 2024-09-27 ENCOUNTER — HOSPITAL ENCOUNTER (EMERGENCY)
Facility: HOSPITAL | Age: 64
Discharge: HOME | End: 2024-09-27
Attending: EMERGENCY MEDICINE
Payer: MEDICARE

## 2024-09-27 VITALS
TEMPERATURE: 98.1 F | OXYGEN SATURATION: 94 % | SYSTOLIC BLOOD PRESSURE: 139 MMHG | HEIGHT: 67 IN | DIASTOLIC BLOOD PRESSURE: 89 MMHG | RESPIRATION RATE: 18 BRPM | BODY MASS INDEX: 33.43 KG/M2 | HEART RATE: 73 BPM | WEIGHT: 212.96 LBS

## 2024-09-27 DIAGNOSIS — R10.9 ABDOMINAL PAIN, UNSPECIFIED ABDOMINAL LOCATION: Primary | ICD-10-CM

## 2024-09-27 DIAGNOSIS — I25.702 CORONARY ARTERY DISEASE INVOLVING CORONARY BYPASS GRAFT OF NATIVE HEART WITH REFRACTORY ANGINA PECTORIS: Primary | ICD-10-CM

## 2024-09-27 DIAGNOSIS — K40.20 BILATERAL INGUINAL HERNIA WITHOUT OBSTRUCTION OR GANGRENE, RECURRENCE NOT SPECIFIED: ICD-10-CM

## 2024-09-27 DIAGNOSIS — G25.81 RESTLESS LEG: ICD-10-CM

## 2024-09-27 DIAGNOSIS — K76.0 FATTY LIVER: ICD-10-CM

## 2024-09-27 DIAGNOSIS — N28.1 RENAL CYST: ICD-10-CM

## 2024-09-27 DIAGNOSIS — R94.39 ABNORMAL NUCLEAR STRESS TEST: ICD-10-CM

## 2024-09-27 LAB
ALBUMIN SERPL-MCNC: 4.7 G/DL (ref 3.5–5.2)
ALBUMIN/GLOB SERPL: 1.6 G/DL
ALP SERPL-CCNC: 126 U/L (ref 39–117)
ALT SERPL W P-5'-P-CCNC: 21 U/L (ref 1–41)
ANION GAP SERPL CALCULATED.3IONS-SCNC: 12 MMOL/L (ref 5–15)
AST SERPL-CCNC: 19 U/L (ref 1–40)
BASOPHILS # BLD MANUAL: 0.08 10*3/MM3 (ref 0–0.2)
BASOPHILS NFR BLD MANUAL: 1 % (ref 0–1.5)
BILIRUB SERPL-MCNC: 0.8 MG/DL (ref 0–1.2)
BUN SERPL-MCNC: 17 MG/DL (ref 8–23)
BUN/CREAT SERPL: 20.5 (ref 7–25)
CALCIUM SPEC-SCNC: 9.3 MG/DL (ref 8.6–10.5)
CHLORIDE SERPL-SCNC: 101 MMOL/L (ref 98–107)
CO2 SERPL-SCNC: 25 MMOL/L (ref 22–29)
CREAT SERPL-MCNC: 0.83 MG/DL (ref 0.76–1.27)
DEPRECATED RDW RBC AUTO: 40.2 FL (ref 37–54)
EGFRCR SERPLBLD CKD-EPI 2021: 98.3 ML/MIN/1.73
EOSINOPHIL # BLD MANUAL: 0.3 10*3/MM3 (ref 0–0.4)
EOSINOPHIL NFR BLD MANUAL: 4 % (ref 0.3–6.2)
ERYTHROCYTE [DISTWIDTH] IN BLOOD BY AUTOMATED COUNT: 12.3 % (ref 12.3–15.4)
GIANT PLATELETS: ABNORMAL
GLOBULIN UR ELPH-MCNC: 3 GM/DL
GLUCOSE SERPL-MCNC: 135 MG/DL (ref 65–99)
HCT VFR BLD AUTO: 48.1 % (ref 37.5–51)
HGB BLD-MCNC: 15.8 G/DL (ref 13–17.7)
LIPASE SERPL-CCNC: 38 U/L (ref 13–60)
LYMPHOCYTES # BLD MANUAL: 3.44 10*3/MM3 (ref 0.7–3.1)
LYMPHOCYTES NFR BLD MANUAL: 6.1 % (ref 5–12)
MAGNESIUM SERPL-MCNC: 2.2 MG/DL (ref 1.6–2.4)
MCH RBC QN AUTO: 29.4 PG (ref 26.6–33)
MCHC RBC AUTO-ENTMCNC: 32.8 G/DL (ref 31.5–35.7)
MCV RBC AUTO: 89.4 FL (ref 79–97)
MONOCYTES # BLD: 0.46 10*3/MM3 (ref 0.1–0.9)
NEUTROPHILS # BLD AUTO: 3.29 10*3/MM3 (ref 1.7–7)
NEUTROPHILS NFR BLD MANUAL: 43.4 % (ref 42.7–76)
PLATELET # BLD AUTO: 334 10*3/MM3 (ref 140–450)
PMV BLD AUTO: 11.5 FL (ref 6–12)
POIKILOCYTOSIS BLD QL SMEAR: ABNORMAL
POTASSIUM SERPL-SCNC: 4 MMOL/L (ref 3.5–5.2)
PROT SERPL-MCNC: 7.7 G/DL (ref 6–8.5)
RBC # BLD AUTO: 5.38 10*6/MM3 (ref 4.14–5.8)
SODIUM SERPL-SCNC: 138 MMOL/L (ref 136–145)
STOMATOCYTES BLD QL SMEAR: ABNORMAL
TARGETS BLD QL SMEAR: ABNORMAL
VARIANT LYMPHS NFR BLD MANUAL: 36.4 % (ref 19.6–45.3)
VARIANT LYMPHS NFR BLD MANUAL: 9.1 % (ref 0–5)
WBC MORPH BLD: NORMAL
WBC NRBC COR # BLD AUTO: 7.57 10*3/MM3 (ref 3.4–10.8)

## 2024-09-27 PROCEDURE — 85025 COMPLETE CBC W/AUTO DIFF WBC: CPT | Performed by: EMERGENCY MEDICINE

## 2024-09-27 PROCEDURE — 83735 ASSAY OF MAGNESIUM: CPT | Performed by: EMERGENCY MEDICINE

## 2024-09-27 PROCEDURE — 25510000001 IOPAMIDOL 61 % SOLUTION: Performed by: EMERGENCY MEDICINE

## 2024-09-27 PROCEDURE — 74177 CT ABD & PELVIS W/CONTRAST: CPT

## 2024-09-27 PROCEDURE — 99285 EMERGENCY DEPT VISIT HI MDM: CPT

## 2024-09-27 PROCEDURE — 85007 BL SMEAR W/DIFF WBC COUNT: CPT | Performed by: EMERGENCY MEDICINE

## 2024-09-27 PROCEDURE — 80053 COMPREHEN METABOLIC PANEL: CPT | Performed by: EMERGENCY MEDICINE

## 2024-09-27 PROCEDURE — 83690 ASSAY OF LIPASE: CPT | Performed by: EMERGENCY MEDICINE

## 2024-09-27 RX ORDER — IOPAMIDOL 612 MG/ML
100 INJECTION, SOLUTION INTRAVASCULAR
Status: COMPLETED | OUTPATIENT
Start: 2024-09-27 | End: 2024-09-27

## 2024-09-27 RX ADMIN — IOPAMIDOL 100 ML: 612 INJECTION, SOLUTION INTRAVENOUS at 11:32

## 2024-09-27 NOTE — ED PROVIDER NOTES
Subjective   History of Present Illness  Patient is a 61-year-old gentleman who was supposed to get a cardiac catheterization today started have abdominal pain and gas sensation he has been having some cramping in his lower extremities.  And came to the ED for evaluation sent by cardiology.    Abdominal Pain  Pain location:  Generalized  Pain quality: aching, bloating, cramping and throbbing    Pain radiation: Moves from one into the abdomen to the other.  Pain severity:  Mild  Onset quality:  Gradual  Progression:  Resolved  Chronicity:  New  Context: not alcohol use, not awakening from sleep, not diet changes, not previous surgeries, not recent illness, not sick contacts, not suspicious food intake and not trauma    Relieved by:  Nothing  Worsened by:  Nothing  Ineffective treatments:  None tried  Associated symptoms: no anorexia, no chest pain, no chills, no constipation, no fatigue, no fever, no flatus, no hematemesis, no hematuria, no nausea and no shortness of breath    Risk factors: no alcohol abuse, no aspirin use and no NSAID use        Review of Systems   Constitutional: Negative.  Negative for chills, fatigue and fever.   HENT: Negative.     Eyes: Negative.    Respiratory: Negative.  Negative for shortness of breath.    Cardiovascular: Negative.  Negative for chest pain.   Gastrointestinal:  Positive for abdominal pain. Negative for anorexia, constipation, flatus, hematemesis and nausea.   Endocrine: Negative.    Genitourinary: Negative.  Negative for hematuria.   Skin: Negative.    Neurological: Negative.    Hematological: Negative.    All other systems reviewed and are negative.      Past Medical History:   Diagnosis Date    Anxiety     Arthritis     Back pain     Bloating     Bronchitis     COPD (chronic obstructive pulmonary disease)     Coronary artery disease     DDD (degenerative disc disease), cervical     Gallstones     GERD (gastroesophageal reflux disease)     Hemorrhoid     Hypercholesteremia   "   Hypertension     IBS (irritable bowel syndrome)     Muscle weakness (generalized)     Neck pain     PVD (peripheral vascular disease)     Stress headache     Vision loss        Allergies   Allergen Reactions    Codeine Other (See Comments)     Pt states does not know     Penicillins Other (See Comments)     Pt reports he does not know what kind of reaction he has, states \"I'm highly allergic.\"       Past Surgical History:   Procedure Laterality Date    AORTAGRAM Right 09/29/2022    Procedure: AORTAGRAM, RIGHT LOWER EXTREMITY ANGIOGRAM, HAWK ATHRECTOMY, BALLOON ANGIOPLASTY, MYNX CLOSURE;  Surgeon: Leo Biggs MD;  Location:  [facility]BR[facility] OR 12;  Service: Vascular;  Laterality: Right;    CARDIAC CATHETERIZATION Right 05/07/2021    Procedure: Left Heart Cath R radial US guided w poss intervention;  Surgeon: Go Baer DO;  Location:  [facility] CATH [facility]AS[facility];  Service: Cardiology;  Laterality: Right;    CHOLECYSTECTOMY      CHOLECYSTECTOMY WITH INTRAOPERATIVE CHOLANGIOGRAM N/A 09/10/2019    Procedure: LAPAROSCOPIC CHOLECYSTECTOMY WITH INTRAOPERATIVE, CHOLANGIOGRAM;  Surgeon: Leon Pulido MD;  Location:  [facility] OR;  Service: General    CORONARY ARTERY BYPASS GRAFT N/A 05/10/2021    Procedure: CORONARY ARTERY BYPASS GRAFTING x 4, lima, endoscopic vein harvest right leg, debra;  Surgeon: Alex Menendez MD;  Location:  [facility] OR;  Service: Cardiothoracic;  Laterality: N/A;    CYST REMOVAL      BACK     HYDROCELECTOMY Left     KNEE SURGERY Left     NECK SURGERY         Family History   Problem Relation Age of Onset    COPD Mother     Cancer Mother         femal type    Neuromuscular disorder Father         Lizzy Gehrig's Disease       Social History     Socioeconomic History    Marital status:    Tobacco Use    Smoking status: Former     Current packs/day: 0.00     Average packs/day: 2.0 packs/day for 20.0 years (40.0 ttl pk-yrs)     Types: Cigarettes     Start date: 5/10/2001     " Quit date: 5/10/2021     Years since quitting: 3.3    Smokeless tobacco: Never   Vaping Use    Vaping status: Never Used   Substance and Sexual Activity    Alcohol use: Yes     Comment: rarely drinks one    Drug use: Not Currently     Types: Marijuana     Comment: patient reports he had not smoked since CABG    Sexual activity: Yes     Partners: Female           Objective   Physical Exam  Vitals and nursing note reviewed. Exam conducted with a chaperone present.   Constitutional:       General: He is awake. He is not in acute distress.     Appearance: Normal appearance. He is well-developed. He is not toxic-appearing.   HENT:      Head: Normocephalic and atraumatic.      Nose:      Right Nostril: No epistaxis.      Left Nostril: No epistaxis.   Eyes:      General: Lids are normal. No scleral icterus.     Conjunctiva/sclera: Conjunctivae normal.      Pupils: Pupils are equal, round, and reactive to light.   Neck:      Vascular: No hepatojugular reflux or JVD.   Cardiovascular:      Rate and Rhythm: Normal rate and regular rhythm.      Chest Wall: PMI is not displaced.      Pulses: Normal pulses. No decreased pulses.      Heart sounds: Normal heart sounds. No murmur heard.  Pulmonary:      Effort: Pulmonary effort is normal. No accessory muscle usage or respiratory distress.      Breath sounds: Normal breath sounds. No decreased breath sounds or wheezing.   Abdominal:      General: Abdomen is flat. Bowel sounds are normal. There is no distension or abdominal bruit.      Palpations: Abdomen is soft. There is no shifting dullness, fluid wave, mass or pulsatile mass.      Tenderness: There is no abdominal tenderness. There is no right CVA tenderness, left CVA tenderness, guarding or rebound.      Hernia: No hernia is present.   Musculoskeletal:         General: Normal range of motion.      Cervical back: Normal range of motion and neck supple. No rigidity.      Right lower leg: No edema.      Left lower leg: No edema.    Skin:     General: Skin is warm and dry.      Capillary Refill: Capillary refill takes less than 2 seconds.      Coloration: Skin is not cyanotic, jaundiced, mottled or pale.   Neurological:      General: No focal deficit present.      Mental Status: He is alert and oriented to person, place, and time. Mental status is at baseline.      GCS: GCS eye subscore is 4. GCS verbal subscore is 5. GCS motor subscore is 6.      Cranial Nerves: No cranial nerve deficit.      Sensory: Sensation is intact.      Motor: Motor function is intact.      Deep Tendon Reflexes: Reflexes are normal and symmetric.   Psychiatric:         Behavior: Behavior normal. Behavior is cooperative.         Procedures           ED Course  ED Course as of 09/27/24 1248   Fri Sep 27, 2024   1212 No acute abnormality identified.  2. Left renal cyst and fatty infiltration of the liver.  3. Small inguinal hernias containing fat.  This was discussed with the patient [TS]   1244 Patient's workup is essentially unremarkable.  CT of the abdomen shows renal cyst fatty infiltration of liver and inguinal hernias nothing else acute.  I have discussed this case at length with the patient repeat examination patient patient awake alert oriented x 3 no distress.  No abdominal pain. [TS]   1244 He is talking about some spasm in his legs which appear to be restless legs out advised to follow-up with his primary MD repeat examination of his lower extremities reveals good pulses in the dorsalis pedis posterior tibial artery and also no tenderness to palpation of the calf.  He wants to know whether he could have peripheral vascular disease which is a possibility I have advised him follow-up with Dr. Flores. [TS]      ED Course User Index  [TS] Catracho Mae MD                                             Medical Decision Making  DD   I considered gastric etiology, gastritis, gastroenteritis, peptic ulcer disease, gastroesophageal reflux disease, , gallbladder disease,  liver disease, pancreatic disease, biliary colic, cholecystitis, cholelithiasis, hepatitis, pancreatitis, cholangitis, porphyria and diabetic ketoacidosis as a possible cause of abdominal pain in this patient. This is a partial list of diagnoses considered.          Problems Addressed:  Abdominal pain, unspecified abdominal location: acute illness or injury  Bilateral inguinal hernia without obstruction or gangrene, recurrence not specified: undiagnosed new problem with uncertain prognosis  Fatty liver: undiagnosed new problem with uncertain prognosis  Renal cyst: undiagnosed new problem with uncertain prognosis  Restless leg: chronic illness or injury    Amount and/or Complexity of Data Reviewed  Labs: ordered.     Details: Labs reviewed  Radiology: ordered.    Risk  Prescription drug management.  Risk Details: This patient presents with abdominal pain arrived hemodynamically stable.  Presentation not consistent with other acute, emergent causes of abdominal pain at this time.  Low suspicion for dissection there is no widened mediastinum, hypotension, pulses deficits, and no pain tearing through to the back.  Low suspicion for nephrolithiasis without flank pain radiating to the groin, hematuria, or any history of kidney stones.  Low suspicion for viscus perforation without evidence of guarding, rebound, or rigidity that would be concerning for an acute abdomen.  Low concern for appendicitis as pain did not radiate from the umbilicus to the right lower quadrant, negative Rovsing's sign, no severe constipation on exam.  Low concern for cholecystitis with negative Gillette sign, no history of gallstones, and no recent pale stools or pain after eating.  Low concern for ulcerative disease as pain is not consistent with eating  foods and is not relieved with patient refraining from eatingand there is no hematemesis or melena. Low suspicion for GI bleeding as there is no melena hematemesis or hematochezia and patient's  hemodynamics are stable and hemoglobin is at its baseline.  Low suspicion for gastroenteritis without evidence of diarrhea, fevers, or recent uncooked food exposure.  There is low concern for ischemic bowel with no significant abdominal pain normal vitals and no acidosis no history of peripheral vascular disease or cardiac dysrhythmias.                Final diagnoses:   Abdominal pain, unspecified abdominal location   Restless leg   Renal cyst   Fatty liver   Bilateral inguinal hernia without obstruction or gangrene, recurrence not specified       ED Disposition  ED Disposition       ED Disposition   Discharge    Condition   Stable    Comment   --               Madhu Andersen, APRN  [address]  [facility] [address]  [phone]    Schedule an appointment as soon as possible for a visit       Emmanuel Flores, DO  [address]  [address]  [facility] [address]  [phone]    Schedule an appointment as soon as possible for a visit       Go Baer, DO  [address]  [address]  [facility] [address]  [phone]    Schedule an appointment as soon as possible for a visit            Medication List      No changes were made to your prescriptions during this visit.            Catracho Mae MD  09/27/24 1128       Catracho Mae MD  09/27/24 1248

## 2024-09-27 NOTE — DISCHARGE INSTRUCTIONS
It was very nice to meet you, for Tree. Thank you for allowing us to take care of you today at [facility].     Today you were seen in the emergency department for your symptoms. Please understand that an ER evaluation is just the start of your evaluation. We do the best we can, but we are often unable to fully find what is causing your symptoms from one evaluation.  Because of this, the goal is to determine whether you need to be evaluated in the hospital or if it is safe for you to go home and see other doctors provided such as primary care physicians or specialist on an outpatient basis.      Like we discussed, I strongly urge that you follow up with your primary care doctor. Please call their office to set up an appointment as soon as possible so that you can be re-evaluated for improvement in your symptoms or for any other questions.  I have provided the information needed, including phone number, to call to set up an appointment below in these discharge papers.      Educational material has also been provided in the following pages regarding what we have discussed today.  I have informed you about the phone numbers for vascular surgery follow-up if needed please return the ER for any worsening symptoms.     Please return to the emergency room within 12-48 hours if you experience symptoms such as the following:   Fever, chills, chest pain or shortness of breath, pain with inspiration/expiration, pain that travels to your arms, neck or back, nausea, vomiting, severe headache, tearing pain in your chest, dizziness, feel as though you are about to pass out, OR if you have any worsening symptoms, or any other concerns.

## 2024-09-27 NOTE — TELEPHONE ENCOUNTER
Caller: DEMETRICE    Relationship to patient: SELF    Best call back number: [phone]    Chief complaint:     Type of visit: HEART CATH    Requested date: ASAP    If rescheduling, when is the original appointment: 9.27.24    Additional notes: PATIENT WAS SCHEDULED FOR 9.27.24 AND WAS DELAYED DUE TO CHEST PRESSURE (GAS) AND PATIENT IS NEEDING TO GET THIS RESCHEDULED. PLEASE CALL PATIENT TO RESCHEDULE. THANK YOU!

## 2024-10-02 ENCOUNTER — TELEPHONE (OUTPATIENT)
Dept: PULMONOLOGY | Facility: CLINIC | Age: 64
End: 2024-10-02
Payer: MEDICARE

## 2024-10-02 DIAGNOSIS — J44.9 CHRONIC OBSTRUCTIVE PULMONARY DISEASE, UNSPECIFIED COPD TYPE: Primary | ICD-10-CM

## 2024-10-07 ENCOUNTER — HOSPITAL ENCOUNTER (OUTPATIENT)
Facility: HOSPITAL | Age: 64
Setting detail: HOSPITAL OUTPATIENT SURGERY
Discharge: HOME | End: 2024-10-07
Attending: EMERGENCY MEDICINE | Admitting: EMERGENCY MEDICINE
Payer: MEDICARE

## 2024-10-07 VITALS
BODY MASS INDEX: 30.78 KG/M2 | HEIGHT: 70 IN | RESPIRATION RATE: 16 BRPM | TEMPERATURE: 98.4 F | DIASTOLIC BLOOD PRESSURE: 73 MMHG | WEIGHT: 215 LBS | OXYGEN SATURATION: 97 % | HEART RATE: 54 BPM | SYSTOLIC BLOOD PRESSURE: 116 MMHG

## 2024-10-07 DIAGNOSIS — I25.702 CORONARY ARTERY DISEASE INVOLVING CORONARY BYPASS GRAFT OF NATIVE HEART WITH REFRACTORY ANGINA PECTORIS: ICD-10-CM

## 2024-10-07 DIAGNOSIS — R94.39 ABNORMAL NUCLEAR STRESS TEST: ICD-10-CM

## 2024-10-07 LAB
ANION GAP SERPL CALCULATED.3IONS-SCNC: 10 MMOL/L (ref 5–15)
BASOPHILS # BLD AUTO: 0.05 10*3/MM3 (ref 0–0.2)
BASOPHILS NFR BLD AUTO: 0.6 % (ref 0–1.5)
BUN SERPL-MCNC: 16 MG/DL (ref 8–23)
BUN/CREAT SERPL: 18.8 (ref 7–25)
CALCIUM SPEC-SCNC: 9.5 MG/DL (ref 8.6–10.5)
CHLORIDE SERPL-SCNC: 103 MMOL/L (ref 98–107)
CO2 SERPL-SCNC: 30 MMOL/L (ref 22–29)
CREAT SERPL-MCNC: 0.85 MG/DL (ref 0.76–1.27)
DEPRECATED RDW RBC AUTO: 41.1 FL (ref 37–54)
EGFRCR SERPLBLD CKD-EPI 2021: 97.6 ML/MIN/1.73
EOSINOPHIL # BLD AUTO: 0.26 10*3/MM3 (ref 0–0.4)
EOSINOPHIL NFR BLD AUTO: 3.2 % (ref 0.3–6.2)
ERYTHROCYTE [DISTWIDTH] IN BLOOD BY AUTOMATED COUNT: 12.1 % (ref 12.3–15.4)
GLUCOSE SERPL-MCNC: 125 MG/DL (ref 65–99)
HCT VFR BLD AUTO: 47.2 % (ref 37.5–51)
HGB BLD-MCNC: 15 G/DL (ref 13–17.7)
IMM GRANULOCYTES # BLD AUTO: 0.03 10*3/MM3 (ref 0–0.05)
IMM GRANULOCYTES NFR BLD AUTO: 0.4 % (ref 0–0.5)
LYMPHOCYTES # BLD AUTO: 3.05 10*3/MM3 (ref 0.7–3.1)
LYMPHOCYTES NFR BLD AUTO: 38.1 % (ref 19.6–45.3)
MCH RBC QN AUTO: 29.1 PG (ref 26.6–33)
MCHC RBC AUTO-ENTMCNC: 31.8 G/DL (ref 31.5–35.7)
MCV RBC AUTO: 91.7 FL (ref 79–97)
MONOCYTES # BLD AUTO: 0.72 10*3/MM3 (ref 0.1–0.9)
MONOCYTES NFR BLD AUTO: 9 % (ref 5–12)
NEUTROPHILS NFR BLD AUTO: 3.9 10*3/MM3 (ref 1.7–7)
NEUTROPHILS NFR BLD AUTO: 48.7 % (ref 42.7–76)
NRBC BLD AUTO-RTO: 0 /100 WBC (ref 0–0.2)
PLATELET # BLD AUTO: 302 10*3/MM3 (ref 140–450)
PMV BLD AUTO: 11.8 FL (ref 6–12)
POTASSIUM SERPL-SCNC: 3.8 MMOL/L (ref 3.5–5.2)
RBC # BLD AUTO: 5.15 10*6/MM3 (ref 4.14–5.8)
SODIUM SERPL-SCNC: 143 MMOL/L (ref 136–145)
WBC NRBC COR # BLD AUTO: 8.01 10*3/MM3 (ref 3.4–10.8)

## 2024-10-07 PROCEDURE — 99152 MOD SED SAME PHYS/QHP 5/>YRS: CPT | Performed by: EMERGENCY MEDICINE

## 2024-10-07 PROCEDURE — 99153 MOD SED SAME PHYS/QHP EA: CPT | Performed by: EMERGENCY MEDICINE

## 2024-10-07 PROCEDURE — 85025 COMPLETE CBC W/AUTO DIFF WBC: CPT | Performed by: EMERGENCY MEDICINE

## 2024-10-07 PROCEDURE — 25010000002 LIDOCAINE 2% SOLUTION: Performed by: EMERGENCY MEDICINE

## 2024-10-07 PROCEDURE — C1760 CLOSURE DEV, VASC: HCPCS

## 2024-10-07 PROCEDURE — 25010000002 HEPARIN (PORCINE) 2000-0.9 UNIT/L-% SOLUTION: Performed by: EMERGENCY MEDICINE

## 2024-10-07 PROCEDURE — C1894 INTRO/SHEATH, NON-LASER: HCPCS | Performed by: EMERGENCY MEDICINE

## 2024-10-07 PROCEDURE — S0260 H&P FOR SURGERY: HCPCS | Performed by: EMERGENCY MEDICINE

## 2024-10-07 PROCEDURE — 80048 BASIC METABOLIC PNL TOTAL CA: CPT | Performed by: EMERGENCY MEDICINE

## 2024-10-07 PROCEDURE — C1769 GUIDE WIRE: HCPCS | Performed by: EMERGENCY MEDICINE

## 2024-10-07 PROCEDURE — 25010000002 HEPARIN (PORCINE) 1000-0.9 UT/500ML-% SOLUTION: Performed by: EMERGENCY MEDICINE

## 2024-10-07 PROCEDURE — 25010000002 MIDAZOLAM HCL (PF) 5 MG/5ML SOLUTION: Performed by: EMERGENCY MEDICINE

## 2024-10-07 PROCEDURE — 25010000002 DIPHENHYDRAMINE PER 50 MG: Performed by: EMERGENCY MEDICINE

## 2024-10-07 PROCEDURE — 93459 L HRT ART/GRFT ANGIO: CPT | Performed by: EMERGENCY MEDICINE

## 2024-10-07 PROCEDURE — 25010000002 FENTANYL CITRATE (PF) 50 MCG/ML SOLUTION: Performed by: EMERGENCY MEDICINE

## 2024-10-07 PROCEDURE — 25510000001 IOPAMIDOL PER 1 ML: Performed by: EMERGENCY MEDICINE

## 2024-10-07 RX ORDER — DIPHENHYDRAMINE HYDROCHLORIDE 50 MG/ML
INJECTION INTRAMUSCULAR; INTRAVENOUS
Status: DISCONTINUED | OUTPATIENT
Start: 2024-10-07 | End: 2024-10-07 | Stop reason: HOSPADM

## 2024-10-07 RX ORDER — HEPARIN SODIUM 200 [USP'U]/100ML
INJECTION, SOLUTION INTRAVENOUS
Status: DISCONTINUED | OUTPATIENT
Start: 2024-10-07 | End: 2024-10-07 | Stop reason: HOSPADM

## 2024-10-07 RX ORDER — SODIUM CHLORIDE 0.9 % (FLUSH) 0.9 %
10 SYRINGE (ML) INJECTION EVERY 12 HOURS SCHEDULED
Status: DISCONTINUED | OUTPATIENT
Start: 2024-10-07 | End: 2024-10-07 | Stop reason: HOSPADM

## 2024-10-07 RX ORDER — SODIUM CHLORIDE 9 MG/ML
1 INJECTION, SOLUTION INTRAVENOUS CONTINUOUS
Status: DISCONTINUED | OUTPATIENT
Start: 2024-10-07 | End: 2024-10-07 | Stop reason: HOSPADM

## 2024-10-07 RX ORDER — RANOLAZINE 500 MG/1
500 TABLET, EXTENDED RELEASE ORAL 2 TIMES DAILY
Qty: 60 TABLET | Refills: 5 | Status: SHIPPED | OUTPATIENT
Start: 2024-10-07

## 2024-10-07 RX ORDER — IOPAMIDOL 755 MG/ML
INJECTION, SOLUTION INTRAVASCULAR
Status: DISCONTINUED | OUTPATIENT
Start: 2024-10-07 | End: 2024-10-07 | Stop reason: HOSPADM

## 2024-10-07 RX ORDER — FENTANYL CITRATE 50 UG/ML
INJECTION, SOLUTION INTRAMUSCULAR; INTRAVENOUS
Status: DISCONTINUED | OUTPATIENT
Start: 2024-10-07 | End: 2024-10-07 | Stop reason: HOSPADM

## 2024-10-07 RX ORDER — NITROGLYCERIN 0.4 MG/1
0.4 TABLET SUBLINGUAL
Status: DISCONTINUED | OUTPATIENT
Start: 2024-10-07 | End: 2024-10-07 | Stop reason: HOSPADM

## 2024-10-07 RX ORDER — ACETAMINOPHEN 325 MG/1
650 TABLET ORAL EVERY 4 HOURS PRN
Status: DISCONTINUED | OUTPATIENT
Start: 2024-10-07 | End: 2024-10-07 | Stop reason: HOSPADM

## 2024-10-07 RX ORDER — MIDAZOLAM HYDROCHLORIDE 5 MG/5ML
INJECTION, SOLUTION INTRAMUSCULAR; INTRAVENOUS
Status: DISCONTINUED | OUTPATIENT
Start: 2024-10-07 | End: 2024-10-07 | Stop reason: HOSPADM

## 2024-10-07 RX ORDER — SODIUM CHLORIDE 0.9 % (FLUSH) 0.9 %
10 SYRINGE (ML) INJECTION AS NEEDED
Status: DISCONTINUED | OUTPATIENT
Start: 2024-10-07 | End: 2024-10-07 | Stop reason: HOSPADM

## 2024-10-07 RX ORDER — LIDOCAINE HYDROCHLORIDE 20 MG/ML
INJECTION, SOLUTION INFILTRATION; PERINEURAL
Status: DISCONTINUED | OUTPATIENT
Start: 2024-10-07 | End: 2024-10-07 | Stop reason: HOSPADM

## 2024-10-07 NOTE — OP NOTE
[facility]  Date of procedure: 10/7/2024     Procedures performed:     1.  Access to the right femoral artery via modified Seldinger technique  2.  Left heart catheterization with retrograde crossing Revonto left ventricle  3.  LV ventriculography  4.  Selective bilateral coronary angiography  5.  LIMA to LAD angiography  6.  Saphenous vein graft to PDA angiography  7.  Saphenous vein graft to diagonal angiography  8.  Saphenous vein graft to obtuse marginal angiography, 100% occluded at the ostium  9.  Aortic root angiography  10.  Patent hemostasis achieved in the right femoral artery access site using 6 Romanian Angio-Seal closure device  11.  Selective right iliofemoral angiography  12.  Conscious sedation with continuous hemodynamic monitoring for 30 minutes        Risk, Benefits, and Alternatives discussed with the patient and/or family.  Plan is for moderate sedation and the patient agrees to proceed with the procedure.  An immediate assessment was done prior to the administration of moderate sedation     Indication: Unstable angina with progressively worsening chest symptoms over the past few months, history of coronary artery disease status post four-vessel bypass 3 years ago  Premedication: Versed, fentanyl  Contrast: Isovue 208 cc  Radiation: Flouro time= 6.8 minutes. Air Kerma= 344 mGy  Catheters: 6Fr JL4, 6Fr JR4, 6Fr angled pigtail, LCB      Procedural details:    The patient was brought to the cath lab and prepped and draped in the usual fashion.  Access was obtained in the right femoral artery via modified Seldinger technique.  A 6 Romanian sheath was placed into the artery and flushed.  Next, a JL 4 was inserted and used to engage the left main and selective left coronary angiography was performed in multiple views.  Next a JR4 was inserted and used to engage the right and selective right coronary angiography was performed.  Next, this catheter was used to engage the saphenous  vein graft to diagonal and SVG to diagonal angiography was performed in multiple views.  This catheter was then used to engage the saphenous vein graft to PDA and SVG to PDA angiography was performed in multiple views.  This catheter was then used to engage the LIMA to LAD and LIMA angiography was performed in multiple views.  Next, an LCB catheter was used to engage the saphenous vein graft to obtuse marginal and SVG to OM was performed and 1 view due to being 100% occluded at the ostium.  Next, pigtail catheter was inserted and used to cross the aortic valve the left ventricle pressures were recorded and LV ventriculography was performed.  This catheter was then pulled back across aortic valve and again pressures were recorded.  This catheter was then used to perform aortic root angiography to confirm the last vein graft was 100% occluded at the ostium.  Everything was then withdrawn through the sheath and the sheath was flushed.  Patent hemostasis was achieved in the right femoral artery access site using a 6 Divehi Angio-Seal closure device.  Patient tolerated the procedure well without any complications.  He left the Cath Lab in stable condition.      I supervised the administration of conscious sedation by nursing staff throughout the case.  First dose was given at 1245 and the end of my face-to-face encounter was at 1317, accounting for a total of 30 minutes of supervision.  During the case, continuous pulse oximetry, heart rate, blood pressure, and patient status were monitored.     Findings:    Hemodynamics:      Aortic: 113/61 mmHg  LV: 143/1 mmHg  LVEDP: 28 mmHg    Left ventriculography:  1. The contractility of the left ventricle is normal.  Estimated ejection fraction 65%.  2. The left ventricle is normal in wall thickness and chamber size.  3. There is no significant mitral regurgitation or aortic insufficiency.    Selective coronary angiography:     Left main: Left main is a large-caliber vessel that  bifurcates to the LAD and left circumflex arteries, mild disease NESSA-3 flow    LAD: The LAD is a large-caliber vessel that wraps around to the apex, there is a 70 to 80% stenosis in the proximal segment, the mid and distal segments have diffuse mild disease, NESSA-3 flow.  Vessel fills retrograde via LIMA graft that ties and distally as well as the saphenous vein graft that ties into the first diagonal branch    Diagonals: First and second diagonals are large caliber with mild disease, the third diagonal is small caliber.  Saphenous vein graft to diagonal branch is widely patent and fills the vessel retrograde    Left circumflex: Left circumflex is a small, nondominant vessel that is 100% occluded    Obtuse marginals: There is 1 obtuse marginal branch that is moderate caliber with mild, diffuse disease    RCA: The RCA is a moderate caliber vessel that is 100% occluded in its proximal segment, vessel fills retrograde via saphenous vein graft to ties into the PDA    PDA/LEELA: Both of these appear to be moderate caliber with mild disease and fills retrograde via saphenous vein graft ties into the PDA    LIMA to LAD: Widely patent    Saphenous vein graft to diagonal: Widely patent    Saphenous vein graft to PDA: Widely patent    Saphenous vein graft obtuse marginal: 100% occluded at the ostium      Estimated Blood Loss: 50 cc    Specimens: None    Complications: None       Impression:  1.  Coronary artery disease as described above including severe native vessel disease.  LIMA to LAD is widely patent, saphenous vein graft to diagonal is widely patent, saphenous vein graft to PDA is widely patent.  Saphenous vein graft obtuse marginal is 100% occluded.      Plan:   1.  Discharge home later today  2.  Continue to optimize his antianginal regimen.  We will start with Ranexa 500 twice daily.    3.  Follow-up in the office in a few months to make sure his symptoms are improving/resolved  4.  Continue on high intensity statin  and dual antiplatelet therapy  5.  Continue on beta-blocker      Go Baer, DO  Interventional cardiology  [facility]  October 7, 2024

## 2024-10-07 NOTE — H&P
Patient seen and examined at bedside.  The history and physical have not changed as below.    We will performing a diagnostic left heart catheterization with bypass studies.    Risk, benefits and alternatives were explained to the patient he wished to proceed.    Subjective[]Expand by Default  Patient ID: Tree Shaffer is a 64 y.o. male.     Chief Complaint:  History of Present Illness  History of Present Illness  The patient presents for evaluation of multiple medical concerns.     Over the past year, he has been in good health. However, during the summer, he spent most of his time indoors, often spending time outdoors and mowing his lawn due to the heat. He tries to limit his exposure to hot weather to prevent a heat stroke. He occasionally experiences minor discomfort, but denies any fluttering. If he turns quickly, he experiences a cramping sensation, although he is unsure if it is muscular or gas-related. He acknowledges fluid retention in both legs, stomach, and at the site of a vein procedure in 2022. He underwent four heart bypasses on 05/10/2021, during which the back of his heart was not functioning properly. Stents were unable to be placed due to blockage. He has been mindful of his diet, including reducing his salt intake, since his heart surgery. He occasionally experiences chest pressure, but it is not as severe as it was four years ago. He suspects it may be due to insufficient exercise. He has been staying indoors for the past two months due to excessive sweating and occasional shortness of breath. He does not currently see a pulmonologist. He is on Symbicort for his COPD. He coughs frequently, which he suspects may be due to a hole in his esophagus. He has noticed increased bruising, which he suspects may be due to his medication. He has not had a stent placed and has not been hospitalized since his surgery. He is currently on Plavix, aspirin, Lipitor, Pletal, and Lasix 80 mg twice daily,  which effectively controls his fluid levels. He notes that his leg swelling increases in the afternoon, particularly in heat, walking, or working out. He also experiences stomach pain and sharp pain, which he suspects may be gas or a twitching muscle. He occasionally experiences leg pain when walking. He has been checked twice for a blood clot, which was negative. He has a knot in his right leg, which he barely feels in his left leg. He can feel a knot in his calf when pressure is applied. He had surgery on his right knee to remove a bursa from his kneecap and clean it out. His right leg is more painful than his left, and he has vein stripping.     SOCIAL HISTORY  He smoked 2 pack of cigarettes a day for 20 years.     FAMILY HISTORY  His mother used to get blood clots a lot, but she had varicose veins.           Review of Systems   Constitutional: Positive for malaise/fatigue. Negative for diaphoresis and fever.   HENT:  Negative for congestion.    Eyes:  Negative for vision loss in left eye and vision loss in right eye.   Cardiovascular:  Positive for chest pain. Negative for claudication, dyspnea on exertion, irregular heartbeat, leg swelling, orthopnea, palpitations and syncope.   Respiratory:  Positive for shortness of breath. Negative for cough and wheezing.    Hematologic/Lymphatic: Negative for adenopathy.   Skin:  Negative for rash.   Musculoskeletal:  Negative for joint pain and joint swelling.   Gastrointestinal:  Negative for abdominal pain, diarrhea, nausea and vomiting.   Neurological:  Negative for excessive daytime sleepiness, dizziness, focal weakness, light-headedness, numbness and weakness.   Psychiatric/Behavioral:  Negative for depression. The patient does not have insomnia.               Current Medications      Current Outpatient Medications:     aspirin (Aspirin Low Dose) 81 MG EC tablet, Take 1 tablet by mouth Daily., Disp: 90 tablet, Rfl: 3    atorvastatin (LIPITOR) 40 MG tablet, Take 1  tablet by mouth Daily., Disp: 90 tablet, Rfl: 3    cilostazol (PLETAL) 100 MG tablet, Take 1 tablet by mouth 2 (Two) Times a Day., Disp: 180 tablet, Rfl: 3    clopidogrel (PLAVIX) 75 MG tablet, Take 1 tablet by mouth Daily., Disp: 90 tablet, Rfl: 3    lisinopril (PRINIVIL,ZESTRIL) 2.5 MG tablet, Take 1 tablet by mouth Daily., Disp: 90 tablet, Rfl: 3    metoprolol tartrate (LOPRESSOR) 50 MG tablet, Take 1 tablet by mouth Every 12 (Twelve) Hours., Disp: 180 tablet, Rfl: 3    cyclobenzaprine (FLEXERIL) 5 MG tablet, Take 1 tablet by mouth 3 (Three) Times a Day As Needed for Muscle Spasms., Disp: 15 tablet, Rfl: 0    furosemide (LASIX) 20 MG tablet, Take 2 tablets by mouth 2 (Two) Times a Day., Disp: 180 tablet, Rfl: 3    furosemide (LASIX) 40 MG tablet, , Disp: , Rfl:     pantoprazole (PROTONIX) 40 MG EC tablet, Take 1 tablet by mouth Daily., Disp: 30 tablet, Rfl: 0    simethicone (Gas-X) 80 MG chewable tablet, Chew 1 tablet Every 6 (Six) Hours As Needed for Flatulence., Disp: 20 tablet, Rfl: 0    SYMBICORT 160-4.5 MCG/ACT inhaler, Inhale 2 puffs 2 (Two) Times a Day., Disp: , Rfl: 2              Objective:      Objective      Vitals:     08/13/24 1112   BP: 140/82   Pulse: 71   SpO2: 99%      Vitals and nursing note reviewed.   Constitutional:       Appearance: Normal and healthy appearance. Well-developed and not in distress.   Eyes:      Extraocular Movements: Extraocular movements intact.      Pupils: Pupils are equal, round, and reactive to light.   HENT:      Head: Normocephalic and atraumatic.    Mouth/Throat:      Pharynx: Oropharynx is clear.   Neck:      Vascular: JVD normal.      Trachea: Trachea normal.   Pulmonary:      Effort: Pulmonary effort is normal.      Breath sounds: Normal breath sounds. No wheezing. No rhonchi. No rales.   Cardiovascular:      PMI at left midclavicular line. Normal rate. Regular rhythm. Normal S1. Normal S2.       Murmurs: There is a grade 2/6 systolic murmur.      No gallop.  No  click. No rub.   Pulses:     Dorsalis pedis: 2+ bilaterally.     Posterior tibial: 2+ bilaterally.  Abdominal:      General: Bowel sounds are normal.      Palpations: Abdomen is soft.      Tenderness: There is no abdominal tenderness.   Musculoskeletal: Normal range of motion.      Cervical back: Normal range of motion and neck supple. Skin:     General: Skin is warm and dry.      Capillary Refill: Capillary refill takes less than 2 seconds.   Feet:      Right foot:      Skin integrity: Skin integrity normal.      Left foot:      Skin integrity: Skin integrity normal.   Neurological:      Mental Status: Alert and oriented to person, place and time.      Sensory: Sensation is intact.      Motor: Motor function is intact.      Coordination: Coordination is intact.   Psychiatric:         Speech: Speech normal.         Behavior: Behavior is cooperative.         Physical Exam        Lab Review:            ECG 12 Lead     Date/Time: 8/13/2024 4:04 PM  Performed by: Go Baer DO     Authorized by: Go Baer DO  Comparison: compared with previous ECG   Similar to previous ECG  Rhythm: sinus rhythm  Rate: normal  Conduction: incomplete right bundle branch block  QRS axis: normal     Clinical impression: abnormal EKG           Results        Assessment/Plan:      Problem List Items Addressed This Visit         Stable angina pectoris     Relevant Medications     metoprolol tartrate (LOPRESSOR) 50 MG tablet     clopidogrel (PLAVIX) 75 MG tablet     cilostazol (PLETAL) 100 MG tablet     Other Relevant Orders     Stress Test With Myocardial Perfusion (1 Day)     Tobacco abuse     Essential hypertension     Relevant Medications     metoprolol tartrate (LOPRESSOR) 50 MG tablet     lisinopril (PRINIVIL,ZESTRIL) 2.5 MG tablet     Coronary artery disease involving native coronary artery of native heart with unstable angina pectoris     Overview       Added automatically from request for surgery  [id number]           Relevant Medications     metoprolol tartrate (LOPRESSOR) 50 MG tablet     clopidogrel (PLAVIX) 75 MG tablet     cilostazol (PLETAL) 100 MG tablet     Atherosclerosis of native artery of both lower extremities with intermittent claudication     Peripheral artery disease     COPD (chronic obstructive pulmonary disease) - Primary     Relevant Orders     Ambulatory Referral to Pulmonology     Dyspnea on exertion     Mixed hyperlipidemia     Relevant Medications     atorvastatin (LIPITOR) 40 MG tablet      Assessment & Plan  1. Stable angina.  His EKG results are satisfactory. A nuclear stress test will be performed to ensure the bypasses are functioning properly. The results will be communicated to him once available. He is advised to continue Plavix, Pletal, Lipitor, and aspirin. A refill for Lasix will be provided.     2. COPD.  A referral to a pulmonologist will be made.     Follow-up  A follow-up visit is scheduled for 1 year from now.        Recommendations/plans:     Transcribed from ambient dictation for Go Baer DO by Go Baer DO.  08/13/24   16:04 CDT     Patient or patient representative verbalized consent for the use of Ambient Listening during the visit with  Go Baer DO for chart documentation. 8/13/2024  16:05 CDT       Contains text generated by [employer]

## 2024-10-16 ENCOUNTER — OFFICE VISIT (OUTPATIENT)
Dept: PULMONOLOGY | Facility: CLINIC | Age: 64
End: 2024-10-16
Payer: MEDICARE

## 2024-10-16 VITALS
HEIGHT: 70 IN | DIASTOLIC BLOOD PRESSURE: 68 MMHG | HEART RATE: 69 BPM | OXYGEN SATURATION: 96 % | BODY MASS INDEX: 31.07 KG/M2 | WEIGHT: 217 LBS | SYSTOLIC BLOOD PRESSURE: 118 MMHG

## 2024-10-16 DIAGNOSIS — J44.9 CHRONIC OBSTRUCTIVE PULMONARY DISEASE, UNSPECIFIED COPD TYPE: ICD-10-CM

## 2024-10-16 DIAGNOSIS — R05.3 CHRONIC COUGH: ICD-10-CM

## 2024-10-16 DIAGNOSIS — Z23 NEED FOR INFLUENZA VACCINATION: Primary | ICD-10-CM

## 2024-10-16 DIAGNOSIS — R06.09 CHRONIC DYSPNEA: ICD-10-CM

## 2024-10-16 DIAGNOSIS — J30.2 SEASONAL ALLERGIES: ICD-10-CM

## 2024-10-16 DIAGNOSIS — K21.9 GASTROESOPHAGEAL REFLUX DISEASE WITHOUT ESOPHAGITIS: ICD-10-CM

## 2024-10-16 DIAGNOSIS — Z87.891 PERSONAL HISTORY OF TOBACCO USE, PRESENTING HAZARDS TO HEALTH: ICD-10-CM

## 2024-10-16 DIAGNOSIS — Z87.891 FORMER SMOKER: ICD-10-CM

## 2024-10-16 NOTE — LETTER
2024     ELIO Hubbard  [address] [place] [address]    Patient: Tree Shaffer   YOB: 1960   Date of Visit: 10/16/2024     Dear ELIO Pollard:    Thank you for referring Tree Shaffer to me for evaluation. Below are the relevant portions of my assessment and plan of care.    If you have questions, please do not hesitate to call me. I look forward to following Tree along with you.         Sincerely,        Inessa Baer,         CC: No Recipients      Progress Notes:    Clinic Note - Follow Up            NAME: Tree Shaffer  MRN: [id number]  AGE: 64 y.o.            : 1960  PRIMARY CARE PROVIDER: Madhu Andersen APRN               Reason for Visit:  Tree Shaffer presents to clinic today for follow up for the evaluation of COPD.    History of Present Illness:  Mr. Shaffer is a 64-year-old male who presents to clinic today for follow-up for evaluation of COPD.  Past medical history includes COPD, former tobacco use, GERD, CAD status post bypass, hypertension, hyperlipidemia.    Patient was last seen as a new patient on .  He was referred by cardiology for evaluation of COPD.  During that visit he was given a sample of Trelegy 100.  Prescription was sent to his pharmacy for Stiolto.  He was not able to fill the Stiolto.  He has been using the Trelegy sample since last visit.  He has noted improvement in his dyspnea and cough with Trelegy.  He continues to have rare wheezing.  Plan was to undergo PFTs on follow-up today.  However the patient is having some groin discomfort from his recent left heart catheter resulting in hematoma.    Review of Systems:  A full 12-point review of systems was performed and was negative except as documented in the HPI.            Physical Exam:  General: No acute distress, cooperative.  Head: Atraumatic, normocephalic, normal inspection.  Eyes: EOMI, normal inspection.  ENT: Mucous  membranes moist, normal inspection. No thrush.  Heart: RRR, no murmur  Lungs: Clear to auscultation bilaterally, no wheezing  MSK: No edema or clubbing  GI/abdominal: Soft, nontender.  Neurologic: Alert, oriented.  Cranial nerves II through XII grossly intact.      Imaging Review:  I personally reviewed the low-dose CT done 9/16/2024:  CT showed mild centrilobular emphysema with a stable 3 mm left lower lobe pulmonary nodule.      Pulmonary Functions Testing Results:              Problem List:  Problem List Items Addressed This Visit          Allergies and Adverse Reactions    Seasonal allergies       Gastrointestinal Abdominal     GERD (gastroesophageal reflux disease)       Pulmonary and Pneumonias    COPD (chronic obstructive pulmonary disease)    Chronic cough       Symptoms and Signs    Chronic dyspnea       Tobacco    Personal history of tobacco use, presenting hazards to health    Former smoker       Other    Need for influenza vaccination - Primary    Relevant Orders    Fluzone >6mos (Completed)         Pulmonary Assessment:  Patient is a 64-year-old male who presents to clinic today for follow-up.  Past medical history includes mild COPD.  Currently managed with triple therapy.  We are awaiting information on which inhaler his insurance will cover.  He was given a another Trelegy sample today.  Flu shot given today.      Plan:   -Continue Trelegy 100, sample given today  -We will change to Stiolto if his insurance will cover  -Follow-up low-dose CT ordered  -Flu shot today  -Would like to hold off on the pneumonia and RSV vaccine           Follow Up:  6 months         Thank you Madhu Andersen APRN for allowing me to participate in this patient's care.           Past Medical History:   Past Medical History:   Diagnosis Date   • Anxiety    • Arthritis    • Back pain    • Bloating    • Bronchitis    • COPD (chronic obstructive pulmonary disease)    • Coronary artery disease    • DDD (degenerative disc  disease), cervical    • Gallstones    • GERD (gastroesophageal reflux disease)    • Hemorrhoid    • Hypercholesteremia    • Hypertension    • IBS (irritable bowel syndrome)    • Muscle weakness (generalized)    • Neck pain    • PVD (peripheral vascular disease)    • Stress headache    • Vision loss          Past Surgical History:   Past Surgical History:   Procedure Laterality Date   • AORTAGRAM Right 09/29/2022    Procedure: AORTAGRAM, RIGHT LOWER EXTREMITY ANGIOGRAM, HAWK ATHRECTOMY, BALLOON ANGIOPLASTY, MYNX CLOSURE;  Surgeon: Leo Biggs MD;  Location:  [facility] HYBRID OR 12;  Service: Vascular;  Laterality: Right;   • CARDIAC CATHETERIZATION Right 05/07/2021    Procedure: Left Heart Cath R radial US guided w poss intervention;  Surgeon: Go Baer DO;  Location:  [facility] CATH INVASIVE LOCATION;  Service: Cardiology;  Laterality: Right;   • CARDIAC CATHETERIZATION N/A 10/7/2024    Procedure: Left Heart Cath;  Surgeon: Go Baer DO;  Location:  [facility] CATH INVASIVE LOCATION;  Service: Cardiovascular;  Laterality: N/A;   • CHOLECYSTECTOMY     • CHOLECYSTECTOMY WITH INTRAOPERATIVE CHOLANGIOGRAM N/A 09/10/2019    Procedure: LAPAROSCOPIC CHOLECYSTECTOMY WITH INTRAOPERATIVE, CHOLANGIOGRAM;  Surgeon: Leon Pulido MD;  Location:  [facility] OR;  Service: General   • CORONARY ARTERY BYPASS GRAFT N/A 05/10/2021    Procedure: CORONARY ARTERY BYPASS GRAFTING x 4, lima, endoscopic vein harvest right leg, debra;  Surgeon: lAex Menendez MD;  Location:  [facility] OR;  Service: Cardiothoracic;  Laterality: N/A;   • CYST REMOVAL      BACK    • HYDROCELECTOMY Left    • KNEE SURGERY Left    • NECK SURGERY           Family History:   Family History   Problem Relation Age of Onset   • COPD Mother    • Cancer Mother         femal type   • Neuromuscular disorder Father         Lizzy Gehrig's Disease         Medications:   Prior to Admission medications    Medication Sig Start Date End Date Taking?  Authorizing Provider   albuterol sulfate  (90 Base) MCG/ACT inhaler Inhale 2 puffs Every 4 (Four) Hours As Needed for Wheezing. 9/4/24  Yes Sharmin Baer DO   aspirin (Aspirin Low Dose) 81 MG EC tablet Take 1 tablet by mouth Daily. 8/13/24  Yes Go Baer DO   atorvastatin (LIPITOR) 40 MG tablet Take 1 tablet by mouth Daily. 8/13/24  Yes Go Baer DO   cilostazol (PLETAL) 100 MG tablet Take 1 tablet by mouth 2 (Two) Times a Day. 8/13/24  Yes Go Baer DO   clopidogrel (PLAVIX) 75 MG tablet Take 1 tablet by mouth Daily. 8/13/24  Yes Go Baer DO   cyclobenzaprine (FLEXERIL) 5 MG tablet Take 1 tablet by mouth 3 (Three) Times a Day As Needed for Muscle Spasms. 7/7/23  Yes Yarelis Christie APRN   fluticasone (FLONASE) 50 MCG/ACT nasal spray 2 sprays into the nostril(s) as directed by provider Daily. 9/4/24  Yes Sharmin Baer DO   Fluticasone-Umeclidin-Vilant (TRELEGY) 100-62.5-25 MCG/ACT inhaler Inhale 1 puff Daily. 10/2/24  Yes Sharmin Baer DO   furosemide (LASIX) 20 MG tablet Take 2 tablets by mouth 2 (Two) Times a Day. 2/5/24  Yes Go Baer DO   furosemide (LASIX) 40 MG tablet  4/18/23  Yes Clarence Kaur MD   lisinopril (PRINIVIL,ZESTRIL) 2.5 MG tablet Take 1 tablet by mouth Daily. 8/13/24  Yes Go Baer DO   metFORMIN (GLUCOPHAGE) 500 MG tablet Take 1 tablet by mouth Every 12 (Twelve) Hours. 9/19/24  Yes Clarence Kaur MD   metoprolol tartrate (LOPRESSOR) 50 MG tablet Take 1 tablet by mouth Every 12 (Twelve) Hours. 8/13/24  Yes Go Baer DO   pantoprazole (PROTONIX) 40 MG EC tablet Take 1 tablet by mouth Daily. 5/15/21  Yes Stefan Sibley MD   ranolazine (Ranexa) 500 MG 12 hr tablet Take 1 tablet by mouth 2 (Two) Times a Day. 10/7/24  Yes Go Baer DO   simethicone (Gas-X) 80 MG chewable tablet Chew 1 tablet Every 6 (Six)  "Hours As Needed for Flatulence. 5/12/23  Yes Yarelis Christie APRN   SYMBICORT 160-4.5 MCG/ACT inhaler Inhale 2 puffs 2 (Two) Times a Day. 6/8/19  Yes Provider, MD Clarence   tiotropium bromide-olodaterol (STIOLTO RESPIMAT) 2.5-2.5 MCG/ACT aerosol solution inhaler Inhale 2 puffs Daily. 9/4/24  Yes Sharmin Baer DO         Allergies:   Codeine and Penicillins      Results Review:             Visit Vitals  /68   Pulse 69   Ht 177.8 cm (70\")   Wt 98.4 kg (217 lb)   SpO2 96%   BMI 31.14 kg/m²           Social History:     Social History     Socioeconomic History   • Marital status:    Tobacco Use   • Smoking status: Former     Current packs/day: 0.00     Average packs/day: 2.0 packs/day for 20.0 years (40.0 ttl pk-yrs)     Types: Cigarettes     Start date: 5/10/2001     Quit date: 5/10/2021     Years since quitting: 3.4   • Smokeless tobacco: Never   Vaping Use   • Vaping status: Never Used   Substance and Sexual Activity   • Alcohol use: Yes     Comment: rarely drinks one   • Drug use: Not Currently     Types: Marijuana     Comment: patient reports he had not smoked since CABG   • Sexual activity: Yes     Partners: Female                Sharmin Baer DO  Pulmonary/Critical Care  10/16/2024  14:09 CDT  "

## 2024-10-16 NOTE — PROGRESS NOTES
Clinic Note - Follow Up            NAME: Tree Shaffer  MRN: [id number]  AGE: 64 y.o.            : 1960  PRIMARY CARE PROVIDER: Madhu Andersen APRN               Reason for Visit:  Tree Shaffer presents to clinic today for follow up for the evaluation of COPD.    History of Present Illness:  Mr. Shaffer is a 64-year-old male who presents to clinic today for follow-up for evaluation of COPD.  Past medical history includes COPD, former tobacco use, GERD, CAD status post bypass, hypertension, hyperlipidemia.    Patient was last seen as a new patient on .  He was referred by cardiology for evaluation of COPD.  During that visit he was given a sample of Trelegy 100.  Prescription was sent to his pharmacy for Stiolto.  He was not able to fill the Stiolto.  He has been using the Trelegy sample since last visit.  He has noted improvement in his dyspnea and cough with Trelegy.  He continues to have rare wheezing.  Plan was to undergo PFTs on follow-up today.  However the patient is having some groin discomfort from his recent left heart catheter resulting in hematoma.    Review of Systems:  A full 12-point review of systems was performed and was negative except as documented in the HPI.            Physical Exam:  General: No acute distress, cooperative.  Head: Atraumatic, normocephalic, normal inspection.  Eyes: EOMI, normal inspection.  ENT: Mucous membranes moist, normal inspection. No thrush.  Heart: RRR, no murmur  Lungs: Clear to auscultation bilaterally, no wheezing  MSK: No edema or clubbing  GI/abdominal: Soft, nontender.  Neurologic: Alert, oriented.  Cranial nerves II through XII grossly intact.      Imaging Review:  I personally reviewed the low-dose CT done 2024:  CT showed mild centrilobular emphysema with a stable 3 mm left lower lobe pulmonary nodule.      Pulmonary Functions Testing Results:              Problem List:  Problem List Items Addressed This Visit           Allergies and Adverse Reactions    Seasonal allergies       Gastrointestinal Abdominal     GERD (gastroesophageal reflux disease)       Pulmonary and Pneumonias    COPD (chronic obstructive pulmonary disease)    Chronic cough       Symptoms and Signs    Chronic dyspnea       Tobacco    Personal history of tobacco use, presenting hazards to health    Former smoker       Other    Need for influenza vaccination - Primary    Relevant Orders    Fluzone >6mos (Completed)         Pulmonary Assessment:  Patient is a 64-year-old male who presents to clinic today for follow-up.  Past medical history includes mild COPD.  Currently managed with triple therapy.  We are awaiting information on which inhaler his insurance will cover.  He was given a another Trelegy sample today.  Flu shot given today.      Plan:   -Continue Trelegy 100, sample given today  -We will change to Stiolto if his insurance will cover  -Follow-up low-dose CT ordered  -Flu shot today  -Would like to hold off on the pneumonia and RSV vaccine           Follow Up:  6 months         Thank you Madhu Andersen APRN for allowing me to participate in this patient's care.           Past Medical History:   Past Medical History:   Diagnosis Date    Anxiety     Arthritis     Back pain     Bloating     Bronchitis     COPD (chronic obstructive pulmonary disease)     Coronary artery disease     DDD (degenerative disc disease), cervical     Gallstones     GERD (gastroesophageal reflux disease)     Hemorrhoid     Hypercholesteremia     Hypertension     IBS (irritable bowel syndrome)     Muscle weakness (generalized)     Neck pain     PVD (peripheral vascular disease)     Stress headache     Vision loss          Past Surgical History:   Past Surgical History:   Procedure Laterality Date    AORTAGRAM Right 09/29/2022    Procedure: AORTAGRAM, RIGHT LOWER EXTREMITY ANGIOGRAM, HAWK ATHRECTOMY, BALLOON ANGIOPLASTY, MYNX CLOSURE;  Surgeon: Leo iBggs MD;   Location:  [facility];  Service: Vascular;  Laterality: Right;    CARDIAC CATHETERIZATION Right 05/07/2021    Procedure: Left Heart Cath R radial US guided w poss intervention;  Surgeon: Go Baer DO;  Location:  [facility];  Service: Cardiology;  Laterality: Right;    CARDIAC CATHETERIZATION N/A 10/7/2024    Procedure: Left Heart Cath;  Surgeon: Go Baer DO;  Location:  [facility];  Service: Cardiovascular;  Laterality: N/A;    CHOLECYSTECTOMY      CHOLECYSTECTOMY WITH INTRAOPERATIVE CHOLANGIOGRAM N/A 09/10/2019    Procedure: LAPAROSCOPIC CHOLECYSTECTOMY WITH INTRAOPERATIVE, CHOLANGIOGRAM;  Surgeon: Leon Pulido MD;  Location:  [facility] OR;  Service: General    CORONARY ARTERY BYPASS GRAFT N/A 05/10/2021    Procedure: CORONARY ARTERY BYPASS GRAFTING x 4, lima, endoscopic vein harvest right leg, debra;  Surgeon: Alex Menendez MD;  Location:  [facility];  Service: Cardiothoracic;  Laterality: N/A;    CYST REMOVAL      BACK     HYDROCELECTOMY Left     KNEE SURGERY Left     NECK SURGERY           Family History:   Family History   Problem Relation Age of Onset    COPD Mother     Cancer Mother         femal type    Neuromuscular disorder Father         Lizzy Gehrig's Disease         Medications:   Prior to Admission medications    Medication Sig Start Date End Date Taking? Authorizing Provider   albuterol sulfate  (90 Base) MCG/ACT inhaler Inhale 2 puffs Every 4 (Four) Hours As Needed for Wheezing. 9/4/24  Yes Sharmin Baer,    aspirin (Aspirin Low Dose) 81 MG EC tablet Take 1 tablet by mouth Daily. 8/13/24  Yes Go Baer DO   atorvastatin (LIPITOR) 40 MG tablet Take 1 tablet by mouth Daily. 8/13/24  Yes Go Baer DO   cilostazol (PLETAL) 100 MG tablet Take 1 tablet by mouth 2 (Two) Times a Day. 8/13/24  Yes Go Baer DO   clopidogrel (PLAVIX) 75 MG tablet Take 1 tablet  "by mouth Daily. 8/13/24  Yes Go Baer DO   cyclobenzaprine (FLEXERIL) 5 MG tablet Take 1 tablet by mouth 3 (Three) Times a Day As Needed for Muscle Spasms. 7/7/23  Yes Yarelis Christie APRN   fluticasone (FLONASE) 50 MCG/ACT nasal spray 2 sprays into the nostril(s) as directed by provider Daily. 9/4/24  Yes Sharmin Baer DO   Fluticasone-Umeclidin-Vilant (TRELEGY) 100-62.5-25 MCG/ACT inhaler Inhale 1 puff Daily. 10/2/24  Yes Sharmin Baer DO   furosemide (LASIX) 20 MG tablet Take 2 tablets by mouth 2 (Two) Times a Day. 2/5/24  Yes Go Baer DO   furosemide (LASIX) 40 MG tablet  4/18/23  Yes ProviderClarence MD   lisinopril (PRINIVIL,ZESTRIL) 2.5 MG tablet Take 1 tablet by mouth Daily. 8/13/24  Yes Go Baer DO   metFORMIN (GLUCOPHAGE) 500 MG tablet Take 1 tablet by mouth Every 12 (Twelve) Hours. 9/19/24  Yes Clarence Kaur MD   metoprolol tartrate (LOPRESSOR) 50 MG tablet Take 1 tablet by mouth Every 12 (Twelve) Hours. 8/13/24  Yes Go Baer DO   pantoprazole (PROTONIX) 40 MG EC tablet Take 1 tablet by mouth Daily. 5/15/21  Yes Stefan Sibley MD   ranolazine (Ranexa) 500 MG 12 hr tablet Take 1 tablet by mouth 2 (Two) Times a Day. 10/7/24  Yes Go Baer DO   simethicone (Gas-X) 80 MG chewable tablet Chew 1 tablet Every 6 (Six) Hours As Needed for Flatulence. 5/12/23  Yes Yarelis Christie APRN   SYMBICORT 160-4.5 MCG/ACT inhaler Inhale 2 puffs 2 (Two) Times a Day. 6/8/19  Yes ProviderClarence MD   tiotropium bromide-olodaterol (STIOLTO RESPIMAT) 2.5-2.5 MCG/ACT aerosol solution inhaler Inhale 2 puffs Daily. 9/4/24  Yes Sharmin Baer DO         Allergies:   Codeine and Penicillins      Results Review:             Visit Vitals  /68   Pulse 69   Ht 177.8 cm (70\")   Wt 98.4 kg (217 lb)   SpO2 96%   BMI 31.14 kg/m²           Social History:     Social History     Socioeconomic " History    Marital status:    Tobacco Use    Smoking status: Former     Current packs/day: 0.00     Average packs/day: 2.0 packs/day for 20.0 years (40.0 ttl pk-yrs)     Types: Cigarettes     Start date: 5/10/2001     Quit date: 5/10/2021     Years since quitting: 3.4    Smokeless tobacco: Never   Vaping Use    Vaping status: Never Used   Substance and Sexual Activity    Alcohol use: Yes     Comment: rarely drinks one    Drug use: Not Currently     Types: Marijuana     Comment: patient reports he had not smoked since CABG    Sexual activity: Yes     Partners: Female                Sharmin Baer DO  Pulmonary/Critical Care  10/16/2024  14:09 CDT

## 2025-01-13 ENCOUNTER — TELEPHONE (OUTPATIENT)
Dept: PULMONOLOGY | Facility: CLINIC | Age: 65
End: 2025-01-13
Payer: MEDICARE

## 2025-01-13 ENCOUNTER — APPOINTMENT (OUTPATIENT)
Dept: GENERAL RADIOLOGY | Facility: HOSPITAL | Age: 65
End: 2025-01-13
Payer: MEDICARE

## 2025-01-13 PROCEDURE — 71046 X-RAY EXAM CHEST 2 VIEWS: CPT

## 2025-01-13 NOTE — TELEPHONE ENCOUNTER
Patient presented to the office for Trelegy samples, let patient know we do not have any samples right now, but he should call back and check to see if we get any. Patient's spouse states that he has been so congested that he can barely catch his breath. I advised patient that there are a lot of viruses going around and we recommend he go to  to get checked out. Patient and spouse were going to UC after they left the office. Advised them to call back if they needed anything.

## 2025-01-30 ENCOUNTER — OFFICE VISIT (OUTPATIENT)
Dept: PULMONOLOGY | Facility: CLINIC | Age: 65
End: 2025-01-30
Payer: MEDICARE

## 2025-01-30 VITALS
WEIGHT: 213 LBS | SYSTOLIC BLOOD PRESSURE: 120 MMHG | HEIGHT: 68 IN | OXYGEN SATURATION: 94 % | DIASTOLIC BLOOD PRESSURE: 68 MMHG | BODY MASS INDEX: 32.28 KG/M2 | HEART RATE: 75 BPM

## 2025-01-30 DIAGNOSIS — R05.3 CHRONIC COUGH: ICD-10-CM

## 2025-01-30 DIAGNOSIS — J43.9 PULMONARY EMPHYSEMA, UNSPECIFIED EMPHYSEMA TYPE: ICD-10-CM

## 2025-01-30 DIAGNOSIS — Z87.891 FORMER SMOKER: ICD-10-CM

## 2025-01-30 DIAGNOSIS — J44.9 CHRONIC OBSTRUCTIVE PULMONARY DISEASE, UNSPECIFIED COPD TYPE: Primary | ICD-10-CM

## 2025-01-30 DIAGNOSIS — K21.9 GASTROESOPHAGEAL REFLUX DISEASE WITHOUT ESOPHAGITIS: ICD-10-CM

## 2025-01-30 RX ORDER — BUDESONIDE, GLYCOPYRROLATE, AND FORMOTEROL FUMARATE 160; 9; 4.8 UG/1; UG/1; UG/1
2 AEROSOL, METERED RESPIRATORY (INHALATION) 2 TIMES DAILY
Qty: 1 EACH | Refills: 0 | COMMUNITY
Start: 2025-01-30

## 2025-01-30 RX ORDER — BUDESONIDE, GLYCOPYRROLATE, AND FORMOTEROL FUMARATE 160; 9; 4.8 UG/1; UG/1; UG/1
2 AEROSOL, METERED RESPIRATORY (INHALATION) 2 TIMES DAILY
Qty: 1 EACH | Refills: 5 | Status: SHIPPED | OUTPATIENT
Start: 2025-01-30

## 2025-01-30 RX ORDER — CEFDINIR 300 MG/1
1 CAPSULE ORAL EVERY 12 HOURS SCHEDULED
COMMUNITY
Start: 2025-01-28

## 2025-01-30 RX ORDER — DEXTROMETHORPHAN HYDROBROMIDE AND PROMETHAZINE HYDROCHLORIDE 15; 6.25 MG/5ML; MG/5ML
SYRUP ORAL
COMMUNITY
Start: 2025-01-28

## 2025-01-30 NOTE — LETTER
2025     ELIO Hubbard  [address] [place] [address]    Patient: Tree Shaffer   YOB: 1960   Date of Visit: 2025     Dear ELIO Pollard:    Thank you for referring Tree Shaffer to me for evaluation. Below are the relevant portions of my assessment and plan of care.    If you have questions, please do not hesitate to call me. I look forward to following Tree along with you.         Sincerely,        Inessa Baer,         CC: No Recipients      Progress Notes:    Clinic Note - Follow Up            NAME: Tree Shaffer  MRN: [id number]  AGE: 64 y.o.            : 1960  PRIMARY CARE PROVIDER: Madhu Andersen APRN               Reason for Visit:  Tree Shaffer presents to clinic today for follow up for the evaluation of COPD.    History of Present Illness:  Mr. Shaffer is a 64-year-old male who presents to clinic today for follow-up for evaluation of COPD. Past medical history includes COPD, former tobacco use, GERD, CAD status post bypass, hypertension, hyperlipidemia.     Patient was last seen in follow-up on 10/16/2024.  Since that visit he has had acute exacerbation of his COPD.  Starting  he was sick for approximately 14 days.  He has undergone 2 rounds of azithromycin as well as Medrol Dosepak.  He was seen by his PCP last week he was started on a medication for his cough as well as cefdinir.  With this treatment he notes that his respiratory status has started to improve.  Feels he is not quite back to his baseline however.  He was started on Stiolto during his last visit, however due to insurance reasons he was not able to fill this medication.    Review of Systems:  A full 12-point review of systems was performed and was negative except as documented in the HPI.            Physical Exam:  General: No acute distress, cooperative.  Head: Atraumatic, normocephalic, normal inspection.  Eyes: EOMI,  normal inspection.  ENT: Mucous membranes moist, normal inspection. No thrush.  Heart: RRR, no murmur  Lungs: Diminished bilaterally, no wheezing  MSK: No edema or clubbing  GI/abdominal: Soft, nontender.  Neurologic: Alert, oriented.  Cranial nerves II through XII grossly intact.      Imaging Review:  I personally reviewed the chest x-ray done 1/13/2025:  Stable emphysema with no new pulmonary infiltrate or opacity.      Pulmonary Functions Testing Results:              Problem List:  Problem List Items Addressed This Visit       GERD (gastroesophageal reflux disease)    COPD (chronic obstructive pulmonary disease) - Primary    Relevant Medications    promethazine-dextromethorphan (PROMETHAZINE-DM) 6.25-15 MG/5ML syrup    Budeson-Glycopyrrol-Formoterol (Breztri Aerosphere) 160-9-4.8 MCG/ACT aerosol inhaler    Budeson-Glycopyrrol-Formoterol (Breztri Aerosphere) 160-9-4.8 MCG/ACT aerosol inhaler    Other Relevant Orders    Alpha - 1 - Antitrypsin    Former smoker    Chronic cough         Pulmonary Assessment:  Patient is a 64-year-old male with a history of COPD.  Given his recent exacerbation and current symptoms we will increase his therapy to triple.  He was given a sample of Breztri in clinic today.  Prescription sent to his pharmacy.      Plan:   -Change to triple therapy with Breztri  -Continue albuterol as needed  -Alpha-1 antitrypsin in clinic today  -He has a low-dose CT ordered for September this year           Follow Up:  3 months         Thank you Madhu Andersen APRN for allowing me to participate in this patient's care.           Past Medical History:   Past Medical History:   Diagnosis Date   • Anxiety    • Arthritis    • Back pain    • Bloating    • Bronchitis    • COPD (chronic obstructive pulmonary disease)    • Coronary artery disease    • DDD (degenerative disc disease), cervical    • Gallstones    • GERD (gastroesophageal reflux disease)    • Hemorrhoid    • Hypercholesteremia    •  Hypertension    • IBS (irritable bowel syndrome)    • Muscle weakness (generalized)    • Neck pain    • PVD (peripheral vascular disease)    • Stress headache    • Vision loss          Past Surgical History:   Past Surgical History:   Procedure Laterality Date   • AORTAGRAM Right 09/29/2022    Procedure: AORTAGRAM, RIGHT LOWER EXTREMITY ANGIOGRAM, HAWK ATHRECTOMY, BALLOON ANGIOPLASTY, MYNX CLOSURE;  Surgeon: Leo Biggs MD;  Location:  [facility];  Service: Vascular;  Laterality: Right;   • CARDIAC CATHETERIZATION Right 05/07/2021    Procedure: Left Heart Cath R radial US guided w poss intervention;  Surgeon: Go Baer DO;  Location:  [facility];  Service: Cardiology;  Laterality: Right;   • CARDIAC CATHETERIZATION N/A 10/7/2024    Procedure: Left Heart Cath;  Surgeon: Go Baer DO;  Location:  [facility];  Service: Cardiovascular;  Laterality: N/A;   • CHOLECYSTECTOMY     • CHOLECYSTECTOMY WITH INTRAOPERATIVE CHOLANGIOGRAM N/A 09/10/2019    Procedure: LAPAROSCOPIC CHOLECYSTECTOMY WITH INTRAOPERATIVE, CHOLANGIOGRAM;  Surgeon: Leon Pulido MD;  Location: [facility] OR;  Service: General   • CORONARY ARTERY BYPASS GRAFT N/A 05/10/2021    Procedure: CORONARY ARTERY BYPASS GRAFTING x 4, lima, endoscopic vein harvest right leg, debra;  Surgeon: Alex Menendez MD;  Location: [facility] OR;  Service: Cardiothoracic;  Laterality: N/A;   • CYST REMOVAL      BACK    • HYDROCELECTOMY Left    • KNEE SURGERY Left    • NECK SURGERY           Family History:   Family History   Problem Relation Age of Onset   • COPD Mother    • Cancer Mother         femal type   • Neuromuscular disorder Father         Lizzy Gehrig's Disease         Medications:   Prior to Admission medications    Medication Sig Start Date End Date Taking? Authorizing Provider   albuterol (PROVENTIL) (2.5 MG/3ML) 0.083% nebulizer solution Take 2.5 mg by nebulization Every 4 (Four)  Hours As Needed for Wheezing or Shortness of Air. 1/13/25  Yes Janice Sue APRN   albuterol sulfate  (90 Base) MCG/ACT inhaler Inhale 2 puffs Every 4 (Four) Hours As Needed for Wheezing. 9/4/24  Yes Sharmin Baer DO   aspirin (Aspirin Low Dose) 81 MG EC tablet Take 1 tablet by mouth Daily. 8/13/24  Yes Go Baer DO   atorvastatin (LIPITOR) 40 MG tablet Take 1 tablet by mouth Daily. 8/13/24  Yes Go Baer DO   cefdinir (OMNICEF) 300 MG capsule Take 1 capsule by mouth Every 12 (Twelve) Hours. 1/28/25  Yes ProviderClarence MD   cilostazol (PLETAL) 100 MG tablet Take 1 tablet by mouth 2 (Two) Times a Day. 8/13/24  Yes Go Baer DO   clopidogrel (PLAVIX) 75 MG tablet Take 1 tablet by mouth Daily. 8/13/24  Yes Go Baer DO   furosemide (LASIX) 40 MG tablet  4/18/23  Yes ProviderClarence MD   guaiFENesin (MUCINEX) 600 MG 12 hr tablet Take 2 tablets by mouth 2 (Two) Times a Day.   Yes ProviderClarence MD   lisinopril (PRINIVIL,ZESTRIL) 2.5 MG tablet Take 1 tablet by mouth Daily. 8/13/24  Yes Go Baer DO   metFORMIN (GLUCOPHAGE) 500 MG tablet Take 1 tablet by mouth Every 12 (Twelve) Hours. 9/19/24  Yes ProviderClarence MD   metoprolol tartrate (LOPRESSOR) 50 MG tablet Take 1 tablet by mouth Every 12 (Twelve) Hours. 8/13/24  Yes Go Baer DO   promethazine-dextromethorphan (PROMETHAZINE-DM) 6.25-15 MG/5ML syrup Take ONE TEASPOONFUL BY MOUTH three times A DAY AS needed FOR cough OR congestion 1/28/25  Yes ProviderClarence MD   ranolazine (Ranexa) 500 MG 12 hr tablet Take 1 tablet by mouth 2 (Two) Times a Day. 10/7/24  Yes Go Baer DO   azithromycin (Zithromax Z-Jd) 250 MG tablet Take 2 tablets by mouth on day 1, then 1 tablet daily on days 2-5  Patient not taking: Reported on 1/30/2025 1/13/25   Janice Sue, ELIO   Budeson-Glycopyrrol-Formoterol (Breztri  "Aerosphere) 160-9-4.8 MCG/ACT aerosol inhaler Inhale 2 puffs 2 (Two) Times a Day. 1/30/25   Sharmin Baer DO   Budeson-Glycopyrrol-Formoterol (Breztri Aerosphere) 160-9-4.8 MCG/ACT aerosol inhaler Inhale 2 puffs 2 (Two) Times a Day. 1/30/25   Sharmin Baer DO   methylPREDNISolone (MEDROL) 4 MG dose pack Take as directed on package instructions.  Patient not taking: Reported on 1/30/2025 1/13/25   Janice Sue, ELIO         Allergies:   Codeine and Penicillins      Results Review:             Visit Vitals  /68   Pulse 75   Ht 172.7 cm (68\")   Wt 96.6 kg (213 lb)   SpO2 94% Comment: RA   BMI 32.39 kg/m²           Social History:     Social History     Socioeconomic History   • Marital status:    Tobacco Use   • Smoking status: Former     Current packs/day: 0.00     Average packs/day: 2.0 packs/day for 20.0 years (40.0 ttl pk-yrs)     Types: Cigarettes     Start date: 5/10/2001     Quit date: 5/10/2021     Years since quitting: 3.7     Passive exposure: Past   • Smokeless tobacco: Never   Vaping Use   • Vaping status: Never Used   Substance and Sexual Activity   • Alcohol use: Yes     Comment: rarely drinks one   • Drug use: Not Currently     Types: Marijuana     Comment: patient reports he had not smoked since CABG   • Sexual activity: Yes     Partners: Female                Sharmin Baer DO  Pulmonary/Critical Care  2/6/2025  18:03 CST    "

## 2025-02-07 NOTE — PROGRESS NOTES
Clinic Note - Follow Up            NAME: rTee Shaffer  MRN: [id number]  AGE: 64 y.o.            : 1960  PRIMARY CARE PROVIDER: Madhu Andersen APRN               Reason for Visit:  Tree Shaffer presents to clinic today for follow up for the evaluation of COPD.    History of Present Illness:  Mr. Shaffer is a 64-year-old male who presents to clinic today for follow-up for evaluation of COPD. Past medical history includes COPD, former tobacco use, GERD, CAD status post bypass, hypertension, hyperlipidemia.     Patient was last seen in follow-up on 10/16/2024.  Since that visit he has had acute exacerbation of his COPD.  Starting  he was sick for approximately 14 days.  He has undergone 2 rounds of azithromycin as well as Medrol Dosepak.  He was seen by his PCP last week he was started on a medication for his cough as well as cefdinir.  With this treatment he notes that his respiratory status has started to improve.  Feels he is not quite back to his baseline however.  He was started on Stiolto during his last visit, however due to insurance reasons he was not able to fill this medication.    Review of Systems:  A full 12-point review of systems was performed and was negative except as documented in the HPI.            Physical Exam:  General: No acute distress, cooperative.  Head: Atraumatic, normocephalic, normal inspection.  Eyes: EOMI, normal inspection.  ENT: Mucous membranes moist, normal inspection. No thrush.  Heart: RRR, no murmur  Lungs: Diminished bilaterally, no wheezing  MSK: No edema or clubbing  GI/abdominal: Soft, nontender.  Neurologic: Alert, oriented.  Cranial nerves II through XII grossly intact.      Imaging Review:  I personally reviewed the chest x-ray done 2025:  Stable emphysema with no new pulmonary infiltrate or opacity.      Pulmonary Functions Testing Results:              Problem List:  Problem List Items Addressed This Visit       GERD  (gastroesophageal reflux disease)    COPD (chronic obstructive pulmonary disease) - Primary    Relevant Medications    promethazine-dextromethorphan (PROMETHAZINE-DM) 6.25-15 MG/5ML syrup    Budeson-Glycopyrrol-Formoterol (Breztri Aerosphere) 160-9-4.8 MCG/ACT aerosol inhaler    Budeson-Glycopyrrol-Formoterol (Breztri Aerosphere) 160-9-4.8 MCG/ACT aerosol inhaler    Other Relevant Orders    Alpha - 1 - Antitrypsin    Former smoker    Chronic cough         Pulmonary Assessment:  Patient is a 64-year-old male with a history of COPD.  Given his recent exacerbation and current symptoms we will increase his therapy to triple.  He was given a sample of Breztri in clinic today.  Prescription sent to his pharmacy.      Plan:   -Change to triple therapy with Breztri  -Continue albuterol as needed  -Alpha-1 antitrypsin in clinic today  -He has a low-dose CT ordered for September this year           Follow Up:  3 months         Thank you Madhu Andersen APRN for allowing me to participate in this patient's care.           Past Medical History:   Past Medical History:   Diagnosis Date    Anxiety     Arthritis     Back pain     Bloating     Bronchitis     COPD (chronic obstructive pulmonary disease)     Coronary artery disease     DDD (degenerative disc disease), cervical     Gallstones     GERD (gastroesophageal reflux disease)     Hemorrhoid     Hypercholesteremia     Hypertension     IBS (irritable bowel syndrome)     Muscle weakness (generalized)     Neck pain     PVD (peripheral vascular disease)     Stress headache     Vision loss          Past Surgical History:   Past Surgical History:   Procedure Laterality Date    AORTAGRAM Right 09/29/2022    Procedure: AORTAGRAM, RIGHT LOWER EXTREMITY ANGIOGRAM, HAWK ATHRECTOMY, BALLOON ANGIOPLASTY, MYNX CLOSURE;  Surgeon: Leo Biggs MD;  Location: [place] [address];  Service: Vascular;  Laterality: Right;    CARDIAC CATHETERIZATION Right 05/07/2021    Procedure:  Left Heart Cath R radial US guided w poss intervention;  Surgeon: Go Baer DO;  Location:  [facility];  Service: Cardiology;  Laterality: Right;    CARDIAC CATHETERIZATION N/A 10/7/2024    Procedure: Left Heart Cath;  Surgeon: Go Baer DO;  Location:  [facility];  Service: Cardiovascular;  Laterality: N/A;    CHOLECYSTECTOMY      CHOLECYSTECTOMY WITH INTRAOPERATIVE CHOLANGIOGRAM N/A 09/10/2019    Procedure: LAPAROSCOPIC CHOLECYSTECTOMY WITH INTRAOPERATIVE, CHOLANGIOGRAM;  Surgeon: Leon Pulido MD;  Location:  [facility] OR;  Service: General    CORONARY ARTERY BYPASS GRAFT N/A 05/10/2021    Procedure: CORONARY ARTERY BYPASS GRAFTING x 4, lima, endoscopic vein harvest right leg, debra;  Surgeon: Alex Menendez MD;  Location:  [facility];  Service: Cardiothoracic;  Laterality: N/A;    CYST REMOVAL      BACK     HYDROCELECTOMY Left     KNEE SURGERY Left     NECK SURGERY           Family History:   Family History   Problem Relation Age of Onset    COPD Mother     Cancer Mother         femal type    Neuromuscular disorder Father         Lizzy Gehrig's Disease         Medications:   Prior to Admission medications    Medication Sig Start Date End Date Taking? Authorizing Provider   albuterol (PROVENTIL) (2.5 MG/3ML) 0.083% nebulizer solution Take 2.5 mg by nebulization Every 4 (Four) Hours As Needed for Wheezing or Shortness of Air. 1/13/25  Yes Janice Sue APRN   albuterol sulfate  (90 Base) MCG/ACT inhaler Inhale 2 puffs Every 4 (Four) Hours As Needed for Wheezing. 9/4/24  Yes Sharmin Baer DO   aspirin (Aspirin Low Dose) 81 MG EC tablet Take 1 tablet by mouth Daily. 8/13/24  Yes Go Baer DO   atorvastatin (LIPITOR) 40 MG tablet Take 1 tablet by mouth Daily. 8/13/24  Yes Go Baer DO   cefdinir (OMNICEF) 300 MG capsule Take 1 capsule by mouth Every 12 (Twelve) Hours. 1/28/25  Yes Provider,  MD Clarence   cilostazol (PLETAL) 100 MG tablet Take 1 tablet by mouth 2 (Two) Times a Day. 8/13/24  Yes Go Baer DO   clopidogrel (PLAVIX) 75 MG tablet Take 1 tablet by mouth Daily. 8/13/24  Yes Go Baer DO   furosemide (LASIX) 40 MG tablet  4/18/23  Yes ProviderClarence MD   guaiFENesin (MUCINEX) 600 MG 12 hr tablet Take 2 tablets by mouth 2 (Two) Times a Day.   Yes Clarence Kaur MD   lisinopril (PRINIVIL,ZESTRIL) 2.5 MG tablet Take 1 tablet by mouth Daily. 8/13/24  Yes Go Baer DO   metFORMIN (GLUCOPHAGE) 500 MG tablet Take 1 tablet by mouth Every 12 (Twelve) Hours. 9/19/24  Yes Clarence Kaur MD   metoprolol tartrate (LOPRESSOR) 50 MG tablet Take 1 tablet by mouth Every 12 (Twelve) Hours. 8/13/24  Yes Go Baer DO   promethazine-dextromethorphan (PROMETHAZINE-DM) 6.25-15 MG/5ML syrup Take ONE TEASPOONFUL BY MOUTH three times A DAY AS needed FOR cough OR congestion 1/28/25  Yes ProviderClarence MD   ranolazine (Ranexa) 500 MG 12 hr tablet Take 1 tablet by mouth 2 (Two) Times a Day. 10/7/24  Yes Go Baer DO   azithromycin (Zithromax Z-Jd) 250 MG tablet Take 2 tablets by mouth on day 1, then 1 tablet daily on days 2-5  Patient not taking: Reported on 1/30/2025 1/13/25   Janice Sue APRN   Budeson-Glycopyrrol-Formoterol (Breztri Aerosphere) 160-9-4.8 MCG/ACT aerosol inhaler Inhale 2 puffs 2 (Two) Times a Day. 1/30/25   Sharmin Baer DO   Budeson-Glycopyrrol-Formoterol (Breztri Aerosphere) 160-9-4.8 MCG/ACT aerosol inhaler Inhale 2 puffs 2 (Two) Times a Day. 1/30/25   Sharmin Baer DO   methylPREDNISolone (MEDROL) 4 MG dose pack Take as directed on package instructions.  Patient not taking: Reported on 1/30/2025 1/13/25   Janice Sue, ELIO         Allergies:   Codeine and Penicillins      Results Review:             Visit Vitals  /68   Pulse 75   Ht 172.7 cm  "(68\")   Wt 96.6 kg (213 lb)   SpO2 94% Comment: RA   BMI 32.39 kg/m²           Social History:     Social History     Socioeconomic History    Marital status:    Tobacco Use    Smoking status: Former     Current packs/day: 0.00     Average packs/day: 2.0 packs/day for 20.0 years (40.0 ttl pk-yrs)     Types: Cigarettes     Start date: 5/10/2001     Quit date: 5/10/2021     Years since quitting: 3.7     Passive exposure: Past    Smokeless tobacco: Never   Vaping Use    Vaping status: Never Used   Substance and Sexual Activity    Alcohol use: Yes     Comment: rarely drinks one    Drug use: Not Currently     Types: Marijuana     Comment: patient reports he had not smoked since CABG    Sexual activity: Yes     Partners: Female                Sharmin Baer DO  Pulmonary/Critical Care  2/6/2025  18:03 CST  "

## 2025-02-18 DIAGNOSIS — J44.9 CHRONIC OBSTRUCTIVE PULMONARY DISEASE, UNSPECIFIED COPD TYPE: ICD-10-CM

## 2025-02-19 ENCOUNTER — TELEPHONE (OUTPATIENT)
Dept: PULMONOLOGY | Facility: CLINIC | Age: 65
End: 2025-02-19
Payer: MEDICARE

## 2025-02-19 NOTE — TELEPHONE ENCOUNTER
----- Message from Sharmin Baer sent at 2/19/2025  2:08 PM CST -----  Hello, please let the pt know his A1 result was normal.  Thanks.     Will discuss at follow up   Prediabetes

## 2025-03-11 RX ORDER — FUROSEMIDE 40 MG/1
40 TABLET ORAL 2 TIMES DAILY
Qty: 180 TABLET | Refills: 3 | Status: SHIPPED | OUTPATIENT
Start: 2025-03-11

## 2025-03-11 NOTE — TELEPHONE ENCOUNTER
Call placed to patient to clarify Lasix dose. He reports taking 40 mg Lasix bid and would like to have 1 pill  versus 2 - 20 mg tablets bid.

## 2025-03-31 RX ORDER — RANOLAZINE 500 MG/1
500 TABLET, EXTENDED RELEASE ORAL 2 TIMES DAILY
Qty: 60 TABLET | Refills: 5 | Status: SHIPPED | OUTPATIENT
Start: 2025-03-31

## 2025-04-14 ENCOUNTER — OFFICE VISIT (OUTPATIENT)
Dept: CARDIOLOGY | Facility: CLINIC | Age: 65
End: 2025-04-14
Payer: MEDICARE

## 2025-04-14 VITALS
OXYGEN SATURATION: 95 % | BODY MASS INDEX: 33.49 KG/M2 | DIASTOLIC BLOOD PRESSURE: 64 MMHG | HEART RATE: 69 BPM | WEIGHT: 221 LBS | HEIGHT: 68 IN | SYSTOLIC BLOOD PRESSURE: 122 MMHG

## 2025-04-14 DIAGNOSIS — I73.9 PERIPHERAL ARTERY DISEASE: ICD-10-CM

## 2025-04-14 DIAGNOSIS — I10 ESSENTIAL HYPERTENSION: Primary | ICD-10-CM

## 2025-04-14 DIAGNOSIS — E78.2 MIXED HYPERLIPIDEMIA: ICD-10-CM

## 2025-04-14 DIAGNOSIS — J43.2 CENTRILOBULAR EMPHYSEMA: ICD-10-CM

## 2025-04-14 DIAGNOSIS — I20.89 STABLE ANGINA PECTORIS: ICD-10-CM

## 2025-04-14 RX ORDER — CLOPIDOGREL BISULFATE 75 MG/1
75 TABLET ORAL DAILY
COMMUNITY

## 2025-04-16 ENCOUNTER — OFFICE VISIT (OUTPATIENT)
Dept: GASTROENTEROLOGY | Facility: CLINIC | Age: 65
End: 2025-04-16
Payer: MEDICARE

## 2025-04-16 VITALS
DIASTOLIC BLOOD PRESSURE: 70 MMHG | SYSTOLIC BLOOD PRESSURE: 120 MMHG | TEMPERATURE: 98 F | BODY MASS INDEX: 32.58 KG/M2 | HEIGHT: 69 IN | WEIGHT: 220 LBS | OXYGEN SATURATION: 98 % | HEART RATE: 74 BPM

## 2025-04-16 DIAGNOSIS — Z86.0101 HISTORY OF ADENOMATOUS POLYP OF COLON: ICD-10-CM

## 2025-04-16 DIAGNOSIS — Z79.01 ANTICOAGULATED: Primary | ICD-10-CM

## 2025-04-16 NOTE — PROGRESS NOTES
Subjective:     Encounter Date:04/14/2025      Patient ID: Tree Shaffer is a 64 y.o. male.    Chief Complaint:  History of Present Illness  History of Present Illness  The patient presents for evaluation of chest pain and arm pain.    He reports a general sense of well-being, with no significant chest discomfort. However, he does experience an unusual sensation of friction in his chest, which he attributes to a previous surgical intervention. He is currently on a daily regimen of baby aspirin, atorvastatin 40 mg, Lasix 40 mg twice daily, lisinopril 2.5 mg, metoprolol 50 mg twice daily, Ranexa 500 mg twice daily, and Plavix 75 mg. He has been prescribed Breztri, but due to financial constraints, he is unable to continue this medication.    He also reports arm pain, which he believes is a result of his sleeping position. He attempts to avoid sleeping on his arm as it tends to become numb. He uses a heart pillow for support during sleep and tries to shift his weight off his shoulder. Despite these efforts, he experiences soreness in his arm when pressure is applied or when he lies on it.    MEDICATIONS  Current: Baby aspirin, atorvastatin 40 mg, Lasix 40 mg twice a day, lisinopril 2.5 mg, metoprolol 50 mg twice a day, Ranexa 500 mg twice a day, Plavix 75 mg    The following portions of the patient's history were reviewed and updated as appropriate: allergies, current medications, past family history, past medical history, past social history, past surgical history, and problem list.    Review of Systems   Constitutional: Negative for diaphoresis, fever and malaise/fatigue.   HENT:  Negative for congestion.    Eyes:  Negative for vision loss in left eye and vision loss in right eye.   Cardiovascular:  Positive for chest pain. Negative for claudication, dyspnea on exertion, irregular heartbeat, leg swelling, orthopnea, palpitations and syncope.   Respiratory:  Negative for cough, shortness of breath and  wheezing.    Hematologic/Lymphatic: Negative for adenopathy.   Skin:  Negative for rash.   Musculoskeletal:  Negative for joint pain and joint swelling.   Gastrointestinal:  Negative for abdominal pain, diarrhea, nausea and vomiting.   Neurological:  Negative for excessive daytime sleepiness, dizziness, focal weakness, light-headedness, numbness and weakness.   Psychiatric/Behavioral:  Negative for depression. The patient does not have insomnia.            Current Outpatient Medications:     albuterol (PROVENTIL) (2.5 MG/3ML) 0.083% nebulizer solution, Take 2.5 mg by nebulization Every 4 (Four) Hours As Needed for Wheezing or Shortness of Air., Disp: 50 each, Rfl: 0    albuterol sulfate  (90 Base) MCG/ACT inhaler, Inhale 2 puffs Every 4 (Four) Hours As Needed for Wheezing., Disp: 6.7 g, Rfl: 5    aspirin (Aspirin Low Dose) 81 MG EC tablet, Take 1 tablet by mouth Daily., Disp: 90 tablet, Rfl: 3    atorvastatin (LIPITOR) 40 MG tablet, Take 1 tablet by mouth Daily., Disp: 90 tablet, Rfl: 3    Budeson-Glycopyrrol-Formoterol (Breztri Aerosphere) 160-9-4.8 MCG/ACT aerosol inhaler, Inhale 2 puffs 2 (Two) Times a Day., Disp: 1 each, Rfl: 5    Budeson-Glycopyrrol-Formoterol (Breztri Aerosphere) 160-9-4.8 MCG/ACT aerosol inhaler, Inhale 2 puffs 2 (Two) Times a Day. (Patient not taking: Reported on 4/16/2025), Disp: 1 each, Rfl: 0    cefdinir (OMNICEF) 300 MG capsule, Take 1 capsule by mouth Every 12 (Twelve) Hours., Disp: , Rfl:     cilostazol (PLETAL) 100 MG tablet, Take 1 tablet by mouth 2 (Two) Times a Day., Disp: 180 tablet, Rfl: 3    clopidogrel (PLAVIX) 75 MG tablet, Take 1 tablet by mouth Daily., Disp: , Rfl:     furosemide (LASIX) 40 MG tablet, Take 1 tablet by mouth 2 (Two) Times a Day., Disp: 180 tablet, Rfl: 3    guaiFENesin (MUCINEX) 600 MG 12 hr tablet, Take 2 tablets by mouth 2 (Two) Times a Day., Disp: , Rfl:     lisinopril (PRINIVIL,ZESTRIL) 2.5 MG tablet, Take 1 tablet by mouth Daily., Disp: 90 tablet,  Rfl: 3    metFORMIN (GLUCOPHAGE) 500 MG tablet, Take 1 tablet by mouth Every 12 (Twelve) Hours., Disp: , Rfl:     metoprolol tartrate (LOPRESSOR) 50 MG tablet, Take 1 tablet by mouth Every 12 (Twelve) Hours., Disp: 180 tablet, Rfl: 3    promethazine-dextromethorphan (PROMETHAZINE-DM) 6.25-15 MG/5ML syrup, Take ONE TEASPOONFUL BY MOUTH three times A DAY AS needed FOR cough OR congestion, Disp: , Rfl:     ranolazine (RANEXA) 500 MG 12 hr tablet, TAKE ONE TABLET BY MOUTH TWICE DAILY, Disp: 60 tablet, Rfl: 5    azithromycin (Zithromax Z-Jd) 250 MG tablet, Take 2 tablets by mouth on day 1, then 1 tablet daily on days 2-5 (Patient not taking: Reported on 4/16/2025), Disp: 6 tablet, Rfl: 0    methylPREDNISolone (MEDROL) 4 MG dose pack, Take as directed on package instructions. (Patient not taking: Reported on 4/16/2025), Disp: 21 tablet, Rfl: 0       Objective:      Vitals:    04/14/25 0854   BP: 122/64   Pulse: 69   SpO2: 95%     Vitals and nursing note reviewed.   Constitutional:       Appearance: Normal and healthy appearance. Well-developed and not in distress.   Eyes:      Extraocular Movements: Extraocular movements intact.      Pupils: Pupils are equal, round, and reactive to light.   HENT:      Head: Normocephalic and atraumatic.    Mouth/Throat:      Pharynx: Oropharynx is clear.   Neck:      Vascular: JVD normal.      Trachea: Trachea normal.   Pulmonary:      Effort: Pulmonary effort is normal.      Breath sounds: Normal breath sounds. No wheezing. No rhonchi. No rales.   Cardiovascular:      PMI at left midclavicular line. Normal rate. Regular rhythm. Normal S1. Normal S2.       Murmurs: There is a grade 2/6 systolic murmur.      No gallop.  No click. No rub.   Pulses:     Dorsalis pedis: 2+ bilaterally.     Posterior tibial: 2+ bilaterally.  Abdominal:      General: Bowel sounds are normal.      Palpations: Abdomen is soft.      Tenderness: There is no abdominal tenderness.   Musculoskeletal: Normal range of  motion.      Cervical back: Normal range of motion and neck supple. Skin:     General: Skin is warm and dry.      Capillary Refill: Capillary refill takes less than 2 seconds.   Feet:      Right foot:      Skin integrity: Skin integrity normal.      Left foot:      Skin integrity: Skin integrity normal.   Neurological:      Mental Status: Alert and oriented to person, place and time.      Sensory: Sensation is intact.      Motor: Motor function is intact.      Coordination: Coordination is intact.   Psychiatric:         Speech: Speech normal.         Behavior: Behavior is cooperative.       Physical Exam  Vital Signs  Blood pressure and heart rate are normal.    Lab Review:       Procedures  Results  Imaging  Vein graft to the obtuse marginal was occluded, but the other three were still doing good.    Assessment/Plan:     Problem List Items Addressed This Visit       Stable angina pectoris    Relevant Medications    clopidogrel (PLAVIX) 75 MG tablet    Essential hypertension - Primary    Peripheral artery disease    COPD (chronic obstructive pulmonary disease)    Mixed hyperlipidemia     Assessment & Plan  1. Chest pain.  He reports no major chest pains but does feel some weird friction, which he attributes to his surgery. His blood pressure and heart rate are within normal limits. The vein graft to the obtuse marginal is occluded, but the other three grafts are functioning well. He is currently on baby aspirin, atorvastatin 40 mg, Lasix 40 mg twice a day, lisinopril 2.5 mg, metoprolol 50 mg twice a day, Ranexa 500 mg twice a day, and Plavix 75 mg. He is advised to continue his current medication regimen without any changes. If any new symptoms arise, he should contact the office immediately.    2. Arm pain.  He experiences arm pain, which he attributes to his sleeping position. He is advised to try different sleeping positions to alleviate the pain.    Follow-up  The patient will follow up in 6  months.      Recommendations/plans:    Transcribed from ambient dictation for Go Baer DO by Go Baer DO.  04/16/25   13:45 CDT    Patient or patient representative verbalized consent for the use of Ambient Listening during the visit with  Go Baer DO for chart documentation. 4/16/2025  13:45 CDT

## 2025-04-16 NOTE — PROGRESS NOTES
"Chief Complaint   Patient presents with    Colonoscopy     Had colon 5 years ago at [facility] 1 polyp needs colon not having any problems has hemorrhoid       PCP: Madhu Andersen, APRN  REFER: Madhu Andersen, AP*    Subjective     HPI    Tree Shaffer is a 64 y.o. male who presents to office for preventative maintenance.  There is  a personal history of colon polyps.   He does not have complaints of nausea/vomiting, change in bowels, weight loss, no BRBPR, no melena.  There is not a family history of colon cancer in first degree relative.  There is not a family history of colon polyps in first degree relative.  Tree Shaffer last colonoscopy-2017 .  Bowels do move on regular basis.        Colonoscopy 2017 - adenomatous polyp      Lab Results - Last 18 Months   Lab Units 10/07/24  1053 09/27/24  1042 09/25/24  0904   GLUCOSE mg/dL 125* 135* 151*   SODIUM mmol/L 143 138 140   POTASSIUM mmol/L 3.8 4.0 4.3   CREATININE mg/dL 0.85 0.83 0.82   BUN mg/dL 16 17 16   BUN / CREAT RATIO  18.8 20.5 19.5   ALK PHOS U/L  --  126*  --    ALT (SGPT) U/L  --  21  --    AST (SGOT) U/L  --  19  --    BILIRUBIN mg/dL  --  0.8  --    ALBUMIN g/dL  --  4.7  --        No results for input(s): \"EGFRIFNONA\", \"EGFRIFAFRI\", \"EGFRRESULT\" in the last 53734 hours.     Past Medical History:   Diagnosis Date    Anxiety     Arthritis     Back pain     Bloating     Bronchitis     COPD (chronic obstructive pulmonary disease)     Coronary artery disease     DDD (degenerative disc disease), cervical     Gallstones     GERD (gastroesophageal reflux disease)     Hemorrhoid     Hypercholesteremia     Hypertension     IBS (irritable bowel syndrome)     Muscle weakness (generalized)     Neck pain     PVD (peripheral vascular disease)     Stress headache     Vision loss      Past Surgical History:   Procedure Laterality Date    AORTAGRAM Right 09/29/2022    Procedure: AORTAGRAM, RIGHT LOWER EXTREMITY ANGIOGRAM, HAWK ATHRECTOMY, BALLOON " ANGIOPLASTY, MYNX CLOSURE;  Surgeon: Leo Biggs MD;  Location:  [facility]ID [facility] 12;  Service: Vascular;  Laterality: Right;    CARDIAC CATHETERIZATION Right 05/07/2021    Procedure: Left Heart Cath R radial US guided w poss intervention;  Surgeon: Go Baer DO;  Location:  [facility] CATH INVASIVE [facility];  Service: Cardiology;  Laterality: Right;    CARDIAC CATHETERIZATION N/A 10/7/2024    Procedure: Left Heart Cath;  Surgeon: Go Baer DO;  Location:  [facility]ATH [facility];  Service: Cardiovascular;  Laterality: N/A;    CHOLECYSTECTOMY      CHOLECYSTECTOMY WITH INTRAOPERATIVE CHOLANGIOGRAM N/A 09/10/2019    Procedure: LAPAROSCOPIC CHOLECYSTECTOMY WITH INTRAOPERATIVE, CHOLANGIOGRAM;  Surgeon: Leon Pulido MD;  Location:  [facility] OR;  Service: General    CORONARY ARTERY BYPASS GRAFT N/A 05/10/2021    Procedure: CORONARY ARTERY BYPASS GRAFTING x 4, lima, endoscopic vein harvest right leg, debra;  Surgeon: Alex Menendez MD;  Location:  [facility] OR;  Service: Cardiothoracic;  Laterality: N/A;    CYST REMOVAL      BACK     HYDROCELECTOMY Left     KNEE SURGERY Left     NECK SURGERY       Outpatient Medications Marked as Taking for the 4/16/25 encounter (Office Visit) with Aman Sarmiento APRN   Medication Sig Dispense Refill    albuterol (PROVENTIL) (2.5 MG/3ML) 0.083% nebulizer solution Take 2.5 mg by nebulization Every 4 (Four) Hours As Needed for Wheezing or Shortness of Air. 50 each 0    albuterol sulfate  (90 Base) MCG/ACT inhaler Inhale 2 puffs Every 4 (Four) Hours As Needed for Wheezing. 6.7 g 5    aspirin (Aspirin Low Dose) 81 MG EC tablet Take 1 tablet by mouth Daily. 90 tablet 3    atorvastatin (LIPITOR) 40 MG tablet Take 1 tablet by mouth Daily. 90 tablet 3    Budeson-Glycopyrrol-Formoterol (Breztri Aerosphere) 160-9-4.8 MCG/ACT aerosol inhaler Inhale 2 puffs 2 (Two) Times a Day. 1 each 5    cefdinir (OMNICEF) 300 MG capsule Take 1 capsule by  "mouth Every 12 (Twelve) Hours.      cilostazol (PLETAL) 100 MG tablet Take 1 tablet by mouth 2 (Two) Times a Day. 180 tablet 3    clopidogrel (PLAVIX) 75 MG tablet Take 1 tablet by mouth Daily.      furosemide (LASIX) 40 MG tablet Take 1 tablet by mouth 2 (Two) Times a Day. 180 tablet 3    guaiFENesin (MUCINEX) 600 MG 12 hr tablet Take 2 tablets by mouth 2 (Two) Times a Day.      lisinopril (PRINIVIL,ZESTRIL) 2.5 MG tablet Take 1 tablet by mouth Daily. 90 tablet 3    metFORMIN (GLUCOPHAGE) 500 MG tablet Take 1 tablet by mouth Every 12 (Twelve) Hours.      metoprolol tartrate (LOPRESSOR) 50 MG tablet Take 1 tablet by mouth Every 12 (Twelve) Hours. 180 tablet 3    promethazine-dextromethorphan (PROMETHAZINE-DM) 6.25-15 MG/5ML syrup Take ONE TEASPOONFUL BY MOUTH three times A DAY AS needed FOR cough OR congestion      ranolazine (RANEXA) 500 MG 12 hr tablet TAKE ONE TABLET BY MOUTH TWICE DAILY 60 tablet 5     Allergies   Allergen Reactions    Codeine Other (See Comments)     Pt states does not know     Penicillins Other (See Comments)     Pt reports he does not know what kind of reaction he has, states \"I'm highly allergic.\"     Social History     Socioeconomic History    Marital status:    Tobacco Use    Smoking status: Former     Current packs/day: 0.00     Average packs/day: 2.0 packs/day for 20.0 years (40.0 ttl pk-yrs)     Types: Cigarettes     Start date: 5/10/2001     Quit date: 5/10/2021     Years since quitting: 3.9     Passive exposure: Past    Smokeless tobacco: Never   Vaping Use    Vaping status: Never Used   Substance and Sexual Activity    Alcohol use: Yes     Comment: rarely drinks one    Drug use: Yes     Types: Marijuana     Comment: rare    Sexual activity: Yes     Partners: Female       Review of Systems   Constitutional:  Negative for fever and unexpected weight change.   HENT:  Negative for trouble swallowing.    Respiratory:  Negative for shortness of breath.    Cardiovascular:  Negative " for chest pain.   Gastrointestinal:  Negative for abdominal pain and anal bleeding.     Objective   Vitals:    04/16/25 0856   BP: 120/70   Pulse: 74   Temp: 98 °F (36.7 °C)   SpO2: 98%     Physical Exam  Constitutional:       Appearance: Normal appearance. He is well-developed.   Eyes:      General: No scleral icterus.  Cardiovascular:      Heart sounds: Normal heart sounds. No murmur heard.  Pulmonary:      Effort: Pulmonary effort is normal.   Abdominal:      General: Bowel sounds are normal. There is no distension.      Palpations: Abdomen is soft.      Tenderness: There is no abdominal tenderness. There is no guarding.   Skin:     General: Skin is warm and dry.      Coloration: Skin is not jaundiced.   Neurological:      Mental Status: He is alert.   Psychiatric:         Behavior: Behavior is cooperative.       Imaging Results (Most Recent)       None          Body mass index is 32.49 kg/m².    Assessment & Plan   Diagnoses and all orders for this visit:    1. Anticoagulated (Primary)    2. History of adenomatous polyp of colon  -     Case Request; Standing  -     Implement Anesthesia Orders Day of Procedure; Standing  -     Follow Anesthesia Guidelines / Protocol; Future  -     Case Request      COLONOSCOPY WITH ANESTHESIA- (examination of colon) (N/A)    Miralax prep     I have suggested utilizing Miralax starting 7 days prior to colonoscopy to help improve prep.  I have explained stool may be loose but we do not want Tree Shaffer to be uncomfortable or experiencing fecal incontinence.  Miralax should be adjusted as needed.     Patient is to continue all blood pressure and cardiac medications prior to procedure and has been advised to take medications morning of procedure   Pt verbalized understanding     Pt to hold diabetes medication/insulin day of procedure to prevent any risk of complications of hypoglycemia intraprocedure.  If taking insulin 1/2 the PM dose as well     All risks, benefits,  alternatives, and indications of colonoscopy procedure have been discussed with the patient. Risks to include perforation of the colon requiring possible surgery or colostomy, risk of bleeding from biopsies or removal of colon tissue, possibility of missing a colon polyp or cancer, or adverse drug reaction.  Benefits to include the diagnosis and management of disease of the colon and rectum. Alternatives to include barium enema, radiographic evaluation, lab testing or no intervention. He verbalizes understanding and agrees.     Patient is to hold their anticoagulation medication per the direction of their prescribing provider,  Dr SUSANNAH Baer. This is to prevent any risk or complication from bleeding intra and post procedure. If they develop bleeding post procedure they are to go the emergency department for further evaluation and treatment immediately.  We will obtain clearance from prescribing provider requesting Plavix will need to be held x 5 days prior to procedure.     I have explained having an adequate and complete prep is associated with success of colonoscopy.   I have explained to Tree Shaffer that not having an adequate bowel prep can lead to decreased rate of  adenoma detection, longer procedure times, and higher chance the physician will not be able to successfully complete full colonoscopy (able to intubate cecum).   I have discussed bowel prep options miralax prep and Golytley.  Tree Shaffer has elected to proceed with miralax prep.   I have also discussed that there are a variety of prep options however prep that is prescribed is influenced on patient health history, how well bowels move on regular basis, and individualized insurance plan.  I have explained that we are not able to know the amount of coverage each individual insurance plan provides for preps.  I asked Tree Shaffer to please call our office if we need to send prescription/provide instructions for another prep due  to costs/insurance coverage.      Aman Sarmiento, APRN  04/16/25        There are no Patient Instructions on file for this visit.

## 2025-04-17 ENCOUNTER — PATIENT ROUNDING (BHMG ONLY) (OUTPATIENT)
Dept: GASTROENTEROLOGY | Facility: CLINIC | Age: 65
End: 2025-04-17
Payer: MEDICARE

## 2025-04-17 NOTE — PROGRESS NOTES
April 17, 2025    Hello, may I speak with Tree Shaffer?    My name is JENNIFER      I am  with [facility]  [address], [address]  [facility] [address] [phone].    Before we get started may I verify your date of birth? 1960    I am calling to officially welcome you to our practice and ask about your recent visit. Is this a good time to talk? yes    Tell me about your visit with us. What things went well?  GREAT       We're always looking for ways to make our patients' experiences even better. Do you have recommendations on ways we may improve?  no    Overall were you satisfied with your first visit to our practice? yes       I appreciate you taking the time to speak with me today. Is there anything else I can do for you? no      Thank you, and have a great day.

## 2025-04-22 ENCOUNTER — TELEPHONE (OUTPATIENT)
Dept: GASTROENTEROLOGY | Facility: CLINIC | Age: 65
End: 2025-04-22
Payer: MEDICARE

## 2025-04-22 NOTE — TELEPHONE ENCOUNTER
----- Message from Go Baer sent at 4/22/2025 10:46 AM CDT -----  Low risk to proceed.  Okay to hold Plavix for 7 days preprocedure and resume on postprocedure day 1.  Recommend not stopping the aspirin.  ----- Message -----  From: Italo Boyd MA  Sent: 4/16/2025   9:23 AM CDT  To: Go Baer DO

## 2025-06-03 ENCOUNTER — TELEPHONE (OUTPATIENT)
Dept: GASTROENTEROLOGY | Facility: CLINIC | Age: 65
End: 2025-06-03
Payer: MEDICARE

## 2025-06-16 ENCOUNTER — OFFICE VISIT (OUTPATIENT)
Dept: PULMONOLOGY | Facility: CLINIC | Age: 65
End: 2025-06-16
Payer: MEDICARE

## 2025-06-16 VITALS
OXYGEN SATURATION: 94 % | SYSTOLIC BLOOD PRESSURE: 132 MMHG | DIASTOLIC BLOOD PRESSURE: 80 MMHG | HEART RATE: 71 BPM | BODY MASS INDEX: 33.22 KG/M2 | HEIGHT: 68 IN | WEIGHT: 219.2 LBS

## 2025-06-16 DIAGNOSIS — Z87.891 FORMER SMOKER: ICD-10-CM

## 2025-06-16 DIAGNOSIS — J43.9 PULMONARY EMPHYSEMA, UNSPECIFIED EMPHYSEMA TYPE: ICD-10-CM

## 2025-06-16 DIAGNOSIS — R05.3 CHRONIC COUGH: ICD-10-CM

## 2025-06-16 DIAGNOSIS — K21.9 GASTROESOPHAGEAL REFLUX DISEASE WITHOUT ESOPHAGITIS: ICD-10-CM

## 2025-06-16 DIAGNOSIS — J44.9 CHRONIC OBSTRUCTIVE PULMONARY DISEASE, UNSPECIFIED COPD TYPE: Primary | ICD-10-CM

## 2025-06-16 DIAGNOSIS — R06.09 CHRONIC DYSPNEA: ICD-10-CM

## 2025-06-16 PROCEDURE — 94727 GAS DIL/WSHOT DETER LNG VOL: CPT | Performed by: INTERNAL MEDICINE

## 2025-06-16 PROCEDURE — 99213 OFFICE O/P EST LOW 20 MIN: CPT | Performed by: INTERNAL MEDICINE

## 2025-06-16 PROCEDURE — 94375 RESPIRATORY FLOW VOLUME LOOP: CPT | Performed by: INTERNAL MEDICINE

## 2025-06-16 PROCEDURE — 3075F SYST BP GE 130 - 139MM HG: CPT | Performed by: INTERNAL MEDICINE

## 2025-06-16 PROCEDURE — 3079F DIAST BP 80-89 MM HG: CPT | Performed by: INTERNAL MEDICINE

## 2025-06-16 PROCEDURE — 94729 DIFFUSING CAPACITY: CPT | Performed by: INTERNAL MEDICINE

## 2025-06-16 RX ORDER — ALBUTEROL SULFATE 90 UG/1
2 INHALANT RESPIRATORY (INHALATION) EVERY 4 HOURS PRN
Qty: 6.7 G | Refills: 5 | Status: SHIPPED | OUTPATIENT
Start: 2025-06-16

## 2025-06-16 NOTE — PROCEDURES
Spirometry with Diffusion Capacity & Lung Volumes    Performed by: Marissa Mckeon, RRT  Authorized by: Sharmin Baer DO     Pre Drug % Predicted    FVC: 89%   FEV1: 61%   FEF 25-75%: 26%   FEV1/FVC: 53%   T%   RV: 90%   DLCO: 81%   D/VAsb: 81%    Interpretation   Spirometry   Spirometry shows moderate obstruction. There is reduced midflow suggesting small airway/airflow obstruction. Post-bronchodilator the ratio shows normal results.   Lung Volume Measurements  Measurements show normal results.   Diffusion Capacity  The patient's diffusion capacity is normal.

## 2025-06-16 NOTE — PROGRESS NOTES
Clinic Note - Follow Up            NAME: Tree Shaffer  MRN: [id number]  AGE: 64 y.o.            : 1960  PRIMARY CARE PROVIDER: Madhu Andersen APRN               Reason for Visit:  Tree Shaffer presents to clinic today for follow up for the evaluation of COPD and pulmonary nodules.    History of Present Illness:  Mr. Shaffer is a 64-year-old male who presents to clinic today for follow-up for evaluation of COPD. Past medical history includes COPD, former tobacco use, GERD, CAD status post bypass, hypertension, hyperlipidemia.     Patient was last seen in clinic for follow-up on 2025.  During that visit the patient was recovering from a recent exacerbation.  He continued to have dyspnea.  We increased his therapy to Breztri.  Today the patient states his respiratory status has been doing well other than some increased dyspnea with the change in weather. He has noted increased dyspnea due increase humidity and heat.  He continues on Breztri and as needed albuterol.  He does note the Breztri is expensive.  He will call his pharmacy/insurance to see if there is a less expensive alternative and let the clinic know.    He did have a ED visit in January for a new cough.  He was given cough syrup and his cough is since resolved.  He is due for a CT and September for pulmonary nodule follow-up.    Review of Systems:  A full 12-point review of systems was performed and was negative except as documented in the HPI.            Physical Exam:  General: No acute distress, cooperative.  Head: Atraumatic, normocephalic, normal inspection.  Eyes: EOMI, normal inspection.  ENT: Mucous membranes moist, normal inspection. No thrush.  Heart: RRR, no murmur  Lungs: Diminished, otherwise clear  MSK: No edema or clubbing  GI/abdominal: Soft, nontender.  Neurologic: Alert, oriented.  Cranial nerves II through XII grossly intact.      Imaging Review:  I personally reviewed the chest x-ray done January  2025:  Chest x-ray obtained due to new cough, showed stable exam and emphysema.  No acute findings.      Pulmonary Functions Testing Results:  6/2025: Moderate COPD with FEV1 61, RV 90, DLCO 81            Problem List:  Problem List Items Addressed This Visit       GERD (gastroesophageal reflux disease)    COPD (chronic obstructive pulmonary disease) - Primary    Relevant Orders    Spirometry with Diffusion Capacity & Lung Volumes    Chronic dyspnea    Former smoker    Chronic cough         Pulmonary Assessment:  Patient is a 64-year-old male who presents to clinic today for follow-up of the above problems.  His respiratory status has been doing well until recent with change in weather.  He continues on Breztri and as needed albuterol.  Will let us know if there is a less expensive alternative.  He is due for a follow-up low-dose CT in September this year, this has been ordered.  We reviewed his PFTs in clinic today which confirmed moderate COPD.      Plan:   - Continue Breztri and as needed albuterol  - If there is a less expensive alternative the patient will let the clinic know and we will call that in place of the Breztri  - Follow-up chest CT in September of this year             Follow Up:  6 months         Thank you Madhu Andersen APRN for allowing me to participate in this patient's care.           Past Medical History:   Past Medical History:   Diagnosis Date    Anxiety     Arthritis     Back pain     Bloating     Bronchitis     COPD (chronic obstructive pulmonary disease)     Coronary artery disease     DDD (degenerative disc disease), cervical     Gallstones     GERD (gastroesophageal reflux disease)     Hemorrhoid     Hypercholesteremia     Hypertension     IBS (irritable bowel syndrome)     Muscle weakness (generalized)     Neck pain     PVD (peripheral vascular disease)     Stress headache     Vision loss          Past Surgical History:   Past Surgical History:   Procedure Laterality Date     AORTOGRAM Right 09/29/2022    Procedure: AORTAGRAM, RIGHT LOWER EXTREMITY ANGIOGRAM, HAWK ATHRECTOMY, BALLOON ANGIOPLASTY, MYNX CLOSURE;  Surgeon: Leo Biggs MD;  Location:  [facility];  Service: Vascular;  Laterality: Right;    CARDIAC CATHETERIZATION Right 05/07/2021    Procedure: Left Heart Cath R radial US guided w poss intervention;  Surgeon: Go Baer DO;  Location:  [facility];  Service: Cardiology;  Laterality: Right;    CARDIAC CATHETERIZATION N/A 10/7/2024    Procedure: Left Heart Cath;  Surgeon: Go Baer DO;  Location:  [facility];  Service: Cardiovascular;  Laterality: N/A;    CHOLECYSTECTOMY      CHOLECYSTECTOMY WITH INTRAOPERATIVE CHOLANGIOGRAM N/A 09/10/2019    Procedure: LAPAROSCOPIC CHOLECYSTECTOMY WITH INTRAOPERATIVE, CHOLANGIOGRAM;  Surgeon: Leon Pulido MD;  Location:  [facility] OR;  Service: General    CORONARY ARTERY BYPASS GRAFT N/A 05/10/2021    Procedure: CORONARY ARTERY BYPASS GRAFTING x 4, lima, endoscopic vein harvest right leg, debra;  Surgeon: Alex Menendez MD;  Location:  [facility] OR;  Service: Cardiothoracic;  Laterality: N/A;    CYST REMOVAL      BACK     HYDROCELECTOMY Left     KNEE SURGERY Left     NECK SURGERY           Family History:   Family History   Problem Relation Age of Onset    COPD Mother     Cancer Mother         femal type    Neuromuscular disorder Father         Lizzy Gehrig's Disease    Colon cancer Neg Hx     Colon polyps Neg Hx     Esophageal cancer Neg Hx          Medications:   Prior to Admission medications    Medication Sig Start Date End Date Taking? Authorizing Provider   albuterol (PROVENTIL) (2.5 MG/3ML) 0.083% nebulizer solution Take 2.5 mg by nebulization Every 4 (Four) Hours As Needed for Wheezing or Shortness of Air. 1/13/25   Janice Sue APRN   albuterol sulfate  (90 Base) MCG/ACT inhaler Inhale 2 puffs Every 4 (Four) Hours As Needed for Wheezing.  9/4/24   Sharmin Baer DO   aspirin (Aspirin Low Dose) 81 MG EC tablet Take 1 tablet by mouth Daily. 8/13/24   Go Baer DO   atorvastatin (LIPITOR) 40 MG tablet Take 1 tablet by mouth Daily. 8/13/24   Go Baer DO   azithromycin (Zithromax Z-Jd) 250 MG tablet Take 2 tablets by mouth on day 1, then 1 tablet daily on days 2-5  Patient not taking: Reported on 4/16/2025 1/13/25   Janice Sue APRN   Budeson-Glycopyrrol-Formoterol (Breztri Aerosphere) 160-9-4.8 MCG/ACT aerosol inhaler Inhale 2 puffs 2 (Two) Times a Day. 1/30/25   Sharmin Baer DO   Budeson-Glycopyrrol-Formoterol (Breztri Aerosphere) 160-9-4.8 MCG/ACT aerosol inhaler Inhale 2 puffs 2 (Two) Times a Day.  Patient not taking: Reported on 4/16/2025 1/30/25   Sharmin Baer DO   cefdinir (OMNICEF) 300 MG capsule Take 1 capsule by mouth Every 12 (Twelve) Hours. 1/28/25   Clarence Kaur MD   cilostazol (PLETAL) 100 MG tablet Take 1 tablet by mouth 2 (Two) Times a Day. 8/13/24   Go Baer DO   clopidogrel (PLAVIX) 75 MG tablet Take 1 tablet by mouth Daily.    Clarence Kaur MD   furosemide (LASIX) 40 MG tablet Take 1 tablet by mouth 2 (Two) Times a Day. 3/11/25   Go Baer DO   guaiFENesin (MUCINEX) 600 MG 12 hr tablet Take 2 tablets by mouth 2 (Two) Times a Day.    Calrence Kaur MD   lisinopril (PRINIVIL,ZESTRIL) 2.5 MG tablet Take 1 tablet by mouth Daily. 8/13/24   Go Baer DO   metFORMIN (GLUCOPHAGE) 500 MG tablet Take 1 tablet by mouth Every 12 (Twelve) Hours. 9/19/24   Clarence Kaur MD   methylPREDNISolone (MEDROL) 4 MG dose pack Take as directed on package instructions.  Patient not taking: Reported on 4/16/2025 1/13/25   Janice Sue APRN   metoprolol tartrate (LOPRESSOR) 50 MG tablet Take 1 tablet by mouth Every 12 (Twelve) Hours. 8/13/24   Go Baer, DO    promethazine-dextromethorphan (PROMETHAZINE-DM) 6.25-15 MG/5ML syrup Take ONE TEASPOONFUL BY MOUTH three times A DAY AS needed FOR cough OR congestion 25   Provider, MD Clarence   ranolazine (RANEXA) 500 MG 12 hr tablet TAKE ONE TABLET BY MOUTH TWICE DAILY 3/31/25   Go Baer DO         Allergies:   Codeine and Penicillins      Results Review:             There were no vitals taken for this visit.        Social History:     Social History     Socioeconomic History    Marital status:    Tobacco Use    Smoking status: Former     Current packs/day: 0.00     Average packs/day: 2.0 packs/day for 20.0 years (40.0 ttl pk-yrs)     Types: Cigarettes     Start date: 5/10/2001     Quit date: 5/10/2021     Years since quittin.1     Passive exposure: Past    Smokeless tobacco: Never   Vaping Use    Vaping status: Never Used   Substance and Sexual Activity    Alcohol use: Yes     Comment: rarely drinks one    Drug use: Yes     Types: Marijuana     Comment: rare    Sexual activity: Yes     Partners: Female                Sharmin Baer DO  Pulmonary/Critical Care  2025  15:01 CDT

## 2025-06-16 NOTE — LETTER
2025     ELIO Hubbard  [address] [place] [address]    Patient: Tree Shaffer   YOB: 1960   Date of Visit: 2025     Dear ELIO Pollard:    Thank you for referring Tree Shaffer to me for evaluation. Below are the relevant portions of my assessment and plan of care.    If you have questions, please do not hesitate to call me. I look forward to following Tree along with you.         Sincerely,        Inessa Baer,         CC: No Recipients      Progress Notes:    Clinic Note - Follow Up            NAME: Tree Shaffer  MRN: [id number]  AGE: 64 y.o.            : 1960  PRIMARY CARE PROVIDER: Madhu Andersen APRN               Reason for Visit:  Tree Shaffer presents to clinic today for follow up for the evaluation of COPD and pulmonary nodules.    History of Present Illness:  Mr. Shaffer is a 64-year-old male who presents to clinic today for follow-up for evaluation of COPD. Past medical history includes COPD, former tobacco use, GERD, CAD status post bypass, hypertension, hyperlipidemia.     Patient was last seen in clinic for follow-up on 2025.  During that visit the patient was recovering from a recent exacerbation.  He continued to have dyspnea.  We increased his therapy to Breztri.  Today the patient states his respiratory status has been doing well other than some increased dyspnea with the change in weather. He has noted increased dyspnea due increase humidity and heat.  He continues on Breztri and as needed albuterol.  He does note the Breztri is expensive.  He will call his pharmacy/insurance to see if there is a less expensive alternative and let the clinic know.    He did have a ED visit in January for a new cough.  He was given cough syrup and his cough is since resolved.  He is due for a CT and September for pulmonary nodule follow-up.    Review of Systems:  A full 12-point review of systems was  performed and was negative except as documented in the HPI.            Physical Exam:  General: No acute distress, cooperative.  Head: Atraumatic, normocephalic, normal inspection.  Eyes: EOMI, normal inspection.  ENT: Mucous membranes moist, normal inspection. No thrush.  Heart: RRR, no murmur  Lungs: Diminished, otherwise clear  MSK: No edema or clubbing  GI/abdominal: Soft, nontender.  Neurologic: Alert, oriented.  Cranial nerves II through XII grossly intact.      Imaging Review:  I personally reviewed the chest x-ray done January 2025:  Chest x-ray obtained due to new cough, showed stable exam and emphysema.  No acute findings.      Pulmonary Functions Testing Results:  6/2025: Moderate COPD with FEV1 61, RV 90, DLCO 81            Problem List:  Problem List Items Addressed This Visit       GERD (gastroesophageal reflux disease)    COPD (chronic obstructive pulmonary disease) - Primary    Relevant Orders    Spirometry with Diffusion Capacity & Lung Volumes    Chronic dyspnea    Former smoker    Chronic cough         Pulmonary Assessment:  Patient is a 64-year-old male who presents to clinic today for follow-up of the above problems.  His respiratory status has been doing well until recent with change in weather.  He continues on Breztri and as needed albuterol.  Will let us know if there is a less expensive alternative.  He is due for a follow-up low-dose CT in September this year, this has been ordered.  We reviewed his PFTs in clinic today which confirmed moderate COPD.      Plan:   - Continue Breztri and as needed albuterol  - If there is a less expensive alternative the patient will let the clinic know and we will call that in place of the Breztri  - Follow-up chest CT in September of this year             Follow Up:  6 months         Thank you Madhu Andersen APRN for allowing me to participate in this patient's care.           Past Medical History:   Past Medical History:   Diagnosis Date   • Anxiety     • Arthritis    • Back pain    • Bloating    • Bronchitis    • COPD (chronic obstructive pulmonary disease)    • Coronary artery disease    • DDD (degenerative disc disease), cervical    • Gallstones    • GERD (gastroesophageal reflux disease)    • Hemorrhoid    • Hypercholesteremia    • Hypertension    • IBS (irritable bowel syndrome)    • Muscle weakness (generalized)    • Neck pain    • PVD (peripheral vascular disease)    • Stress headache    • Vision loss          Past Surgical History:   Past Surgical History:   Procedure Laterality Date   • AORTOGRAM Right 09/29/2022    Procedure: AORTAGRAM, RIGHT LOWER EXTREMITY ANGIOGRAM, HAWK ATHRECTOMY, BALLOON ANGIOPLASTY, MYNX CLOSURE;  Surgeon: Leo Biggs MD;  Location: [facility] HYBRID OR 12;  Service: Vascular;  Laterality: Right;   • CARDIAC CATHETERIZATION Right 05/07/2021    Procedure: Left Heart Cath R radial US guided w poss intervention;  Surgeon: Go Baer DO;  Location:  [facility] CATH INVASIVE LOCATION;  Service: Cardiology;  Laterality: Right;   • CARDIAC CATHETERIZATION N/A 10/7/2024    Procedure: Left Heart Cath;  Surgeon: Go Baer DO;  Location: [facility] CATH INVASIVE LOCATION;  Service: Cardiovascular;  Laterality: N/A;   • CHOLECYSTECTOMY     • CHOLECYSTECTOMY WITH INTRAOPERATIVE CHOLANGIOGRAM N/A 09/10/2019    Procedure: LAPAROSCOPIC CHOLECYSTECTOMY WITH INTRAOPERATIVE, CHOLANGIOGRAM;  Surgeon: Leon Pulido MD;  Location: [facility] OR;  Service: General   • CORONARY ARTERY BYPASS GRAFT N/A 05/10/2021    Procedure: CORONARY ARTERY BYPASS GRAFTING x 4, lima, endoscopic vein harvest right leg, debra;  Surgeon: Alex Menendez MD;  Location: [facility] OR;  Service: Cardiothoracic;  Laterality: N/A;   • CYST REMOVAL      BACK    • HYDROCELECTOMY Left    • KNEE SURGERY Left    • NECK SURGERY           Family History:   Family History   Problem Relation Age of Onset   • COPD Mother    • Cancer Mother         femal type    • Neuromuscular disorder Father         Lizzy Gehrig's Disease   • Colon cancer Neg Hx    • Colon polyps Neg Hx    • Esophageal cancer Neg Hx          Medications:   Prior to Admission medications    Medication Sig Start Date End Date Taking? Authorizing Provider   albuterol (PROVENTIL) (2.5 MG/3ML) 0.083% nebulizer solution Take 2.5 mg by nebulization Every 4 (Four) Hours As Needed for Wheezing or Shortness of Air. 1/13/25   Janice Sue APRN   albuterol sulfate  (90 Base) MCG/ACT inhaler Inhale 2 puffs Every 4 (Four) Hours As Needed for Wheezing. 9/4/24   Sharmin Baer DO   aspirin (Aspirin Low Dose) 81 MG EC tablet Take 1 tablet by mouth Daily. 8/13/24   Go Baer DO   atorvastatin (LIPITOR) 40 MG tablet Take 1 tablet by mouth Daily. 8/13/24   Go Baer DO   azithromycin (Zithromax Z-Jd) 250 MG tablet Take 2 tablets by mouth on day 1, then 1 tablet daily on days 2-5  Patient not taking: Reported on 4/16/2025 1/13/25   Janice Sue APRN   Budeson-Glycopyrrol-Formoterol (Breztri Aerosphere) 160-9-4.8 MCG/ACT aerosol inhaler Inhale 2 puffs 2 (Two) Times a Day. 1/30/25   Sharmin Baer DO   Budeson-Glycopyrrol-Formoterol (Breztri Aerosphere) 160-9-4.8 MCG/ACT aerosol inhaler Inhale 2 puffs 2 (Two) Times a Day.  Patient not taking: Reported on 4/16/2025 1/30/25   Sharmin Baer DO   cefdinir (OMNICEF) 300 MG capsule Take 1 capsule by mouth Every 12 (Twelve) Hours. 1/28/25   Clarence Kaur MD   cilostazol (PLETAL) 100 MG tablet Take 1 tablet by mouth 2 (Two) Times a Day. 8/13/24   Go Baer DO   clopidogrel (PLAVIX) 75 MG tablet Take 1 tablet by mouth Daily.    Clarence Kaur MD   furosemide (LASIX) 40 MG tablet Take 1 tablet by mouth 2 (Two) Times a Day. 3/11/25   Go Baer DO   guaiFENesin (MUCINEX) 600 MG 12 hr tablet Take 2 tablets by mouth 2 (Two) Times a Day.    Provider,  MD Clarence   lisinopril (PRINIVIL,ZESTRIL) 2.5 MG tablet Take 1 tablet by mouth Daily. 24   Go Baer DO   metFORMIN (GLUCOPHAGE) 500 MG tablet Take 1 tablet by mouth Every 12 (Twelve) Hours. 24   Clarence Kaur MD   methylPREDNISolone (MEDROL) 4 MG dose pack Take as directed on package instructions.  Patient not taking: Reported on 2025   Janice Sue APRN   metoprolol tartrate (LOPRESSOR) 50 MG tablet Take 1 tablet by mouth Every 12 (Twelve) Hours. 24   Go Baer DO   promethazine-dextromethorphan (PROMETHAZINE-DM) 6.25-15 MG/5ML syrup Take ONE TEASPOONFUL BY MOUTH three times A DAY AS needed FOR cough OR congestion 25   ProviderClarence MD   ranolazine (RANEXA) 500 MG 12 hr tablet TAKE ONE TABLET BY MOUTH TWICE DAILY 3/31/25   Go Baer DO         Allergies:   Codeine and Penicillins      Results Review:             There were no vitals taken for this visit.        Social History:     Social History     Socioeconomic History   • Marital status:    Tobacco Use   • Smoking status: Former     Current packs/day: 0.00     Average packs/day: 2.0 packs/day for 20.0 years (40.0 ttl pk-yrs)     Types: Cigarettes     Start date: 5/10/2001     Quit date: 5/10/2021     Years since quittin.1     Passive exposure: Past   • Smokeless tobacco: Never   Vaping Use   • Vaping status: Never Used   Substance and Sexual Activity   • Alcohol use: Yes     Comment: rarely drinks one   • Drug use: Yes     Types: Marijuana     Comment: rare   • Sexual activity: Yes     Partners: Female                Sharmin Baer DO  Pulmonary/Critical Care  2025  15:01 CDT

## 2025-06-20 ENCOUNTER — TELEPHONE (OUTPATIENT)
Dept: GASTROENTEROLOGY | Facility: CLINIC | Age: 65
End: 2025-06-20
Payer: MEDICARE

## 2025-06-20 ENCOUNTER — HOSPITAL ENCOUNTER (OUTPATIENT)
Facility: HOSPITAL | Age: 65
Setting detail: HOSPITAL OUTPATIENT SURGERY
Discharge: HOME | End: 2025-06-20
Attending: INTERNAL MEDICINE | Admitting: INTERNAL MEDICINE
Payer: MEDICARE

## 2025-06-20 DIAGNOSIS — Z86.0101 HISTORY OF ADENOMATOUS POLYP OF COLON: Primary | ICD-10-CM

## 2025-06-20 PROCEDURE — G0463 HOSPITAL OUTPT CLINIC VISIT: HCPCS | Performed by: INTERNAL MEDICINE

## 2025-06-20 NOTE — NURSING NOTE
Procedure cancelled, pt reports he thought he was only going to been seen in the office and did not drink any prep. Pt sent to Dr Mendoza's office.

## 2025-06-20 NOTE — TELEPHONE ENCOUNTER
Patient came by office today.   Was scheduled for colonoscopy procedure today - Did not prep and did not hold plavix.   Reschedule for 07/22/2025 at 11:15am     Went over instructions once again with patient. Patient and wife left with instructions.     Need new case request    Thank you

## 2025-07-22 ENCOUNTER — ANESTHESIA EVENT (OUTPATIENT)
Dept: GASTROENTEROLOGY | Facility: HOSPITAL | Age: 65
End: 2025-07-22
Payer: MEDICARE

## 2025-07-22 ENCOUNTER — HOSPITAL ENCOUNTER (OUTPATIENT)
Facility: HOSPITAL | Age: 65
Setting detail: HOSPITAL OUTPATIENT SURGERY
Discharge: HOME | End: 2025-07-22
Attending: INTERNAL MEDICINE | Admitting: INTERNAL MEDICINE
Payer: MEDICARE

## 2025-07-22 ENCOUNTER — ANESTHESIA (OUTPATIENT)
Dept: GASTROENTEROLOGY | Facility: HOSPITAL | Age: 65
End: 2025-07-22
Payer: MEDICARE

## 2025-07-22 ENCOUNTER — TELEPHONE (OUTPATIENT)
Dept: GASTROENTEROLOGY | Facility: CLINIC | Age: 65
End: 2025-07-22
Payer: MEDICARE

## 2025-07-22 VITALS
DIASTOLIC BLOOD PRESSURE: 74 MMHG | HEART RATE: 61 BPM | SYSTOLIC BLOOD PRESSURE: 109 MMHG | BODY MASS INDEX: 30.36 KG/M2 | WEIGHT: 205 LBS | HEIGHT: 69 IN | TEMPERATURE: 97.9 F | OXYGEN SATURATION: 93 % | RESPIRATION RATE: 15 BRPM

## 2025-07-22 DIAGNOSIS — Z86.0101 HISTORY OF ADENOMATOUS POLYP OF COLON: ICD-10-CM

## 2025-07-22 PROCEDURE — 25010000002 LIDOCAINE PF 2% 2 % SOLUTION: Performed by: NURSE ANESTHETIST, CERTIFIED REGISTERED

## 2025-07-22 PROCEDURE — 25810000003 SODIUM CHLORIDE 0.9 % SOLUTION: Performed by: ANESTHESIOLOGY

## 2025-07-22 PROCEDURE — 25010000002 PROPOFOL 10 MG/ML EMULSION: Performed by: NURSE ANESTHETIST, CERTIFIED REGISTERED

## 2025-07-22 PROCEDURE — 45385 COLONOSCOPY W/LESION REMOVAL: CPT | Performed by: INTERNAL MEDICINE

## 2025-07-22 RX ORDER — SODIUM CHLORIDE 9 MG/ML
500 INJECTION, SOLUTION INTRAVENOUS CONTINUOUS PRN
Status: DISCONTINUED | OUTPATIENT
Start: 2025-07-22 | End: 2025-07-22 | Stop reason: HOSPADM

## 2025-07-22 RX ORDER — PROPOFOL 10 MG/ML
VIAL (ML) INTRAVENOUS AS NEEDED
Status: DISCONTINUED | OUTPATIENT
Start: 2025-07-22 | End: 2025-07-22 | Stop reason: SURG

## 2025-07-22 RX ORDER — SODIUM CHLORIDE 0.9 % (FLUSH) 0.9 %
10 SYRINGE (ML) INJECTION AS NEEDED
Status: DISCONTINUED | OUTPATIENT
Start: 2025-07-22 | End: 2025-07-22 | Stop reason: HOSPADM

## 2025-07-22 RX ORDER — LIDOCAINE HYDROCHLORIDE 20 MG/ML
INJECTION, SOLUTION EPIDURAL; INFILTRATION; INTRACAUDAL; PERINEURAL AS NEEDED
Status: DISCONTINUED | OUTPATIENT
Start: 2025-07-22 | End: 2025-07-22 | Stop reason: SURG

## 2025-07-22 RX ORDER — LIDOCAINE HYDROCHLORIDE 10 MG/ML
0.5 INJECTION, SOLUTION EPIDURAL; INFILTRATION; INTRACAUDAL; PERINEURAL ONCE AS NEEDED
Status: DISCONTINUED | OUTPATIENT
Start: 2025-07-22 | End: 2025-07-22 | Stop reason: HOSPADM

## 2025-07-22 RX ADMIN — SODIUM CHLORIDE 500 ML: 9 INJECTION, SOLUTION INTRAVENOUS at 11:45

## 2025-07-22 RX ADMIN — PROPOFOL 225 MG: 10 INJECTION, EMULSION INTRAVENOUS at 12:14

## 2025-07-22 RX ADMIN — LIDOCAINE HYDROCHLORIDE 100 MG: 20 INJECTION, SOLUTION EPIDURAL; INFILTRATION; INTRACAUDAL; PERINEURAL at 12:14

## 2025-07-22 NOTE — H&P
[facility] Gastroenterology  Pre Procedure History & Physical    Chief Complaint:   Polyps    Subjective     HPI:   Polyps    Past Medical History:   Past Medical History:   Diagnosis Date    Anxiety     Arthritis     Back pain     Bloating     Bronchitis     COPD (chronic obstructive pulmonary disease)     Coronary artery disease     DDD (degenerative disc disease), cervical     Gallstones     GERD (gastroesophageal reflux disease)     Hemorrhoid     Hypercholesteremia     Hypertension     IBS (irritable bowel syndrome)     Muscle weakness (generalized)     Neck pain     PVD (peripheral vascular disease)     Stress headache     Vision loss        Past Surgical History:  Past Surgical History:   Procedure Laterality Date    AORTOGRAM Right 09/29/2022    Procedure: AORTAGRAM, RIGHT LOWER EXTREMITY ANGIOGRAM, HAWK ATHRECTOMY, BALLOON ANGIOPLASTY, MYNX CLOSURE;  Surgeon: Leo Biggs MD;  Location: [facility] HYBRID OR 12;  Service: Vascular;  Laterality: Right;    CARDIAC CATHETERIZATION Right 05/07/2021    Procedure: Left Heart Cath R radial US guided w poss intervention;  Surgeon: Go Baer DO;  Location: [facility] CATH INVASIVE LOCATION;  Service: Cardiology;  Laterality: Right;    CARDIAC CATHETERIZATION N/A 10/07/2024    Procedure: Left Heart Cath;  Surgeon: Go Baer DO;  Location: [facility] CATH INVASIVE LOCATION;  Service: Cardiovascular;  Laterality: N/A;    CHOLECYSTECTOMY      denies    CHOLECYSTECTOMY WITH INTRAOPERATIVE CHOLANGIOGRAM N/A 09/10/2019    Procedure: LAPAROSCOPIC CHOLECYSTECTOMY WITH INTRAOPERATIVE, CHOLANGIOGRAM;  Surgeon: Leon Pulido MD;  Location: [facility] OR;  Service: General    CORONARY ARTERY BYPASS GRAFT N/A 05/10/2021    Procedure: CORONARY ARTERY BYPASS GRAFTING x 4, lima, endoscopic vein harvest right leg, debra;  Surgeon: Alex Menendez MD;  Location: [facility] OR;  Service: Cardiothoracic;  Laterality: N/A;    CYST REMOVAL      BACK      HYDROCELECTOMY Left     KNEE SURGERY Left     NECK SURGERY         Family History:  Family History   Problem Relation Age of Onset    COPD Mother     Cancer Mother         femal type    Neuromuscular disorder Father         Lizzy Gehrig's Disease    Colon cancer Neg Hx     Colon polyps Neg Hx     Esophageal cancer Neg Hx        Social History:   reports that he quit smoking about 4 years ago. His smoking use included cigarettes. He started smoking about 24 years ago. He has a 40 pack-year smoking history. He has been exposed to tobacco smoke. He has never used smokeless tobacco. He reports current alcohol use. He reports current drug use. Drug: Marijuana.    Medications:   Prior to Admission medications    Medication Sig Start Date End Date Taking? Authorizing Provider   albuterol sulfate  (90 Base) MCG/ACT inhaler Inhale 2 puffs Every 4 (Four) Hours As Needed for Wheezing. 6/16/25  Yes Sharmin Baer DO   atorvastatin (LIPITOR) 40 MG tablet Take 1 tablet by mouth Daily. 8/13/24  Yes Go Baer DO   Budeson-Glycopyrrol-Formoterol (Breztri Aerosphere) 160-9-4.8 MCG/ACT aerosol inhaler Inhale 2 puffs 2 (Two) Times a Day. 1/30/25  Yes Sharmin Bare,    cilostazol (PLETAL) 100 MG tablet Take 1 tablet by mouth 2 (Two) Times a Day. 8/13/24  Yes Go Baer DO   furosemide (LASIX) 40 MG tablet Take 1 tablet by mouth 2 (Two) Times a Day. 3/11/25  Yes Go Baer DO   lisinopril (PRINIVIL,ZESTRIL) 2.5 MG tablet Take 1 tablet by mouth Daily. 8/13/24  Yes Go Baer DO   metoprolol tartrate (LOPRESSOR) 50 MG tablet Take 1 tablet by mouth Every 12 (Twelve) Hours. 8/13/24  Yes Go Baer DO   ranolazine (RANEXA) 500 MG 12 hr tablet TAKE ONE TABLET BY MOUTH TWICE DAILY 3/31/25  Yes Go Baer DO   albuterol (PROVENTIL) (2.5 MG/3ML) 0.083% nebulizer solution Take 2.5 mg by nebulization Every 4 (Four) Hours  "As Needed for Wheezing or Shortness of Air. 1/13/25   Janice Sue APRN   aspirin (Aspirin Low Dose) 81 MG EC tablet Take 1 tablet by mouth Daily. 8/13/24   Go Baer DO   Budeson-Glycopyrrol-Formoterol (Breztri Aerosphere) 160-9-4.8 MCG/ACT aerosol inhaler Inhale 2 puffs 2 (Two) Times a Day. 1/30/25   Sharmin Baer DO   clopidogrel (PLAVIX) 75 MG tablet Take 1 tablet by mouth Daily.    Provider, MD Clarence   guaiFENesin (MUCINEX) 600 MG 12 hr tablet Take 2 tablets by mouth 2 (Two) Times a Day.    ProviderClarence MD   metFORMIN (GLUCOPHAGE) 500 MG tablet Take 1 tablet by mouth Every 12 (Twelve) Hours. 9/19/24   ProviderClarence MD   promethazine-dextromethorphan (PROMETHAZINE-DM) 6.25-15 MG/5ML syrup Take ONE TEASPOONFUL BY MOUTH three times A DAY AS needed FOR cough OR congestion 1/28/25   Clarence Kaur MD   azithromycin (Zithromax Z-Jd) 250 MG tablet Take 2 tablets by mouth on day 1, then 1 tablet daily on days 2-5  Patient not taking: Reported on 1/30/2025 1/13/25 7/22/25  Janice Sue APRN   cefdinir (OMNICEF) 300 MG capsule Take 1 capsule by mouth Every 12 (Twelve) Hours. 1/28/25 7/22/25  ProviderClarence MD   methylPREDNISolone (MEDROL) 4 MG dose pack Take as directed on package instructions.  Patient not taking: Reported on 1/30/2025 1/13/25 7/22/25  Janice Sue APRN       Allergies:  Codeine and Penicillins    ROS:    General: Weight stable  Resp: No SOA  Cardiovascular: No CP    Objective     Blood pressure 143/83, pulse 64, temperature 97.9 °F (36.6 °C), temperature source Temporal, resp. rate 20, height 175.3 cm (69\"), weight 93 kg (205 lb), SpO2 95%.    Physical Exam   Constitutional: Pt is oriented to person, place, and in no distress.   HENT: Mouth/Throat: Oropharynx is clear.   Cardiovascular: Normal rate, regular rhythm.    Pulmonary/Chest: Effort normal. No respiratory distress. No  wheezes.   Abdominal: Soft. " Non-distended.  Skin: Skin is warm and dry.   Psychiatric: Mood, memory, affect and judgment appear normal.     Assessment & Plan     Diagnosis:  Polyps    Anticipated Surgical Procedure:  C-scope    The risks, benefits, and alternatives of this procedure have been discussed with the patient or the responsible party- the patient understands and agrees to proceed.

## 2025-07-22 NOTE — ANESTHESIA PREPROCEDURE EVALUATION
Anesthesia Evaluation     Patient summary reviewed   no history of anesthetic complications:   NPO Solid Status: > 8 hours             Airway   Mallampati: II  Dental      Pulmonary    (+) COPD,  Cardiovascular   Exercise tolerance: good (4-7 METS)    (+) hypertension, CAD, CABG (2021), hyperlipidemia  (-) angina, cardiac stents      Neuro/Psych- negative ROS  GI/Hepatic/Renal/Endo    (+) obesity, GERD, diabetes mellitus    Musculoskeletal     Abdominal    Substance History      OB/GYN          Other                      Anesthesia Plan    ASA 3     MAC       Anesthetic plan, risks, benefits, and alternatives have been provided, discussed and informed consent has been obtained with: patient.    CODE STATUS:

## 2025-07-22 NOTE — ANESTHESIA POSTPROCEDURE EVALUATION
"Patient: Tree Shaffer    Procedure Summary       Date: 07/22/25 Room / Location:  [facility] /  [facility]    Anesthesia Start: 1209 Anesthesia Stop: 1228    Procedure: COLONOSCOPY WITH ANESTHESIA- (examination of colon) Diagnosis:       History of adenomatous polyp of colon      (History of adenomatous polyp of colon [Z86.0101])    Surgeons: Lazaro Mendoza DO Provider: Clari Hickman CRNA    Anesthesia Type: MAC ASA Status: 3            Anesthesia Type: MAC    Vitals  Vitals Value Taken Time   /74 07/22/25 12:59   Temp     Pulse 61 07/22/25 12:59   Resp 15 07/22/25 12:59   SpO2 93 % 07/22/25 12:59           Post Anesthesia Care and Evaluation    Patient location during evaluation: PHASE II  Patient participation: complete - patient participated  Level of consciousness: awake and awake and alert  Pain score: 0  Pain management: adequate    Airway patency: patent  Anesthetic complications: No anesthetic complications  PONV Status: none  Cardiovascular status: acceptable  Respiratory status: acceptable  Hydration status: acceptable    Comments: Patient discharged according to acceptable Hansa score per RN assessment. See nursing records for further information.     Blood pressure 109/74, pulse 61, temperature 97.9 °F (36.6 °C), temperature source Temporal, resp. rate 15, height 175.3 cm (69\"), weight 93 kg (205 lb), SpO2 93%.      "

## (undated) DEVICE — STERILE (14X122CM) TELESCOPICALLY-FOLDED COVER: Brand: CIV-CLEAR™ TRANSDUCER COVER

## (undated) DEVICE — GLV SURG SENSICARE W/ALOE PF LF 7.5 STRL

## (undated) DEVICE — TIBURON DRAPE TOWELS: Brand: CONVERTORS

## (undated) DEVICE — DEFENDO AIR WATER SUCTION AND BIOPSY VALVE KIT: Brand: DEFENDO AIR/WATER/SUCTION AND BIOPSY VALVE

## (undated) DEVICE — 4.0MM PRECISION ROUND

## (undated) DEVICE — PAD LAP CHOLE: Brand: MEDLINE INDUSTRIES, INC.

## (undated) DEVICE — SUT VIC 0 UR6 27IN VCP603H

## (undated) DEVICE — GLV SURG TRIUMPH MICRO PF LTX 7.5 STRL

## (undated) DEVICE — SUT VIC 4/0 P3 18IN UD VCP494H

## (undated) DEVICE — GLV SURG TRIUMPH MICRO PF LTX 8.5 STRL

## (undated) DEVICE — Z DUP USE 2539655 SENSOR EEG AD W1.1XL11IN BRAIN MON 4 ELECTRD BIS

## (undated) DEVICE — SYSTEM SKIN CLSR 22CM DERMBND PRINEO

## (undated) DEVICE — DEV EPS SPIDERFX 6MM OTW320/RX190CM

## (undated) DEVICE — A2000 MULTI-USE SYRINGE KIT, P/N 701277-003KIT CONTENTS: 100ML CONTRAST RESERVOIR AND TUBING WITH CONTRAST SPIKE AND CLAMP: Brand: A2000 MULTI-USE SYRINGE KIT

## (undated) DEVICE — FORCEP BPLR IRIS

## (undated) DEVICE — DEV CLS VASC MYNXCONTROL 6FTO7F

## (undated) DEVICE — MODEL BT2000 P/N 700287-012KIT CONTENTS: MANIFOLD WITH SALINE AND CONTRAST PORTS, SALINE TUBING WITH SPIKE AND HAND SYRINGE, TRANSDUCER: Brand: BT2000 AUTOMATED MANIFOLD KIT

## (undated) DEVICE — CATH FLSH OMNI SFT 5F 90CM

## (undated) DEVICE — MODEL AT P65, P/N 701554-001KIT CONTENTS: HAND CONTROLLER, 3-WAY HIGH-PRESSURE STOPCOCK WITH ROTATING END AND PREMIUM HIGH-PRESSURE TUBING: Brand: ANGIOTOUCH® KIT

## (undated) DEVICE — MICRO KOVER: Brand: UNBRANDED

## (undated) DEVICE — THE CHANNEL CLEANING BRUSH IS A NYLON FLEXI BRUSH ATTACHED TO A FLEXIBLE PLASTIC SHEATH DESIGNED TO SAFELY REMOVE DEBRIS FROM FLEXIBLE ENDOSCOPES.

## (undated) DEVICE — GW STARTER JB STR .035 15X150CM

## (undated) DEVICE — SOLIDIFIER LIQ LIQUILOC/PLUS W/TREAT 2000CC

## (undated) DEVICE — ARGYLE YANKAUER BULB TIP WITH VENT: Brand: ARGYLE

## (undated) DEVICE — GW STARTER FXD CORE J .035 3X260CM 3MM

## (undated) DEVICE — GLV SURG BIOGEL LTX PF 6 1/2

## (undated) DEVICE — DRAIN,WOUND,ROUND,24FR,5/16",FULL-FLUTED: Brand: MEDLINE

## (undated) DEVICE — PINNACLE R/O II INTRODUCER SHEATH WITH RADIOPAQUE MARKER: Brand: PINNACLE

## (undated) DEVICE — PINNACLE INTRODUCER SHEATH: Brand: PINNACLE

## (undated) DEVICE — 3M™ STERI-STRIP™ REINFORCED ADHESIVE SKIN CLOSURES, R1546, 1/4 IN X 4 IN (6 MM X 100 MM), 10 STRIPS/ENVELOPE: Brand: 3M™ STERI-STRIP™

## (undated) DEVICE — SUTURE MCRYL SZ 4-0 L18IN ABSRB UD L19MM PS-2 3/8 CIR PRIM Y496G

## (undated) DEVICE — SUT SILK 2/0 FS BLK 18IN 685G

## (undated) DEVICE — ESOPHAGEAL STETHOSCOPE W/TEMPERATURE SENSOR: Brand: DEROYAL

## (undated) DEVICE — TRY PREP SCRB VAG PVP

## (undated) DEVICE — ENDOPOUCH RETRIEVER SPECIMEN RETRIEVAL BAGS: Brand: ENDOPOUCH RETRIEVER

## (undated) DEVICE — CATH F5 INF JR 4 100CM: Brand: INFINITI

## (undated) DEVICE — ELECTRD BLD EDGE/INSUL1P 2.4X5.1MM STRL

## (undated) DEVICE — CANN NASL ETCO2 LO/FLO A/

## (undated) DEVICE — RESERVOIR,SUCTION,100CC,SILICONE: Brand: MEDLINE

## (undated) DEVICE — CATH F5 INF LCB 100CM: Brand: INFINITI

## (undated) DEVICE — KT ANTI FOG W/FLD AND SPNG

## (undated) DEVICE — SUT VIC 3/0 RB1 27IN UD VCP215H

## (undated) DEVICE — AORTIC PUNCHES ARE USED TO CREATE A UNIFORM OPENING IN BLOOD VESSELS DURING CARDIOVASCULAR SURGERY. THE VESSEL GRAFT IS INSERTED INTO THE CREATED OPENING AND SUTURED TO THE VESSEL WALL. AORTIC LANCETS ARE USED TO MAKE A SMALL UNIFORM CUT IN A BLOOD VESSEL TO FACILITATE INSERTION OF AN AORTIC PUNCH.  PUNCHES COME IN VARIOUS LENGTHS, DIAMETERS AND TIP CONFIGURATIONS.: Brand: CLEANCUT ROTATING AORTIC PUNCH

## (undated) DEVICE — ST TB EXT STANDARDBORE 30IN

## (undated) DEVICE — PK CATH CARD 30 CA/4

## (undated) DEVICE — CATH F6 ST+ MP A1 100CM: Brand: SUPER TORQUE

## (undated) DEVICE — MONOPOLAR METZENBAUM SCISSOR, MINI BLADE TIP, DISPOSABLE: Brand: MONOPOLAR METZENBAUM SCISSOR, MINI BLADE TIP, DISPOSABLE

## (undated) DEVICE — SENSR O2 OXIMAX FNGR A/ 18IN NONSTR

## (undated) DEVICE — SYS VASOVIEW HEMOPRO ENDOSCOPIC HARVST VESL

## (undated) DEVICE — CATH DIAG IMPULSE M/ PK 145 5FR

## (undated) DEVICE — SOLIDIFIER LIQUI LOC PLUS 2000CC

## (undated) DEVICE — PK TURNOVER RM ADV

## (undated) DEVICE — SPNG GZ WOVN 4X4IN 12PLY 10/BX STRL

## (undated) DEVICE — REPLACEMENT PD F VISTA CERV CLLR ADLT

## (undated) DEVICE — GAUZE,SPONGE,4"X4",16PLY,XRAY,STRL,LF: Brand: MEDLINE

## (undated) DEVICE — YANKAUER SUCTION INSTRUMENT WITHOUT CONTROL VENT, OPEN TIP, CLEAR: Brand: YANKAUER

## (undated) DEVICE — ST CATH CHOLANG WKARLAN/BALN 2LUM 4F 1.25

## (undated) DEVICE — PDS II VLT 0 107CM AG ST3: Brand: ENDOLOOP

## (undated) DEVICE — DRSNG SURESITE WNDW 4X4.5

## (undated) DEVICE — C-ARMOR C-ARM EQUIPMENT COVERS CLEAR STERILE UNIVERSAL FIT 12 PER CASE: Brand: C-ARMOR

## (undated) DEVICE — GLOVE SURG SZ 75 L12IN FNGR THK94MIL TRNSLUC YEL LTX

## (undated) DEVICE — UNDERGLOVE SURG SZ 8 FNGR THK0.21MIL GRN LTX BEAD CUF

## (undated) DEVICE — GW STARTER FXD/CORE PTFE/COAT J/TP/3MM .035IN 10X150CM

## (undated) DEVICE — SPONGE SURG WHT KTNR DISECT RADPQ ST

## (undated) DEVICE — Device

## (undated) DEVICE — 2, DISPOSABLE SUCTION/IRRIGATOR WITHOUT DISPOSABLE TIP: Brand: STRYKEFLOW

## (undated) DEVICE — 1/2 FORCE SURGICAL SPRING CLIP: Brand: STEALTH® SPRING CLIP

## (undated) DEVICE — SNARE POLYP SM AD W13MMXL240CM SHTH DIA2.4MM HEX STIFF

## (undated) DEVICE — GLV SURG DERMASSURE GRN LF PF 8.0

## (undated) DEVICE — SOL IRR NACL 0.9PCT BT 1000ML

## (undated) DEVICE — TIBURON BRACHIAL ANGIOGRAPHY DRAPE: Brand: CONVERTORS

## (undated) DEVICE — MASK,OXYGEN,MED CONC,ADLT,7' TUB, UC: Brand: PENDING

## (undated) DEVICE — NEURO CDS

## (undated) DEVICE — GLOVE SURG SZ 8 L12IN FNGR THK94MIL TRNSLUC YEL LTX HYDRGEL

## (undated) DEVICE — SYR LUERLOK 50ML

## (undated) DEVICE — STERNUM BLADE, OFFSET (32.0 X 0.8 X 6.3MM)

## (undated) DEVICE — TR BAND RADIAL ARTERY COMPRESSION DEVICE: Brand: TR BAND

## (undated) DEVICE — LAPAROSCOPIC MONOPOLAR CORD: Brand: VALLEYLAB

## (undated) DEVICE — SYS DISTNTION VEIN BONCHEK 300MMHG

## (undated) DEVICE — PK OPN HEART 30

## (undated) DEVICE — CLTH CLENS READYCLEANSE PERI CARE PK/5

## (undated) DEVICE — CANN CO2/O2 NASL A/

## (undated) DEVICE — DESTINATION RENAL GUIDING SHEATH: Brand: DESTINATION

## (undated) DEVICE — SUTURE VCRL SZ 3-0 L18IN ABSRB UD L26MM SH 1/2 CIR J864D

## (undated) DEVICE — PENCL ES MEGADINE EZ/CLEAN BUTN W/HOLSTR 10FT

## (undated) DEVICE — SUP ARMBRD ART/LINE BLU

## (undated) DEVICE — GLV SURG BIOGEL LTX PF 7 1/2

## (undated) DEVICE — ELECTRD PAD DEFIB A/

## (undated) DEVICE — CLIP APPLIER: Brand: ENDO CLIP

## (undated) DEVICE — PROXIMATE RH ROTATING HEAD SKIN STAPLERS (35 WIDE) CONTAINS 35 STAINLESS STEEL STAPLES: Brand: PROXIMATE

## (undated) DEVICE — BLAKE SILICONE DRAINS CARDIO CONNECTOR 2:1: Brand: BLAKE

## (undated) DEVICE — PK SET UP ANES OPN HEART 30

## (undated) DEVICE — Device: Brand: SINGLE USE ELECTROSURGICAL SNARE SD-400

## (undated) DEVICE — PATIENT RETURN ELECTRODE, SINGLE-USE, CONTACT QUALITY MONITORING, ADULT, WITH 9FT CORD, FOR PATIENTS WEIGING OVER 33LBS. (15KG): Brand: MEGADYNE

## (undated) DEVICE — ST MIC/INTRO ACC SHRP/NDL TUNG/TP NITNL 5F 45CM 7CM

## (undated) DEVICE — RADIFOCUS OPTITORQUE ANGIOGRAPHIC CATHETER: Brand: OPTITORQUE

## (undated) DEVICE — RADIFOCUS GLIDEWIRE ADVANTAGE GUIDEWIRE: Brand: GLIDEWIRE ADVANTAGE

## (undated) DEVICE — ATHERECTOMY H1-LX HAWKONE 7F EXT TIP US: Brand: HAWKONE™

## (undated) DEVICE — E-PACK PROCEDURE KIT: Brand: E-PACK

## (undated) DEVICE — DEFENDO AIR WATER SUCTION AND BIOPSY VALVE KIT FOR  OLYMPUS: Brand: DEFENDO AIR/WATER/SUCTION AND BIOPSY VALVE

## (undated) DEVICE — 3M™ IOBAN™ 2 ANTIMICROBIAL INCISE DRAPE 6650EZ: Brand: IOBAN™ 2

## (undated) DEVICE — TOWEL,OR,DSP,ST,BLUE,STD,4/PK,20PK/CS: Brand: MEDLINE

## (undated) DEVICE — E-Z CLEAN, NON-STICK, PTFE COATED, ELECTROSURGICAL BLADE ELECTRODE, MODIFIED EXTENDED INSULATION, 2.5 INCH (6.35 CM): Brand: MEGADYNE

## (undated) DEVICE — KT NDL GUIDE STRL 18GA

## (undated) DEVICE — BAPTIST TURNOVER KIT: Brand: MEDLINE INDUSTRIES, INC.

## (undated) DEVICE — INFLATION DEVICE: Brand: ENCORE™ 26

## (undated) DEVICE — PAD ENDOVASCULAR: Brand: MEDLINE INDUSTRIES, INC.

## (undated) DEVICE — GLIDESHEATH SLENDER STAINLESS STEEL KIT: Brand: GLIDESHEATH SLENDER

## (undated) DEVICE — BLAKE SILICONE DRAIN, 19 FR ROUND, HUBLESS WITH 1/4" BENDABLE TROCAR: Brand: BLAKE

## (undated) DEVICE — GLOVE SURG SZ 75 L12IN FNGR THK87MIL DK GRN LTX FREE ISOLEX

## (undated) DEVICE — NAVICROSS SUPPORT CATHETER: Brand: NAVICROSS

## (undated) DEVICE — PAD, DEFIB, ADULT, RADIOTRANS, PHYSIO: Brand: MEDLINE